# Patient Record
Sex: MALE | Race: BLACK OR AFRICAN AMERICAN | Employment: OTHER | URBAN - NONMETROPOLITAN AREA
[De-identification: names, ages, dates, MRNs, and addresses within clinical notes are randomized per-mention and may not be internally consistent; named-entity substitution may affect disease eponyms.]

---

## 2017-01-26 RX ORDER — SITAGLIPTIN 100 MG/1
TABLET, FILM COATED ORAL
Qty: 90 TABLET | Refills: 3 | Status: SHIPPED | OUTPATIENT
Start: 2017-01-26 | End: 2017-03-22

## 2017-03-22 ENCOUNTER — OFFICE VISIT (OUTPATIENT)
Dept: FAMILY MEDICINE CLINIC | Age: 72
End: 2017-03-22

## 2017-03-22 VITALS
DIASTOLIC BLOOD PRESSURE: 76 MMHG | OXYGEN SATURATION: 98 % | BODY MASS INDEX: 33.29 KG/M2 | WEIGHT: 245.8 LBS | SYSTOLIC BLOOD PRESSURE: 126 MMHG | HEIGHT: 72 IN | HEART RATE: 60 BPM

## 2017-03-22 DIAGNOSIS — N40.0 BENIGN NON-NODULAR PROSTATIC HYPERPLASIA WITHOUT LOWER URINARY TRACT SYMPTOMS: ICD-10-CM

## 2017-03-22 DIAGNOSIS — E11.42 TYPE 2 DIABETES MELLITUS WITH DIABETIC POLYNEUROPATHY, WITHOUT LONG-TERM CURRENT USE OF INSULIN (HCC): ICD-10-CM

## 2017-03-22 DIAGNOSIS — I10 HTN (HYPERTENSION), BENIGN: ICD-10-CM

## 2017-03-22 DIAGNOSIS — N40.1 BENIGN NON-NODULAR PROSTATIC HYPERPLASIA WITH LOWER URINARY TRACT SYMPTOMS: ICD-10-CM

## 2017-03-22 DIAGNOSIS — E78.00 PURE HYPERCHOLESTEROLEMIA: ICD-10-CM

## 2017-03-22 DIAGNOSIS — E11.41 TYPE 2 DIABETES MELLITUS WITH DIABETIC MONONEUROPATHY, WITHOUT LONG-TERM CURRENT USE OF INSULIN (HCC): ICD-10-CM

## 2017-03-22 DIAGNOSIS — F41.9 ANXIETY: ICD-10-CM

## 2017-03-22 DIAGNOSIS — Z23 NEED FOR TDAP VACCINATION: Primary | ICD-10-CM

## 2017-03-22 DIAGNOSIS — Z23 NEED FOR VACCINATION WITH 13-POLYVALENT PNEUMOCOCCAL CONJUGATE VACCINE: ICD-10-CM

## 2017-03-22 PROCEDURE — G8598 ASA/ANTIPLAT THER USED: HCPCS | Performed by: FAMILY MEDICINE

## 2017-03-22 PROCEDURE — 1036F TOBACCO NON-USER: CPT | Performed by: FAMILY MEDICINE

## 2017-03-22 PROCEDURE — 1123F ACP DISCUSS/DSCN MKR DOCD: CPT | Performed by: FAMILY MEDICINE

## 2017-03-22 PROCEDURE — G8427 DOCREV CUR MEDS BY ELIG CLIN: HCPCS | Performed by: FAMILY MEDICINE

## 2017-03-22 PROCEDURE — 90670 PCV13 VACCINE IM: CPT | Performed by: FAMILY MEDICINE

## 2017-03-22 PROCEDURE — G8417 CALC BMI ABV UP PARAM F/U: HCPCS | Performed by: FAMILY MEDICINE

## 2017-03-22 PROCEDURE — G0009 ADMIN PNEUMOCOCCAL VACCINE: HCPCS | Performed by: FAMILY MEDICINE

## 2017-03-22 PROCEDURE — 36415 COLL VENOUS BLD VENIPUNCTURE: CPT | Performed by: FAMILY MEDICINE

## 2017-03-22 PROCEDURE — 3045F PR MOST RECENT HEMOGLOBIN A1C LEVEL 7.0-9.0%: CPT | Performed by: FAMILY MEDICINE

## 2017-03-22 PROCEDURE — 4040F PNEUMOC VAC/ADMIN/RCVD: CPT | Performed by: FAMILY MEDICINE

## 2017-03-22 PROCEDURE — 99214 OFFICE O/P EST MOD 30 MIN: CPT | Performed by: FAMILY MEDICINE

## 2017-03-22 PROCEDURE — 3017F COLORECTAL CA SCREEN DOC REV: CPT | Performed by: FAMILY MEDICINE

## 2017-03-22 PROCEDURE — G8484 FLU IMMUNIZE NO ADMIN: HCPCS | Performed by: FAMILY MEDICINE

## 2017-03-22 RX ORDER — CANAGLIFLOZIN 100 MG/1
TABLET, FILM COATED ORAL
COMMUNITY
Start: 2017-03-13 | End: 2017-07-24 | Stop reason: SDUPTHER

## 2017-03-22 ASSESSMENT — PATIENT HEALTH QUESTIONNAIRE - PHQ9
1. LITTLE INTEREST OR PLEASURE IN DOING THINGS: 0
2. FEELING DOWN, DEPRESSED OR HOPELESS: 0
SUM OF ALL RESPONSES TO PHQ9 QUESTIONS 1 & 2: 0
SUM OF ALL RESPONSES TO PHQ QUESTIONS 1-9: 0

## 2017-03-23 LAB
CHOLESTEROL, TOTAL: 270 MG/DL (ref 0–199)
CREATININE URINE: 68.2 MG/DL (ref 39–259)
ESTIMATED AVERAGE GLUCOSE: 191.5 MG/DL
GLUCOSE BLD-MCNC: 140 MG/DL (ref 70–99)
HBA1C MFR BLD: 8.3 %
HDLC SERPL-MCNC: 56 MG/DL (ref 40–60)
LDL CHOLESTEROL CALCULATED: 189 MG/DL
MICROALBUMIN UR-MCNC: <1.2 MG/DL
MICROALBUMIN/CREAT UR-RTO: NORMAL MG/G (ref 0–30)
TRIGL SERPL-MCNC: 123 MG/DL (ref 0–150)
VLDLC SERPL CALC-MCNC: 25 MG/DL

## 2017-03-27 DIAGNOSIS — E11.41 CONTROLLED TYPE 2 DIABETES MELLITUS WITH DIABETIC MONONEUROPATHY, WITHOUT LONG-TERM CURRENT USE OF INSULIN (HCC): ICD-10-CM

## 2017-03-27 DIAGNOSIS — E11.42 TYPE 2 DIABETES MELLITUS WITH DIABETIC POLYNEUROPATHY, WITHOUT LONG-TERM CURRENT USE OF INSULIN (HCC): ICD-10-CM

## 2017-03-27 DIAGNOSIS — E78.00 PURE HYPERCHOLESTEROLEMIA: ICD-10-CM

## 2017-03-27 RX ORDER — GLIMEPIRIDE 1 MG/1
1 TABLET ORAL
Qty: 30 TABLET | Refills: 11 | Status: SHIPPED | OUTPATIENT
Start: 2017-03-27 | End: 2017-11-16 | Stop reason: SDUPTHER

## 2017-03-27 RX ORDER — ROSUVASTATIN CALCIUM 40 MG/1
TABLET, COATED ORAL
Qty: 30 TABLET | Refills: 11 | Status: SHIPPED | OUTPATIENT
Start: 2017-03-27 | End: 2018-05-24 | Stop reason: SDUPTHER

## 2017-03-27 RX ORDER — AMLODIPINE BESYLATE 10 MG/1
TABLET ORAL
Qty: 30 TABLET | Refills: 11 | Status: SHIPPED | OUTPATIENT
Start: 2017-03-27 | End: 2018-03-30 | Stop reason: SDUPTHER

## 2017-03-28 RX ORDER — AMLODIPINE BESYLATE 10 MG/1
TABLET ORAL
Qty: 30 TABLET | Refills: 11 | Status: SHIPPED | OUTPATIENT
Start: 2017-03-28 | End: 2017-07-24 | Stop reason: SDUPTHER

## 2017-04-08 ENCOUNTER — TELEPHONE (OUTPATIENT)
Dept: FAMILY MEDICINE CLINIC | Age: 72
End: 2017-04-08

## 2017-04-08 DIAGNOSIS — E11.9 DIABETES MELLITUS TYPE II, CONTROLLED (HCC): ICD-10-CM

## 2017-04-10 RX ORDER — METFORMIN HYDROCHLORIDE 500 MG/1
TABLET, EXTENDED RELEASE ORAL
Qty: 60 TABLET | Refills: 11 | Status: SHIPPED | OUTPATIENT
Start: 2017-04-10 | End: 2017-07-24 | Stop reason: SDUPTHER

## 2017-04-20 RX ORDER — CANAGLIFLOZIN 100 MG/1
TABLET, FILM COATED ORAL
Qty: 15 TABLET | Refills: 10 | Status: SHIPPED | OUTPATIENT
Start: 2017-04-20 | End: 2018-01-23

## 2017-05-11 RX ORDER — DIAZEPAM 5 MG/1
TABLET ORAL
Qty: 45 TABLET | Refills: 0 | Status: SHIPPED | OUTPATIENT
Start: 2017-05-11 | End: 2017-07-12 | Stop reason: SDUPTHER

## 2017-05-24 RX ORDER — DIAZEPAM 5 MG/1
TABLET ORAL
Qty: 45 TABLET | Refills: 0 | OUTPATIENT
Start: 2017-05-24

## 2017-07-13 RX ORDER — DIAZEPAM 5 MG/1
TABLET ORAL
Qty: 45 TABLET | Refills: 0 | Status: SHIPPED | OUTPATIENT
Start: 2017-07-13 | End: 2017-09-25 | Stop reason: SDUPTHER

## 2017-07-24 ENCOUNTER — OFFICE VISIT (OUTPATIENT)
Dept: FAMILY MEDICINE CLINIC | Age: 72
End: 2017-07-24

## 2017-07-24 VITALS
OXYGEN SATURATION: 97 % | WEIGHT: 242.2 LBS | BODY MASS INDEX: 32.8 KG/M2 | SYSTOLIC BLOOD PRESSURE: 122 MMHG | HEART RATE: 74 BPM | HEIGHT: 72 IN | DIASTOLIC BLOOD PRESSURE: 80 MMHG

## 2017-07-24 DIAGNOSIS — E11.42 TYPE 2 DIABETES MELLITUS WITH DIABETIC POLYNEUROPATHY, WITHOUT LONG-TERM CURRENT USE OF INSULIN (HCC): ICD-10-CM

## 2017-07-24 DIAGNOSIS — N40.0 BENIGN NON-NODULAR PROSTATIC HYPERPLASIA WITHOUT LOWER URINARY TRACT SYMPTOMS: ICD-10-CM

## 2017-07-24 DIAGNOSIS — F41.9 ANXIETY: ICD-10-CM

## 2017-07-24 DIAGNOSIS — Z13.6 SCREENING FOR AAA (ABDOMINAL AORTIC ANEURYSM): ICD-10-CM

## 2017-07-24 DIAGNOSIS — I10 HTN (HYPERTENSION), BENIGN: ICD-10-CM

## 2017-07-24 DIAGNOSIS — E11.41 CONTROLLED TYPE 2 DIABETES MELLITUS WITH DIABETIC MONONEUROPATHY, WITHOUT LONG-TERM CURRENT USE OF INSULIN (HCC): Primary | ICD-10-CM

## 2017-07-24 DIAGNOSIS — E78.00 PURE HYPERCHOLESTEROLEMIA: ICD-10-CM

## 2017-07-24 LAB — HBA1C MFR BLD: 8.4 %

## 2017-07-24 PROCEDURE — 1036F TOBACCO NON-USER: CPT | Performed by: FAMILY MEDICINE

## 2017-07-24 PROCEDURE — G8427 DOCREV CUR MEDS BY ELIG CLIN: HCPCS | Performed by: FAMILY MEDICINE

## 2017-07-24 PROCEDURE — 99214 OFFICE O/P EST MOD 30 MIN: CPT | Performed by: FAMILY MEDICINE

## 2017-07-24 PROCEDURE — 3017F COLORECTAL CA SCREEN DOC REV: CPT | Performed by: FAMILY MEDICINE

## 2017-07-24 PROCEDURE — 3046F HEMOGLOBIN A1C LEVEL >9.0%: CPT | Performed by: FAMILY MEDICINE

## 2017-07-24 PROCEDURE — 1123F ACP DISCUSS/DSCN MKR DOCD: CPT | Performed by: FAMILY MEDICINE

## 2017-07-24 PROCEDURE — 83036 HEMOGLOBIN GLYCOSYLATED A1C: CPT | Performed by: FAMILY MEDICINE

## 2017-07-24 PROCEDURE — G8598 ASA/ANTIPLAT THER USED: HCPCS | Performed by: FAMILY MEDICINE

## 2017-07-24 PROCEDURE — 4040F PNEUMOC VAC/ADMIN/RCVD: CPT | Performed by: FAMILY MEDICINE

## 2017-07-24 PROCEDURE — G8417 CALC BMI ABV UP PARAM F/U: HCPCS | Performed by: FAMILY MEDICINE

## 2017-07-24 RX ORDER — PREGABALIN 75 MG/1
CAPSULE ORAL
Qty: 60 CAPSULE | Refills: 3 | Status: SHIPPED | OUTPATIENT
Start: 2017-07-24 | End: 2017-10-24

## 2017-07-24 RX ORDER — METFORMIN HYDROCHLORIDE 500 MG/1
1000 TABLET, EXTENDED RELEASE ORAL 2 TIMES DAILY
Qty: 120 TABLET | Refills: 11 | Status: SHIPPED | OUTPATIENT
Start: 2017-07-24 | End: 2018-04-23 | Stop reason: ALTCHOICE

## 2017-07-25 PROBLEM — E11.42 TYPE 2 DIABETES MELLITUS WITH DIABETIC POLYNEUROPATHY, WITHOUT LONG-TERM CURRENT USE OF INSULIN (HCC): Status: ACTIVE | Noted: 2017-07-25

## 2017-07-27 LAB
6-ACETYLMORPHINE: NOT DETECTED
7-AMINOCLONAZEPAM: NOT DETECTED
ALPHA-OH-ALPRAZOLAM: NOT DETECTED
ALPRAZOLAM: NOT DETECTED
AMPHETAMINE: NOT DETECTED
BARBITURATES: NOT DETECTED
BENZOYLECGONINE: NOT DETECTED
BUPRENORPHINE: NOT DETECTED
CARISOPRODOL: NOT DETECTED
CLONAZEPAM: NOT DETECTED
CODEINE: NOT DETECTED
CREATININE URINE: 70.1 MG/DL (ref 20–400)
DIAZEPAM: NOT DETECTED
DRUGS EXPECTED: NORMAL
EER PAIN MGT DRUG PANEL, HIGH RES/EMIT U: NORMAL
ETHYL GLUCURONIDE: NOT DETECTED
FENTANYL: NOT DETECTED
HYDROCODONE: NOT DETECTED
HYDROMORPHONE: NOT DETECTED
LORAZEPAM: NOT DETECTED
MARIJUANA METABOLITE: NOT DETECTED
MDA: NOT DETECTED
MDEA: NOT DETECTED
MDMA URINE: NOT DETECTED
MEPERIDINE: NOT DETECTED
METHADONE: NOT DETECTED
METHAMPHETAMINE: NOT DETECTED
METHYLPHENIDATE: NOT DETECTED
MIDAZOLAM: NOT DETECTED
MORPHINE: NOT DETECTED
NORBUPRENORPHINE, FREE: NOT DETECTED
NORDIAZEPAM: PRESENT
NORFENTANYL: NOT DETECTED
NORHYDROCODONE, URINE: NOT DETECTED
NOROXYCODONE: NOT DETECTED
NOROXYMORPHONE, URINE: NOT DETECTED
OXAZEPAM: PRESENT
OXYCODONE: NOT DETECTED
OXYMORPHONE: NOT DETECTED
PAIN MANAGEMENT DRUG PANEL: NORMAL
PAIN MANAGEMENT DRUG PANEL: NORMAL
PCP: NOT DETECTED
PHENTERMINE: NOT DETECTED
PROPOXYPHENE: NOT DETECTED
TAPENTADOL, URINE: NOT DETECTED
TAPENTADOL-O-SULFATE, URINE: NOT DETECTED
TEMAZEPAM: PRESENT
TRAMADOL: NOT DETECTED
ZOLPIDEM: NOT DETECTED

## 2017-08-23 ENCOUNTER — HOSPITAL ENCOUNTER (OUTPATIENT)
Dept: ULTRASOUND IMAGING | Age: 72
Discharge: HOME | End: 2017-08-23
Attending: FAMILY MEDICINE | Admitting: FAMILY MEDICINE

## 2017-08-23 DIAGNOSIS — Z13.6 ENCOUNTER FOR SCREENING FOR CARDIOVASCULAR DISORDERS: ICD-10-CM

## 2017-08-23 DIAGNOSIS — Z13.6 SCREENING FOR AAA (ABDOMINAL AORTIC ANEURYSM): ICD-10-CM

## 2017-09-25 DIAGNOSIS — F41.9 ANXIETY: ICD-10-CM

## 2017-09-25 RX ORDER — DIAZEPAM 5 MG/1
TABLET ORAL
Qty: 45 TABLET | Refills: 0 | Status: SHIPPED | OUTPATIENT
Start: 2017-09-25 | End: 2018-01-03 | Stop reason: SDUPTHER

## 2017-10-24 ENCOUNTER — OFFICE VISIT (OUTPATIENT)
Dept: FAMILY MEDICINE CLINIC | Age: 72
End: 2017-10-24

## 2017-10-24 VITALS
SYSTOLIC BLOOD PRESSURE: 126 MMHG | BODY MASS INDEX: 32.23 KG/M2 | HEART RATE: 83 BPM | DIASTOLIC BLOOD PRESSURE: 78 MMHG | HEIGHT: 72 IN | OXYGEN SATURATION: 97 % | WEIGHT: 238 LBS

## 2017-10-24 DIAGNOSIS — E11.41 CONTROLLED TYPE 2 DIABETES MELLITUS WITH DIABETIC MONONEUROPATHY, WITHOUT LONG-TERM CURRENT USE OF INSULIN (HCC): Primary | ICD-10-CM

## 2017-10-24 DIAGNOSIS — F41.9 ANXIETY: ICD-10-CM

## 2017-10-24 DIAGNOSIS — N40.0 BENIGN NON-NODULAR PROSTATIC HYPERPLASIA WITHOUT LOWER URINARY TRACT SYMPTOMS: ICD-10-CM

## 2017-10-24 DIAGNOSIS — I10 HTN (HYPERTENSION), BENIGN: ICD-10-CM

## 2017-10-24 DIAGNOSIS — L91.0 KELOID: ICD-10-CM

## 2017-10-24 DIAGNOSIS — I25.10 CAD IN NATIVE ARTERY: ICD-10-CM

## 2017-10-24 DIAGNOSIS — E11.42 TYPE 2 DIABETES MELLITUS WITH DIABETIC POLYNEUROPATHY, WITHOUT LONG-TERM CURRENT USE OF INSULIN (HCC): ICD-10-CM

## 2017-10-24 DIAGNOSIS — K59.00 CONSTIPATION, UNSPECIFIED CONSTIPATION TYPE: ICD-10-CM

## 2017-10-24 DIAGNOSIS — Z23 NEED FOR PROPHYLACTIC VACCINATION AGAINST DIPHTHERIA-TETANUS-PERTUSSIS (DTP): ICD-10-CM

## 2017-10-24 DIAGNOSIS — Z23 NEED FOR INFLUENZA VACCINATION: ICD-10-CM

## 2017-10-24 DIAGNOSIS — E78.00 PURE HYPERCHOLESTEROLEMIA: ICD-10-CM

## 2017-10-24 LAB — HBA1C MFR BLD: 8.4 %

## 2017-10-24 PROCEDURE — 4040F PNEUMOC VAC/ADMIN/RCVD: CPT | Performed by: FAMILY MEDICINE

## 2017-10-24 PROCEDURE — 3045F PR MOST RECENT HEMOGLOBIN A1C LEVEL 7.0-9.0%: CPT | Performed by: FAMILY MEDICINE

## 2017-10-24 PROCEDURE — G8598 ASA/ANTIPLAT THER USED: HCPCS | Performed by: FAMILY MEDICINE

## 2017-10-24 PROCEDURE — G8417 CALC BMI ABV UP PARAM F/U: HCPCS | Performed by: FAMILY MEDICINE

## 2017-10-24 PROCEDURE — 3017F COLORECTAL CA SCREEN DOC REV: CPT | Performed by: FAMILY MEDICINE

## 2017-10-24 PROCEDURE — G8484 FLU IMMUNIZE NO ADMIN: HCPCS | Performed by: FAMILY MEDICINE

## 2017-10-24 PROCEDURE — G8427 DOCREV CUR MEDS BY ELIG CLIN: HCPCS | Performed by: FAMILY MEDICINE

## 2017-10-24 PROCEDURE — G0008 ADMIN INFLUENZA VIRUS VAC: HCPCS | Performed by: FAMILY MEDICINE

## 2017-10-24 PROCEDURE — 83036 HEMOGLOBIN GLYCOSYLATED A1C: CPT | Performed by: FAMILY MEDICINE

## 2017-10-24 PROCEDURE — 90688 IIV4 VACCINE SPLT 0.5 ML IM: CPT | Performed by: FAMILY MEDICINE

## 2017-10-24 PROCEDURE — 1123F ACP DISCUSS/DSCN MKR DOCD: CPT | Performed by: FAMILY MEDICINE

## 2017-10-24 PROCEDURE — 1036F TOBACCO NON-USER: CPT | Performed by: FAMILY MEDICINE

## 2017-10-24 PROCEDURE — 99214 OFFICE O/P EST MOD 30 MIN: CPT | Performed by: FAMILY MEDICINE

## 2017-10-24 NOTE — PROGRESS NOTES
SUBJECTIVE:    10/24/2017    Adan Tripp (: 1945)    81 y.o.    male    Prior to Visit Medications    Medication Sig Taking? Authorizing Provider   Tdap (ADACEL) 5-2-15.5 LF-MCG/0.5 injection Inject 0.5 mLs into the muscle once for 1 dose Yes Sonam Cole MD   diazepam (VALIUM) 5 MG tablet TAKE ONE TABLET BY MOUTH THREE TIMES A DAY AS NEEDED Yes Milena Jacobs CNP   pregabalin (LYRICA) 75 MG capsule 1 nightly for a week, then 1 bid Yes Sonam Cole MD   metFORMIN (GLUCOPHAGE-XR) 500 MG extended release tablet Take 2 tablets by mouth 2 times daily Yes Sonam Cole MD   INVOKANA 100 MG TABS tablet TAKE ONE TABLET BY MOUTH EVERY MORNING BEFORE BREAKFAST Yes Supriya Jiménez NP   amLODIPine (NORVASC) 10 MG tablet TAKE ONE TABLET BY MOUTH DAILY Yes Sonam Cole MD   rosuvastatin (CRESTOR) 40 MG tablet 1 daily Yes Sonam Cole MD   glimepiride (AMARYL) 1 MG tablet Take 1 tablet by mouth every morning (before breakfast) Yes Sonam Cole MD   lisinopril (PRINIVIL;ZESTRIL) 40 MG tablet TAKE ONE TABLET BY MOUTH DAILY Yes Cecily Enriquez NP   finasteride (PROSCAR) 5 MG tablet Take 1 tablet by mouth daily Yes Sonam Cole MD   glucose blood VI test strips (ONE TOUCH ULTRA TEST) strip TEST TWICE A DAY Yes Sonam Cole MD   tamsulosin (FLOMAX) 0.4 MG capsule TAKE TWO CAPSULEs BY MOUTH EVERY DAY 1/2 HOUR AFTER THE SAME MEAL EACH DAY Yes Sonam Cole MD   Omega-3 Fatty Acids (FISH OIL) 1000 MG CAPS Take 1 capsule by mouth 2 times daily Yes Cecily Enriquez NP   Nitroglycerin 400 MCG/SPRAY AERS Place 1 tablet onto the tongue every 5 minutes as needed. Yes Sonam Cole MD   isosorbide mononitrate (IMDUR) 60 MG CR tablet Take 60 mg by mouth continuous prn. Yes Historical Provider, MD   aspirin 325 MG tablet Take 325 mg by mouth daily.  Yes Historical Provider, MD   atenolol (TENORMIN) 25 MG tablet Take 50 mg by mouth daily. Yes Historical Provider, MD   He did not use the Lyrica. Had the AAA screening and it was OK. ALLERGIES:  Allergies   Allergen Reactions    Prednisone Other (See Comments)     Made patient very agitated (medrol dospak)    Codeine Other (See Comments)     constipation       Chief Complaint   Patient presents with    Diarrhea     3 month f/u - constipation alternating with lose stool (had colonoscopy recently but wasn't cleaned out so he has to repeat the study).  Diabetes   Uses the Valium before bed or if he wakes up early he will take one. HPI  Barrett Blair has to have his colonoscopy repeated, he was not cleaned out adequately. Still has constipation alternating with loose stool. Dr. Soheila Castro gave him lactulose but he has not taken because that upsets his stomach. Suggested he may just try a half a dose of the lactulose daily. His sugars been running a little bit better. Increase the metformin XR. He understands that the metformin to may have some effect on his bowels but he was pleased with the improvement in his sugar. Is here for high blood pressure. Watching salt intake. Blood pressure checked at home - no. Numbers good if checked? NA. Denies chest pain. Denies shortness of breath. Taking medications as prescribed. Is here for diabetes. BGs consistently in an acceptable range. No increased thirst or increased urination. No dizziness, shakiness, or cold sweats. Is here for cholesterol. Tolerating medication. Trying to watch low-fat diet. Weight stable. No change in exercise. HX: (> 3 status chronic/inact. Prob) or  Wt Readings from Last 3 Encounters:   10/24/17 238 lb (108 kg)   07/24/17 242 lb 3.2 oz (109.9 kg)   03/22/17 245 lb 12.8 oz (111.5 kg)       Occupation Retired teacher now works security              HPI:  (>4 )  LOCATION:  QUALITY:SEVERITY:  DURATION:  TIMING:  CONTEXT:  MOD. FACTORS:  ASSOC.  S/S:    Pertinent items are noted in HPI.  (+/- 2-9 systems)  ROS  All other systems reviewed and are negative except as noted above  Additional review of systems may be scanned into the media section of this medical record. Any responses requiring further intervention were pursued.     Past Medical History:   Diagnosis Date    Acute MI     Angina pectoris, unstable (HCC)     ASHD (arteriosclerotic heart disease)     Bladder outlet obstruction     BPH (benign prostatic hyperplasia)     Chest pain     Constipation     Diabetes mellitus (HCC)     GERD (gastroesophageal reflux disease)     Hx of CABG     Hypercholesteremia     Hypercholesterolemia     Hypertension     IBS (irritable bowel syndrome)     Sinusitis     Type II or unspecified type diabetes mellitus without mention of complication, not stated as uncontrolled        Family History   Problem Relation Age of Onset    Diabetes Mother     High Blood Pressure Mother     Arthritis Mother        Social History   Substance Use Topics    Smoking status: Former Smoker     Packs/day: 5.00     Years: 10.00     Types: Cigarettes     Quit date: 3/13/1976    Smokeless tobacco: Never Used    Alcohol use No      Comment: 3 beers a month         Past Surgical History:   Procedure Laterality Date    CARDIAC CATHETERIZATION  2002    stent    CARDIAC CATHETERIZATION  2011    stent    COLONOSCOPY  10/24/13    CORONARY ANGIOPLASTY WITH STENT PLACEMENT         Immunization History   Administered Date(s) Administered    Influenza Vaccine, unspecified formulation 10/27/2016    Influenza Virus Vaccine 10/03/2014, 10/06/2015    Pneumococcal 13-valent Conjugate ([id number]) 03/22/2017    Pneumococcal Polysaccharide ([id number]) 02/06/2013, 10/06/2015       Vitals:    10/24/17 1416   BP: 126/78   Site: Left Arm   Position: Sitting   Cuff Size: Large Adult   Pulse: 83   SpO2: 97%   Weight: 238 lb (108 kg)   Height: 6' (1.829 m)       Estimated body mass index is 32.28 kg/m² as calculated from the following:    Height as of this encounter: 6' (1.829 m). Weight as of this encounter: 238 lb (108 kg).   OBJECTIVE:    /78 (Site: Left Arm, Position: Sitting, Cuff Size: Large Adult)   Pulse 83   Ht 6' (1.829 m)   Wt 238 lb (108 kg)   SpO2 97% Comment: RA  BMI 32.28 kg/m²   BP Readings from Last 2 Encounters:   10/24/17 126/78   07/24/17 122/80     Lab Review   Office Visit on 07/24/2017   Component Date Value    Hemoglobin A1C 07/24/2017 8.4     Drugs Expected 07/27/2017 See Interp     Pain Management Drug Pan* 07/27/2017 Consistent     Creatinine, Ur 07/27/2017 70.1     Codeine 07/27/2017 Not Detected     Morphine 07/27/2017 Not Detected     6-Acetylmorphine 07/27/2017 Not Detected     Oxycodone 07/27/2017 Not Detected     Noroxycodone 07/27/2017 Not Detected     Oxymorphone 07/27/2017 Not Detected     NOROXYMORPHONE, URINE 07/27/2017 Not Detected     Hydrocodone 07/27/2017 Not Detected     NORHYDROCODONE, URINE 07/27/2017 Not Detected     Hydromorphone 07/27/2017 Not Detected     Buprenorphine 07/27/2017 Not Detected     Norbuprenorphine 07/27/2017 Not Detected     Fentanyl 07/27/2017 Not Detected     Norfentanyl 07/27/2017 Not Detected     Meperidine 07/27/2017 Not Detected     Tapentadol, Urine 07/27/2017 Not Detected     Tapentadol-O-Sulfate, Ur* 07/27/2017 Not Detected     Methadone 07/27/2017 Not Detected     Propoxyphene 07/27/2017 Not Detected     Tramadol 07/27/2017 Not Detected     Amphetamine 07/27/2017 Not Detected     Methamphetamine 07/27/2017 Not Detected     MDMA URINE 07/27/2017 Not Detected     MDA 07/27/2017 Not Detected     MDEA 07/27/2017 Not Detected     Methylphenidate 07/27/2017 Not Detected     Phentermine 07/27/2017 Not Detected     Benzoylecgonine 07/27/2017 Not Detected     Alprazolam 07/27/2017 Not Detected     Alpha-OH-alprazolam 07/27/2017 Not Detected     Clonazepam 07/27/2017 Not Detected     7-aminoclonazepam 07/27/2017 Not Detected     Diazepam 07/27/2017 Not Detected     NORDIAZEPAM 07/27/2017 Present     OXAZEPAM 07/27/2017 Present     TEMAZEPAM 07/27/2017 Present     Lorazepam 07/27/2017 Not Detected     Midazolam 07/27/2017 Not Detected     Zolpidem 07/27/2017 Not Detected     Barbiturates 07/27/2017 Not Detected     Ethyl Glucuronide 07/27/2017 Not Detected     Marijuana Metabolite 07/27/2017 Not Detected     PCP 07/27/2017 Not Detected     CARISOPRODOL 07/27/2017 Not Detected     Pain Management Drug Pan* 07/27/2017 See Below     [facility] Pain Mgt Drug Panel,* 07/27/2017 See Note        Physical Exam   Constitutional: He appears well-developed and well-nourished. No distress. HENT:   Head: Normocephalic and atraumatic. Right Ear: External ear normal.   Left Ear: External ear normal.   Nose: Nose normal.   Eyes: Conjunctivae and EOM are normal. Pupils are equal, round, and reactive to light. Right eye exhibits no discharge. Left eye exhibits no discharge. No scleral icterus. Cardiovascular: Normal rate, regular rhythm and normal heart sounds. Pulmonary/Chest: Effort normal and breath sounds normal. No respiratory distress. Abdominal: Soft. There is no tenderness. Skin: Skin is warm and dry. No rash noted. He is not diaphoretic. No erythema. No pallor. Psychiatric: He has a normal mood and affect. His behavior is normal. Judgment and thought content normal.   Nursing note and vitals reviewed. ASSESSMENT:    Assessment/Plan:  Anel Alexander was seen today for diarrhea and diabetes.     Diagnoses and all orders for this visit:    Controlled type 2 diabetes mellitus with diabetic mononeuropathy, without long-term current use of insulin (HCC)  -     POCT glycosylated hemoglobin (Hb A1C)    Need for influenza vaccination  -     INFLUENZA, QUADV, 3 YRS AND OLDER, IM, MDV, 0.5ML (FLUZONE QUADV)    Need for prophylactic vaccination against diphtheria-tetanus-pertussis (DTP)  -     Tdap (ADACEL) 5-2-15.5 LF-MCG/0.5 injection; Inject 0.5 mLs into the muscle once for 1 dose    Type 2 diabetes mellitus with diabetic polyneuropathy, without long-term current use of insulin (HCC)    Benign non-nodular prostatic hyperplasia without lower urinary tract symptoms    CAD in native artery    Pure hypercholesterolemia    HTN (hypertension), benign    DM (diabetes mellitus), secondary, uncontrolled, w/neurologic complic (HCC)    Anxiety    Constipation, unspecified constipation type    Keloid        Results for POC orders placed in visit on 10/24/17   POCT glycosylated hemoglobin (Hb A1C)   Result Value Ref Range    Hemoglobin A1C 8.4 %       PLAN:    Last A1c was 8.4, recheck today. Flu shot given. Get Tdap. Reassured about keloid. He continues use diazepam appropriately. Blood pressure and cholesterol acceptable. Await follow-up colonoscopy. Repeat 6 months. His sugar control has not improved any. He had been using the Invokanna when necessary. Ask and begin using 100 mg daily. Recheck A1c 3 months. Controlled Substances Monitoring:     Attestation: The Prescription Monitoring Report for this patient was reviewed today. (Caroline Gallegos MD)  Documentation: No signs of potential drug abuse or diversion identified., Existing medication contract. (Caroline Gallegos MD)      Patient should call the office immediately with new or ongoing signs or symptoms or worsening, or proceed to the emergency room. All entries in chief complaint and history of present illness are reviewed and validated by me. No changes in past medical history, past surgical history, social history, or family history were noted during the patient encounter unless specifically listed above. All updates of past medical history, past surgical history, social history, or family history were reviewed personally by me during the office visit. All problems listed in the assessment are stable unless noted otherwise.   Medication

## 2017-11-16 DIAGNOSIS — E11.42 TYPE 2 DIABETES MELLITUS WITH DIABETIC POLYNEUROPATHY, WITHOUT LONG-TERM CURRENT USE OF INSULIN (HCC): ICD-10-CM

## 2017-11-16 DIAGNOSIS — E11.41 CONTROLLED TYPE 2 DIABETES MELLITUS WITH DIABETIC MONONEUROPATHY, WITHOUT LONG-TERM CURRENT USE OF INSULIN (HCC): ICD-10-CM

## 2017-11-16 RX ORDER — GLIMEPIRIDE 1 MG/1
1 TABLET ORAL
Qty: 30 TABLET | Refills: 11 | Status: SHIPPED | OUTPATIENT
Start: 2017-11-16 | End: 2018-01-23 | Stop reason: DRUGHIGH

## 2017-12-05 DIAGNOSIS — E11.42 TYPE 2 DIABETES MELLITUS WITH DIABETIC POLYNEUROPATHY, WITHOUT LONG-TERM CURRENT USE OF INSULIN (HCC): ICD-10-CM

## 2017-12-05 DIAGNOSIS — E11.41 CONTROLLED TYPE 2 DIABETES MELLITUS WITH DIABETIC MONONEUROPATHY, WITHOUT LONG-TERM CURRENT USE OF INSULIN (HCC): ICD-10-CM

## 2018-01-02 RX ORDER — LISINOPRIL 40 MG/1
TABLET ORAL
Qty: 30 TABLET | Refills: 11 | Status: SHIPPED | OUTPATIENT
Start: 2018-01-02 | End: 2018-11-16 | Stop reason: SDUPTHER

## 2018-01-03 RX ORDER — DIAZEPAM 5 MG/1
TABLET ORAL
Qty: 45 TABLET | Refills: 2 | Status: SHIPPED | OUTPATIENT
Start: 2018-01-03 | End: 2018-02-02

## 2018-01-15 ENCOUNTER — TELEPHONE (OUTPATIENT)
Dept: FAMILY MEDICINE CLINIC | Age: 73
End: 2018-01-15

## 2018-01-15 DIAGNOSIS — E11.42 TYPE 2 DIABETES MELLITUS WITH DIABETIC POLYNEUROPATHY, WITHOUT LONG-TERM CURRENT USE OF INSULIN (HCC): Primary | ICD-10-CM

## 2018-01-15 NOTE — TELEPHONE ENCOUNTER
Attempted to contact patient on 1/15/2018. Result: left message on the patient's voicemail asking patient to return my call. Pre-Visit planning not completed.

## 2018-01-23 ENCOUNTER — OFFICE VISIT (OUTPATIENT)
Dept: FAMILY MEDICINE CLINIC | Age: 73
End: 2018-01-23

## 2018-01-23 VITALS
WEIGHT: 242.6 LBS | HEIGHT: 72 IN | DIASTOLIC BLOOD PRESSURE: 76 MMHG | BODY MASS INDEX: 32.86 KG/M2 | OXYGEN SATURATION: 97 % | HEART RATE: 83 BPM | SYSTOLIC BLOOD PRESSURE: 122 MMHG

## 2018-01-23 DIAGNOSIS — J40 BRONCHITIS: ICD-10-CM

## 2018-01-23 DIAGNOSIS — Z12.5 SCREENING PSA (PROSTATE SPECIFIC ANTIGEN): ICD-10-CM

## 2018-01-23 DIAGNOSIS — E11.41 CONTROLLED TYPE 2 DIABETES MELLITUS WITH DIABETIC MONONEUROPATHY, WITHOUT LONG-TERM CURRENT USE OF INSULIN (HCC): Primary | ICD-10-CM

## 2018-01-23 DIAGNOSIS — E11.42 DIABETIC POLYNEUROPATHY ASSOCIATED WITH TYPE 2 DIABETES MELLITUS (HCC): ICD-10-CM

## 2018-01-23 DIAGNOSIS — E78.00 PURE HYPERCHOLESTEROLEMIA: ICD-10-CM

## 2018-01-23 DIAGNOSIS — I25.10 CAD IN NATIVE ARTERY: ICD-10-CM

## 2018-01-23 DIAGNOSIS — F41.9 ANXIETY: ICD-10-CM

## 2018-01-23 DIAGNOSIS — F51.01 PRIMARY INSOMNIA: ICD-10-CM

## 2018-01-23 DIAGNOSIS — Z11.59 ENCOUNTER FOR HEPATITIS C SCREENING TEST FOR LOW RISK PATIENT: ICD-10-CM

## 2018-01-23 DIAGNOSIS — I10 HTN (HYPERTENSION), BENIGN: ICD-10-CM

## 2018-01-23 DIAGNOSIS — Z11.4 ENCOUNTER FOR SCREENING FOR HIV: ICD-10-CM

## 2018-01-23 LAB — HBA1C MFR BLD: 8.4 %

## 2018-01-23 PROCEDURE — G8484 FLU IMMUNIZE NO ADMIN: HCPCS | Performed by: FAMILY MEDICINE

## 2018-01-23 PROCEDURE — G8427 DOCREV CUR MEDS BY ELIG CLIN: HCPCS | Performed by: FAMILY MEDICINE

## 2018-01-23 PROCEDURE — 1123F ACP DISCUSS/DSCN MKR DOCD: CPT | Performed by: FAMILY MEDICINE

## 2018-01-23 PROCEDURE — G8417 CALC BMI ABV UP PARAM F/U: HCPCS | Performed by: FAMILY MEDICINE

## 2018-01-23 PROCEDURE — 3017F COLORECTAL CA SCREEN DOC REV: CPT | Performed by: FAMILY MEDICINE

## 2018-01-23 PROCEDURE — 99214 OFFICE O/P EST MOD 30 MIN: CPT | Performed by: FAMILY MEDICINE

## 2018-01-23 PROCEDURE — 4040F PNEUMOC VAC/ADMIN/RCVD: CPT | Performed by: FAMILY MEDICINE

## 2018-01-23 PROCEDURE — 3045F PR MOST RECENT HEMOGLOBIN A1C LEVEL 7.0-9.0%: CPT | Performed by: FAMILY MEDICINE

## 2018-01-23 PROCEDURE — G8598 ASA/ANTIPLAT THER USED: HCPCS | Performed by: FAMILY MEDICINE

## 2018-01-23 PROCEDURE — 83036 HEMOGLOBIN GLYCOSYLATED A1C: CPT | Performed by: FAMILY MEDICINE

## 2018-01-23 PROCEDURE — 1036F TOBACCO NON-USER: CPT | Performed by: FAMILY MEDICINE

## 2018-01-23 RX ORDER — GLIMEPIRIDE 2 MG/1
2 TABLET ORAL
Qty: 30 TABLET | Refills: 11 | Status: SHIPPED | OUTPATIENT
Start: 2018-01-23 | End: 2018-07-23 | Stop reason: SDUPTHER

## 2018-01-23 RX ORDER — TRAZODONE HYDROCHLORIDE 100 MG/1
TABLET ORAL
Qty: 30 TABLET | Refills: 0 | Status: SHIPPED | OUTPATIENT
Start: 2018-01-23 | End: 2018-05-05 | Stop reason: SDUPTHER

## 2018-01-23 RX ORDER — BENZONATATE 200 MG/1
200 CAPSULE ORAL 3 TIMES DAILY PRN
Qty: 20 CAPSULE | Refills: 0 | Status: SHIPPED | OUTPATIENT
Start: 2018-01-23 | End: 2018-01-30

## 2018-01-23 NOTE — PATIENT INSTRUCTIONS
To use inhaler before you go to bed to help with cough. Start Glimepiride 2 mg tablets. Patient should call the office immediately with new or ongoing signs or symptoms or worsening, or proceed to the emergency room. If you are on medications which could impair your senses, you are at risk of weakness, falls, dizziness, or drowsiness. You should be careful during activities which could place you at risk of harm, such as climbing, using stairs, operating machinery, or driving vehicles. If you feel you cannot safely do these activities, you should request others to help you, or avoid the activities altogether. If you are drowsy for any other reason, you should use the same precautions as listed above.

## 2018-01-23 NOTE — LETTER
January 23, 2018     Tracee Gardiner  [address] [facility] [facility] [address] [facility] [facility]      Dear Lincoln Liu: Thank you for enrolling in [address] Please follow the instructions below to securely access your online medical record. [employer] allows you to send messages to your doctor, view your test results, renew your prescriptions, schedule appointments, and more. How Do I Sign Up? 1. In your Internet browser, go to [email or web address]  2. Click on the Sign Up Now link in the Sign In box. You will see the New Member Sign Up page. 3. Enter your [employer] Access Code exactly as it appears below. You will not need to use this code after youve completed the sign-up process. If you do not sign up before the expiration date, you must request a new code. [employer] Access Code: [email or web address]  Activation Code Expiration: 3/7/2018  9:57 AM  Enter your Social Security Number ([id number]) and Date of Birth (mm/dd/yyyy) as indicated and click Submit. You will be taken to the next sign-up page. 4. Create a [employer] ID. This will be your [employer] login ID and cannot be changed, so think of one that is secure and easy to remember. 5. Create a [employer] password. You can change your password at any time. 6. Enter your Password Reset Question and Answer. This can be used at a later time if you forget your password. 7. Enter your e-mail address. You will receive e-mail notification when new information is available in [address] 8. Click Sign Up. You can now view your medical record. Additional Information  If you have questions, please contact the physician practice where you receive care. Remember, [employer] is NOT to be used for urgent needs. For medical emergencies, dial 911. For questions regarding your [employer] account call [phone]. If you have a clinical question, please call your doctor's office.

## 2018-01-23 NOTE — PROGRESS NOTES
Chief Complaint   Patient presents with    Diabetes    Anxiety    Hypertension    Hyperlipidemia   Discussed colonoscopy. He states he has one scheduled in the next few days. BS have been bouncing around due to recent bronchitis. Stools are better. He has not been using Lactulose. Eating more fruit and vegetables. Denies chest pain. He has been coughing since Laila. Night time is worse with cough. He used Yuli Cornwall pills which worked pretty good. Discussed new regulations for controlled substances and not going to be prescribing any longer. Discussed alternate therapy for sleep. He goes to be around 9 PM and wakes around 2 AM and feels refreshed and can not go back to sleep. He takes Valium to help him go back to sleep. He has not used Invokana in a while because it makes him go to the restroom a lot. He tried to cut it in half but no improvement. HPI:  Helder Choe is a 81 y.o. (: 1945) here today for  Treatment Adherence:   Medication compliance:  compliant most of the time  Diet compliance:  compliant most of the time  Weight trend: stable  Current exercise: active at work   Barriers: none    Diabetes Mellitus Type 2: Current symptoms/problems include none. Home blood sugar records: patient tests 2 time(s) per day  Any episodes of hypoglycemia? no  Eye exam current (within one year): yes  Tobacco history: He  reports that he quit smoking about 41 years ago. His smoking use included Cigarettes. He has a 50.00 pack-year smoking history. He has never used smokeless tobacco.   Daily Aspirin? Yes    Hypertension:  Home blood pressure monitoring: Yes - usually good. He is adherent to a low sodium diet. Patient denies chest pain, shortness of breath, headache, lightheadedness, blurred vision, peripheral edema, palpitations and dry cough. Antihypertensive medication side effects: no medication side effects noted. Use of agents associated with hypertension: none.      Hyperlipidemia:

## 2018-04-18 ENCOUNTER — TELEPHONE (OUTPATIENT)
Dept: FAMILY MEDICINE CLINIC | Age: 73
End: 2018-04-18

## 2018-04-23 ENCOUNTER — OFFICE VISIT (OUTPATIENT)
Dept: FAMILY MEDICINE CLINIC | Age: 73
End: 2018-04-23

## 2018-04-23 VITALS
DIASTOLIC BLOOD PRESSURE: 72 MMHG | BODY MASS INDEX: 33.59 KG/M2 | HEART RATE: 72 BPM | WEIGHT: 248 LBS | SYSTOLIC BLOOD PRESSURE: 120 MMHG | HEIGHT: 72 IN | OXYGEN SATURATION: 96 %

## 2018-04-23 DIAGNOSIS — F51.01 PRIMARY INSOMNIA: ICD-10-CM

## 2018-04-23 DIAGNOSIS — E78.00 PURE HYPERCHOLESTEROLEMIA: ICD-10-CM

## 2018-04-23 DIAGNOSIS — E11.42 TYPE 2 DIABETES MELLITUS WITH DIABETIC POLYNEUROPATHY, WITHOUT LONG-TERM CURRENT USE OF INSULIN (HCC): ICD-10-CM

## 2018-04-23 DIAGNOSIS — E11.41 CONTROLLED TYPE 2 DIABETES MELLITUS WITH DIABETIC MONONEUROPATHY, WITHOUT LONG-TERM CURRENT USE OF INSULIN (HCC): Primary | ICD-10-CM

## 2018-04-23 LAB — HBA1C MFR BLD: 8.7 %

## 2018-04-23 PROCEDURE — 3017F COLORECTAL CA SCREEN DOC REV: CPT | Performed by: FAMILY MEDICINE

## 2018-04-23 PROCEDURE — 1036F TOBACCO NON-USER: CPT | Performed by: FAMILY MEDICINE

## 2018-04-23 PROCEDURE — G8417 CALC BMI ABV UP PARAM F/U: HCPCS | Performed by: FAMILY MEDICINE

## 2018-04-23 PROCEDURE — 99214 OFFICE O/P EST MOD 30 MIN: CPT | Performed by: FAMILY MEDICINE

## 2018-04-23 PROCEDURE — G8427 DOCREV CUR MEDS BY ELIG CLIN: HCPCS | Performed by: FAMILY MEDICINE

## 2018-04-23 PROCEDURE — G8598 ASA/ANTIPLAT THER USED: HCPCS | Performed by: FAMILY MEDICINE

## 2018-04-23 PROCEDURE — 3045F PR MOST RECENT HEMOGLOBIN A1C LEVEL 7.0-9.0%: CPT | Performed by: FAMILY MEDICINE

## 2018-04-23 PROCEDURE — 4040F PNEUMOC VAC/ADMIN/RCVD: CPT | Performed by: FAMILY MEDICINE

## 2018-04-23 PROCEDURE — 1123F ACP DISCUSS/DSCN MKR DOCD: CPT | Performed by: FAMILY MEDICINE

## 2018-04-23 PROCEDURE — 83036 HEMOGLOBIN GLYCOSYLATED A1C: CPT | Performed by: FAMILY MEDICINE

## 2018-04-23 PROCEDURE — 2022F DILAT RTA XM EVC RTNOPTHY: CPT | Performed by: FAMILY MEDICINE

## 2018-04-23 RX ORDER — DIAZEPAM 5 MG/1
TABLET ORAL
COMMUNITY
Start: 2018-03-24 | End: 2018-07-16 | Stop reason: ALTCHOICE

## 2018-04-23 ASSESSMENT — PATIENT HEALTH QUESTIONNAIRE - PHQ9
SUM OF ALL RESPONSES TO PHQ QUESTIONS 1-9: 0
2. FEELING DOWN, DEPRESSED OR HOPELESS: 0
1. LITTLE INTEREST OR PLEASURE IN DOING THINGS: 0
SUM OF ALL RESPONSES TO PHQ9 QUESTIONS 1 & 2: 0

## 2018-05-05 DIAGNOSIS — F51.01 PRIMARY INSOMNIA: ICD-10-CM

## 2018-05-07 RX ORDER — TRAZODONE HYDROCHLORIDE 100 MG/1
TABLET ORAL
Qty: 30 TABLET | Refills: 11 | Status: SHIPPED | OUTPATIENT
Start: 2018-05-07 | End: 2018-07-16

## 2018-05-08 ENCOUNTER — NURSE ONLY (OUTPATIENT)
Dept: FAMILY MEDICINE CLINIC | Age: 73
End: 2018-05-08

## 2018-05-08 DIAGNOSIS — E78.00 PURE HYPERCHOLESTEROLEMIA: ICD-10-CM

## 2018-05-08 DIAGNOSIS — Z12.5 SCREENING PSA (PROSTATE SPECIFIC ANTIGEN): ICD-10-CM

## 2018-05-08 DIAGNOSIS — I10 HTN (HYPERTENSION), BENIGN: ICD-10-CM

## 2018-05-08 DIAGNOSIS — E11.41 CONTROLLED TYPE 2 DIABETES MELLITUS WITH DIABETIC MONONEUROPATHY, WITHOUT LONG-TERM CURRENT USE OF INSULIN (HCC): ICD-10-CM

## 2018-05-08 DIAGNOSIS — I25.10 CAD IN NATIVE ARTERY: ICD-10-CM

## 2018-05-08 DIAGNOSIS — Z11.59 ENCOUNTER FOR HEPATITIS C SCREENING TEST FOR LOW RISK PATIENT: ICD-10-CM

## 2018-05-08 DIAGNOSIS — Z11.4 ENCOUNTER FOR SCREENING FOR HIV: ICD-10-CM

## 2018-05-08 LAB
A/G RATIO: 2 (ref 1.1–2.2)
ALBUMIN SERPL-MCNC: 4.7 G/DL (ref 3.4–5)
ALP BLD-CCNC: 61 U/L (ref 40–129)
ALT SERPL-CCNC: 18 U/L (ref 10–40)
ANION GAP SERPL CALCULATED.3IONS-SCNC: 16 MMOL/L (ref 3–16)
AST SERPL-CCNC: 21 U/L (ref 15–37)
BILIRUB SERPL-MCNC: 1.2 MG/DL (ref 0–1)
BUN BLDV-MCNC: 17 MG/DL (ref 7–20)
CALCIUM SERPL-MCNC: 9.3 MG/DL (ref 8.3–10.6)
CHLORIDE BLD-SCNC: 103 MMOL/L (ref 99–110)
CHOLESTEROL, TOTAL: 176 MG/DL (ref 0–199)
CO2: 21 MMOL/L (ref 21–32)
CREAT SERPL-MCNC: 1 MG/DL (ref 0.8–1.3)
CREATININE URINE: 176 MG/DL (ref 39–259)
GFR AFRICAN AMERICAN: >60
GFR NON-AFRICAN AMERICAN: >60
GLOBULIN: 2.3 G/DL
GLUCOSE BLD-MCNC: 143 MG/DL (ref 70–99)
HDLC SERPL-MCNC: 57 MG/DL (ref 40–60)
HEPATITIS C ANTIBODY INTERPRETATION: NORMAL
LDL CHOLESTEROL CALCULATED: 99 MG/DL
MICROALBUMIN UR-MCNC: 2.9 MG/DL
MICROALBUMIN/CREAT UR-RTO: 16.5 MG/G (ref 0–30)
POTASSIUM SERPL-SCNC: 4.2 MMOL/L (ref 3.5–5.1)
PROSTATE SPECIFIC ANTIGEN: 3.86 NG/ML (ref 0–4)
SODIUM BLD-SCNC: 140 MMOL/L (ref 136–145)
TOTAL PROTEIN: 7 G/DL (ref 6.4–8.2)
TRIGL SERPL-MCNC: 100 MG/DL (ref 0–150)
VLDLC SERPL CALC-MCNC: 20 MG/DL

## 2018-05-08 PROCEDURE — 36415 COLL VENOUS BLD VENIPUNCTURE: CPT | Performed by: FAMILY MEDICINE

## 2018-05-09 LAB
ESTIMATED AVERAGE GLUCOSE: 180 MG/DL
HBA1C MFR BLD: 7.9 %
HIV AG/AB: NORMAL
HIV ANTIGEN: NORMAL
HIV-1 ANTIBODY: NORMAL
HIV-2 AB: NORMAL

## 2018-05-24 DIAGNOSIS — E78.00 PURE HYPERCHOLESTEROLEMIA: ICD-10-CM

## 2018-05-24 RX ORDER — ROSUVASTATIN CALCIUM 40 MG/1
TABLET, COATED ORAL
Qty: 30 TABLET | Refills: 10 | Status: SHIPPED | OUTPATIENT
Start: 2018-05-24 | End: 2019-02-11 | Stop reason: SDUPTHER

## 2018-07-10 ENCOUNTER — TELEPHONE (OUTPATIENT)
Dept: FAMILY MEDICINE CLINIC | Age: 73
End: 2018-07-10

## 2018-07-10 DIAGNOSIS — E11.42 TYPE 2 DIABETES MELLITUS WITH DIABETIC POLYNEUROPATHY, WITHOUT LONG-TERM CURRENT USE OF INSULIN (HCC): Primary | ICD-10-CM

## 2018-07-16 DIAGNOSIS — F51.01 PRIMARY INSOMNIA: ICD-10-CM

## 2018-07-16 NOTE — TELEPHONE ENCOUNTER
Dr. Reynaga Can:    Notes: This patient has an upcoming appointment with you for Diabetes and Hyperlipidemia. In planning for that visit I have completed the following pre-visit planning:     Pre-Visit Planning Checklist:  Patient contacted: yes  Verified patient by name and date of birth: yes    Health Maintenance items reviewed:    No pre-visit planning health maintenance topics to review at this time    Labs and procedures pended: Non-Fasting Hemoglobin A1C    Labs and procedures discussed with patient: yes  Reminded patient to check with their insurance company about coverage for lab tests and lab location: no    Preliminary Medication Reconciliation: was performed. Reminded patient to arrive early: yes    Please complete the med-reconciliation and sign the appropriate labs as soon as possible.       Maribell Bran, [address]  Pre-Services Specialist

## 2018-07-23 ENCOUNTER — OFFICE VISIT (OUTPATIENT)
Dept: FAMILY MEDICINE CLINIC | Age: 73
End: 2018-07-23

## 2018-07-23 VITALS
HEART RATE: 79 BPM | OXYGEN SATURATION: 96 % | SYSTOLIC BLOOD PRESSURE: 138 MMHG | HEIGHT: 72 IN | BODY MASS INDEX: 32.43 KG/M2 | WEIGHT: 239.4 LBS | DIASTOLIC BLOOD PRESSURE: 80 MMHG

## 2018-07-23 DIAGNOSIS — I25.10 CAD IN NATIVE ARTERY: ICD-10-CM

## 2018-07-23 DIAGNOSIS — I10 HTN (HYPERTENSION), BENIGN: ICD-10-CM

## 2018-07-23 DIAGNOSIS — F41.9 ANXIETY: ICD-10-CM

## 2018-07-23 DIAGNOSIS — E11.42 TYPE 2 DIABETES MELLITUS WITH DIABETIC POLYNEUROPATHY, WITHOUT LONG-TERM CURRENT USE OF INSULIN (HCC): Primary | ICD-10-CM

## 2018-07-23 DIAGNOSIS — Z86.010 HX OF COLONIC POLYPS: ICD-10-CM

## 2018-07-23 DIAGNOSIS — E11.41 CONTROLLED TYPE 2 DIABETES MELLITUS WITH DIABETIC MONONEUROPATHY, WITHOUT LONG-TERM CURRENT USE OF INSULIN (HCC): ICD-10-CM

## 2018-07-23 DIAGNOSIS — E78.00 PURE HYPERCHOLESTEROLEMIA: ICD-10-CM

## 2018-07-23 LAB — HBA1C MFR BLD: 7.9 %

## 2018-07-23 PROCEDURE — 1036F TOBACCO NON-USER: CPT | Performed by: FAMILY MEDICINE

## 2018-07-23 PROCEDURE — G8417 CALC BMI ABV UP PARAM F/U: HCPCS | Performed by: FAMILY MEDICINE

## 2018-07-23 PROCEDURE — 1123F ACP DISCUSS/DSCN MKR DOCD: CPT | Performed by: FAMILY MEDICINE

## 2018-07-23 PROCEDURE — G8598 ASA/ANTIPLAT THER USED: HCPCS | Performed by: FAMILY MEDICINE

## 2018-07-23 PROCEDURE — 3045F PR MOST RECENT HEMOGLOBIN A1C LEVEL 7.0-9.0%: CPT | Performed by: FAMILY MEDICINE

## 2018-07-23 PROCEDURE — 99214 OFFICE O/P EST MOD 30 MIN: CPT | Performed by: FAMILY MEDICINE

## 2018-07-23 PROCEDURE — 1101F PT FALLS ASSESS-DOCD LE1/YR: CPT | Performed by: FAMILY MEDICINE

## 2018-07-23 PROCEDURE — 4040F PNEUMOC VAC/ADMIN/RCVD: CPT | Performed by: FAMILY MEDICINE

## 2018-07-23 PROCEDURE — G8427 DOCREV CUR MEDS BY ELIG CLIN: HCPCS | Performed by: FAMILY MEDICINE

## 2018-07-23 PROCEDURE — 3017F COLORECTAL CA SCREEN DOC REV: CPT | Performed by: FAMILY MEDICINE

## 2018-07-23 PROCEDURE — 2022F DILAT RTA XM EVC RTNOPTHY: CPT | Performed by: FAMILY MEDICINE

## 2018-07-23 PROCEDURE — 83036 HEMOGLOBIN GLYCOSYLATED A1C: CPT | Performed by: FAMILY MEDICINE

## 2018-07-23 RX ORDER — GLIMEPIRIDE 4 MG/1
4 TABLET ORAL
Qty: 30 TABLET | Refills: 5 | Status: SHIPPED | OUTPATIENT
Start: 2018-07-23 | End: 2018-08-13 | Stop reason: SDUPTHER

## 2018-07-23 NOTE — PATIENT INSTRUCTIONS
With the Glimepiride 2 mg tablets that you have at home, take 2 tablets a day to use them up. Then take the new prescription of Glimepiride 4 mg daily. This has been sent to [facility]. Keep Invokana the same. Patient should call the office immediately with new or ongoing signs or symptoms or worsening, or proceed to the emergency room. If you are on medications which could impair your senses, you are at risk of weakness, falls, dizziness, or drowsiness. You should be careful during activities which could place you at risk of harm, such as climbing, using stairs, operating machinery, or driving vehicles. If you feel you cannot safely do these activities, you should request others to help you, or avoid the activities altogether. If you are drowsy for any other reason, you should use the same precautions as listed above.

## 2018-08-13 ENCOUNTER — TELEPHONE (OUTPATIENT)
Dept: FAMILY MEDICINE CLINIC | Age: 73
End: 2018-08-13

## 2018-08-13 DIAGNOSIS — E11.41 CONTROLLED TYPE 2 DIABETES MELLITUS WITH DIABETIC MONONEUROPATHY, WITHOUT LONG-TERM CURRENT USE OF INSULIN (HCC): ICD-10-CM

## 2018-08-13 RX ORDER — GLIMEPIRIDE 4 MG/1
4 TABLET ORAL
Qty: 90 TABLET | Refills: 3 | Status: SHIPPED | OUTPATIENT
Start: 2018-08-13 | End: 2018-10-22 | Stop reason: SDUPTHER

## 2018-08-13 NOTE — TELEPHONE ENCOUNTER
Jeferson called in, patient got his Amaryl 4 mg tablets back in July as once daily which is what was sent, apparently the patient was confused and has been taking 4 mg - 2 tablets with breakfast daily -     Do you want to check his blood work since he's been taking 8 mg each morning?    Jeferson is calling the patient to inform him he was only to take 1 - 4mg tablet at breakfast as previously ordered in July

## 2018-08-13 NOTE — TELEPHONE ENCOUNTER
IV site removed and was intact. No active bleeding noted. Discharge instructions and educational printouts given to pt and discussed thoroughly. Patient verbalized clear understanding of all discussed. Patient d/c'd via w/c with escort service and family with all of patient's belongings. At present no distress noted.   May do a follow-up hemoglobin A1c

## 2018-08-15 ENCOUNTER — NURSE ONLY (OUTPATIENT)
Dept: FAMILY MEDICINE CLINIC | Age: 73
End: 2018-08-15

## 2018-08-15 DIAGNOSIS — E11.41 CONTROLLED TYPE 2 DIABETES MELLITUS WITH DIABETIC MONONEUROPATHY, WITHOUT LONG-TERM CURRENT USE OF INSULIN (HCC): Primary | ICD-10-CM

## 2018-08-15 DIAGNOSIS — E11.41 CONTROLLED TYPE 2 DIABETES MELLITUS WITH DIABETIC MONONEUROPATHY, WITHOUT LONG-TERM CURRENT USE OF INSULIN (HCC): ICD-10-CM

## 2018-08-15 DIAGNOSIS — E78.00 PURE HYPERCHOLESTEROLEMIA: ICD-10-CM

## 2018-08-15 DIAGNOSIS — E11.42 TYPE 2 DIABETES MELLITUS WITH DIABETIC POLYNEUROPATHY, WITHOUT LONG-TERM CURRENT USE OF INSULIN (HCC): ICD-10-CM

## 2018-08-15 LAB
ALBUMIN SERPL-MCNC: 4.5 G/DL (ref 3.4–5)
ALP BLD-CCNC: 60 U/L (ref 40–129)
ALT SERPL-CCNC: 16 U/L (ref 10–40)
AST SERPL-CCNC: 18 U/L (ref 15–37)
BILIRUB SERPL-MCNC: 1 MG/DL (ref 0–1)
BILIRUBIN DIRECT: <0.2 MG/DL (ref 0–0.3)
BILIRUBIN, INDIRECT: NORMAL MG/DL (ref 0–1)
CHOLESTEROL, TOTAL: 155 MG/DL (ref 0–199)
HDLC SERPL-MCNC: 47 MG/DL (ref 40–60)
LDL CHOLESTEROL CALCULATED: 87 MG/DL
TOTAL PROTEIN: 6.6 G/DL (ref 6.4–8.2)
TRIGL SERPL-MCNC: 105 MG/DL (ref 0–150)
VLDLC SERPL CALC-MCNC: 21 MG/DL

## 2018-08-15 PROCEDURE — 36415 COLL VENOUS BLD VENIPUNCTURE: CPT | Performed by: FAMILY MEDICINE

## 2018-08-16 LAB
ESTIMATED AVERAGE GLUCOSE: 188.6 MG/DL
HBA1C MFR BLD: 8.2 %

## 2018-08-18 DIAGNOSIS — E11.42 TYPE 2 DIABETES MELLITUS WITH DIABETIC POLYNEUROPATHY, WITHOUT LONG-TERM CURRENT USE OF INSULIN (HCC): ICD-10-CM

## 2018-08-18 DIAGNOSIS — E11.41 CONTROLLED TYPE 2 DIABETES MELLITUS WITH DIABETIC MONONEUROPATHY, WITHOUT LONG-TERM CURRENT USE OF INSULIN (HCC): ICD-10-CM

## 2018-09-28 RX ORDER — AMLODIPINE BESYLATE 10 MG/1
TABLET ORAL
Qty: 90 TABLET | Refills: 0 | Status: SHIPPED | OUTPATIENT
Start: 2018-09-28 | End: 2018-10-01 | Stop reason: SDUPTHER

## 2018-10-01 RX ORDER — AMLODIPINE BESYLATE 10 MG/1
TABLET ORAL
Qty: 90 TABLET | Refills: 1 | Status: SHIPPED | OUTPATIENT
Start: 2018-10-01 | End: 2019-01-02 | Stop reason: SDUPTHER

## 2018-10-01 RX ORDER — AMLODIPINE BESYLATE 10 MG/1
10 TABLET ORAL DAILY
Qty: 90 TABLET | Refills: 3 | OUTPATIENT
Start: 2018-10-01

## 2018-10-18 ENCOUNTER — NURSE ONLY (OUTPATIENT)
Dept: FAMILY MEDICINE CLINIC | Age: 73
End: 2018-10-18
Payer: MEDICARE

## 2018-10-18 DIAGNOSIS — Z23 NEED FOR INFLUENZA VACCINATION: Primary | ICD-10-CM

## 2018-10-18 PROCEDURE — G0008 ADMIN INFLUENZA VIRUS VAC: HCPCS | Performed by: FAMILY MEDICINE

## 2018-10-18 PROCEDURE — 90688 IIV4 VACCINE SPLT 0.5 ML IM: CPT | Performed by: FAMILY MEDICINE

## 2018-10-22 DIAGNOSIS — E11.41 CONTROLLED TYPE 2 DIABETES MELLITUS WITH DIABETIC MONONEUROPATHY, WITHOUT LONG-TERM CURRENT USE OF INSULIN (HCC): ICD-10-CM

## 2018-10-22 RX ORDER — GLIMEPIRIDE 4 MG/1
4 TABLET ORAL
Qty: 90 TABLET | Refills: 0 | Status: SHIPPED | OUTPATIENT
Start: 2018-10-22 | End: 2019-07-11 | Stop reason: SDUPTHER

## 2018-11-16 ENCOUNTER — TELEPHONE (OUTPATIENT)
Dept: FAMILY MEDICINE CLINIC | Age: 73
End: 2018-11-16

## 2018-11-16 DIAGNOSIS — E11.49 TYPE 2 DIABETES MELLITUS WITH OTHER NEUROLOGIC COMPLICATION, WITHOUT LONG-TERM CURRENT USE OF INSULIN (HCC): Primary | ICD-10-CM

## 2018-11-16 RX ORDER — LISINOPRIL 40 MG/1
TABLET ORAL
Qty: 90 TABLET | Refills: 10 | Status: SHIPPED | OUTPATIENT
Start: 2018-11-16 | End: 2019-11-16 | Stop reason: SDUPTHER

## 2018-11-16 NOTE — TELEPHONE ENCOUNTER
Dr. Jimmy Oliva:    Notes: This patient has an upcoming appointment with you for Diabetes, Hyperlipidemia and Hypertension. In planning for that visit I have completed the following pre-visit planning:     Pre-Visit Planning Checklist:  Patient contacted: yes  Verified patient by name and date of birth: yes    Health Maintenance items reviewed:    No pre-visit planning health maintenance topics to review at this time    Labs and procedures pended: Non-Fasting Hemoglobin A1C    Labs and procedures discussed with patient: yes  Reminded patient to check with their insurance company about coverage for lab tests and lab location: no    Preliminary Medication Reconciliation: was performed. Reminded patient to arrive early: yes    Please complete the med-reconciliation and sign the appropriate labs as soon as possible.       Kelly Guillory MA  Pre-Services Specialist

## 2018-11-30 ENCOUNTER — OFFICE VISIT (OUTPATIENT)
Dept: FAMILY MEDICINE CLINIC | Age: 73
End: 2018-11-30
Payer: MEDICARE

## 2018-11-30 VITALS
HEART RATE: 77 BPM | SYSTOLIC BLOOD PRESSURE: 130 MMHG | HEIGHT: 72 IN | BODY MASS INDEX: 31.61 KG/M2 | OXYGEN SATURATION: 98 % | DIASTOLIC BLOOD PRESSURE: 76 MMHG | WEIGHT: 233.4 LBS

## 2018-11-30 DIAGNOSIS — F51.01 PRIMARY INSOMNIA: ICD-10-CM

## 2018-11-30 DIAGNOSIS — I10 HTN (HYPERTENSION), BENIGN: ICD-10-CM

## 2018-11-30 DIAGNOSIS — E78.00 PURE HYPERCHOLESTEROLEMIA: ICD-10-CM

## 2018-11-30 DIAGNOSIS — I25.10 CAD IN NATIVE ARTERY: ICD-10-CM

## 2018-11-30 DIAGNOSIS — N40.1 BENIGN NON-NODULAR PROSTATIC HYPERPLASIA WITH LOWER URINARY TRACT SYMPTOMS: ICD-10-CM

## 2018-11-30 DIAGNOSIS — K63.5 POLYP OF COLON, UNSPECIFIED PART OF COLON, UNSPECIFIED TYPE: ICD-10-CM

## 2018-11-30 DIAGNOSIS — K57.90 DIVERTICULOSIS OF INTESTINE WITHOUT BLEEDING, UNSPECIFIED INTESTINAL TRACT LOCATION: ICD-10-CM

## 2018-11-30 DIAGNOSIS — E11.42 TYPE 2 DIABETES MELLITUS WITH DIABETIC POLYNEUROPATHY, WITHOUT LONG-TERM CURRENT USE OF INSULIN (HCC): Primary | ICD-10-CM

## 2018-11-30 LAB — HBA1C MFR BLD: 7.9 %

## 2018-11-30 PROCEDURE — 99214 OFFICE O/P EST MOD 30 MIN: CPT | Performed by: FAMILY MEDICINE

## 2018-11-30 PROCEDURE — 1101F PT FALLS ASSESS-DOCD LE1/YR: CPT | Performed by: FAMILY MEDICINE

## 2018-11-30 PROCEDURE — G8598 ASA/ANTIPLAT THER USED: HCPCS | Performed by: FAMILY MEDICINE

## 2018-11-30 PROCEDURE — 3045F PR MOST RECENT HEMOGLOBIN A1C LEVEL 7.0-9.0%: CPT | Performed by: FAMILY MEDICINE

## 2018-11-30 PROCEDURE — 2022F DILAT RTA XM EVC RTNOPTHY: CPT | Performed by: FAMILY MEDICINE

## 2018-11-30 PROCEDURE — 83036 HEMOGLOBIN GLYCOSYLATED A1C: CPT | Performed by: FAMILY MEDICINE

## 2018-11-30 PROCEDURE — G8427 DOCREV CUR MEDS BY ELIG CLIN: HCPCS | Performed by: FAMILY MEDICINE

## 2018-11-30 PROCEDURE — 1123F ACP DISCUSS/DSCN MKR DOCD: CPT | Performed by: FAMILY MEDICINE

## 2018-11-30 PROCEDURE — G8482 FLU IMMUNIZE ORDER/ADMIN: HCPCS | Performed by: FAMILY MEDICINE

## 2018-11-30 PROCEDURE — G8417 CALC BMI ABV UP PARAM F/U: HCPCS | Performed by: FAMILY MEDICINE

## 2018-11-30 PROCEDURE — 4040F PNEUMOC VAC/ADMIN/RCVD: CPT | Performed by: FAMILY MEDICINE

## 2018-11-30 PROCEDURE — 3017F COLORECTAL CA SCREEN DOC REV: CPT | Performed by: FAMILY MEDICINE

## 2018-11-30 PROCEDURE — 1036F TOBACCO NON-USER: CPT | Performed by: FAMILY MEDICINE

## 2018-11-30 RX ORDER — TRAZODONE HYDROCHLORIDE 100 MG/1
TABLET ORAL
COMMUNITY
Start: 2018-11-29 | End: 2019-06-03 | Stop reason: SDUPTHER

## 2018-11-30 NOTE — PROGRESS NOTES
lb 6.4 oz (105.9 kg)   07/23/18 239 lb 6.4 oz (108.6 kg)   04/23/18 248 lb (112.5 kg)       Physical Exam   Constitutional: He appears well-developed and well-nourished. No distress. HENT:   Head: Normocephalic and atraumatic. Right Ear: External ear normal.   Left Ear: External ear normal.   Nose: Nose normal.   Lips symmetric   Eyes: Pupils are equal, round, and reactive to light. Lids are normal. Right eye exhibits no discharge. Left eye exhibits no discharge. No scleral icterus. Neck: No JVD present. No thyromegaly present. Cardiovascular: Normal rate, regular rhythm and normal heart sounds. Pulmonary/Chest: Effort normal and breath sounds normal. No respiratory distress. Abdominal: Soft. There is no hepatosplenomegaly. There is no tenderness. Musculoskeletal: He exhibits edema (Trace swelling right ankle). Skin: Skin is warm and dry. No rash noted. He is not diaphoretic. No erythema. No pallor. Turgor normal   Psychiatric: He has a normal mood and affect. His behavior is normal. Judgment and thought content normal.   Nursing note and vitals reviewed. Results for POC orders placed in visit on 11/30/18   POCT glycosylated hemoglobin (Hb A1C)   Result Value Ref Range    Hemoglobin A1C 7.9 %            ASSESSMENT PLAN      Diagnosis Orders   1. Type 2 diabetes mellitus with diabetic polyneuropathy, without long-term current use of insulin (HCC)  POCT glycosylated hemoglobin (Hb A1C)   2. HTN (hypertension), benign     3. Pure hypercholesterolemia     4. DM (diabetes mellitus), secondary, uncontrolled, w/neurologic complic ([facility] [address]     5. Benign non-nodular prostatic hyperplasia with lower urinary tract symptoms     6. CAD in native artery     7. Polyp of colon, unspecified part of colon, unspecified type     8. Diverticulosis of intestine without bleeding, unspecified intestinal tract location     9.  Primary insomnia  traZODone (DESYREL) 100 MG tablet    hemoglobin A1c has dropped from 8.2 down

## 2019-02-11 ENCOUNTER — OFFICE VISIT (OUTPATIENT)
Dept: FAMILY MEDICINE CLINIC | Age: 74
End: 2019-02-11
Payer: MEDICARE

## 2019-02-11 VITALS
OXYGEN SATURATION: 96 % | DIASTOLIC BLOOD PRESSURE: 72 MMHG | BODY MASS INDEX: 32.1 KG/M2 | HEIGHT: 72 IN | WEIGHT: 237 LBS | TEMPERATURE: 98.3 F | SYSTOLIC BLOOD PRESSURE: 142 MMHG | HEART RATE: 80 BPM

## 2019-02-11 DIAGNOSIS — J06.9 VIRAL URI: ICD-10-CM

## 2019-02-11 DIAGNOSIS — R05.9 COUGH: ICD-10-CM

## 2019-02-11 DIAGNOSIS — R06.2 WHEEZING: ICD-10-CM

## 2019-02-11 DIAGNOSIS — R09.89 CHEST CONGESTION: ICD-10-CM

## 2019-02-11 DIAGNOSIS — E78.00 PURE HYPERCHOLESTEROLEMIA: ICD-10-CM

## 2019-02-11 DIAGNOSIS — R68.89 FLU-LIKE SYMPTOMS: Primary | ICD-10-CM

## 2019-02-11 LAB
INFLUENZA A ANTIBODY: NEGATIVE
INFLUENZA B ANTIBODY: NEGATIVE

## 2019-02-11 PROCEDURE — G8427 DOCREV CUR MEDS BY ELIG CLIN: HCPCS | Performed by: NURSE PRACTITIONER

## 2019-02-11 PROCEDURE — 94640 AIRWAY INHALATION TREATMENT: CPT | Performed by: NURSE PRACTITIONER

## 2019-02-11 PROCEDURE — 1101F PT FALLS ASSESS-DOCD LE1/YR: CPT | Performed by: NURSE PRACTITIONER

## 2019-02-11 PROCEDURE — G8482 FLU IMMUNIZE ORDER/ADMIN: HCPCS | Performed by: NURSE PRACTITIONER

## 2019-02-11 PROCEDURE — 87804 INFLUENZA ASSAY W/OPTIC: CPT | Performed by: NURSE PRACTITIONER

## 2019-02-11 PROCEDURE — 1036F TOBACCO NON-USER: CPT | Performed by: NURSE PRACTITIONER

## 2019-02-11 PROCEDURE — 3017F COLORECTAL CA SCREEN DOC REV: CPT | Performed by: NURSE PRACTITIONER

## 2019-02-11 PROCEDURE — G8417 CALC BMI ABV UP PARAM F/U: HCPCS | Performed by: NURSE PRACTITIONER

## 2019-02-11 PROCEDURE — 99214 OFFICE O/P EST MOD 30 MIN: CPT | Performed by: NURSE PRACTITIONER

## 2019-02-11 PROCEDURE — 1123F ACP DISCUSS/DSCN MKR DOCD: CPT | Performed by: NURSE PRACTITIONER

## 2019-02-11 PROCEDURE — 4040F PNEUMOC VAC/ADMIN/RCVD: CPT | Performed by: NURSE PRACTITIONER

## 2019-02-11 PROCEDURE — G8598 ASA/ANTIPLAT THER USED: HCPCS | Performed by: NURSE PRACTITIONER

## 2019-02-11 RX ORDER — ALBUTEROL SULFATE 90 UG/1
2 AEROSOL, METERED RESPIRATORY (INHALATION) EVERY 6 HOURS PRN
Qty: 1 INHALER | Refills: 0 | Status: SHIPPED | OUTPATIENT
Start: 2019-02-11 | End: 2021-09-10

## 2019-02-11 RX ORDER — ALBUTEROL SULFATE 2.5 MG/3ML
2.5 SOLUTION RESPIRATORY (INHALATION) ONCE
Status: COMPLETED | OUTPATIENT
Start: 2019-02-11 | End: 2019-02-11

## 2019-02-11 RX ORDER — ROSUVASTATIN CALCIUM 40 MG/1
40 TABLET, COATED ORAL EVERY EVENING
Qty: 30 TABLET | Refills: 11 | Status: SHIPPED | OUTPATIENT
Start: 2019-02-11 | End: 2022-02-15 | Stop reason: SDUPTHER

## 2019-02-11 RX ORDER — GUAIFENESIN 600 MG/1
1200 TABLET, EXTENDED RELEASE ORAL 2 TIMES DAILY
Qty: 20 TABLET | Refills: 0 | Status: SHIPPED | OUTPATIENT
Start: 2019-02-11 | End: 2019-02-21

## 2019-02-11 RX ORDER — OSELTAMIVIR PHOSPHATE 75 MG/1
75 CAPSULE ORAL 2 TIMES DAILY
Qty: 10 CAPSULE | Refills: 0 | Status: SHIPPED | OUTPATIENT
Start: 2019-02-11 | End: 2019-02-16

## 2019-02-11 RX ADMIN — Medication 0.5 MG: at 13:53

## 2019-02-11 RX ADMIN — ALBUTEROL SULFATE 2.5 MG: 2.5 SOLUTION RESPIRATORY (INHALATION) at 13:52

## 2019-02-11 ASSESSMENT — PATIENT HEALTH QUESTIONNAIRE - PHQ9
SUM OF ALL RESPONSES TO PHQ QUESTIONS 1-9: 0
2. FEELING DOWN, DEPRESSED OR HOPELESS: 0
1. LITTLE INTEREST OR PLEASURE IN DOING THINGS: 0
SUM OF ALL RESPONSES TO PHQ QUESTIONS 1-9: 0
SUM OF ALL RESPONSES TO PHQ9 QUESTIONS 1 & 2: 0

## 2019-02-11 ASSESSMENT — ENCOUNTER SYMPTOMS
SORE THROAT: 0
VOICE CHANGE: 0
SWOLLEN GLANDS: 0
COUGH: 1
CONSTIPATION: 0
ABDOMINAL PAIN: 0
APNEA: 0
CHEST TIGHTNESS: 0
DIARRHEA: 1
BACK PAIN: 0
TROUBLE SWALLOWING: 0
SINUS PRESSURE: 0
RHINORRHEA: 1
WHEEZING: 0
SHORTNESS OF BREATH: 0
FACIAL SWELLING: 0
ALLERGIC/IMMUNOLOGIC NEGATIVE: 1
SINUS PAIN: 0
NAUSEA: 0
VOMITING: 0
STRIDOR: 0
CHOKING: 0

## 2019-03-03 ENCOUNTER — APPOINTMENT (OUTPATIENT)
Dept: CT IMAGING | Age: 74
End: 2019-03-03
Payer: MEDICARE

## 2019-03-03 ENCOUNTER — HOSPITAL ENCOUNTER (EMERGENCY)
Age: 74
Discharge: HOME | End: 2019-03-03
Attending: EMERGENCY MEDICINE
Payer: MEDICARE

## 2019-03-03 VITALS
DIASTOLIC BLOOD PRESSURE: 96 MMHG | TEMPERATURE: 97.9 F | WEIGHT: 237 LBS | RESPIRATION RATE: 16 BRPM | HEIGHT: 74 IN | OXYGEN SATURATION: 98 % | SYSTOLIC BLOOD PRESSURE: 162 MMHG | BODY MASS INDEX: 30.42 KG/M2 | HEART RATE: 72 BPM

## 2019-03-03 DIAGNOSIS — G44.201 ACUTE INTRACTABLE TENSION-TYPE HEADACHE: Primary | ICD-10-CM

## 2019-03-03 LAB
RAPID INFLUENZA  B AGN: NEGATIVE
RAPID INFLUENZA A AGN: NEGATIVE

## 2019-03-03 PROCEDURE — 96372 THER/PROPH/DIAG INJ SC/IM: CPT

## 2019-03-03 PROCEDURE — 70450 CT HEAD/BRAIN W/O DYE: CPT

## 2019-03-03 PROCEDURE — 6360000002 HC RX W HCPCS: Performed by: EMERGENCY MEDICINE

## 2019-03-03 PROCEDURE — 87804 INFLUENZA ASSAY W/OPTIC: CPT

## 2019-03-03 PROCEDURE — 99284 EMERGENCY DEPT VISIT MOD MDM: CPT

## 2019-03-03 RX ORDER — KETOROLAC TROMETHAMINE 30 MG/ML
30 INJECTION, SOLUTION INTRAMUSCULAR; INTRAVENOUS ONCE
Status: COMPLETED | OUTPATIENT
Start: 2019-03-03 | End: 2019-03-03

## 2019-03-03 RX ADMIN — KETOROLAC TROMETHAMINE 30 MG: 30 INJECTION, SOLUTION INTRAMUSCULAR at 05:34

## 2019-03-03 ASSESSMENT — PAIN SCALES - GENERAL: PAINLEVEL_OUTOF10: 3

## 2019-03-03 ASSESSMENT — PAIN DESCRIPTION - LOCATION: LOCATION: HEAD

## 2019-03-20 DIAGNOSIS — E11.8 CONTROLLED TYPE 2 DIABETES MELLITUS WITH COMPLICATION, WITHOUT LONG-TERM CURRENT USE OF INSULIN (HCC): Primary | ICD-10-CM

## 2019-03-20 DIAGNOSIS — E78.00 HYPERCHOLESTEROLEMIA: ICD-10-CM

## 2019-03-29 DIAGNOSIS — E11.41 CONTROLLED TYPE 2 DIABETES MELLITUS WITH DIABETIC MONONEUROPATHY, WITHOUT LONG-TERM CURRENT USE OF INSULIN (HCC): ICD-10-CM

## 2019-03-29 DIAGNOSIS — E11.42 TYPE 2 DIABETES MELLITUS WITH DIABETIC POLYNEUROPATHY, WITHOUT LONG-TERM CURRENT USE OF INSULIN (HCC): ICD-10-CM

## 2019-05-01 ENCOUNTER — OFFICE VISIT (OUTPATIENT)
Dept: ORTHOPEDIC SURGERY | Age: 74
End: 2019-05-01
Payer: MEDICARE

## 2019-05-01 VITALS
SYSTOLIC BLOOD PRESSURE: 128 MMHG | HEIGHT: 74 IN | HEART RATE: 72 BPM | DIASTOLIC BLOOD PRESSURE: 78 MMHG | BODY MASS INDEX: 30.36 KG/M2 | WEIGHT: 236.55 LBS

## 2019-05-01 DIAGNOSIS — M48.062 LUMBAR STENOSIS WITH NEUROGENIC CLAUDICATION: Primary | ICD-10-CM

## 2019-05-01 PROCEDURE — 1123F ACP DISCUSS/DSCN MKR DOCD: CPT | Performed by: PHYSICAL MEDICINE & REHABILITATION

## 2019-05-01 PROCEDURE — 4040F PNEUMOC VAC/ADMIN/RCVD: CPT | Performed by: PHYSICAL MEDICINE & REHABILITATION

## 2019-05-01 PROCEDURE — 3017F COLORECTAL CA SCREEN DOC REV: CPT | Performed by: PHYSICAL MEDICINE & REHABILITATION

## 2019-05-01 PROCEDURE — 99212 OFFICE O/P EST SF 10 MIN: CPT | Performed by: PHYSICAL MEDICINE & REHABILITATION

## 2019-05-01 PROCEDURE — 1036F TOBACCO NON-USER: CPT | Performed by: PHYSICAL MEDICINE & REHABILITATION

## 2019-05-01 PROCEDURE — G8427 DOCREV CUR MEDS BY ELIG CLIN: HCPCS | Performed by: PHYSICAL MEDICINE & REHABILITATION

## 2019-05-01 PROCEDURE — G8417 CALC BMI ABV UP PARAM F/U: HCPCS | Performed by: PHYSICAL MEDICINE & REHABILITATION

## 2019-05-01 PROCEDURE — G8598 ASA/ANTIPLAT THER USED: HCPCS | Performed by: PHYSICAL MEDICINE & REHABILITATION

## 2019-05-01 NOTE — PROGRESS NOTES
New Patient: SPINE    CHIEF COMPLAINT:    Chief Complaint   Patient presents with    Back Pain     fu back wanting a letter stating what is back problems are for his employer       HISTORY OF PRESENT ILLNESS:                The patient is a 72 y.o. male with known lumbar stenosis at T11-T12 and L4 5. 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 He has buttock pain with standing more than 4 hours. No progressive leg weakness. He has no issues with any of his requirements at work but is requesting a letter today to state that he is followed in the clinic for a low back condition. Past Medical History:   Diagnosis Date    [facility]     [facility]ina p[facility]is, unstable [facility]     ASHD (arteriosclerotic heart disease)     Bladder outlet obstruction     BPH (benign prostatic hyperplasia)     Chest pain     Constipation     Diabetes mellitus ([facility]     GERD (gastroesophageal reflux disease)     Hx of CABG     Hypercholesteremia     Hypercholesterolemia     Hypertension     IBS (irritable bowel syndrome)     Sinusitis     Type II or unspecified type diabetes mellitus without mention of complication, not stated as uncontrolled           Pain Assessment  Location of Pain: Back  Severity of Pain: 0    The pain assessment was noted & reviewed in the medical record today.      Current/Past Treatment:   · Physical Therapy:   · Chiropractic:     · Injection:   Prior epidurals 2014  Medications:            NSAIDS: Yes            Muscle relaxer:              Steriods:   prior Pred taper            Neuropathic medications:              Opioids:            Other:   · Surgery/Consult:    Work Status/Functionality:     Past Medical History: Medical history form was reviewed today & scanned into the media tab  Past Medical History:   Diagnosis Date    [facility]     Angina pectoris, unstable ([facility]     ASHD (arteriosclerotic heart disease)     Bladder outlet obstruction     BPH (benign prostatic hyperplasia)     Chest pain     Constipation     Diabetes mellitus ([facility]     GERD (gastroesophageal reflux disease)     Hx of CABG     Hypercholesteremia     Hypercholesterolemia     Hypertension     IBS (irritable bowel syndrome)     Sinusitis     Type II or unspecified type diabetes mellitus without mention of complication, not stated as uncontrolled       Past Surgical History:     Past Surgical History:   Procedure Laterality Date    CARDIAC CATHETERIZATION  2002    stent    CARDIAC CATHETERIZATION  2011    stent    COLONOSCOPY  10/24/13    CORONARY ANGIOPLASTY WITH STENT PLACEMENT       Current Medications:     Current Outpatient Medications:     blood glucose test strips (ONE TOUCH ULTRA TEST) strip, Use one strip to test twice a day, Disp: 100 strip, Rfl: 3    rosuvastatin (CRESTOR) 40 MG tablet, Take 1 tablet by mouth every evening, Disp: 30 tablet, Rfl: 11    albuterol sulfate HFA (PROAIR HFA) 108 (90 Base) MCG/ACT inhaler, Inhale 2 puffs into the lungs every 6 hours as needed for Wheezing or Shortness of Breath, Disp: 1 Inhaler, Rfl: 0    empagliflozin (JARDIANCE) 25 MG tablet, Take 1 tablet by mouth daily, Disp: 30 tablet, Rfl: 11    amLODIPine (NORVASC) 10 MG tablet, TAKE ONE TABLET BY MOUTH DAILY, Disp: 90 tablet, Rfl: 1    traZODone (DESYREL) 100 MG tablet, , Disp: , Rfl:     lisinopril (PRINIVIL;ZESTRIL) 40 MG tablet, TAKE ONE TABLET BY MOUTH DAILY, Disp: 90 tablet, Rfl: 10    glimepiride (AMARYL) 4 MG tablet, Take 1 tablet by mouth every morning (before breakfast), Disp: 90 tablet, Rfl: 0    Omega-3 Fatty Acids (FISH OIL) 1000 MG CAPS, Take 1 capsule by mouth 2 times daily, Disp: 90 capsule, Rfl: 3    Nitroglycerin 400 MCG/SPRAY AERS, Place 1 tablet onto the tongue every 5 minutes as needed. , Disp: 1 Bottle, Rfl: 2    isosorbide mononitrate (IMDUR) 60 MG CR tablet, Take 60 mg by mouth continuous prn., Disp: , Rfl:     aspirin 325 MG tablet, Take 325 mg by mouth daily. , Disp: , Rfl:     atenolol (TENORMIN) 25 MG tablet, Take 50 mg by mouth daily. , Disp: , Rfl:   Allergies:  Prednisone; Metformin and related; and Codeine  Social History:    reports that he quit smoking about 43 years ago. His smoking use included cigarettes. He has a 50.00 pack-year smoking history. He has never used smokeless tobacco. He reports that he does not drink alcohol or use drugs. Family History:   Family History   Problem Relation Age of Onset    Diabetes Mother     High Blood Pressure Mother     Arthritis Mother        REVIEW OF SYSTEMS: Full ROS noted & scanned   CONSTITUTIONAL: Denies unexplained weight loss, fevers, chills or fatigue  NEUROLOGICAL: Denies unsteady gait or progressive weakness  MUSCULOSKELETAL: Denies joint swelling or redness  PSYCHOLOGICAL: Denies anxiety, depression   SKIN: Denies skin changes, delayed healing, rash, itching   HEMATOLOGIC: Denies easy bleeding or bruising  ENDOCRINE: Denies excessive thirst, urination, heat/cold  RESPIRATORY: Denies current dyspnea, cough  GI: Denies nausea, vomiting, diarrhea   : Denies bowel or bladder issues       PHYSICAL EXAM:    Vitals: Blood pressure 128/78, pulse 72, height 6' 2.02\" (1.88 m), weight 236 lb 8.9 oz (107.3 kg). GENERAL EXAM:  · General Apparence: Patient is adequately groomed with no evidence of malnutrition. · Orientation: The patient is oriented to time, place and person. · Mood & Affect:The patient's mood and affect are appropriate   · Vascular: Examination reveals no swelling tenderness in upper or lower extremities. Good capillary refill  · Lymphatic: The lymphatic examination bilaterally reveals all areas to be without enlargement or induration  · Sensation: Sensation is intact without deficit  · Coordination/Balance: Good coordination       LUMBAR/SACRAL EXAMINATION:  · Inspection: Local inspection shows no step-off or bruising.   Lumbar alignment is normal.  Sagittal and Coronal balance is neutral. · Palpation:   No evidence of tenderness at the midline. No tenderness bilaterally at the paraspinal or trochanters. There is no step-off or paraspinal spasm. · Range of Motion:  Mild loss of flexion extension  · Strength:   Strength testing is 5/5 in all muscle groups tested. · Special Tests:   Straight leg raise and crossed SLR negative. Leg length and pelvis level.  0 out of 5 Lang's signs. · Skin: There are no rashes, ulcerations or lesions. · Reflexes: Reflexes are symmetrically 2+ at the patellar and ankle tendons. Clonus absent bilaterally at the feet. · Gait & station: Normal gait  · Additional Examinations:   · RIGHT LOWER EXTREMITY: Inspection/examination of the right lower extremity does not show any tenderness, deformity or injury. Range of motion is full. There is no gross instability. There are no rashes, ulcerations or lesions. Strength and tone are normal.  ·   · LEFT LOWER EXTREMITY:  Inspection/examination of the left lower extremity does not show any tenderness, deformity or injury. Range of motion is full. There is no gross instability. There are no rashes, ulcerations or lesions. Strength and tone are normal.    Diagnostic Testin MRI shows Modic changes L5-S1, moderate lumbar stenosis L4 5     thoracic MRI shows T11-T12 HNP    Impression:    Lumbar stenosis        Plan:     Symptoms are stable. Leg written today for him. He can continue activities to tolerance.   He'll contact me for any progressive weakness or increasing pain    WATSON Engle

## 2019-05-01 NOTE — LETTER
May 1, 2019      To Whom It May Concern:      Shauna Kirkpatrick is my patient at [address] [place] [address] and sports medicine. I have followed him for many years for a chronic low back condition which is been stable. Please call for any questions.         Sincerely,         Daved Ganser M.D.

## 2019-07-03 RX ORDER — AMLODIPINE BESYLATE 10 MG/1
10 TABLET ORAL DAILY
Qty: 90 TABLET | Refills: 3 | Status: SHIPPED | OUTPATIENT
Start: 2019-07-03 | End: 2020-07-06

## 2019-08-05 DIAGNOSIS — E11.41 CONTROLLED TYPE 2 DIABETES MELLITUS WITH DIABETIC MONONEUROPATHY, WITHOUT LONG-TERM CURRENT USE OF INSULIN (HCC): ICD-10-CM

## 2019-08-05 RX ORDER — METFORMIN HYDROCHLORIDE 500 MG/1
TABLET, EXTENDED RELEASE ORAL
Qty: 360 TABLET | Refills: 11 | Status: SHIPPED | OUTPATIENT
Start: 2019-08-05 | End: 2021-03-26

## 2019-08-31 DIAGNOSIS — E11.41 CONTROLLED TYPE 2 DIABETES MELLITUS WITH DIABETIC MONONEUROPATHY, WITHOUT LONG-TERM CURRENT USE OF INSULIN (HCC): ICD-10-CM

## 2019-08-31 DIAGNOSIS — E11.42 TYPE 2 DIABETES MELLITUS WITH DIABETIC POLYNEUROPATHY, WITHOUT LONG-TERM CURRENT USE OF INSULIN (HCC): ICD-10-CM

## 2019-09-06 DIAGNOSIS — E11.41 CONTROLLED TYPE 2 DIABETES MELLITUS WITH DIABETIC MONONEUROPATHY, WITHOUT LONG-TERM CURRENT USE OF INSULIN (HCC): ICD-10-CM

## 2019-09-06 DIAGNOSIS — E11.42 TYPE 2 DIABETES MELLITUS WITH DIABETIC POLYNEUROPATHY, WITHOUT LONG-TERM CURRENT USE OF INSULIN (HCC): ICD-10-CM

## 2019-09-06 RX ORDER — GLIMEPIRIDE 4 MG/1
4 TABLET ORAL
Qty: 90 TABLET | Refills: 3 | Status: SHIPPED | OUTPATIENT
Start: 2019-09-06 | End: 2020-09-14

## 2019-09-13 ENCOUNTER — TELEPHONE (OUTPATIENT)
Dept: FAMILY MEDICINE CLINIC | Age: 74
End: 2019-09-13

## 2019-09-13 NOTE — TELEPHONE ENCOUNTER
Pt. Is requesting an appt. With Dr. Palmira Archibald and he is booked up. Pt. States he is having some burning in his legs.

## 2019-11-04 ENCOUNTER — NURSE ONLY (OUTPATIENT)
Dept: FAMILY MEDICINE CLINIC | Age: 74
End: 2019-11-04
Payer: MEDICARE

## 2019-11-04 DIAGNOSIS — Z23 NEED FOR INFLUENZA VACCINATION: Primary | ICD-10-CM

## 2019-11-04 PROCEDURE — G0008 ADMIN INFLUENZA VIRUS VAC: HCPCS | Performed by: FAMILY MEDICINE

## 2019-11-04 PROCEDURE — 90686 IIV4 VACC NO PRSV 0.5 ML IM: CPT | Performed by: FAMILY MEDICINE

## 2019-11-16 DIAGNOSIS — F51.01 PRIMARY INSOMNIA: ICD-10-CM

## 2019-11-18 RX ORDER — TRAZODONE HYDROCHLORIDE 100 MG/1
100 TABLET ORAL NIGHTLY PRN
Qty: 30 TABLET | Refills: 5 | Status: SHIPPED | OUTPATIENT
Start: 2019-11-18 | End: 2020-12-29

## 2019-11-18 RX ORDER — LISINOPRIL 40 MG/1
TABLET ORAL
Qty: 90 TABLET | Refills: 0 | Status: SHIPPED | OUTPATIENT
Start: 2019-11-18 | End: 2020-11-16 | Stop reason: SDUPTHER

## 2020-01-13 ENCOUNTER — OFFICE VISIT (OUTPATIENT)
Dept: FAMILY MEDICINE CLINIC | Age: 75
End: 2020-01-13
Payer: MEDICARE

## 2020-01-13 VITALS
HEART RATE: 71 BPM | BODY MASS INDEX: 30.98 KG/M2 | WEIGHT: 241.4 LBS | SYSTOLIC BLOOD PRESSURE: 136 MMHG | OXYGEN SATURATION: 98 % | DIASTOLIC BLOOD PRESSURE: 70 MMHG

## 2020-01-13 LAB — HBA1C MFR BLD: 7.4 %

## 2020-01-13 PROCEDURE — G8482 FLU IMMUNIZE ORDER/ADMIN: HCPCS | Performed by: FAMILY MEDICINE

## 2020-01-13 PROCEDURE — 3017F COLORECTAL CA SCREEN DOC REV: CPT | Performed by: FAMILY MEDICINE

## 2020-01-13 PROCEDURE — 4040F PNEUMOC VAC/ADMIN/RCVD: CPT | Performed by: FAMILY MEDICINE

## 2020-01-13 PROCEDURE — 2022F DILAT RTA XM EVC RTNOPTHY: CPT | Performed by: FAMILY MEDICINE

## 2020-01-13 PROCEDURE — 1036F TOBACCO NON-USER: CPT | Performed by: FAMILY MEDICINE

## 2020-01-13 PROCEDURE — 83036 HEMOGLOBIN GLYCOSYLATED A1C: CPT | Performed by: FAMILY MEDICINE

## 2020-01-13 PROCEDURE — 99214 OFFICE O/P EST MOD 30 MIN: CPT | Performed by: FAMILY MEDICINE

## 2020-01-13 PROCEDURE — 3046F HEMOGLOBIN A1C LEVEL >9.0%: CPT | Performed by: FAMILY MEDICINE

## 2020-01-13 PROCEDURE — G8427 DOCREV CUR MEDS BY ELIG CLIN: HCPCS | Performed by: FAMILY MEDICINE

## 2020-01-13 PROCEDURE — G8417 CALC BMI ABV UP PARAM F/U: HCPCS | Performed by: FAMILY MEDICINE

## 2020-01-13 PROCEDURE — 1123F ACP DISCUSS/DSCN MKR DOCD: CPT | Performed by: FAMILY MEDICINE

## 2020-01-13 NOTE — PROGRESS NOTES
Chief Complaint   Patient presents with    Hypertension    Hyperlipidemia    Diabetes    Other     patient has multiple medical complaints   He is to see his cardiologist tomorrow. His overall sugar control has improved from 1 year ago. Foot exam was all normal except bilateral heel due to callous. He will update his labs one day fasting. He does mention feeling bad on Saturday, no energy, may have been a virus. 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 He states he will be back in 6 months and will be down 10 pounds and have his sugar control better as well. HPI:  Teresa Carrillo is a 69 y.o. (: 1945) here today for  Treatment Adherence:   Medication compliance:  compliant all of the time  Diet compliance:  compliant most of the time  Weight trend: increasing  Current exercise: no regular exercise  Barriers: lack of motivation    Diabetes Mellitus Type 2: Current symptoms/problems include none. Home blood sugar records: fasting range: 130 average  Any episodes of hypoglycemia? yes - a couple of times no more than 1 times in 4 months  Eye exam current (within one year): no  Tobacco history: He  reports that he quit smoking about 43 years ago. His smoking use included cigarettes. He has a 50.00 pack-year smoking history. He has never used smokeless tobacco.   Daily Aspirin? Yes    Hypertension:  Home blood pressure monitoring: Yes - runs good. He is not adherent to a low sodium diet. Patient denies chest pain and shortness of breath. Antihypertensive medication side effects: no medication side effects noted. Use of agents associated with hypertension: none. Hyperlipidemia:  No new myalgias or GI upset on rosuvastatin (Crestor).        Lab Results   Component Value Date    LABA1C 7.9 2018    LABA1C 8.2 08/15/2018    LABA1C 7.9 2018     Lab Results   Component Value Date    LABMICR 2.90 (H) 2018    CREATININE 1.0 2018     Lab Results   Component Value Date    ALT 16 08/15/2018    AST 18 08/15/2018     Lab Results   Component Value Date    CHOL 155 08/15/2018    TRIG 105 08/15/2018    HDL 47 08/15/2018    LDLCALC 87 08/15/2018          Patient's medications, allergies, past medical, surgical, social and family histories were reviewed and updated asappropriate on 1/13/2020 at 9:44 AM.    ROS:  Review of Systems    All other systems reviewed and are negative except as noted above on 1/13/2020 at 9:44 AM. Additional review of systems may be scanned into the media section ofthis medical record. Any responses requiring further intervention were pursued.     Hemoglobin A1C (%)   Date Value   11/30/2018 7.9     Microscopic Examination (no units)   Date Value   12/14/2014 YES     Microalbumin, Random Urine (mg/dL)   Date Value   05/08/2018 2.90 (H)     LDL Calculated (mg/dL)   Date Value   08/15/2018 87     Past Medical History:   Diagnosis Date    Acute MI ([address]     Angina pectoris, unstable ([address]     ASHD (arteriosclerotic heart disease)     Bladder outlet obstruction     BPH (benign prostatic hyperplasia)     Chest pain     Constipation     Diabetes mellitus (HCC)     GERD (gastroesophageal reflux disease)     Hx of CABG     Hypercholesteremia     Hypercholesterolemia     Hypertension     IBS (irritable bowel syndrome)     Sinusitis     Type II or unspecified type diabetes mellitus without mention of complication, not stated as uncontrolled         Family History   Problem Relation Age of Onset    Diabetes Mother     High Blood Pressure Mother     Arthritis Mother      Social History     Socioeconomic History    Marital status:      Spouse name: Not on file    Number of children: Not on file    Years of education: Not on file    Highest education level: Not on file   Occupational History    Occupation:    Social Needs    Financial resource strain: Not on file    Food insecurity:     Worry: Not rosuvastatin (CRESTOR) 40 MG tablet Take 1 tablet by mouth every evening  YANNICK Beavers CNP   albuterol sulfate HFA (PROAIR HFA) 108 (90 Base) MCG/ACT inhaler Inhale 2 puffs into the lungs every 6 hours as needed for Wheezing or Shortness of Breath  YANNICK Beavers CNP   empagliflozin (JARDIANCE) 25 MG tablet Take 1 tablet by mouth daily  Anitra Antonio MD   Omega-3 Fatty Acids (FISH OIL) 1000 MG CAPS Take 1 capsule by mouth 2 times daily  YANNICK Garza CNP   Nitroglycerin 400 MCG/SPRAY AERS Place 1 tablet onto the tongue every 5 minutes as needed. Anitra Antonio MD   aspirin 325 MG tablet Take 325 mg by mouth daily. Historical Provider, MD     Allergies   Allergen Reactions    Prednisone Other (See Comments)     Made patient very agitated (medrol dospak)    Metformin And Related Other (See Comments)     GI upset    Codeine Other (See Comments)     constipation       OBJECTIVE:  Estimated body mass index is 30.98 kg/m² as calculated from the following:    Height as of 5/1/19: 6' 2.02\" (1.88 m). Weight as of this encounter: 241 lb 6.4 oz (109.5 kg). Vitals:    01/13/20 0930   BP: 136/70   Site: Left Upper Arm   Position: Sitting   Cuff Size: Large Adult   Pulse: 71   SpO2: 98%   Weight: 241 lb 6.4 oz (109.5 kg)     BP Readings from Last 2 Encounters:   01/13/20 136/70   05/01/19 128/78     Wt Readings from Last 3 Encounters:   01/13/20 241 lb 6.4 oz (109.5 kg)   05/01/19 236 lb 8.9 oz (107.3 kg)   03/03/19 237 lb (107.5 kg)       Physical Exam  Vitals signs and nursing note reviewed. Constitutional:       General: He is not in acute distress. Appearance: He is well-developed. He is not diaphoretic. HENT:      Head: Normocephalic and atraumatic. Right Ear: External ear normal.      Left Ear: External ear normal.      Nose: Nose normal.   Eyes:      General: Lids are normal. No scleral icterus. Right eye: No discharge.          Left eye: No discharge. Pupils: Pupils are equal, round, and reactive to light. Neck:      Thyroid: No thyromegaly. Vascular: No JVD. Cardiovascular:      Rate and Rhythm: Normal rate and regular rhythm. Heart sounds: Normal heart sounds. Pulmonary:      Effort: Pulmonary effort is normal. No respiratory distress. Breath sounds: Normal breath sounds. Abdominal:      Palpations: Abdomen is soft. There is no hepatomegaly or splenomegaly. Tenderness: There is no tenderness. Feet:      Right foot:      Protective Sensation: 10 sites tested. 9 sites sensed. Left foot:      Protective Sensation: 10 sites tested. 9 sites sensed. Skin:     General: Skin is warm and dry. Coloration: Skin is not pale. Findings: No erythema or rash. Comments: Turgor normal   Neurological:      Sensory: Sensory deficit (Monofilament testing both heels not felt) present. Psychiatric:         Behavior: Behavior normal.         Thought Content: Thought content normal.         Judgment: Judgment normal.       No results found for this visit on 01/13/20. Hemoglobin A1c 7.4       ASSESSMENT PLAN      Diagnosis Orders   1. Type 2 diabetes mellitus with diabetic polyneuropathy, without long-term current use of insulin ([facility])  Microalbumin / Creatinine Urine Ratio    POCT glycosylated hemoglobin (Hb A1C)   2. HTN (hypertension), benign  COMPREHENSIVE METABOLIC PANEL   3. CAD in native artery     4. Pure hypercholesterolemia  LIPID PANEL   5. Prostate cancer screening  Psa screening   6. Controlled type 2 diabetes mellitus with diabetic mononeuropathy, without long-term current use of insulin ([facility])     Improve diabetic control, A1c down from 7.9 on November 2000 18-7.4 today. Patient states that his goal was to lose 10 more pounds. We will follow-up in 6 months. Blood pressure acceptable. No symptoms of coronary disease. He is no longer taking isosorbide, and using rosuvastatin PRN.   He will see his cardiologist tomorrow and I asked him to mention these faxed to the cardiologist.  Olivia Mccoy will be updated including lipid. PSA to be checked to. Patient should call the office immediately with new or ongoing signs or symptoms or worsening, or proceed to the emergency room. No changes in past medical history, past surgical history, social history, or family history were noted during the patient encounter unless specifically listed above. All updates of past medical history, past surgical history, social history, or family history were reviewed personally by me during the office visit. All problems listed in the assessment are stable unless noted otherwise. Medication profile reviewed personally by me during the visit. Medication side effects and possible impairments from medications were discussed as applicable. This document was prepared by a combination of typing and transcription through a voice recognition software. Scribe attestation: Hong Cai RN, am scribing for and in the presence of Xiomara Rosario MD. Electronically signed by Richy Gonzalez RN on 1/13/2020 at 9:44 AM      Provider attestation:     I, Dr. Javier Hoff, personally performed the services described in this documentation, as scribed by the above signed scribe in my presence, and it is both accurate and complete. I agree with the ROS and Past Histories independently gathered by the clinical support staff and the remaining scribed note accurately describes my personal service to the patient.       1/13/2020    10:08 AM

## 2020-01-13 NOTE — PATIENT INSTRUCTIONS
Please update your eye exam for diabetic changes. Discuss your medications with your cardiologist.          Patient should call the office immediately with new or ongoing signs or symptoms or worsening, or proceed to the emergency room. If you are on medications which could impair your senses, you are at risk of weakness, falls, dizziness, or drowsiness. You should be careful during activities which could place you at risk of harm, such as climbing, using stairs, operating machinery, or driving vehicles. If you feel you cannot safely do these activities, you should request others to help you, or avoid the activities altogether. If you are drowsy for any other reason, you should use the same precautions as listed above. You may receive a survey regarding the care you received during your visit. Your input is valuable to us. We encourage you to complete and return your survey.

## 2020-01-13 NOTE — LETTER
[address] [facility]  [address] [facility] [address] [facility]  Phone: [phone]  Fax: [phone]    Merle Barba MD        January 13, 2020     Patient: Faustino Lopes   YOB: 1945   Date of Visit: 1/13/2020       To Whom it May Concern:    Rosenda Cantor was seen in my clinic on 1/13/2020. He ill on Saturday, please excuse him from work. He may return to work 1/13/20. If you have any questions or concerns, please don't hesitate to call.     Sincerely,         Merle Barba MD

## 2020-01-21 ENCOUNTER — TELEPHONE (OUTPATIENT)
Dept: FAMILY MEDICINE CLINIC | Age: 75
End: 2020-01-21

## 2020-01-21 NOTE — TELEPHONE ENCOUNTER
Patient states he was told to have his eyes checked but has no referral, please advise looking to get scheduled.

## 2020-01-28 ENCOUNTER — HOSPITAL ENCOUNTER (EMERGENCY)
Age: 75
Discharge: HOME | End: 2020-01-28
Attending: EMERGENCY MEDICINE
Payer: MEDICARE

## 2020-01-28 VITALS
HEIGHT: 74 IN | HEART RATE: 80 BPM | OXYGEN SATURATION: 94 % | WEIGHT: 240 LBS | BODY MASS INDEX: 30.8 KG/M2 | DIASTOLIC BLOOD PRESSURE: 73 MMHG | RESPIRATION RATE: 16 BRPM | SYSTOLIC BLOOD PRESSURE: 162 MMHG | TEMPERATURE: 100.5 F

## 2020-01-28 LAB
RAPID INFLUENZA  B AGN: NEGATIVE
RAPID INFLUENZA A AGN: POSITIVE

## 2020-01-28 PROCEDURE — 6370000000 HC RX 637 (ALT 250 FOR IP): Performed by: EMERGENCY MEDICINE

## 2020-01-28 PROCEDURE — 99283 EMERGENCY DEPT VISIT LOW MDM: CPT

## 2020-01-28 PROCEDURE — 87804 INFLUENZA ASSAY W/OPTIC: CPT

## 2020-01-28 RX ORDER — OSELTAMIVIR PHOSPHATE 75 MG/1
75 CAPSULE ORAL ONCE
Status: COMPLETED | OUTPATIENT
Start: 2020-01-28 | End: 2020-01-28

## 2020-01-28 RX ORDER — ACETAMINOPHEN 325 MG/1
650 TABLET ORAL ONCE
Status: COMPLETED | OUTPATIENT
Start: 2020-01-28 | End: 2020-01-28

## 2020-01-28 RX ORDER — DEXTROMETHORPHAN HYDROBROMIDE AND PROMETHAZINE HYDROCHLORIDE 15; 6.25 MG/5ML; MG/5ML
5 SYRUP ORAL 4 TIMES DAILY PRN
Qty: 180 ML | Refills: 0 | Status: SHIPPED | OUTPATIENT
Start: 2020-01-28 | End: 2021-09-13 | Stop reason: SDUPTHER

## 2020-01-28 RX ORDER — BENZONATATE 100 MG/1
200 CAPSULE ORAL 3 TIMES DAILY PRN
Qty: 30 CAPSULE | Refills: 0 | Status: SHIPPED | OUTPATIENT
Start: 2020-01-28 | End: 2020-02-04

## 2020-01-28 RX ORDER — OSELTAMIVIR PHOSPHATE 75 MG/1
75 CAPSULE ORAL 2 TIMES DAILY
Qty: 10 CAPSULE | Refills: 0 | Status: SHIPPED | OUTPATIENT
Start: 2020-01-28 | End: 2020-02-02

## 2020-01-28 RX ADMIN — OSELTAMIVIR PHOSPHATE 75 MG: 30 CAPSULE ORAL at 21:38

## 2020-01-28 RX ADMIN — ACETAMINOPHEN 650 MG: 325 TABLET, FILM COATED ORAL at 21:38

## 2020-01-28 ASSESSMENT — PAIN SCALES - GENERAL: PAINLEVEL_OUTOF10: 5

## 2020-01-29 NOTE — ED PROVIDER NOTES
Tylenol/Motrin use for home, I did not feel imaging were necessary at this time, lung sounds clear, nontoxic-appearing, strict return precautions given, all questions answered, will return if any worsening symptoms or new concerns, see AVS for further discharge information, patient verbalized understanding of plan, felt comfortable going home. Orders Placed This Encounter   Procedures    Rapid influenza A/B antigens     Orders Placed This Encounter   Medications    oseltamivir (TAMIFLU) capsule 75 mg    acetaminophen (TYLENOL) tablet 650 mg    benzonatate (TESSALON PERLES) 100 MG capsule     Sig: Take 2 capsules by mouth 3 times daily as needed for Cough     Dispense:  30 capsule     Refill:  0    promethazine-dextromethorphan (PROMETHAZINE-DM) 6.25-15 MG/5ML syrup     Sig: Take 5 mLs by mouth 4 times daily as needed for Cough (nausea)     Dispense:  180 mL     Refill:  0    oseltamivir (TAMIFLU) 75 MG capsule     Sig: Take 1 capsule by mouth 2 times daily for 5 days     Dispense:  10 capsule     Refill:  0         CLINICAL IMPRESSION  1. Influenza A    2. Cough    3. Nasal congestion    4. HTN (hypertension), benign    5. Hx of CABG        Blood pressure (!) 162/73, pulse 80, temperature 100.5 °F (38.1 °C), temperature source Oral, resp. rate 16, height 6' 2\" (1.88 m), weight 240 lb (108.9 kg), SpO2 94 %. DISPOSITION  Jacobo Rodriguez was discharged to home in stable condition.                    Lashaun Carr, DO  01/29/20 8851

## 2020-02-03 RX ORDER — EMPAGLIFLOZIN 25 MG/1
TABLET, FILM COATED ORAL
Qty: 30 TABLET | Refills: 10 | Status: SHIPPED | OUTPATIENT
Start: 2020-02-03 | End: 2020-02-24

## 2020-02-24 RX ORDER — EMPAGLIFLOZIN 25 MG/1
TABLET, FILM COATED ORAL
Qty: 30 TABLET | Refills: 3 | Status: SHIPPED | OUTPATIENT
Start: 2020-02-24 | End: 2020-07-09 | Stop reason: SDUPTHER

## 2020-05-26 RX ORDER — BLOOD SUGAR DIAGNOSTIC
STRIP MISCELLANEOUS
Qty: 100 STRIP | Refills: 1 | Status: SHIPPED | OUTPATIENT
Start: 2020-05-26 | End: 2020-08-19

## 2020-06-05 ENCOUNTER — TELEPHONE (OUTPATIENT)
Dept: ADMINISTRATIVE | Age: 75
End: 2020-06-05

## 2020-06-05 NOTE — TELEPHONE ENCOUNTER
Submitted PA for Jardiance 25MG tablets, Key: [id number]  Medication has been DENIED. Patients plan does NOT provide prescription coverage. See attached letter. Please notify patient. Thank you.

## 2020-06-08 NOTE — TELEPHONE ENCOUNTER
I spoke with . Latosha Mayorga, he uses [employer] for his prescription drug coverage. He was told by both the insurance company and Warren Iglesias that he has not met his deductible so the copay for his Ricardo Rondon is $500 for a 30 day supply. I asked him to reach out to [facility] and ask what alternatives are covered or more affordable and asked him to call us back with that info. Pt verbalized understanding.

## 2020-07-06 RX ORDER — AMLODIPINE BESYLATE 10 MG/1
TABLET ORAL
Qty: 90 TABLET | Refills: 2 | Status: SHIPPED | OUTPATIENT
Start: 2020-07-06 | End: 2020-07-10

## 2020-07-06 NOTE — TELEPHONE ENCOUNTER
Future appt scheduled 07/13/2020       Last appt 01/13/2020      Last Written 07/03/2019    amLODIPine (NORVASC) 10 MG tablet  #90  3 RF

## 2020-07-10 RX ORDER — AMLODIPINE BESYLATE 10 MG/1
TABLET ORAL
Qty: 90 TABLET | Refills: 2 | Status: SHIPPED | OUTPATIENT
Start: 2020-07-10 | End: 2021-04-13 | Stop reason: SDUPTHER

## 2020-07-10 RX ORDER — EMPAGLIFLOZIN 25 MG/1
TABLET, FILM COATED ORAL
Qty: 30 TABLET | Refills: 3 | Status: SHIPPED | OUTPATIENT
Start: 2020-07-10 | End: 2020-11-16 | Stop reason: SDUPTHER

## 2020-07-13 ENCOUNTER — OFFICE VISIT (OUTPATIENT)
Dept: FAMILY MEDICINE CLINIC | Age: 75
End: 2020-07-13
Payer: MEDICARE

## 2020-07-13 VITALS
DIASTOLIC BLOOD PRESSURE: 90 MMHG | SYSTOLIC BLOOD PRESSURE: 156 MMHG | HEART RATE: 72 BPM | OXYGEN SATURATION: 97 % | TEMPERATURE: 97.2 F | BODY MASS INDEX: 31.46 KG/M2 | WEIGHT: 245 LBS

## 2020-07-13 LAB
A/G RATIO: 2 (ref 1.1–2.2)
ALBUMIN SERPL-MCNC: 4.6 G/DL (ref 3.4–5)
ALP BLD-CCNC: 60 U/L (ref 40–129)
ALT SERPL-CCNC: 20 U/L (ref 10–40)
ANION GAP SERPL CALCULATED.3IONS-SCNC: 14 MMOL/L (ref 3–16)
AST SERPL-CCNC: 19 U/L (ref 15–37)
BILIRUB SERPL-MCNC: 1.1 MG/DL (ref 0–1)
BUN BLDV-MCNC: 15 MG/DL (ref 7–20)
CALCIUM SERPL-MCNC: 9.5 MG/DL (ref 8.3–10.6)
CHLORIDE BLD-SCNC: 105 MMOL/L (ref 99–110)
CHOLESTEROL, TOTAL: 179 MG/DL (ref 0–199)
CO2: 22 MMOL/L (ref 21–32)
CREAT SERPL-MCNC: 1.1 MG/DL (ref 0.8–1.3)
CREATININE URINE: 196.2 MG/DL (ref 39–259)
GFR AFRICAN AMERICAN: >60
GFR NON-AFRICAN AMERICAN: >60
GLOBULIN: 2.3 G/DL
GLUCOSE BLD-MCNC: 130 MG/DL (ref 70–99)
HDLC SERPL-MCNC: 48 MG/DL (ref 40–60)
LDL CHOLESTEROL CALCULATED: 112 MG/DL
MICROALBUMIN UR-MCNC: 1.3 MG/DL
MICROALBUMIN/CREAT UR-RTO: 6.6 MG/G (ref 0–30)
POTASSIUM SERPL-SCNC: 4.1 MMOL/L (ref 3.5–5.1)
SODIUM BLD-SCNC: 141 MMOL/L (ref 136–145)
TOTAL PROTEIN: 6.9 G/DL (ref 6.4–8.2)
TRIGL SERPL-MCNC: 93 MG/DL (ref 0–150)
VITAMIN B-12: 947 PG/ML (ref 211–911)
VLDLC SERPL CALC-MCNC: 19 MG/DL

## 2020-07-13 PROCEDURE — 3051F HG A1C>EQUAL 7.0%<8.0%: CPT | Performed by: FAMILY MEDICINE

## 2020-07-13 PROCEDURE — G8417 CALC BMI ABV UP PARAM F/U: HCPCS | Performed by: FAMILY MEDICINE

## 2020-07-13 PROCEDURE — 2022F DILAT RTA XM EVC RTNOPTHY: CPT | Performed by: FAMILY MEDICINE

## 2020-07-13 PROCEDURE — G8428 CUR MEDS NOT DOCUMENT: HCPCS | Performed by: FAMILY MEDICINE

## 2020-07-13 PROCEDURE — 83036 HEMOGLOBIN GLYCOSYLATED A1C: CPT | Performed by: FAMILY MEDICINE

## 2020-07-13 PROCEDURE — 3017F COLORECTAL CA SCREEN DOC REV: CPT | Performed by: FAMILY MEDICINE

## 2020-07-13 PROCEDURE — 36415 COLL VENOUS BLD VENIPUNCTURE: CPT | Performed by: FAMILY MEDICINE

## 2020-07-13 PROCEDURE — 99214 OFFICE O/P EST MOD 30 MIN: CPT | Performed by: FAMILY MEDICINE

## 2020-07-13 PROCEDURE — 1123F ACP DISCUSS/DSCN MKR DOCD: CPT | Performed by: FAMILY MEDICINE

## 2020-07-13 PROCEDURE — 4040F PNEUMOC VAC/ADMIN/RCVD: CPT | Performed by: FAMILY MEDICINE

## 2020-07-13 PROCEDURE — 1036F TOBACCO NON-USER: CPT | Performed by: FAMILY MEDICINE

## 2020-07-13 NOTE — PROGRESS NOTES
Chief Complaint   Patient presents with    Diabetes    Hyperlipidemia    Hypertension   He has not taken Jardiance since January. He believes he has a drug deductible, he will check this out further. He urinates a lot, he has seen his urologist and cardiologist.  PSA was fine. Metformin XR hurts his stomach, increases his gas and feels uncomfortable. He is currently on glimepiride 4 mg daily with the metformin. He will try to get the Jardiance to avoid switching to injections. He will try to update his eye exam.  Discussed doing 4 month follow up virtually. He states he had influenza A back in January. HPI:  Justice Bates is a 70 y.o. (: 1945) here today for  Treatment Adherence:   Medication compliance:  noncompliant: not taking the Jardiance due to cost and metformin bothers his stomach but still takes it  Diet compliance:  noncompliant: eating worse since shut down  Weight trend: increasing  Current exercise: has started riding his bike again  Barriers: none    Diabetes Mellitus Type 2: Current symptoms/problems include none. Home blood sugar records: fasting range: 150, patient tests 2 time(s) per day  Any episodes of hypoglycemia? yes - but very rare  Eye exam current (within one year): no  Tobacco history: He  reports that he quit smoking about 44 years ago. His smoking use included cigarettes. He has a 50.00 pack-year smoking history. He has never used smokeless tobacco.   Daily Aspirin? Yes    Hypertension:  Home blood pressure monitoring: Yes - 120's/70's . He is not adherent to a low sodium diet. Patient denies chest pain and shortness of breath. Antihypertensive medication side effects: no medication side effects noted. Use of agents associated with hypertension: none. Hyperlipidemia:  No new myalgias or GI upset on rosuvastatin (Crestor).        Lab Results   Component Value Date    LABA1C 7.4 2020    LABA1C 7.9 2018    LABA1C 8.2 08/15/2018     Lab Results   Component Value Date    LABMICR 2.90 (H) 05/08/2018    CREATININE 1.0 05/08/2018     Lab Results   Component Value Date    ALT 16 08/15/2018    AST 18 08/15/2018     Lab Results   Component Value Date    CHOL 155 08/15/2018    TRIG 105 08/15/2018    HDL 47 08/15/2018    LDLCALC 87 08/15/2018          Patient's medications, allergies, past medical, surgical, social and family histories were reviewed and updated asappropriate on 7/13/2020 at 9:45 AM.    ROS:  Review of Systems    All other systems reviewed and are negative except as noted above on 7/13/2020 at 9:45 AM. Additional review of systems may be scanned into the media section ofthis medical record. Any responses requiring further intervention were pursued.     Hemoglobin A1C (%)   Date Value   01/13/2020 7.4     Microscopic Examination (no units)   Date Value   12/14/2014 YES     Microalbumin, Random Urine (mg/dL)   Date Value   05/08/2018 2.90 (H)     LDL Calculated (mg/dL)   Date Value   08/15/2018 87     Past Medical History:   Diagnosis Date    Acute MI ([address]     Angina pectoris, unstable ([address]     ASHD (arteriosclerotic heart disease)     Bladder outlet obstruction     BPH (benign prostatic hyperplasia)     Chest pain     Constipation     Diabetes mellitus (HCC)     GERD (gastroesophageal reflux disease)     Hx of CABG     Hypercholesteremia     Hypercholesterolemia     Hypertension     IBS (irritable bowel syndrome)     Influenza A 01/28/2020    Sinusitis     Type II or unspecified type diabetes mellitus without mention of complication, not stated as uncontrolled         Family History   Problem Relation Age of Onset    Diabetes Mother     High Blood Pressure Mother     Arthritis Mother      Social History     Socioeconomic History    Marital status:      Spouse name: Not on file    Number of children: Not on file    Years of education: Not on file    Highest education level: Not on file   Occupational History MG tablet Take 1 tablet by mouth every morning (before breakfast)  Nora Jo MD   metFORMIN (GLUCOPHAGE-XR) 500 MG extended release tablet TAKE TWO TABLETS BY MOUTH TWICE A DAY  Nora Jo MD   rosuvastatin (CRESTOR) 40 MG tablet Take 1 tablet by mouth every evening  Dia Fabry, APRN - CNP   albuterol sulfate HFA (PROAIR HFA) 108 (90 Base) MCG/ACT inhaler Inhale 2 puffs into the lungs every 6 hours as needed for Wheezing or Shortness of Breath  Dia Fabry, APRN - CNP   Omega-3 Fatty Acids (FISH OIL) 1000 MG CAPS Take 1 capsule by mouth 2 times daily  YANNICK Garza CNP   Nitroglycerin 400 MCG/SPRAY AERS Place 1 tablet onto the tongue every 5 minutes as needed. Nora Jo MD   aspirin 325 MG tablet Take 325 mg by mouth daily. Historical Provider, MD     Allergies   Allergen Reactions    Prednisone Other (See Comments)     Made patient very agitated (medrol dospak)    Metformin And Related Other (See Comments)     GI upset    Codeine Other (See Comments)     constipation       OBJECTIVE:  Estimated body mass index is 30.81 kg/m² as calculated from the following:    Height as of 1/28/20: 6' 2\" (1.88 m). Weight as of 1/28/20: 240 lb (108.9 kg). Vitals:    07/13/20 0947   BP: (!) 154/80   Site: Left Upper Arm   Position: Sitting   Cuff Size: Large Adult   Pulse: 72   Temp: 97.2 °F (36.2 °C)   TempSrc: Temporal   SpO2: 97%   Weight: 245 lb (111.1 kg)     BP Readings from Last 2 Encounters:   01/28/20 (!) 162/73   01/13/20 136/70     Wt Readings from Last 3 Encounters:   01/28/20 240 lb (108.9 kg)   01/13/20 241 lb 6.4 oz (109.5 kg)   05/01/19 236 lb 8.9 oz (107.3 kg)       Physical Exam  Vitals signs and nursing note reviewed. Constitutional:       General: He is not in acute distress. Appearance: He is well-developed. He is not diaphoretic. HENT:      Head: Normocephalic and atraumatic.       Right Ear: External ear normal.      Left Ear: External ear normal.      Nose: Nose normal.   Eyes:      General: Lids are normal. No scleral icterus. Right eye: No discharge. Left eye: No discharge. Pupils: Pupils are equal, round, and reactive to light. Neck:      Thyroid: No thyromegaly. Vascular: No JVD. Cardiovascular:      Rate and Rhythm: Normal rate and regular rhythm. Heart sounds: Normal heart sounds. Pulmonary:      Effort: Pulmonary effort is normal. No respiratory distress. Breath sounds: Normal breath sounds. Abdominal:      Palpations: Abdomen is soft. There is no hepatomegaly or splenomegaly. Tenderness: There is no abdominal tenderness. Skin:     General: Skin is warm and dry. Coloration: Skin is not pale. Findings: No erythema or rash. Comments: Turgor normal   Psychiatric:         Behavior: Behavior normal.         Thought Content: Thought content normal.         Judgment: Judgment normal.              ASSESSMENT PLAN      Diagnosis Orders   1. DM (diabetes mellitus), secondary, uncontrolled, w/neurologic complic ([facility] [address]  POCT glycosylated hemoglobin (Hb A1C)    Hemoglobin A1C    VITAMIN B12   2. Prostate cancer screening     3. Type 2 diabetes mellitus with diabetic polyneuropathy, without long-term current use of insulin (HCC)  Microalbumin / Creatinine Urine Ratio   4. Pure hypercholesterolemia  LIPID PANEL   5. HTN (hypertension), benign  COMPREHENSIVE METABOLIC PANEL   6. Bladder outlet obstruction     7. Constipation, unspecified constipation type     8. CAD in native artery     Patient's fingerstick hemoglobin A1c came back with an error. Metformin causes him GI disturbance even the extended release. He has not been taking Jardiance due to cost.  I asked him if he would be in favor of injections and he would rather not and he is going to explore trying to get the Jardiance. It sounds like he would prefer also to put up with the side effects of metformin. Await labs.

## 2020-07-14 LAB
ESTIMATED AVERAGE GLUCOSE: 194.4 MG/DL
HBA1C MFR BLD: 8.4 %

## 2020-08-03 RX ORDER — TAMSULOSIN HYDROCHLORIDE 0.4 MG/1
CAPSULE ORAL
Qty: 60 CAPSULE | Refills: 10 | Status: ON HOLD | OUTPATIENT
Start: 2020-08-03 | End: 2022-10-12 | Stop reason: ALTCHOICE

## 2020-08-19 RX ORDER — BLOOD SUGAR DIAGNOSTIC
STRIP MISCELLANEOUS
Qty: 100 STRIP | Refills: 3 | Status: SHIPPED | OUTPATIENT
Start: 2020-08-19 | End: 2020-09-11

## 2020-09-11 RX ORDER — BLOOD SUGAR DIAGNOSTIC
STRIP MISCELLANEOUS
Qty: 100 STRIP | Refills: 0 | Status: SHIPPED | OUTPATIENT
Start: 2020-09-11 | End: 2021-03-02

## 2020-09-11 NOTE — TELEPHONE ENCOUNTER
Last OV 7/13/20  Future Appointments   Date Time Provider Michael Marti   11/16/2020  9:40 AM Nora Jo MD [facility]

## 2020-09-14 RX ORDER — GLIMEPIRIDE 4 MG/1
TABLET ORAL
Qty: 90 TABLET | Refills: 2 | Status: SHIPPED | OUTPATIENT
Start: 2020-09-14 | End: 2021-07-26

## 2020-09-14 NOTE — TELEPHONE ENCOUNTER
Last OV 7/13/20  Future Appointments   Date Time Provider Michael Marti   11/16/2020  9:40 AM Fang Blake MD [facility]

## 2020-10-06 ENCOUNTER — TELEPHONE (OUTPATIENT)
Dept: FAMILY MEDICINE CLINIC | Age: 75
End: 2020-10-06

## 2020-10-09 ENCOUNTER — OFFICE VISIT (OUTPATIENT)
Dept: FAMILY MEDICINE CLINIC | Age: 75
End: 2020-10-09
Payer: MEDICARE

## 2020-10-09 VITALS
DIASTOLIC BLOOD PRESSURE: 80 MMHG | HEIGHT: 74 IN | BODY MASS INDEX: 30.72 KG/M2 | TEMPERATURE: 96.9 F | SYSTOLIC BLOOD PRESSURE: 138 MMHG | WEIGHT: 239.4 LBS

## 2020-10-09 PROCEDURE — 90686 IIV4 VACC NO PRSV 0.5 ML IM: CPT | Performed by: FAMILY MEDICINE

## 2020-10-09 PROCEDURE — G8482 FLU IMMUNIZE ORDER/ADMIN: HCPCS | Performed by: FAMILY MEDICINE

## 2020-10-09 PROCEDURE — 4040F PNEUMOC VAC/ADMIN/RCVD: CPT | Performed by: FAMILY MEDICINE

## 2020-10-09 PROCEDURE — G0438 PPPS, INITIAL VISIT: HCPCS | Performed by: FAMILY MEDICINE

## 2020-10-09 PROCEDURE — 3017F COLORECTAL CA SCREEN DOC REV: CPT | Performed by: FAMILY MEDICINE

## 2020-10-09 PROCEDURE — G0008 ADMIN INFLUENZA VIRUS VAC: HCPCS | Performed by: FAMILY MEDICINE

## 2020-10-09 PROCEDURE — 1123F ACP DISCUSS/DSCN MKR DOCD: CPT | Performed by: FAMILY MEDICINE

## 2020-10-09 ASSESSMENT — PATIENT HEALTH QUESTIONNAIRE - PHQ9
1. LITTLE INTEREST OR PLEASURE IN DOING THINGS: 0
SUM OF ALL RESPONSES TO PHQ QUESTIONS 1-9: 2
SUM OF ALL RESPONSES TO PHQ QUESTIONS 1-9: 2
2. FEELING DOWN, DEPRESSED OR HOPELESS: 2
SUM OF ALL RESPONSES TO PHQ9 QUESTIONS 1 & 2: 2

## 2020-10-09 ASSESSMENT — LIFESTYLE VARIABLES: HOW OFTEN DO YOU HAVE A DRINK CONTAINING ALCOHOL: 0

## 2020-10-09 NOTE — PROGRESS NOTES
Medicare Annual Wellness Visit  Name: Rosa Elena Kenney Date: 10/9/2020   MRN: <[id number]> Sex: Male   Age: 70 y.o. Ethnicity: Non-/Non    : 1945 Race: Parris Ellis is here for Medicare AWV    Screenings for behavioral, psychosocial and functional/safety risks, and cognitive dysfunction are all negative except as indicated below. These results, as well as other patient data from the [address] form, are documented in Flowsheets linked to this Encounter. Allergies   Allergen Reactions    Prednisone Other (See Comments)     Made patient very agitated (medrol dospak)    Metformin And Related Other (See Comments)     GI upset    Codeine Other (See Comments)     constipation       Prior to Visit Medications    Medication Sig Taking?  Authorizing Provider   glimepiride (AMARYL) 4 MG tablet TAKE ONE TABLET BY MOUTH EVERY MORNING BEFORE BREAKFAST  Jose J Hung MD   [facility] ULTRA strip USE ONE STRIP TO TEST TWICE A DAY  Jose J Hung MD   tamsulosin (FLOMAX) 0.4 MG capsule TAKE TWO CAPSULES BY MOUTH DAILY ONE HALF HOUR AFTER THE SAME MEAL EACH DAY  Jose J Hung MD   amLODIPine (NORVASC) 10 MG tablet TAKE ONE TABLET BY MOUTH DAILY  Jose J Hung MD   empagliflozin (Familia Nice) 25 MG tablet TAKE ONE TABLET BY MOUTH DAILY  Patient not taking: Reported on 2020  Jose J Hung MD   lisinopril (PRINIVIL;ZESTRIL) 40 MG tablet TAKE ONE TABLET BY MOUTH DAILY  Jose J Hung MD   traZODone (DESYREL) 100 MG tablet Take 1 tablet by mouth nightly as needed for Sleep  Jose J Hung MD   metFORMIN (GLUCOPHAGE-XR) 500 MG extended release tablet TAKE TWO TABLETS BY MOUTH TWICE A DAY  Jose J Hung MD   rosuvastatin (CRESTOR) 40 MG tablet Take 1 tablet by mouth every evening  Toña Hernandez, APRN - CNP   albuterol sulfate HFA (PROAIR HFA) 108 (90 Base) MCG/ACT inhaler Inhale 2 puffs into the lungs every 6 hours as needed for Wheezing or Shortness of Breath  Héctorarlette YANNICK Crawley CNP   Omega-3 Fatty Acids (FISH OIL) 1000 MG CAPS Take 1 capsule by mouth 2 times daily  YANNIKC Garza CNP   Nitroglycerin 400 MCG/SPRAY AERS Place 1 tablet onto the tongue every 5 minutes as needed. John Jain MD   aspirin 325 MG tablet Take 325 mg by mouth daily.   Historical Provider, MD       Past Medical History:   Diagnosis Date    Acute MI ([facility] [address]     Angina pectoris, unstable ([address]     ASHD (arteriosclerotic heart disease)     Bladder outlet obstruction     BPH (benign prostatic hyperplasia)     Chest pain     Constipation     Diabetes mellitus ([facility] [address] [facility]     GERD (gastroesophageal reflux disease)     Hx of CABG     Hypercholesteremia     Hypercholesterolemia     Hypertension     IBS (irritable bowel syndrome)     Influenza A 01/28/2020    Sinusitis     Type II or unspecified type diabetes mellitus without mention of complication, not stated as uncontrolled        Past Surgical History:   Procedure Laterality Date    CARDIAC CATHETERIZATION  2002    stent    CARDIAC CATHETERIZATION  2011    stent    COLONOSCOPY  10/24/13    CORONARY ANGIOPLASTY WITH STENT PLACEMENT         Family History   Problem Relation Age of Onset    Diabetes Mother     High Blood Pressure Mother     Arthritis Mother        CareTeam (Including outside providers/suppliers regularly involved in providing care):   Patient Care Team:  John Jain MD as PCP - Christos Koo MD as PCP - [facility] Empaneled Provider  Rizwana Oliveira MD as Surgeon (Orthopedic Surgery)    Wt Readings from Last 3 Encounters:   10/09/20 239 lb 6.4 oz (108.6 kg)   07/13/20 245 lb (111.1 kg)   01/28/20 240 lb (108.9 kg)     Vitals:    10/09/20 1158   BP: 138/80   Temp: 96.9 °F (36.1 °C)   Weight: 239 lb 6.4 oz (108.6 kg)   Height: 6' 2\" (1.88 m)     Body mass index is 30.74 kg/m². Based upon direct observation of the patient, evaluation of cognition reveals recent and remote memory intact. Patient's complete Health Risk Assessment and screening values have been reviewed and are found in Flowsheets. The following problems were reviewed today and where indicated follow up appointments were made and/or referrals ordered. Positive Risk Factor Screenings with Interventions:     General Health and ACP:  General  In general, how would you say your health is?: Good  In the past 7 days, have you experienced any of the following?  New or Increased Pain, New or Increased Fatigue, Loneliness, Social Isolation, Stress or Anger?: None of These  Do you get the social and emotional support that you need?: Yes  Do you have a Living Will?: Yes  Advance Directives     Power of [facility] Will ACP-Advance Directive ACP-Power of     Not on File Not on File Filed [address] [facility] [place] Risk Interventions:  · No Living Will: ACP documents already completed- patient asked to provide copy to the office    Health Habits/Nutrition:  Health Habits/Nutrition  Do you exercise for at least 20 minutes 2-3 times per week?: Yes  Have you lost any weight without trying in the past 3 months?: No  Do you eat fewer than 2 meals per day?: No  Have you seen a dentist within the past year?: Yes  Body mass index: (!) 30.73  Health Habits/Nutrition Interventions:  · Inadequate physical activity:  educational materials provided to promote increased physical activity    Hearing/Vision:  No exam data present  Hearing/Vision  Do you or your family notice any trouble with your hearing?: No  Do you have difficulty driving, watching TV, or doing any of your daily activities because of your eyesight?: No  Have you had an eye exam within the past year?: (!) No  Hearing/Vision Interventions:  · Vision concerns:  patient encouraged to make appointment with his/her eye specialist    Personalized Preventive Plan Current Health Maintenance Status  Immunization History   Administered Date(s) Administered    Influenza Vaccine, unspecified formulation 10/27/2016    Influenza Virus Vaccine 10/03/2014, 10/06/2015    Influenza, Quadv, IM, (6 mo and older Fluzone, Flulaval, Fluarix and 3 yrs and older Afluria) 10/24/2017, 10/18/2018    Influenza, Quadv, IM, PF (6 mo and older Fluzone, Flulaval, Fluarix, and 3 yrs and older Afluria) 11/04/2019    Pneumococcal Conjugate 13-valent ([id number]) 03/22/2017    Pneumococcal Polysaccharide ([id number]) 02/06/2013, 10/06/2015    Td vaccine (adult) 01/01/2002        Health Maintenance   Topic Date Due    Shingles Vaccine (1 of 2) 06/27/1997    Diabetic retinal exam  12/19/2018    Annual Wellness Visit (AWV)  05/29/2019    Flu vaccine (1) 09/01/2020    DTaP/Tdap/Td vaccine (1 - Tdap) 01/17/2022 (Originally 6/27/1966)    Diabetic foot exam  01/13/2021    A1C test (Diabetic or Prediabetic)  07/13/2021    Diabetic microalbuminuria test  07/13/2021    Lipid screen  07/13/2021    Potassium monitoring  07/13/2021    Creatinine monitoring  07/13/2021    Colon cancer screen colonoscopy  11/21/2021    Pneumococcal 65+ years Vaccine  Completed    AAA screen  Completed    Hepatitis C screen  Completed    Hepatitis A vaccine  Aged Out    Hib vaccine  Aged Out    Meningococcal (ACWY) vaccine  Aged Out     Recommendations for [employer] Due: see orders and patient instructions/AVS.  . Recommended screening schedule for the next 5-10 years is provided to the patient in written form: see Patient Instructions/AVS.    Judy BRADLEY LPN, 74/9/5698, performed the documented evaluation under the direct supervision of the attending physician. This encounter was performed under myDayanara MDs, direct supervision, 10/9/2020.         Vaccine Information Sheet, \"Influenza - Inactivated\"  given to Valdene Cellar, or parent/legal guardian of Gaby Almanzar and verbalized understanding. Patient responses:    Have you ever had a reaction to a flu vaccine? No  Do you have any current illness? No  Have you ever had [place] Syndrome? No  Do you have a serious allergy to any of the follow: Neomycin, Polymyxin, Thimerosal, eggs or egg products? No    Flu vaccine given per order. Please see immunization tab. Risks and benefits explained. Current VIS given.

## 2020-10-09 NOTE — PATIENT INSTRUCTIONS
Personalized Preventive Plan for Andrea Castrejon - 10/9/2020  Medicare offers a range of preventive health benefits. Some of the tests and screenings are paid in full while other may be subject to a deductible, co-insurance, and/or copay. Some of these benefits include a comprehensive review of your medical history including lifestyle, illnesses that may run in your family, and various assessments and screenings as appropriate. After reviewing your medical record and screening and assessments performed today your provider may have ordered immunizations, labs, imaging, and/or referrals for you. A list of these orders (if applicable) as well as your Preventive Care list are included within your After Visit Summary for your review. Other Preventive Recommendations:    · A preventive eye exam performed by an eye specialist is recommended every 1-2 years to screen for glaucoma; cataracts, macular degeneration, and other eye disorders. · A preventive dental visit is recommended every 6 months. · Try to get at least 150 minutes of exercise per week or 10,000 steps per day on a pedometer . · Order or download the FREE \"Exercise & Physical Activity: Your Everyday Guide\" from [employer]. Call 8-825.850.2547 or search [employer] online. · You need 6198-2194 mg of calcium and 3274-4460 IU of vitamin D per day. It is possible to meet your calcium requirement with diet alone, but a vitamin D supplement is usually necessary to meet this goal.  · When exposed to the sun, use a sunscreen that protects against both UVA and UVB radiation with an SPF of 30 or greater. Reapply every 2 to 3 hours or after sweating, drying off with a towel, or swimming. · Always wear a seat belt when traveling in a car. Always wear a helmet when riding a bicycle or motorcycle.     Keep Your Memory [place]       Many factors can affect your ability to remembera hectic lifestyle, aging, stress, chronic disease, and certain medicines. But, there are steps you can take to sharpen your mind and help preserve your memory. Challenge Your Brain   Regularly challenging your mind may help keeps it in top shape. Good mental exercises include:   Crossword puzzlesUse a dictionary if you need it; you will learn more that way. Brainteasers Try some! Crafts, such as wood working and sewing   Hobbies, such as gardening and building model airplanes   SocializingVisit old friends or join groups to meet new ones. Reading   Learning a new language   Taking a class, whether it be art history or kamryn chi   TravelingExperience the food, history, and culture of your destination   Learning to use a computer   Going to museums, the theater, or thought-provoking movies   Changing things in your daily life, such as reversing your pattern in the grocery store or brushing your teeth using your nondominant hand   Use Memory Aids   There is no need to remember every detail on your own. These memory aids can help:   Calendars and day planners   Electronic organizers to store all sorts of helpful informationThese devices can \"beep\" to remind you of appointments. A book of days to record birthdays, anniversaries, and other occasions that occur on the same date every year   Detailed \"to-do\" lists and strategically placed sticky notes   Quick \"study\" sessionsBefore a gathering, review who will be there so their names will be fresh in your mind. Establish routinesFor example, keep your keys, wallet, and umbrella in the same place all the time or take medicine with your 8:00 AM glass of juice   Live a Healthy Life   Many actions that will keep your body strong will do the same for your mind. For example:   Talk to Your Doctor About Herbs and Supplements    Malnutrition and vitamin deficiencies can impair your mental function. For example, vitamin B12 deficiency can cause a range of symptoms, including confusion.  But, what if your nutritional needs are being met? Can herbs and supplements still offer a benefit? Researchers have investigated a range of natural remedies, such as ginkgo , ginseng , and the supplement phosphatidylserine (PS). So far, though, the evidence is inconsistent as to whether these products can improve memory or thinking. If you are interested in taking herbs and supplements, talk to your doctor first because they may interact with other medicines that you are taking. Exercise Regularly    Among the many benefits of regular exercise are increased blood flow to the brain and decreased risk of certain diseases that can interfere with memory function. One study found that even moderate exercise has a beneficial effect. Examples of \"moderate\" exercise include:   Playing 18 holes of golf once a week, without a cart   Playing tennis twice a week   Walking one mile per day   Manage Stress    It can be tough to remember what is important when your mind is cluttered. Make time for relaxation. Choose activities that calm you down, and make it routine. Manage Chronic Conditions    Side effects of high blood pressure , diabetes, and heart disease can interfere with mental function. Many of the lifestyle steps discussed here can help manage these conditions. Strive to eat a healthy diet, exercise regularly, get stress under control, and follow your doctor's advice for your condition. Minimize Medications    Talk to your doctor about the medicines that you take. Some may be unnecessary. Also, healthy lifestyle habits may lower the need for certain drugs. Last Reviewed: April 2010 Florecita Hobson MD   Updated: 4/13/2010   ·        Advance Care Planning: Care Instructions  Your Care Instructions     It can be hard to live with an illness that cannot be cured. But if your health is getting worse, you may want to make decisions about end-of-life care. Planning for the end of your life does not mean that you are giving up.  It is a way to make sure that your wishes are met. Clearly stating your wishes can make it easier for your loved ones. Making plans while you are still able may also ease your mind and make your final days less stressful and more meaningful. Follow-up care is a key part of your treatment and safety. Be sure to make and go to all appointments, and call your doctor if you are having problems. It's also a good idea to know your test results and keep a list of the medicines you take. What can you do to plan for the end of life? You can bring these issues up with your doctor. You do not need to wait until your doctor starts the conversation. You might start with \"I would not be willing to live with . Silvino Liz \" When you complete this sentence it helps your doctor understand your wishes. Talk openly and honestly with your doctor. This is the best way to understand the decisions you will need to make as your health changes. Know that you can always change your mind. Ask your doctor about commonly used life-support measures. These include tube feedings, breathing machines, and fluids given through a vein (IV). Understanding these treatments will help you decide whether you want them. You may choose to have these life-supporting treatments for a limited time. This allows a trial period to see whether they will help you. You may also decide that you want your doctor to take only certain measures to keep you alive. It is important to spell out these conditions so that your doctor and family understand them. Talk to your doctor about how long you are likely to live. He or she may be able to give you an idea of what usually happens with your specific illness. Think about preparing papers that state your wishes. This way there will not be any confusion about what you want. You can change your instructions at any time. Which papers should you prepare?   Advance directives are legal papers that tell doctors how you want to be cared for at the end of your life. You do not need a  to write these papers. Ask your doctor or your state health department for information on how to write your advance directives. They may have the forms for each of these types of papers. Make sure your doctor has a copy of these on file, and give a copy to a family member or close friend. Consider a do-not-resuscitate order (DNR). This order asks that no extra treatments be done if your heart stops or you stop breathing. Extra treatments may include cardiopulmonary resuscitation (CPR), electrical shock to restart your heart, or a machine to breathe for you. If you decide to have a DNR order, ask your doctor to explain and write it. Place the order in your home where everyone can easily see it. Consider a living will. A living will explains your wishes about life support and other treatments at the end of your life if you become unable to speak for yourself. Living nagel tell doctors to use or not use treatments that would keep you alive. You must have one or two witnesses or a notary present when you sign this form. A living will may be called something else in your state. Consider a medical power of . This form allows you to name a person to make decisions about your care if you are not able to. Most people ask a close friend or family member. Talk to this person about the kinds of treatments you want and those that you do not want. Make sure this person understands your wishes. A medical power of  may be called something else in your state. These legal papers are simple to change. Tell your doctor what you want to change, and ask him or her to make a note in your medical file. Give your family updated copies of the papers. Where can you learn more? Go to https://amado.[employer]. org and sign in to your [employer] account. Enter P184 in the [facility] box to learn more about \"Advance Care Planning: Care Instructions. \" If you do not have an account, please click on the \"Sign Up Now\" link. Current as of: December 9, 2019               Content Version: 12.6  © 4752-8672 [employer]. Care instructions adapted under license by [facility] ([facility]). If you have questions about a medical condition or this instruction, always ask your healthcare professional. [address] any warranty or liability for your use of this information. ·        Learning About Job Wiley  What is a living will? A living will, also called a declaration, is a legal form. It tells your family and your doctor your wishes when you can't speak for yourself. It's used by the health professionals who will treat you as you near the end of your life or if you get seriously hurt or ill. If you put your wishes in writing, your loved ones and others will know what kind of care you want. They won't need to guess. This can ease your mind and be helpful to others. And you can change or cancel your living will at any time. A living will is not the same as an estate or property will. An estate will explains what you want to happen with your money and property after you die. How do you use it? A living will is used to describe the kinds of treatment or life support you want as you near the end of your life or if you get seriously hurt or ill. Keep these facts in mind about living nagel. Your living will is used only if you can't speak or make decisions for yourself. Most often, one or more doctors must certify that you can't speak or decide for yourself before your living will takes effect. If you get better and can speak for yourself again, you can accept or refuse any treatment. It doesn't matter what you said in your living will. Some states may limit your right to refuse treatment in certain cases.  For example, you may need to clearly state in your living will that you don't want artificial hydration and nutrition, such as live without the help of machines? Do you want certain Amish practices performed if you become very ill? If you have a choice, where do you want to be cared for? In your home? At a hospital or nursing home? If you have a choice at the end of your life, where would you prefer to die? At home? In a hospital or nursing home? Somewhere else? Would you prefer to be buried or cremated? Do you want your organs to be donated after you die? What should you do with your living will? Make sure that your family members and your health care agent have copies of your living will (also called a declaration). Give your doctor a copy of your living will. Ask him or her to keep it as part of your medical record. If you have more than one doctor, make sure that each one has a copy. Put a copy of your living will where it can be easily found. For example, some people may put a copy on their refrigerator door. If you are using a digital copy, be sure your doctor, family members, and health care agent know how to find and access it. Where can you learn more? Go to https://[employer].[facility]. org and sign in to your [employer] account. Enter W337 in the [address] box to learn more about \"Learning About Living Manisha Ferrera. \"     If you do not have an account, please click on the \"Sign Up Now\" link. Current as of: December 9, 2019               Content Version: 12.6  © 2276-7940 [employer]. Care instructions adapted under license by [facility] ([facility]). If you have questions about a medical condition or this instruction, always ask your healthcare professional. [place] [address] any warranty or liability for your use of this information. ·        Learning About Medical Power of   What is a medical power of ? A medical power of , also called a durable power of  for health care, is one type of the legal forms called advance directives.  It lets you name the person you want to make treatment decisions for you if you can't speak or decide for yourself. The person you choose is called your health care agent. This person is also called a health care proxy or health care surrogate. A medical power of  may be called something else in your state. How do you choose a health care agent? Choose your health care agent carefully. This person may or may not be a family member. Talk to the person before you make your final decision. Make sure he or she is comfortable with this responsibility. It's a good idea to choose someone who:  Is at least 25years old. Knows you well and understands what makes life meaningful for you. Understands your Buddhist and moral values. Will do what you want, not what he or she wants. Will be able to make difficult choices at a stressful time. Will be able to refuse or stop treatment, if that is what you would want, even if you could die. Will be firm and confident with health professionals if needed. Will ask questions to get needed information. Lives near you or agrees to travel to you if needed. Your family may help you make medical decisions while you can still be part of that process. But it's important to choose one person to be your health care agent in case you aren't able to make decisions for yourself. If you don't fill out the legal form and name a health care agent, the decisions your family can make may be limited. A health care agent may be called something else in your state. Who will make decisions for you if you don't have a health care agent? If you don't have a health care agent or a living will, you may not get the care you want. Decisions may be made by family members who disagree about your medical care. Or decisions may be made by a medical professional who doesn't know you well. In some cases, a  makes the decisions.   When you name a health care agent, it is very clear who has the power

## 2020-11-03 LAB
BASOPHILS ABSOLUTE: NORMAL
BASOPHILS RELATIVE PERCENT: NORMAL
EOSINOPHILS ABSOLUTE: NORMAL
EOSINOPHILS RELATIVE PERCENT: NORMAL
HCT VFR BLD CALC: 48 % (ref 41–53)
HEMOGLOBIN: 16.6 G/DL (ref 13.5–17.5)
LYMPHOCYTES ABSOLUTE: NORMAL
LYMPHOCYTES RELATIVE PERCENT: NORMAL
MCH RBC QN AUTO: 31.3 PG
MCHC RBC AUTO-ENTMCNC: 34.6 G/DL
MCV RBC AUTO: 91 FL
MONOCYTES ABSOLUTE: NORMAL
MONOCYTES RELATIVE PERCENT: NORMAL
NEUTROPHILS ABSOLUTE: NORMAL
NEUTROPHILS RELATIVE PERCENT: NORMAL
PDW BLD-RTO: 11.9 %
PLATELET # BLD: 243 K/ΜL
PMV BLD AUTO: NORMAL FL
RBC # BLD: 5.3 10^6/ΜL
WBC # BLD: 4.4 10^3/ML

## 2020-11-16 ENCOUNTER — VIRTUAL VISIT (OUTPATIENT)
Dept: FAMILY MEDICINE CLINIC | Age: 75
End: 2020-11-16
Payer: MEDICARE

## 2020-11-16 ENCOUNTER — TELEPHONE (OUTPATIENT)
Dept: FAMILY MEDICINE CLINIC | Age: 75
End: 2020-11-16

## 2020-11-16 PROCEDURE — 99441 PR PHYS/QHP TELEPHONE EVALUATION 5-10 MIN: CPT | Performed by: FAMILY MEDICINE

## 2020-11-16 RX ORDER — EMPAGLIFLOZIN 25 MG/1
TABLET, FILM COATED ORAL
Qty: 30 TABLET | Refills: 5 | Status: SHIPPED | OUTPATIENT
Start: 2020-11-16 | End: 2022-08-15

## 2020-11-16 RX ORDER — NITROGLYCERIN 0.4 MG/1
0.4 TABLET SUBLINGUAL EVERY 5 MIN PRN
Qty: 25 TABLET | Refills: 3 | Status: ON HOLD | OUTPATIENT
Start: 2020-11-16 | End: 2022-10-22 | Stop reason: HOSPADM

## 2020-11-16 RX ORDER — LISINOPRIL 40 MG/1
TABLET ORAL
Qty: 90 TABLET | Refills: 3 | Status: SHIPPED | OUTPATIENT
Start: 2020-11-16 | End: 2021-12-06 | Stop reason: ALTCHOICE

## 2020-11-16 NOTE — PROGRESS NOTES
Kiley eZe is a 70 y.o. male evaluated via telephone on 11/16/2020. For follow up of patients diabetes, hypertension and hyperlipidemia    Patient continues to take his rosuvastatin daily with no side effects per patient. Patient continues to take the metformin for his diabetes, tolerating the GI side effects, and did start taking the jardiance and he feels that this medication has helped a lot. Patient has not always been taking the amaryl reminded patient that this medication is to be taken on a daily basis. Patients bg has been hanging in the 140s for his weekly average. Patients Bps have been running in the 130's/ 70s but it was elevated today. Has not had any side effects of his Bps medications and denies any chest pain and shortness of breath. Patient has seen both urology and gastroenterology. He was having trouble with constipation but that is better now. ASSESSMENT PLAN      Diagnosis Orders   1. Controlled type 2 diabetes mellitus with diabetic mononeuropathy, without long-term current use of insulin (HCC)  HEMOGLOBIN A1C    empagliflozin (JARDIANCE) 25 MG tablet   2. HTN (hypertension), benign     3. CAD in native artery     4. Pure hypercholesterolemia     5. Benign non-nodular prostatic hyperplasia without lower urinary tract symptoms     Per report his sugars are improved. Check his hemoglobin A1c. He is reminded to use the glimepiride and Metformin more regularly. Blood pressure acceptable per report at home, follow-up reading 130/70. No symptoms of coronary insufficiency. Continue lipid monitoring as needed. He reports urology felt he was doing okay. In office appointment 4 months    Patient should call the office immediately with new or ongoing signs or symptoms or worsening, or proceed to the emergency room.   No changes in past medical history, past surgical history, social history, or family history were noted during the patient encounter unless specifically listed above. All updates of past medical history, past surgical history, social history, or family history were reviewed personally by me during the office visit. All problems listed in the assessment are stable unless noted otherwise. Medication profile reviewed personally by me during the visit. Medication side effects and possible impairments from medications were discussed as applicable. This document was prepared by a combination of typing and transcription through a voice recognition software. Consent:  He and/or health care decision maker is aware that that he may receive a bill for this telephone service, depending on his insurance coverage, and has provided verbal consent to proceed: Yes      Documentation:  I communicated with the patient and/or health care decision maker about see above. Details of this discussion including any medical advice provided: See above      I affirm this is a Patient Initiated Episode with an Established Patient who has not had a related appointment within my department in the past 7 days or scheduled within the next 24 hours. Total Time: minutes: 5-10 minutes    Note: not billable if this call serves to triage the patient into an appointment for the relevant concern      [facility], Dr. Jeffrey Lovett, personally performed the services described in this documentation, as scribed by the above signed scribe in my presence, and it is both accurate and complete. I agree with the ROS and Past Histories independently gathered by the clinical support staff and the remaining scribed note accurately describes my personal service to the patient.       11/16/2020    10:22 AM

## 2020-11-16 NOTE — TELEPHONE ENCOUNTER
[facility] pharmacy called and said that the script for the nitro was sent for nasal spray and it needs to be for tabs.

## 2020-12-29 RX ORDER — TRAZODONE HYDROCHLORIDE 100 MG/1
TABLET ORAL
Qty: 30 TABLET | Refills: 4 | Status: SHIPPED | OUTPATIENT
Start: 2020-12-29 | End: 2021-11-15

## 2021-03-02 DIAGNOSIS — E11.41 CONTROLLED TYPE 2 DIABETES MELLITUS WITH DIABETIC MONONEUROPATHY, WITHOUT LONG-TERM CURRENT USE OF INSULIN (HCC): ICD-10-CM

## 2021-03-02 DIAGNOSIS — E11.42 TYPE 2 DIABETES MELLITUS WITH DIABETIC POLYNEUROPATHY, WITHOUT LONG-TERM CURRENT USE OF INSULIN (HCC): ICD-10-CM

## 2021-03-02 RX ORDER — BLOOD SUGAR DIAGNOSTIC
STRIP MISCELLANEOUS
Qty: 100 STRIP | Refills: 2 | Status: SHIPPED | OUTPATIENT
Start: 2021-03-02 | End: 2021-05-04 | Stop reason: SDUPTHER

## 2021-03-24 ENCOUNTER — OFFICE VISIT (OUTPATIENT)
Dept: FAMILY MEDICINE CLINIC | Age: 76
End: 2021-03-24
Payer: MEDICARE

## 2021-03-24 VITALS
OXYGEN SATURATION: 94 % | DIASTOLIC BLOOD PRESSURE: 80 MMHG | WEIGHT: 244 LBS | SYSTOLIC BLOOD PRESSURE: 140 MMHG | HEIGHT: 74 IN | TEMPERATURE: 97.9 F | HEART RATE: 82 BPM | BODY MASS INDEX: 31.32 KG/M2

## 2021-03-24 DIAGNOSIS — E11.42 TYPE 2 DIABETES MELLITUS WITH DIABETIC POLYNEUROPATHY, WITHOUT LONG-TERM CURRENT USE OF INSULIN (HCC): ICD-10-CM

## 2021-03-24 DIAGNOSIS — E78.00 PURE HYPERCHOLESTEROLEMIA: ICD-10-CM

## 2021-03-24 DIAGNOSIS — I25.10 CAD IN NATIVE ARTERY: ICD-10-CM

## 2021-03-24 DIAGNOSIS — I10 HTN (HYPERTENSION), BENIGN: ICD-10-CM

## 2021-03-24 DIAGNOSIS — E11.41 CONTROLLED TYPE 2 DIABETES MELLITUS WITH DIABETIC MONONEUROPATHY, WITHOUT LONG-TERM CURRENT USE OF INSULIN (HCC): Primary | ICD-10-CM

## 2021-03-24 DIAGNOSIS — R09.89 ABDOMINAL BRUIT: ICD-10-CM

## 2021-03-24 PROCEDURE — 3017F COLORECTAL CA SCREEN DOC REV: CPT | Performed by: FAMILY MEDICINE

## 2021-03-24 PROCEDURE — 99214 OFFICE O/P EST MOD 30 MIN: CPT | Performed by: FAMILY MEDICINE

## 2021-03-24 PROCEDURE — 1123F ACP DISCUSS/DSCN MKR DOCD: CPT | Performed by: FAMILY MEDICINE

## 2021-03-24 PROCEDURE — 2022F DILAT RTA XM EVC RTNOPTHY: CPT | Performed by: FAMILY MEDICINE

## 2021-03-24 PROCEDURE — G8427 DOCREV CUR MEDS BY ELIG CLIN: HCPCS | Performed by: FAMILY MEDICINE

## 2021-03-24 PROCEDURE — 4040F PNEUMOC VAC/ADMIN/RCVD: CPT | Performed by: FAMILY MEDICINE

## 2021-03-24 PROCEDURE — 1036F TOBACCO NON-USER: CPT | Performed by: FAMILY MEDICINE

## 2021-03-24 PROCEDURE — 3052F HG A1C>EQUAL 8.0%<EQUAL 9.0%: CPT | Performed by: FAMILY MEDICINE

## 2021-03-24 PROCEDURE — G8482 FLU IMMUNIZE ORDER/ADMIN: HCPCS | Performed by: FAMILY MEDICINE

## 2021-03-24 PROCEDURE — G8417 CALC BMI ABV UP PARAM F/U: HCPCS | Performed by: FAMILY MEDICINE

## 2021-03-24 PROCEDURE — 36415 COLL VENOUS BLD VENIPUNCTURE: CPT | Performed by: FAMILY MEDICINE

## 2021-03-24 ASSESSMENT — PATIENT HEALTH QUESTIONNAIRE - PHQ9
SUM OF ALL RESPONSES TO PHQ9 QUESTIONS 1 & 2: 0
2. FEELING DOWN, DEPRESSED OR HOPELESS: 0
1. LITTLE INTEREST OR PLEASURE IN DOING THINGS: 0

## 2021-03-24 NOTE — PROGRESS NOTES
Chief Complaint   Patient presents with    Diabetes    Hyperlipidemia    Hypertension    Other     patient has multiple medical complaints       Wt Readings from Last 3 Encounters:   21 244 lb (110.7 kg)   10/09/20 239 lb 6.4 oz (108.6 kg)   20 245 lb (111.1 kg)     BP Readings from Last 3 Encounters:   21 (!) 140/80   10/09/20 138/80   20 (!) 156/90      Lab Results   Component Value Date    LABA1C 8.4 2020    LABA1C 7.4 2020    LABA1C 7.9 2018     He is still getting the constipation and bloating from the Metformin. He has been taking metamucil and it has been helping. His insurance will not cover Jardiance until his deductible is met. HPI:  Eran Vázquez is a 70 y.o. (: 1945) here today for    Treatment Adherence:   Medication compliance:  compliant most of the time some of his medications he does not take daily  Diet compliance:  eats what he wants  Weight trend: stable  Current exercise: walks 1 time(s) per day  Barriers: none    Diabetes Mellitus Type 2: Current symptoms/problems include none. Home blood sugar records: fasting range: 115-160 checks 2 times a day  Any episodes of hypoglycemia? no  Eye exam current (within one year): no  Tobacco history: He  reports that he quit smoking about 45 years ago. His smoking use included cigarettes. He has a 50.00 pack-year smoking history. He has never used smokeless tobacco.   Daily Aspirin? Yes    Hypertension:  Home blood pressure monitoring: Yes - 130/80's. He is not adherent to a low sodium diet. Patient denies chest pain and shortness of breath. Antihypertensive medication side effects: no medication side effects noted. Use of agents associated with hypertension: none. Hyperlipidemia:  No new myalgias or GI upset on rosuvastatin (Crestor).        Lab Results   Component Value Date    LABA1C 8.4 2020    LABA1C 7.4 2020    LABA1C 7.9 2018     Lab Results   Component Value Date    LABMICR 1.30 07/13/2020    CREATININE 1.1 07/13/2020     Lab Results   Component Value Date    ALT 20 07/13/2020    AST 19 07/13/2020     Lab Results   Component Value Date    CHOL 179 07/13/2020    TRIG 93 07/13/2020    HDL 48 07/13/2020    LDLCALC 112 (H) 07/13/2020          [] Patient has completed an advance directive  [] Patient has NOT completed an advanced directive  [] Patient has a documented healthcare surrogate  [] Patient does NOT have a documented healthcare surrogate  [] Discussed the importance of establishing and updating an advanced directive. Patient has questions at this time and those were answered. [] Discussed the importance of establishing and updating an advanced directive. Patient does NOT have questions at this time. Discussed with: [] Patient            [] Family             [] Other caregiver    Patient's medications, allergies, past medical, surgical, social and family histories were reviewed and updated asappropriate on 3/24/2021 at 10:17 AM.    ROS:  Review of Systems    All other systems reviewed and are negative except as noted above on 3/24/2021 at 10:17 AM. Additional review of systems may be scanned into the media section ofthis medical record. Any responses requiring further intervention were pursued.     Past Medical History:   Diagnosis Date    Acute MI ([address]     Angina pectoris, unstable ([address]     ASHD (arteriosclerotic heart disease)     Bladder outlet obstruction     BPH (benign prostatic hyperplasia)     Chest pain     Constipation     Diabetes mellitus ([address]     GERD (gastroesophageal reflux disease)     Hx of CABG     Hypercholesteremia     Hypercholesterolemia     Hypertension     IBS (irritable bowel syndrome)     Influenza A 01/28/2020    Sinusitis     Type II or unspecified type diabetes mellitus without mention of complication, not stated as uncontrolled      Family History   Problem Relation Age of Onset    Diabetes Mother    [facility] Blood Pressure Mother     Arthritis Mother      Social History     Socioeconomic History    Marital status:      Spouse name: Not on file    Number of children: Not on file    Years of education: Not on file    Highest education level: Not on file   Occupational History    Occupation:    Social Needs    Financial resource strain: Not on file    Food insecurity     Worry: Not on file     Inability: Not on file   [employer] needs     Medical: Not on file     Non-medical: Not on file   Tobacco Use    Smoking status: Former Smoker     Packs/day: 5.00     Years: 10.00     Pack years: 50.00     Types: Cigarettes     Quit date: 3/13/1976     Years since quittin.0    Smokeless tobacco: Never Used   Substance and Sexual Activity    Alcohol use: No     Comment: 3 beers a month    Drug use: No    Sexual activity: Yes     Partners: Female   Lifestyle    Physical activity     Days per week: Not on file     Minutes per session: Not on file    Stress: Not on file   Relationships    Social connections     Talks on phone: Not on file     Gets together: Not on file     Attends [employer] service: Not on file     Active member of club or organization: Not on file     Attends meetings of clubs or organizations: Not on file     Relationship status: Not on file    Intimate partner violence     Fear of current or ex partner: Not on file     Emotionally abused: Not on file     Physically abused: Not on file     Forced sexual activity: Not on file   Other Topics Concern    Not on file   Social History Narrative    Not on file     Prior to Visit Medications    Medication Sig Taking?  Authorizing Provider   blood glucose test strips (ONETOUCH ULTRA) strip TEST TWO TIMES A DAY Yes Mar Jalloh MD   traZODone (DESYREL) 100 MG tablet TAKE ONE TABLET BY MOUTH ONCE NIGHTLY AS NEEDED FOR SLEEP Yes YANNICK Sawyer - CNP   lisinopril (PRINIVIL;ZESTRIL) 40 MG tablet TAKE ONE TABLET BY MOUTH DAILY Yes Esteban Ulloa MD   nitroGLYCERIN (NITROSTAT) 0.4 MG SL tablet Place 1 tablet under the tongue every 5 minutes as needed for Chest pain up to max of 3 total doses. If no relief after 1 dose, call 911. Yes Esteban Ulloa MD   glimepiride (AMARYL) 4 MG tablet TAKE ONE TABLET BY MOUTH EVERY MORNING BEFORE BREAKFAST Yes Esteban Ulloa MD   tamsulosin (FLOMAX) 0.4 MG capsule TAKE TWO CAPSULES BY MOUTH DAILY ONE HALF HOUR AFTER THE SAME MEAL [address] Yes Esteban Ulloa MD   amLODIPine (NORVASC) 10 MG tablet TAKE ONE TABLET BY MOUTH DAILY Yes Esteban Ulloa MD   metFORMIN (GLUCOPHAGE-XR) 500 MG extended release tablet TAKE TWO TABLETS BY MOUTH TWICE A DAY Yes Esteban Ulloa MD   rosuvastatin (CRESTOR) 40 MG tablet Take 1 tablet by mouth every evening Yes YANNICK Sheldon CNP   albuterol sulfate HFA (PROAIR HFA) 108 (90 Base) MCG/ACT inhaler Inhale 2 puffs into the lungs every 6 hours as needed for Wheezing or Shortness of Breath Yes YANNICK Sheldon CNP   Omega-3 Fatty Acids (FISH OIL) 1000 MG CAPS Take 1 capsule by mouth 2 times daily Yes YANNICK Garza CNP   aspirin 325 MG tablet Take 325 mg by mouth daily. Yes Historical Provider, MD   empagliflozin (JARDIANCE) 25 MG tablet TAKE ONE TABLET BY MOUTH DAILY  Patient not taking: Reported on 3/24/2021  Esteban Ulloa MD     Allergies   Allergen Reactions    Prednisone Other (See Comments)     Made patient very agitated (medrol dospak)    Metformin And Related Other (See Comments)     GI upset    Codeine Other (See Comments)     constipation       OBJECTIVE:  Estimated body mass index is 31.33 kg/m² as calculated from the following:    Height as of this encounter: 6' 2\" (1.88 m). Weight as of this encounter: 244 lb (110.7 kg).   Vitals:    03/24/21 0942 03/24/21 1012   BP: (!) 140/80 (!) 140/80   Site: Left Upper Arm    Position: Sitting Cuff Size: Medium Adult    Pulse: 82    Temp: 97.9 °F (36.6 °C)    TempSrc: Temporal    SpO2: 94%    Weight: 244 lb (110.7 kg)    Height: 6' 2\" (1.88 m)        Physical Exam  Vitals signs and nursing note reviewed. Constitutional:       General: He is not in acute distress. Appearance: He is well-developed. He is not diaphoretic. HENT:      Head: Normocephalic and atraumatic. Right Ear: External ear normal.      Left Ear: External ear normal.      Nose: Nose normal.   Eyes:      General: Lids are normal. No scleral icterus. Right eye: No discharge. Left eye: No discharge. Pupils: Pupils are equal, round, and reactive to light. Neck:      Thyroid: No thyromegaly. Vascular: No JVD. Cardiovascular:      Rate and Rhythm: Normal rate and regular rhythm. Heart sounds: Normal heart sounds. Pulmonary:      Effort: Pulmonary effort is normal. No respiratory distress. Breath sounds: Normal breath sounds. Abdominal:      General: There is abdominal bruit. Palpations: Abdomen is soft. There is no hepatomegaly or splenomegaly. Tenderness: There is no abdominal tenderness. Skin:     General: Skin is warm and dry. Coloration: Skin is not pale. Findings: No erythema or rash. Comments: Turgor normal   Psychiatric:         Behavior: Behavior normal.         Thought Content: Thought content normal.         Judgment: Judgment normal.              ASSESSMENT PLAN      Diagnosis Orders   1. Controlled type 2 diabetes mellitus with diabetic mononeuropathy, without long-term current use of insulin (HCC)   DIABETES FOOT EXAM    HEMOGLOBIN A1C    VITAMIN B12 & FOLATE    ANTON - Rox Thomas MD, Ophthalmology, [facility]   2. Abdominal bruit  US SCREENING FOR AAA   3. Type 2 diabetes mellitus with diabetic polyneuropathy, without long-term current use of insulin ([address]     4. HTN (hypertension), benign     5. CAD in native artery     6.  Pure hypercholesterolemia     No symptoms of elevated sugar. He had went off the [employer] because of cost.  I asked him what we wanted to do if the A1c control was not better and he said he would just have to go back on the medication. He needs a referral for diabetic eye exam which was given. I did note a new abdominal bruit and checking a screening abdominal ultrasound. He did have a greater than 100 cigarettes smoking history in his life. Blood pressure acceptable at upper limits. No symptoms of coronary insufficiency. Continue lipid monitoring as needed. Follow-up 6 months    Patient should call the office immediately with new or ongoing signs or symptoms or worsening, or proceed to the emergency room. No changes in past medical history, past surgical history, social history, or family history were noted during the patient encounter unless specifically listed above. All updates of past medical history, past surgical history, social history, or family history were reviewed personally by me during the office visit. All problems listed in the assessment are stable unless noted otherwise. Medication profile reviewed personally by me during the visit. Medication side effects and possible impairments from medications were discussed as applicable. This document was prepared by a combination of typing and transcription through a voice recognition software. Scribe attestation: Shweta Pina MA, am scribing for and in the presence of Jamel Hernandez MD. Electronically signed by Nathalie Dill MA on 3/24/2021 at 10:17 AM      Provider attestation:     I, Dr. Fab Noyola, personally performed the services described in this documentation, as scribed by the above signed scribe in my presence, and it is both accurate and complete.  I agree with the ROS and Past Histories independently gathered by the clinical support staff and the remaining scribed note accurately describes my personal

## 2021-03-24 NOTE — PATIENT INSTRUCTIONS
Call [phone] to schedule your abdominal ultrasound. Call [facility] in [facility] [phone] to schedule your eye exam.       Patient should call the office immediately with new or ongoing signs or symptoms or worsening, or proceed to the emergency room. If you are on medications which could impair your senses, you are at risk of weakness, falls, dizziness, or drowsiness. You should be careful during activities which could place you at risk of harm, such as climbing, using stairs, operating machinery, or driving vehicles. If you feel you cannot safely do these activities, you should request others to help you, or avoid the activities altogether. If you are drowsy for any other reason, you should use the same precautions as listed above.      Web Address for Advance Directive:    [email or web address]

## 2021-03-25 LAB
ESTIMATED AVERAGE GLUCOSE: 205.9 MG/DL
FOLATE: 11.53 NG/ML (ref 4.78–24.2)
HBA1C MFR BLD: 8.8 %
VITAMIN B-12: 651 PG/ML (ref 211–911)

## 2021-03-26 DIAGNOSIS — E11.41 CONTROLLED TYPE 2 DIABETES MELLITUS WITH DIABETIC MONONEUROPATHY, WITHOUT LONG-TERM CURRENT USE OF INSULIN (HCC): ICD-10-CM

## 2021-03-26 RX ORDER — METFORMIN HYDROCHLORIDE 500 MG/1
TABLET, EXTENDED RELEASE ORAL
Qty: 360 TABLET | Refills: 10 | Status: SHIPPED | OUTPATIENT
Start: 2021-03-26 | End: 2021-05-04 | Stop reason: SDUPTHER

## 2021-03-29 ENCOUNTER — HOSPITAL ENCOUNTER (OUTPATIENT)
Dept: ULTRASOUND IMAGING | Age: 76
Discharge: HOME | End: 2021-03-29
Payer: MEDICARE

## 2021-03-29 DIAGNOSIS — E11.41 CONTROLLED TYPE 2 DIABETES MELLITUS WITH DIABETIC MONONEUROPATHY, WITHOUT LONG-TERM CURRENT USE OF INSULIN (HCC): Primary | ICD-10-CM

## 2021-03-29 DIAGNOSIS — R09.89 ABDOMINAL BRUIT: ICD-10-CM

## 2021-03-29 DIAGNOSIS — R09.89 ABDOMINAL BRUIT: Primary | ICD-10-CM

## 2021-03-29 PROCEDURE — 76706 US ABDL AORTA SCREEN AAA: CPT

## 2021-04-13 RX ORDER — AMLODIPINE BESYLATE 10 MG/1
10 TABLET ORAL DAILY
Qty: 90 TABLET | Refills: 3 | Status: SHIPPED | OUTPATIENT
Start: 2021-04-13 | End: 2022-03-29

## 2021-04-27 DIAGNOSIS — R09.89 ABDOMINAL BRUIT: Primary | ICD-10-CM

## 2021-05-04 DIAGNOSIS — E11.41 CONTROLLED TYPE 2 DIABETES MELLITUS WITH DIABETIC MONONEUROPATHY, WITHOUT LONG-TERM CURRENT USE OF INSULIN (HCC): ICD-10-CM

## 2021-05-04 DIAGNOSIS — E11.42 TYPE 2 DIABETES MELLITUS WITH DIABETIC POLYNEUROPATHY, WITHOUT LONG-TERM CURRENT USE OF INSULIN (HCC): ICD-10-CM

## 2021-05-04 RX ORDER — BLOOD SUGAR DIAGNOSTIC
STRIP MISCELLANEOUS
Qty: 100 STRIP | Refills: 2 | Status: SHIPPED | OUTPATIENT
Start: 2021-05-04 | End: 2022-01-10

## 2021-05-04 RX ORDER — METFORMIN HYDROCHLORIDE 500 MG/1
500 TABLET, EXTENDED RELEASE ORAL 2 TIMES DAILY
Qty: 360 TABLET | Refills: 3 | Status: SHIPPED | OUTPATIENT
Start: 2021-05-04 | End: 2022-04-25

## 2021-05-11 LAB
CATARACTS: POSITIVE
DIABETIC RETINOPATHY: NEGATIVE
GLAUCOMA: NEGATIVE
INTRAOCULAR PRESSURE EYE: NORMAL
VISUAL ACUITY DISTANCE LEFT EYE: NORMAL
VISUAL ACUITY DISTANCE RIGHT EYE: NORMAL

## 2021-07-06 ENCOUNTER — NURSE ONLY (OUTPATIENT)
Dept: FAMILY MEDICINE CLINIC | Age: 76
End: 2021-07-06
Payer: MEDICARE

## 2021-07-06 DIAGNOSIS — E11.41 CONTROLLED TYPE 2 DIABETES MELLITUS WITH DIABETIC MONONEUROPATHY, WITHOUT LONG-TERM CURRENT USE OF INSULIN (HCC): ICD-10-CM

## 2021-07-06 PROCEDURE — 36415 COLL VENOUS BLD VENIPUNCTURE: CPT | Performed by: FAMILY MEDICINE

## 2021-07-07 LAB
ESTIMATED AVERAGE GLUCOSE: 185.8 MG/DL
HBA1C MFR BLD: 8.1 %

## 2021-07-25 DIAGNOSIS — E11.41 CONTROLLED TYPE 2 DIABETES MELLITUS WITH DIABETIC MONONEUROPATHY, WITHOUT LONG-TERM CURRENT USE OF INSULIN (HCC): ICD-10-CM

## 2021-07-26 RX ORDER — GLIMEPIRIDE 4 MG/1
TABLET ORAL
Qty: 90 TABLET | Refills: 3 | Status: SHIPPED | OUTPATIENT
Start: 2021-07-26 | End: 2022-08-03

## 2021-07-26 NOTE — TELEPHONE ENCOUNTER
Last OV 3/24/21  Future Appointments   Date Time Provider Michael Marti   9/20/2021  9:40 AM Shant Catherine MD [facility] [address] [facility]

## 2021-08-04 ENCOUNTER — TELEPHONE (OUTPATIENT)
Dept: FAMILY MEDICINE CLINIC | Age: 76
End: 2021-08-04

## 2021-08-04 NOTE — TELEPHONE ENCOUNTER
Patient called and informed that he does not need appointment to discussed. Discussed with him and informed that the best options is to call his insurance to see what type of glucometer they cover and to call us back with this information and we can then send order for this.

## 2021-08-04 NOTE — TELEPHONE ENCOUNTER
----- Message from Hetal Moreno sent at 8/4/2021 10:19 AM EDT -----  Subject: Appointment Request    Reason for Call: Routine Existing Condition Follow Up (Diabetes)    QUESTIONS  Type of Appointment? Established Patient  Reason for appointment request? Available appointments did not meet   patient need  Additional Information for Provider? Pt is interested in different glucose   meters and wanted to discuss. If he can do a phone call, he'd like that.   ---------------------------------------------------------------------------  --------------  [address]  What is the best way for the office to contact you? Do not leave any   message, patient will call back for answer  Preferred Call Back Phone Number? [phone]  ---------------------------------------------------------------------------  --------------  SCRIPT ANSWERS  Relationship to Patient? Self  (Is the patient requesting to be seen urgently for their symptoms?)? No  Is this follow up request related to routine Diabetes Management? Yes  Are you having any new concerns about your existing condition? No  Have you been diagnosed with, awaiting test results for, or told that you   are suspected of having COVID-19 (Coronavirus)? (If patient has tested   negative or was tested as a requirement for work, school, or travel and   not based on symptoms, answer no)? No  Do you currently have flu-like symptoms including fever or chills, cough,   shortness of breath, difficulty breathing, or new loss of taste or smell? No  Have you had close contact with someone with COVID-19 in the last 14 days? No  (Service Expert  click yes below to proceed with [facility] As Usual   Scheduling)?  Yes

## 2021-09-10 ENCOUNTER — HOSPITAL ENCOUNTER (EMERGENCY)
Age: 76
Discharge: HOME | End: 2021-09-10
Attending: EMERGENCY MEDICINE
Payer: MEDICARE

## 2021-09-10 VITALS
OXYGEN SATURATION: 96 % | HEIGHT: 74 IN | TEMPERATURE: 98.7 F | WEIGHT: 235 LBS | DIASTOLIC BLOOD PRESSURE: 90 MMHG | HEART RATE: 73 BPM | SYSTOLIC BLOOD PRESSURE: 167 MMHG | BODY MASS INDEX: 30.16 KG/M2 | RESPIRATION RATE: 18 BRPM

## 2021-09-11 ENCOUNTER — APPOINTMENT (OUTPATIENT)
Dept: GENERAL RADIOLOGY | Age: 76
End: 2021-09-11
Payer: MEDICARE

## 2021-09-11 ENCOUNTER — HOSPITAL ENCOUNTER (EMERGENCY)
Age: 76
Discharge: HOME | End: 2021-09-11
Attending: EMERGENCY MEDICINE
Payer: MEDICARE

## 2021-09-11 VITALS
DIASTOLIC BLOOD PRESSURE: 85 MMHG | SYSTOLIC BLOOD PRESSURE: 164 MMHG | TEMPERATURE: 98.2 F | OXYGEN SATURATION: 96 % | RESPIRATION RATE: 18 BRPM | BODY MASS INDEX: 30.17 KG/M2 | WEIGHT: 235 LBS | HEART RATE: 79 BPM

## 2021-09-11 DIAGNOSIS — R05.9 COUGH: Primary | ICD-10-CM

## 2021-09-11 PROCEDURE — U0005 INFEC AGEN DETEC AMPLI PROBE: HCPCS

## 2021-09-11 PROCEDURE — 71045 X-RAY EXAM CHEST 1 VIEW: CPT

## 2021-09-11 PROCEDURE — 99283 EMERGENCY DEPT VISIT LOW MDM: CPT

## 2021-09-11 PROCEDURE — U0003 INFECTIOUS AGENT DETECTION BY NUCLEIC ACID (DNA OR RNA); SEVERE ACUTE RESPIRATORY SYNDROME CORONAVIRUS 2 (SARS-COV-2) (CORONAVIRUS DISEASE [COVID-19]), AMPLIFIED PROBE TECHNIQUE, MAKING USE OF HIGH THROUGHPUT TECHNOLOGIES AS DESCRIBED BY CMS-2020-01-R: HCPCS

## 2021-09-11 ASSESSMENT — ENCOUNTER SYMPTOMS
SINUS PAIN: 0
CHEST TIGHTNESS: 0
FACIAL SWELLING: 0
SINUS PRESSURE: 0
COUGH: 1
CHOKING: 0
SHORTNESS OF BREATH: 0
WHEEZING: 0
STRIDOR: 0

## 2021-09-11 NOTE — ED PROVIDER NOTES
[facility] [facility] Name: Michelle Gilmore  MRN: [id number]  [place] 1945  Date of evaluation: 9/11/2021  Provider: Shahla Mayers MD    CHIEF COMPLAINT       Chief Complaint   Patient presents with    Cough     states cough for couple weeks, worseneing recently stills feels fatigued, denies fever     Fatigue         HISTORY OF PRESENT ILLNESS   (Location/Symptom, Timing/Onset, Context/Setting, Quality, Duration, Modifying Factors, Severity)  Note limiting factors. Michelle Gilmore is a 78 y.o. male who presents to the emergency department     Patient presents emergency department history of apparently complaining of a cough he is on lisinopril which he says he has been on for many years he denies any new irritants or allergens. He denies fever chills denies productive cough he says he is not short of breath he does not think that he has COVID-19 but would like to be tested  Patient really denies any other medical problems he is a diabetic on Metformin he also has the history of high blood pressure otherwise no other symptoms no headache no hemoptysis no recent travel no leg swelling    The history is provided by the patient. Nursing Notes were reviewed. REVIEW OF SYSTEMS    (2-9 systems for level 4, 10 or more for level 5)     Review of Systems   Constitutional: Negative for activity change, appetite change, chills, diaphoresis, fatigue and fever. HENT: Negative for congestion, facial swelling, sinus pressure and sinus pain. Eyes: Negative for visual disturbance. Respiratory: Positive for cough. Negative for choking, chest tightness, shortness of breath, wheezing and stridor. Cardiovascular: Negative for chest pain, palpitations and leg swelling. Genitourinary: Negative for difficulty urinating and flank pain. Allergic/Immunologic: Negative for immunocompromised state.    Neurological: Negative for dizziness, weakness, light-headedness and headaches. All other systems reviewed and are negative. Except as noted above the remainder of the review of systems was reviewed and negative. PAST MEDICAL HISTORY     Past Medical History:   Diagnosis Date    [facility] ([facility]     Angina pectoris, unstable ([facility]     ASHD (arteriosclerotic heart disease)     Bladder outlet obstruction     BPH (benign prostatic hyperplasia)     Chest pain     Constipation     Diabetes mellitus ([facility] [facility]     GERD (gastroesophageal reflux disease)     Hx of CABG     Hypercholesteremia     Hypercholesterolemia     Hypertension     IBS (irritable bowel syndrome)     Influenza A 01/28/2020    Sinusitis     Type II or unspecified type diabetes mellitus without mention of complication, not stated as uncontrolled          SURGICAL HISTORY       Past Surgical History:   Procedure Laterality Date    CARDIAC CATHETERIZATION  2002    stent    CARDIAC CATHETERIZATION  2011    stent    COLONOSCOPY  10/24/13    CORONARY ANGIOPLASTY WITH STENT PLACEMENT           CURRENT MEDICATIONS       Previous Medications    AMLODIPINE (NORVASC) 10 MG TABLET    Take 1 tablet by mouth daily    ASPIRIN 325 MG TABLET    Take 325 mg by mouth daily. BLOOD GLUCOSE TEST STRIPS (ONETOUCH ULTRA) STRIP    TEST TWO TIMES A DAY    EMPAGLIFLOZIN (JARDIANCE) 25 MG TABLET    TAKE ONE TABLET BY MOUTH DAILY    GLIMEPIRIDE (AMARYL) 4 MG TABLET    TAKE ONE TABLET BY MOUTH EVERY MORNING BEFORE BREAKFAST    LISINOPRIL (PRINIVIL;ZESTRIL) 40 MG TABLET    TAKE ONE TABLET BY MOUTH DAILY    METFORMIN (GLUCOPHAGE-XR) 500 MG EXTENDED RELEASE TABLET    Take 1 tablet by mouth 2 times daily    NITROGLYCERIN (NITROSTAT) 0.4 MG SL TABLET    Place 1 tablet under the tongue every 5 minutes as needed for Chest pain up to max of 3 total doses. If no relief after 1 dose, call 911.     OMEGA-3 FATTY ACIDS (FISH OIL) 1000 MG CAPS    Take 1 capsule by mouth 2 times daily    ROSUVASTATIN (CRESTOR) 40 MG TABLET    Take 1 tablet by mouth every evening    TAMSULOSIN (FLOMAX) 0.4 MG CAPSULE    TAKE TWO CAPSULES BY MOUTH DAILY ONE HALF HOUR AFTER THE SAME MEAL EACH DAY    TRAZODONE (DESYREL) 100 MG TABLET    TAKE ONE TABLET BY MOUTH ONCE NIGHTLY AS NEEDED FOR SLEEP       ALLERGIES     Prednisone, Metformin and related, and Codeine    FAMILY HISTORY       Family History   Problem Relation Age of Onset    Diabetes Mother     High Blood Pressure Mother     Arthritis Mother           SOCIAL HISTORY       Social History     Socioeconomic History    Marital status:      Spouse name: None    Number of children: None    Years of education: None    Highest education level: None   Occupational History    Occupation:    Tobacco Use    Smoking status: Former Smoker     Packs/day: 5.00     Years: 10.00     Pack years: 50.00     Types: Cigarettes     Quit date: 3/13/1976     Years since quittin.5    Smokeless tobacco: Never Used   Substance and Sexual Activity    Alcohol use: No     Comment: 3 beers a month    Drug use: No    Sexual activity: Yes     Partners: Female   Other Topics Concern    None   Social History Narrative    None     Social Determinants of Health     Financial Resource Strain:     Difficulty of Paying Living Expenses:    Food Insecurity:     Worried About Running Out of Food in the Last Year:     Ran Out of Food in the Last Year:    Transportation Needs:     Lack of Transportation (Medical):      Lack of Transportation (Non-Medical):    Physical Activity:     Days of Exercise per Week:     Minutes of Exercise per Session:    Stress:     Feeling of Stress :    Social Connections:     Frequency of Communication with Friends and Family:     Frequency of Social Gatherings with Friends and Family:     Attends [employer]ze Services:     Active Member of Clubs or Organizations:     Attends Club or Organization Meetings:     Marital Status:    Intimate Partner Violence:     Fear of Current or Ex-Partner:     Emotionally Abused:     Physically Abused:     Sexually Abused:        SCREENINGS               PHYSICAL EXAM    (up to 7 for level 4, 8 or more for level 5)     ED Triage Vitals   BP Temp Temp Source Pulse Resp SpO2 Height Weight   09/11/21 1618 09/11/21 1618 09/11/21 1618 09/11/21 1618 09/11/21 1618 09/11/21 1618 -- 09/11/21 1617   (!) 164/85 98.2 °F (36.8 °C) Oral 79 18 96 %  235 lb (106.6 kg)       Physical Exam  Vitals and nursing note reviewed. Constitutional:       General: He is not in acute distress. Appearance: Normal appearance. He is well-developed. He is not ill-appearing, toxic-appearing or diaphoretic. HENT:      Head: Normocephalic. Right Ear: Ear canal and external ear normal.      Left Ear: Ear canal and external ear normal.      Nose: Nose normal. No congestion or rhinorrhea. Mouth/Throat:      Mouth: Mucous membranes are moist.      Pharynx: No oropharyngeal exudate. Eyes:      Conjunctiva/sclera: Conjunctivae normal.      Pupils: Pupils are equal, round, and reactive to light. Neck:      Thyroid: No thyromegaly. Vascular: No carotid bruit. Cardiovascular:      Rate and Rhythm: Normal rate and regular rhythm. Heart sounds: Normal heart sounds. No murmur heard. No friction rub. No gallop. Pulmonary:      Effort: Pulmonary effort is normal. No respiratory distress. Breath sounds: Normal breath sounds. Abdominal:      General: Bowel sounds are normal. There is no distension. Palpations: Abdomen is soft. Tenderness: There is no abdominal tenderness. Musculoskeletal:         General: Normal range of motion. Cervical back: Normal range of motion and neck supple. No rigidity or tenderness. Lymphadenopathy:      Cervical: No cervical adenopathy. Skin:     General: Skin is warm. Neurological:      Mental Status: He is alert and oriented to person, place, and time.       GCS: GCS eye subscore is 4. GCS verbal subscore is 5. GCS motor subscore is 6. Cranial Nerves: No cranial nerve deficit. Sensory: No sensory deficit. Motor: No abnormal muscle tone. Coordination: Coordination normal.      Deep Tendon Reflexes: Reflexes normal.   Psychiatric:         Behavior: Behavior normal.         DIAGNOSTIC RESULTS     EKG: All EKG's are interpreted by the Emergency Department Physician who either signs or Co-signs this chart in the absence of a cardiologist.        RADIOLOGY:   Non-plain film images such as CT, Ultrasound and MRI are read by the radiologist. Plain radiographic images are visualized and preliminarily interpreted by the emergency physician with the below findings:        Interpretation per the Radiologist below, if available at the time of this note:    XR CHEST PORTABLE   Final Result   No acute process. LABS:  No results found for this visit on 09/11/21. EMERGENCY DEPARTMENT COURSE and DIFFERENTIAL DIAGNOSIS/MDM:     Vitals:    09/11/21 1617 09/11/21 1618   BP:  (!) 164/85   Pulse:  79   Resp:  18   Temp:  98.2 °F (36.8 °C)   TempSrc:  Oral   SpO2:  96%   Weight: 235 lb (106.6 kg) 235 lb (106.6 kg)           MDM      REASSESSMENT          CRITICAL CARE TIME     CONSULTS:  None      PROCEDURES:     Procedures    MEDICATIONS GIVEN THIS VISIT:  Medications - No data to display     FINAL IMPRESSION      1. Cough            DISPOSITION/PLAN   DISPOSITION Decision To Discharge 09/11/2021 04:53:41 PM      PATIENT REFERRED TO:  Karen Ortega MD  [address] [place] [address]. [place] [address] [phone]    Schedule an appointment as soon as possible for a visit in 3 days        DISCHARGE MEDICATIONS:  New Prescriptions    No medications on file       Controlled Substances Monitoring  RX Monitoring 1/23/2018   Attestation The Prescription Monitoring Report for this patient was reviewed today.    Periodic Controlled Substance Monitoring No signs of potential drug abuse or diversion identified. (Please note that portions of this note were completed with a voice recognition program.  Efforts were made to edit the dictations but occasionally words are mis-transcribed.)    Patient was advised to return to the Emergency Department if there was any worsening.     Gail Bull MD (electronically signed)  Attending Emergency Physician         Robert Hutchins MD  09/11/21 [id number] no

## 2021-09-11 NOTE — ED NOTES
--Patient provided with discharge instructions and any prescriptions. --Instructions, dosing, and follow-up appointments reviewed with patient/family. No further questions or needs at this time. --Vital signs and patient stable upon discharge. --Patient ambulatory to [facility].        Marie Locke RN  09/11/21 [id number]

## 2021-09-11 NOTE — ED NOTES
Pt ambulatory with steady gait. Pt denies any lightheaded or dizziness or dyspnea on exertion.       Dewayne Pineda RN  09/11/21 [id number]

## 2021-09-12 LAB — SARS-COV-2: NOT DETECTED

## 2021-09-13 ENCOUNTER — VIRTUAL VISIT (OUTPATIENT)
Dept: FAMILY MEDICINE CLINIC | Age: 76
End: 2021-09-13
Payer: MEDICARE

## 2021-09-13 DIAGNOSIS — R05.9 COUGH: ICD-10-CM

## 2021-09-13 DIAGNOSIS — R09.81 SINUS CONGESTION: ICD-10-CM

## 2021-09-13 DIAGNOSIS — B96.89 ACUTE BACTERIAL SINUSITIS: Primary | ICD-10-CM

## 2021-09-13 DIAGNOSIS — J01.90 ACUTE BACTERIAL SINUSITIS: Primary | ICD-10-CM

## 2021-09-13 PROCEDURE — 4040F PNEUMOC VAC/ADMIN/RCVD: CPT | Performed by: NURSE PRACTITIONER

## 2021-09-13 PROCEDURE — G8427 DOCREV CUR MEDS BY ELIG CLIN: HCPCS | Performed by: NURSE PRACTITIONER

## 2021-09-13 PROCEDURE — 1123F ACP DISCUSS/DSCN MKR DOCD: CPT | Performed by: NURSE PRACTITIONER

## 2021-09-13 PROCEDURE — 99213 OFFICE O/P EST LOW 20 MIN: CPT | Performed by: NURSE PRACTITIONER

## 2021-09-13 PROCEDURE — 3017F COLORECTAL CA SCREEN DOC REV: CPT | Performed by: NURSE PRACTITIONER

## 2021-09-13 RX ORDER — AMOXICILLIN 875 MG/1
875 TABLET, COATED ORAL 2 TIMES DAILY
Qty: 14 TABLET | Refills: 0 | Status: SHIPPED | OUTPATIENT
Start: 2021-09-13 | End: 2021-09-20

## 2021-09-13 RX ORDER — DEXTROMETHORPHAN HYDROBROMIDE AND PROMETHAZINE HYDROCHLORIDE 15; 6.25 MG/5ML; MG/5ML
5 SYRUP ORAL 4 TIMES DAILY PRN
Qty: 180 ML | Refills: 0 | Status: SHIPPED | OUTPATIENT
Start: 2021-09-13 | End: 2021-09-20

## 2021-09-13 ASSESSMENT — ENCOUNTER SYMPTOMS
WHEEZING: 0
CHEST TIGHTNESS: 0
APNEA: 0
CHOKING: 0
FACIAL SWELLING: 0
ABDOMINAL PAIN: 0
VOICE CHANGE: 1
VOMITING: 0
RHINORRHEA: 1
NAUSEA: 0
DIARRHEA: 0
SORE THROAT: 1
TROUBLE SWALLOWING: 0
COUGH: 1
SWOLLEN GLANDS: 0
GASTROINTESTINAL NEGATIVE: 1
SHORTNESS OF BREATH: 0
STRIDOR: 0
SINUS PAIN: 1
SINUS PRESSURE: 1

## 2021-09-13 NOTE — PROGRESS NOTES
2021    TELEHEALTH EVALUATION -- Audio/Visual (During [facility]-11 public health emergency)    HPI:    Alok Valdez (:  1945) has requested an audio/video evaluation for the following concern(s):    URI   This is a new problem. The current episode started 1 to 4 weeks ago (2-3 weeks ago and worsened 5 to 7 days ago). The problem has been gradually worsening. There has been no fever. The fever has been present for less than 1 day. Associated symptoms include congestion (Sinus ), coughing (Productive at times ), headaches (Sinus), rhinorrhea, sinus pain and a sore throat (Slightly scratchy). Pertinent negatives include no abdominal pain, chest pain, diarrhea, dysuria, ear pain, joint pain, joint swelling, nausea, neck pain, plugged ear sensation, rash, sneezing, swollen glands, vomiting or wheezing. Treatments tried: Mucinex, Dayquil. He was tested for COVID and states it was negative. He was tested three days ago. Review of Systems   Constitutional: Positive for appetite change (Lack appetite ) and fatigue. Negative for activity change, chills, diaphoresis, fever and unexpected weight change. HENT: Positive for congestion (Sinus ), postnasal drip, rhinorrhea, sinus pressure, sinus pain, sore throat (Slightly scratchy) and voice change (Hoarse voice - comes and goes ). Negative for dental problem, drooling, ear discharge, ear pain, facial swelling, hearing loss, mouth sores, nosebleeds, sneezing, tinnitus and trouble swallowing. Respiratory: Positive for cough (Productive at times ). Negative for apnea, choking, chest tightness, shortness of breath, wheezing and stridor. Cardiovascular: Negative. Negative for chest pain. Gastrointestinal: Negative. Negative for abdominal pain, diarrhea, nausea and vomiting. Genitourinary: Negative for dysuria. Musculoskeletal: Negative for joint pain and neck pain. Skin: Negative for rash.    Allergic/Immunologic: Positive for environmental allergies. Negative for food allergies and immunocompromised state. Neurological: Positive for headaches (Sinus). Negative for dizziness, tremors, seizures, syncope, facial asymmetry, speech difficulty, weakness, light-headedness and numbness. Prior to Visit Medications    Medication Sig Taking? Authorizing Provider   promethazine-dextromethorphan (PROMETHAZINE-DM) 6.25-15 MG/5ML syrup Take 5 mLs by mouth 4 times daily as needed for Cough (nausea) Yes YANNICK Gonzales CNP   amoxicillin (AMOXIL) 875 MG tablet Take 1 tablet by mouth 2 times daily for 7 days Yes YANNICK Gonzales CNP   glimepiride (AMARYL) 4 MG tablet TAKE ONE TABLET BY MOUTH EVERY MORNING BEFORE BREAKFAST Yes Jhon Berger MD   metFORMIN (GLUCOPHAGE-XR) 500 MG extended release tablet Take 1 tablet by mouth 2 times daily Yes Jhon Berger MD   blood glucose test strips (ONETOUCH ULTRA) strip TEST TWO TIMES A DAY Yes Jhon Berger MD   amLODIPine (NORVASC) 10 MG tablet Take 1 tablet by mouth daily Yes Jhon Berger MD   traZODone (DESYREL) 100 MG tablet TAKE ONE TABLET BY MOUTH ONCE NIGHTLY AS NEEDED FOR SLEEP Yes YANNICK Gonzales CNP   lisinopril (PRINIVIL;ZESTRIL) 40 MG tablet TAKE ONE TABLET BY MOUTH DAILY Yes Jhon Berger MD   nitroGLYCERIN (NITROSTAT) 0.4 MG SL tablet Place 1 tablet under the tongue every 5 minutes as needed for Chest pain up to max of 3 total doses. If no relief after 1 dose, call 911. Yes Jhon Berger MD   tamsulosin (FLOMAX) 0.4 MG capsule TAKE TWO CAPSULES BY MOUTH DAILY ONE HALF HOUR AFTER THE SAME MEAL EACH DAY Yes Jhon Berger MD   rosuvastatin (CRESTOR) 40 MG tablet Take 1 tablet by mouth every evening Yes YANNICK Gonzales CNP   aspirin 325 MG tablet Take 325 mg by mouth daily.  Yes Historical Provider, MD   empagliflozin (JARDIANCE) 25 MG tablet TAKE ONE TABLET BY MOUTH DAILY  Patient not taking: Reported on 2021  Meghna Chapman MD   Omega-3 Fatty Acids (FISH OIL) 1000 MG CAPS Take 1 capsule by mouth 2 times daily  Patient not taking: Reported on 2021  YANNICK Olson CNP       Social History     Tobacco Use    Smoking status: Former Smoker     Packs/day: 5.00     Years: 10.00     Pack years: 50.00     Types: Cigarettes     Quit date: 3/13/1976     Years since quittin.5    Smokeless tobacco: Never Used   Substance Use Topics    Alcohol use: No     Comment: 3 beers a month    Drug use: No        Allergies   Allergen Reactions    Prednisone Other (See Comments)     Made patient very agitated (medrol dospak)    Metformin And Related Other (See Comments)     GI upset    Codeine Other (See Comments)     constipation   ,   Past Medical History:   Diagnosis Date    Acute MI ([address]     Angina pectoris, unstable ([address]     ASHD (arteriosclerotic heart disease)     Bladder outlet obstruction     BPH (benign prostatic hyperplasia)     Chest pain     Constipation     Diabetes mellitus ([address]     GERD (gastroesophageal reflux disease)     Hx of CABG     Hypercholesteremia     Hypercholesterolemia     Hypertension     IBS (irritable bowel syndrome)     Influenza A 2020    Sinusitis     Type II or unspecified type diabetes mellitus without mention of complication, not stated as uncontrolled    ,   Past Surgical History:   Procedure Laterality Date    CARDIAC CATHETERIZATION      stent    CARDIAC CATHETERIZATION      stent    COLONOSCOPY  10/24/13    CORONARY ANGIOPLASTY WITH STENT PLACEMENT     ,   Social History     Tobacco Use    Smoking status: Former Smoker     Packs/day: 5.00     Years: 10.00     Pack years: 50.00     Types: Cigarettes     Quit date: 3/13/1976     Years since quittin.5    Smokeless tobacco: Never Used   Substance Use Topics    Alcohol use: No     Comment: 3 beers a month    Drug use: No   ,   Family History Problem Relation Age of Onset    Diabetes Mother     High Blood Pressure Mother     Arthritis Mother    ,   Immunization History   Administered Date(s) Administered    COVID-19, Moderna, PF, 100mcg/0.5mL 02/16/2021, 03/27/2021    Influenza Vaccine, unspecified formulation 10/27/2016    Influenza Virus Vaccine 10/03/2014, 10/06/2015    Influenza, Quadv, IM, (6 mo and older Fluzone, Flulaval, Fluarix and 3 yrs and older Afluria) 10/24/2017, 10/18/2018    Influenza, Quadv, IM, PF (6 mo and older Fluzone, Flulaval, Fluarix, and 3 yrs and older Afluria) 11/04/2019, 10/09/2020    Pneumococcal Conjugate 13-valent (Icopzch10) 03/22/2017    Pneumococcal Polysaccharide ([id number]) 02/06/2013, 10/06/2015    Pneumococcal Vaccine 08/30/2006    Td vaccine (adult) 01/01/2002   ,   Health Maintenance   Topic Date Due    Shingles Vaccine (1 of 2) Never done    Diabetic microalbuminuria test  07/13/2021    Lipid screen  07/13/2021    Potassium monitoring  07/13/2021    Creatinine monitoring  07/13/2021    Flu vaccine (1) 09/01/2021    Annual Wellness Visit (AWV)  10/10/2021    DTaP/Tdap/Td vaccine (1 - Tdap) 01/17/2022 (Originally 1/2/2002)    Colon cancer screen colonoscopy  11/21/2021    Diabetic foot exam  03/24/2022    A1C test (Diabetic or Prediabetic)  07/06/2022    Diabetic retinal exam  05/11/2023    Pneumococcal 65+ years Vaccine  Completed    COVID-19 Vaccine  Completed    AAA screen  Completed    Hepatitis C screen  Completed    Hepatitis A vaccine  Aged Out    Hib vaccine  Aged Out    Meningococcal (ACWY) vaccine  Aged Out       PHYSICAL EXAMINATION:  [ INSTRUCTIONS:  \"[x]\" Indicates a positive item  \"[]\" Indicates a negative item      Constitutional: [x] Appears well-developed and well-nourished [x] No apparent distress      [] Abnormal-   Mental status  [x] Alert and awake  [x] Oriented to person/place/time [x]Able to follow commands      Eyes:  EOM    [x]  Normal  [] Abnormal-  Sclera [x]  Normal  [] Abnormal -         Discharge [x]  None visible  [] Abnormal -    HENT:   [x] Normocephalic, atraumatic. [] Abnormal   [x] Mouth/Throat: Mucous membranes are moist.     External Ears [x] Normal  [] Abnormal-     Neck: [x] No visualized mass     Pulmonary/Chest: [x] Respiratory effort normal.  [x] No visualized signs of difficulty breathing or respiratory distress        [] Abnormal-      Musculoskeletal:   [x] Normal gait with no signs of ataxia         [x] Normal range of motion of neck        [] Abnormal-       Neurological:        [x] No Facial Asymmetry (Cranial nerve 7 motor function) (limited exam to video visit)          [x] No gaze palsy        [] Abnormal-         Skin:        [x] No significant exanthematous lesions or discoloration noted on facial skin         [] Abnormal-            Psychiatric:       [x] Normal Affect [x] No Hallucinations        [] Abnormal-     ASSESSMENT/PLAN:  Jaqueline Bruner was seen today for cough and sinusitis. Recommend rest, increased fluids, good nutrition. Recommend him taking Mucinex two times a day for the next 7 days. Recommend Flonase daily for the next 2 weeks. Diagnoses and all orders for this visit:    Acute bacterial sinusitis  -     amoxicillin (AMOXIL) 875 MG tablet; Take 1 tablet by mouth 2 times daily for 7 days    Cough  -     promethazine-dextromethorphan (PROMETHAZINE-DM) 6.25-15 MG/5ML syrup; Take 5 mLs by mouth 4 times daily as needed for Cough (nausea)    Sinus congestion    Return if symptoms worsen or fail to improve. Bryan Porter is a 78 y.o. male being evaluated by a Virtual Visit (video visit) encounter to address concerns as mentioned above. A caregiver was present when appropriate. Due to this being a TeleHealth encounter (During [facility] public health emergency), evaluation of the following organ systems was limited: Vitals/Constitutional/EENT/Resp/CV/GI//MS/Neuro/Skin/Heme-Lymph-Imm.   Pursuant to the emergency declaration under the [facility], [address] [place] [address] authority and the [employer] Act, this Virtual Visit was conducted with patient's (and/or legal guardian's) consent, to reduce the patient's risk of exposure to COVID-19 and provide necessary medical care. The patient (and/or legal guardian) has also been advised to contact this office for worsening conditions or problems, and seek emergency medical treatment and/or call 911 if deemed necessary. Patient identification was verified at the start of the visit: Yes    Services were provided through a video synchronous discussion virtually to substitute for in-person clinic visit. Patient and provider were located at their individual homes. --YANNICK Lopez CNP on 9/13/2021 at 1:22 PM    An electronic signature was used to authenticate this note. Patient should call the office immediately with new or ongoing signs or symptoms or worsening, or proceed to the emergency room. All entries in chief complaint and history of present illness are reviewed and validated by me. No changes in past medical history, past surgical history, social history, or family history were noted during the patient encounter unless specifically listed above. All updates of past medical history, past surgical history, social history, or family history were reviewed personally by me during the office visit. All problems listed in the assessment are stable unless noted otherwise. Medication profile reviewed personally by me during the office visit. Medication side effects and possible impairments from medications were discussed as applicable. Every effort has been made to assure accurate transcription by this voice recognition software. However, mistakes in transcription may still occur    You are being started on a new medication.  All medications have the potential for adverse effects. All medications effect each person differently. Please read and review provided information related to medication. If the medication that you have been prescribed has the potential to cause sedation, do not drive or operate car, truck, or heavy machinery until you know how the medication will effect you. If you experience any adverse effects from the medication, please call the office or report to the emergency department. Sinusitis: Care Instructions  Your Care Instructions    Sinusitis is an infection of the lining of the sinus cavities in your head. Sinusitis often follows a cold. It causes pain and pressure in your head and face. In most cases, sinusitis gets better on its own in 1 to 2 weeks. But some mild symptoms may last for several weeks. Sometimes antibiotics are needed. Follow-up care is a key part of your treatment and safety. Be sure to make and go to all appointments, and call your doctor if you are having problems. It's also a good idea to know your test results and keep a list of the medicines you take. How can you care for yourself at home? · Take an over-the-counter pain medicine, such as acetaminophen (Tylenol), ibuprofen (Advil, Motrin), or naproxen (Aleve). Read and follow all instructions on the label. · If the doctor prescribed antibiotics, take them as directed. Do not stop taking them just because you feel better. You need to take the full course of antibiotics. · Be careful when taking over-the-counter cold or flu medicines and Tylenol at the same time. Many of these medicines have acetaminophen, which is Tylenol. Read the labels to make sure that you are not taking more than the recommended dose. Too much acetaminophen (Tylenol) can be harmful. · Breathe warm, moist air from a steamy shower, a hot bath, or a sink filled with hot water. Avoid cold, dry air. Using a humidifier in your home may help. Follow the directions for cleaning the machine.   · Use saline (saltwater) nasal washes to help keep your nasal passages open and wash out mucus and bacteria. You can buy saline nose drops at a grocery store or drugstore. Or you can make your own at home by adding 1 teaspoon of salt and 1 teaspoon of baking soda to 2 cups of distilled water. If you make your own, fill a bulb syringe with the solution, insert the tip into your nostril, and squeeze gently. Leandro Simeonman your nose. · Put a hot, wet towel or a warm gel pack on your face 3 or 4 times a day for 5 to 10 minutes each time. · Try a decongestant nasal spray like oxymetazoline (Afrin). Do not use it for more than 3 days in a row. Using it for more than 3 days can make your congestion worse. When should you call for help? Call your doctor now or seek immediate medical care if:    · You have new or worse swelling or redness in your face or around your eyes.     · You have a new or higher fever.    Watch closely for changes in your health, and be sure to contact your doctor if:    · You have new or worse facial pain.     · The mucus from your nose becomes thicker (like pus) or has new blood in it.     · You are not getting better as expected. Where can you learn more? Go to https://[employer]."[employer]. org and sign in to your [employer] account. Enter C145 in the [facility] box to learn more about \"Sinusitis: Care Instructions. \"     If you do not have an account, please click on the \"Sign Up Now\" link. Current as of: May 12, 2017  Content Version: 11.7  © 4557-1054 [employer]. Care instructions adapted under license by [facility] ([facility]). If you have questions about a medical condition or this instruction, always ask your healthcare professional. [address] any warranty or liability for your use of this information.

## 2021-09-13 NOTE — PATIENT INSTRUCTIONS
Patient Education        Sinusitis: Care Instructions  Your Care Instructions     Sinusitis is an infection of the lining of the sinus cavities in your head. Sinusitis often follows a cold. It causes pain and pressure in your head and face. In most cases, sinusitis gets better on its own in 1 to 2 weeks. But some mild symptoms may last for several weeks. Sometimes antibiotics are needed. Follow-up care is a key part of your treatment and safety. Be sure to make and go to all appointments, and call your doctor if you are having problems. It's also a good idea to know your test results and keep a list of the medicines you take. How can you care for yourself at home? · Take an over-the-counter pain medicine, such as acetaminophen (Tylenol), ibuprofen (Advil, Motrin), or naproxen (Aleve). Read and follow all instructions on the label. · If the doctor prescribed antibiotics, take them as directed. Do not stop taking them just because you feel better. You need to take the full course of antibiotics. · Be careful when taking over-the-counter cold or flu medicines and Tylenol at the same time. Many of these medicines have acetaminophen, which is Tylenol. Read the labels to make sure that you are not taking more than the recommended dose. Too much acetaminophen (Tylenol) can be harmful. · Breathe warm, moist air from a steamy shower, a hot bath, or a sink filled with hot water. Avoid cold, dry air. Using a humidifier in your home may help. Follow the directions for cleaning the machine. · Use saline (saltwater) nasal washes. This can help keep your nasal passages open and wash out mucus and bacteria. You can buy saline nose drops at a grocery store or drugstore. Or you can make your own at home by adding 1 teaspoon of salt and 1 teaspoon of baking soda to 2 cups of distilled water. If you make your own, fill a bulb syringe with the solution, insert the tip into your nostril, and squeeze gently. Karoline Sheba your nose.   · Put a hot, wet towel or a warm gel pack on your face 3 or 4 times a day for 5 to 10 minutes each time. · Try a decongestant nasal spray like oxymetazoline (Afrin). Do not use it for more than 3 days in a row. Using it for more than 3 days can make your congestion worse. When should you call for help? Call your doctor now or seek immediate medical care if:    · You have new or worse swelling or redness in your face or around your eyes.     · You have a new or higher fever. Watch closely for changes in your health, and be sure to contact your doctor if:    · You have new or worse facial pain.     · The mucus from your nose becomes thicker (like pus) or has new blood in it.     · You are not getting better as expected. Where can you learn more? Go to https://[employer].net.[employer]. org and sign in to your [employer] account. Enter L971 in the [employer] box to learn more about \"Sinusitis: Care Instructions. \"     If you do not have an account, please click on the \"Sign Up Now\" link. Current as of: December 2, 2020               Content Version: 12.9  © 0895-5709 [employer]. Care instructions adapted under license by [facility] ([facility]). If you have questions about a medical condition or this instruction, always ask your healthcare professional. [address]  any warranty or liability for your use of this information.

## 2021-09-16 ENCOUNTER — TELEPHONE (OUTPATIENT)
Dept: FAMILY MEDICINE CLINIC | Age: 76
End: 2021-09-16

## 2021-09-16 NOTE — TELEPHONE ENCOUNTER
----- Message from Nila Cullen sent at 9/16/2021 10:24 AM EDT -----  Subject: Appointment Request    Reason for Call: Routine Existing Condition Follow Up (Diabetes)    QUESTIONS  Type of Appointment? Established Patient  Reason for appointment request? Available appointments did not meet   patient need  Additional Information for Provider? htn, hld, dm; screened red   ---------------------------------------------------------------------------  --------------  CALL BACK INFO  What is the best way for the office to contact you? OK to leave message on   voicemail  Preferred Call Back Phone Number? [phone]  ---------------------------------------------------------------------------  --------------  SCRIPT ANSWERS  Relationship to Patient? Self  Have your symptoms changed? No  (Is the patient requesting to be seen urgently for their symptoms?)? No  Is this follow up request related to routine Diabetes Management? Yes  Are you having any new concerns about your existing condition? No  Have you been diagnosed with, awaiting test results for, or told that you   are suspected of having COVID-19 (Coronavirus)? (If patient has tested   negative or was tested as a requirement for work, school, or travel and   not based on symptoms, answer no)? No  Within the past two weeks have you developed any of the following symptoms   (answer no if symptoms have been present longer than 2 weeks or began   more than 2 weeks ago)? Fever or Chills, Cough, Shortness of breath or   difficulty breathing, Loss of taste or smell, Sore throat, Nasal   congestion, Sneezing or runny nose, Fatigue or generalized body aches   (answer no if pain is specific to a body part e.g. back pain), Diarrhea,   Headache?  Yes

## 2021-09-22 PROCEDURE — 99283 EMERGENCY DEPT VISIT LOW MDM: CPT

## 2021-09-23 ENCOUNTER — HOSPITAL ENCOUNTER (EMERGENCY)
Age: 76
Discharge: HOME | End: 2021-09-23
Attending: EMERGENCY MEDICINE
Payer: MEDICARE

## 2021-09-23 ENCOUNTER — APPOINTMENT (OUTPATIENT)
Dept: GENERAL RADIOLOGY | Age: 76
End: 2021-09-23
Payer: MEDICARE

## 2021-09-23 VITALS
RESPIRATION RATE: 23 BRPM | TEMPERATURE: 97 F | DIASTOLIC BLOOD PRESSURE: 76 MMHG | HEIGHT: 74 IN | OXYGEN SATURATION: 96 % | HEART RATE: 69 BPM | BODY MASS INDEX: 30.16 KG/M2 | SYSTOLIC BLOOD PRESSURE: 140 MMHG | WEIGHT: 235 LBS

## 2021-09-23 DIAGNOSIS — R05.9 COUGH: Primary | ICD-10-CM

## 2021-09-23 LAB
A/G RATIO: 1.5 (ref 1.1–2.2)
ALBUMIN SERPL-MCNC: 4.2 G/DL (ref 3.4–5)
ALP BLD-CCNC: 71 U/L (ref 40–129)
ALT SERPL-CCNC: 25 U/L (ref 10–40)
ANION GAP SERPL CALCULATED.3IONS-SCNC: 9 MMOL/L (ref 3–16)
AST SERPL-CCNC: 20 U/L (ref 15–37)
BASOPHILS ABSOLUTE: 0.1 K/UL (ref 0–0.2)
BASOPHILS RELATIVE PERCENT: 1.5 %
BILIRUB SERPL-MCNC: 0.6 MG/DL (ref 0–1)
BILIRUBIN URINE: NEGATIVE
BLOOD, URINE: NEGATIVE
BUN BLDV-MCNC: 15 MG/DL (ref 7–20)
CALCIUM SERPL-MCNC: 9.6 MG/DL (ref 8.3–10.6)
CHLORIDE BLD-SCNC: 103 MMOL/L (ref 99–110)
CLARITY: CLEAR
CO2: 25 MMOL/L (ref 21–32)
COLOR: YELLOW
CREAT SERPL-MCNC: 1 MG/DL (ref 0.8–1.3)
EKG ATRIAL RATE: 77 BPM
EKG DIAGNOSIS: NORMAL
EKG P AXIS: 26 DEGREES
EKG P-R INTERVAL: 156 MS
EKG Q-T INTERVAL: 376 MS
EKG QRS DURATION: 98 MS
EKG QTC CALCULATION (BAZETT): 425 MS
EKG R AXIS: 76 DEGREES
EKG T AXIS: 44 DEGREES
EKG VENTRICULAR RATE: 77 BPM
EOSINOPHILS ABSOLUTE: 0.2 K/UL (ref 0–0.6)
EOSINOPHILS RELATIVE PERCENT: 3.7 %
GFR AFRICAN AMERICAN: >60
GFR NON-AFRICAN AMERICAN: >60
GLOBULIN: 2.8 G/DL
GLUCOSE BLD-MCNC: 154 MG/DL (ref 70–99)
GLUCOSE BLD-MCNC: 96 MG/DL (ref 70–99)
GLUCOSE URINE: NEGATIVE MG/DL
HCT VFR BLD CALC: 43.5 % (ref 40.5–52.5)
HEMOGLOBIN: 14.9 G/DL (ref 13.5–17.5)
KETONES, URINE: NEGATIVE MG/DL
LEUKOCYTE ESTERASE, URINE: NEGATIVE
LYMPHOCYTES ABSOLUTE: 2 K/UL (ref 1–5.1)
LYMPHOCYTES RELATIVE PERCENT: 32.2 %
MCH RBC QN AUTO: 32.1 PG (ref 26–34)
MCHC RBC AUTO-ENTMCNC: 34.2 G/DL (ref 31–36)
MCV RBC AUTO: 93.7 FL (ref 80–100)
MICROSCOPIC EXAMINATION: NORMAL
MONOCYTES ABSOLUTE: 0.7 K/UL (ref 0–1.3)
MONOCYTES RELATIVE PERCENT: 11.2 %
NEUTROPHILS ABSOLUTE: 3.2 K/UL (ref 1.7–7.7)
NEUTROPHILS RELATIVE PERCENT: 51.4 %
NITRITE, URINE: NEGATIVE
PDW BLD-RTO: 13.1 % (ref 12.4–15.4)
PERFORMED ON: ABNORMAL
PH UA: 6 (ref 5–8)
PLATELET # BLD: 264 K/UL (ref 135–450)
PMV BLD AUTO: 7.4 FL (ref 5–10.5)
POTASSIUM SERPL-SCNC: 4.2 MMOL/L (ref 3.5–5.1)
PROTEIN UA: NEGATIVE MG/DL
RBC # BLD: 4.64 M/UL (ref 4.2–5.9)
SARS-COV-2, NAAT: NOT DETECTED
SODIUM BLD-SCNC: 137 MMOL/L (ref 136–145)
SPECIFIC GRAVITY UA: <=1.005 (ref 1–1.03)
TOTAL PROTEIN: 7 G/DL (ref 6.4–8.2)
TROPONIN: <0.01 NG/ML
URINE REFLEX TO CULTURE: NORMAL
URINE TYPE: NORMAL
UROBILINOGEN, URINE: 1 E.U./DL
WBC # BLD: 6.2 K/UL (ref 4–11)

## 2021-09-23 PROCEDURE — 93010 ELECTROCARDIOGRAM REPORT: CPT | Performed by: INTERNAL MEDICINE

## 2021-09-23 PROCEDURE — 87635 SARS-COV-2 COVID-19 AMP PRB: CPT

## 2021-09-23 PROCEDURE — 71046 X-RAY EXAM CHEST 2 VIEWS: CPT

## 2021-09-23 PROCEDURE — 85025 COMPLETE CBC W/AUTO DIFF WBC: CPT

## 2021-09-23 PROCEDURE — 84484 ASSAY OF TROPONIN QUANT: CPT

## 2021-09-23 PROCEDURE — 80053 COMPREHEN METABOLIC PANEL: CPT

## 2021-09-23 PROCEDURE — 93005 ELECTROCARDIOGRAM TRACING: CPT | Performed by: EMERGENCY MEDICINE

## 2021-09-23 PROCEDURE — 81003 URINALYSIS AUTO W/O SCOPE: CPT

## 2021-09-23 RX ORDER — ALBUTEROL SULFATE 90 UG/1
2 AEROSOL, METERED RESPIRATORY (INHALATION) 4 TIMES DAILY PRN
Qty: 18 G | Refills: 0 | Status: SHIPPED | OUTPATIENT
Start: 2021-09-23

## 2021-09-23 RX ORDER — BENZONATATE 100 MG/1
100 CAPSULE ORAL 3 TIMES DAILY PRN
Qty: 7 CAPSULE | Refills: 0 | Status: SHIPPED | OUTPATIENT
Start: 2021-09-23 | End: 2022-08-15

## 2021-09-23 NOTE — ED PROVIDER NOTES
[address] ED      CHIEF COMPLAINT  Cough (cough and fatigue for a couple weeks; Covid -; blood glucose readings around 70 @ home. )       HISTORY OF PRESENT ILLNESS  Georga Cranker is a 78 y.o. male who presents to the emergency department for evaluation of cough. Patient reports having cough for couple weeks. Says he has also been feeling very tired. Says he also developed a headache today. Reports it is a global headache. He had it earlier today. Denies having headache currently. Denies any recent head injuries. Reports the glucose reading today was around 70 which prompted the visit to the emergency department. Reports he is on Metformin and glipizide for his diabetes. No insulin. Says he did have a virtual follow-up appointment with his PCP and was given amoxicillin. Reports he just completed the 7-day prescription of amoxicillin which seemed to help with his symptoms. Says he is COVID-19 vaccinated. Says he is eating. No nausea, vomiting, or diarrhea. Denies having history of PE or DVT. No other complaints, modifying factors or associated symptoms. I have reviewed the following from the nursing documentation.     Past Medical History:   Diagnosis Date    Acute MI ([address]     Angina pectoris, unstable ([address]     ASHD (arteriosclerotic heart disease)     Bladder outlet obstruction     BPH (benign prostatic hyperplasia)     Chest pain     Constipation     Diabetes mellitus ([address]     GERD (gastroesophageal reflux disease)     Hx of CABG     Hypercholesteremia     Hypercholesterolemia     Hypertension     IBS (irritable bowel syndrome)     Influenza A 01/28/2020    Sinusitis     Type II or unspecified type diabetes mellitus without mention of complication, not stated as uncontrolled      Past Surgical History:   Procedure Laterality Date    CARDIAC CATHETERIZATION  2002    stent    CARDIAC CATHETERIZATION  2011    stent    COLONOSCOPY  10/24/13    CORONARY ANGIOPLASTY WITH STENT PLACEMENT       Family History   Problem Relation Age of Onset    Diabetes Mother     High Blood Pressure Mother     Arthritis Mother      Social History     Socioeconomic History    Marital status:      Spouse name: Not on file    Number of children: Not on file    Years of education: Not on file    Highest education level: Not on file   Occupational History    Occupation:    Tobacco Use    Smoking status: Former Smoker     Packs/day: 5.00     Years: 10.00     Pack years: 50.00     Types: Cigarettes     Quit date: 3/13/1976     Years since quittin.5    Smokeless tobacco: Never Used   Substance and Sexual Activity    Alcohol use: No     Comment: 3 beers a month    Drug use: No    Sexual activity: Yes     Partners: Female   Other Topics Concern    Not on file   Social History Narrative    Not on file     Social Determinants of Health     Financial Resource Strain:     Difficulty of Paying Living Expenses:    Food Insecurity:     Worried About Running Out of Food in the Last Year:     Ran Out of Food in the Last Year:    Transportation Needs:     Lack of Transportation (Medical):  Lack of Transportation (Non-Medical):    Physical Activity:     Days of Exercise per Week:     Minutes of Exercise per Session:    Stress:     Feeling of Stress :    Social Connections:     Frequency of Communication with Friends and Family:     Frequency of Social Gatherings with Friends and Family:     Attends Jainism Services:     Active Member of Clubs or Organizations:     Attends Club or Organization Meetings:     Marital Status:    Intimate Partner Violence:     Fear of Current or Ex-Partner:     Emotionally Abused:     Physically Abused:     Sexually Abused:      No current facility-administered medications for this encounter.      Current Outpatient Medications   Medication Sig Dispense Refill    benzonatate (TESSALON) 100 MG capsule Take 1 23   Ht 6' 2\" (1.88 m)   Wt 235 lb (106.6 kg)   SpO2 96%   BMI 30.17 kg/m²    GENERAL APPEARANCE: Awake and alert. Clinically nontoxic appearing. HENT: Normocephalic. Atraumatic. PERRL. EOMI. No facial droop. HEART/CHEST: RRR. LUNGS: Respirations unlabored. Speaking comfortably in full sentences. Clear to auscultation bilaterally. ABDOMEN: Soft, non-distended abdomen. Non tender to palpation. No guarding. No rebound. EXTREMITIES: No gross deformities. Moving all extremities. No significant leg swelling. Negative Homans' sign bilaterally. SKIN: Warm and dry. No acute rashes. NEUROLOGICAL: Alert and oriented. No gross facial drooping. Answering questions appropriately. Moving all extremities. PSYCHIATRIC: Pleasant. Normal mood and affect.     LABS  Results for orders placed or performed during the hospital encounter of 09/23/21   COVID-19, Rapid    Specimen: Nasopharyngeal Swab   Result Value Ref Range    SARS-CoV-2, NAAT Not Detected Not Detected   Comprehensive Metabolic Panel   Result Value Ref Range    Sodium 137 136 - 145 mmol/L    Potassium 4.2 3.5 - 5.1 mmol/L    Chloride 103 99 - 110 mmol/L    CO2 25 21 - 32 mmol/L    Anion Gap 9 3 - 16    Glucose 96 70 - 99 mg/dL    BUN 15 7 - 20 mg/dL    CREATININE 1.0 0.8 - 1.3 mg/dL    GFR Non-African American >60 >60    GFR African American >60 >60    Calcium 9.6 8.3 - 10.6 mg/dL    Total Protein 7.0 6.4 - 8.2 g/dL    Albumin 4.2 3.4 - 5.0 g/dL    Albumin/Globulin Ratio 1.5 1.1 - 2.2    Total Bilirubin 0.6 0.0 - 1.0 mg/dL    Alkaline Phosphatase 71 40 - 129 U/L    ALT 25 10 - 40 U/L    AST 20 15 - 37 U/L    Globulin 2.8 g/dL   CBC Auto Differential   Result Value Ref Range    WBC 6.2 4.0 - 11.0 K/uL    RBC 4.64 4.20 - 5.90 M/uL    Hemoglobin 14.9 13.5 - 17.5 g/dL    Hematocrit 43.5 40.5 - 52.5 %    MCV 93.7 80.0 - 100.0 fL    MCH 32.1 26.0 - 34.0 pg    MCHC 34.2 31.0 - 36.0 g/dL    RDW 13.1 12.4 - 15.4 %    Platelets 404 853 - 442 K/uL    MPV 7.4 5.0 - 10.5 fL    Neutrophils % 51.4 %    Lymphocytes % 32.2 %    Monocytes % 11.2 %    Eosinophils % 3.7 %    Basophils % 1.5 %    Neutrophils Absolute 3.2 1.7 - 7.7 K/uL    Lymphocytes Absolute 2.0 1.0 - 5.1 K/uL    Monocytes Absolute 0.7 0.0 - 1.3 K/uL    Eosinophils Absolute 0.2 0.0 - 0.6 K/uL    Basophils Absolute 0.1 0.0 - 0.2 K/uL   Troponin   Result Value Ref Range    Troponin <0.01 <0.01 ng/mL   Urinalysis Reflex to Culture    Specimen: Urine, clean catch   Result Value Ref Range    Color, UA Yellow Straw/Yellow    Clarity, UA Clear Clear    Glucose, Ur Negative Negative mg/dL    Bilirubin Urine Negative Negative    Ketones, Urine Negative Negative mg/dL    Specific Gravity, UA <=1.005 1.005 - 1.030    Blood, Urine Negative Negative    pH, UA 6.0 5.0 - 8.0    Protein, UA Negative Negative mg/dL    Urobilinogen, Urine 1.0 <2.0 E.U./dL    Nitrite, Urine Negative Negative    Leukocyte Esterase, Urine Negative Negative    Microscopic Examination Not Indicated     Urine Type NotGiven     Urine Reflex to Culture Not Indicated    POCT Glucose   Result Value Ref Range    POC Glucose 154 (H) 70 - 99 mg/dl    Performed on ACCU-CHEK    EKG 12 Lead   Result Value Ref Range    Ventricular Rate 77 BPM    Atrial Rate 77 BPM    P-R Interval 156 ms    QRS Duration 98 ms    Q-T Interval 376 ms    QTc Calculation (Bazett) 425 ms    P Axis 26 degrees    R Axis 76 degrees    T Axis 44 degrees    Diagnosis       Normal sinus rhythmNormal ECGNo previous ECGs availableConfirmed by STARR JOSHI MD ([id number]) on 9/23/2021 7:43:09 AM       I have reviewed all labs for this visit. ECG  The Ekg interpreted by me shows  Normal sinus rhythm with a rate of 77  Axis is normal  QTc is normal  Intervals and Durations are unremarkable.       ST Segments: No acute ischemic changes    RADIOLOGY  XR CHEST (2 VW)    Result Date: 9/23/2021  EXAMINATION: TWO XRAY VIEWS OF THE CHEST 9/23/2021 1:23 am COMPARISON: 09/11/2021 HISTORY: ORDERING SYSTEM PROVIDED

## 2021-11-15 DIAGNOSIS — F51.01 PRIMARY INSOMNIA: ICD-10-CM

## 2021-11-15 RX ORDER — TRAZODONE HYDROCHLORIDE 100 MG/1
TABLET ORAL
Qty: 30 TABLET | Refills: 4 | Status: SHIPPED | OUTPATIENT
Start: 2021-11-15

## 2021-12-06 ENCOUNTER — OFFICE VISIT (OUTPATIENT)
Dept: FAMILY MEDICINE CLINIC | Age: 76
End: 2021-12-06
Payer: MEDICARE

## 2021-12-06 VITALS
WEIGHT: 240 LBS | HEIGHT: 74 IN | HEART RATE: 77 BPM | SYSTOLIC BLOOD PRESSURE: 128 MMHG | DIASTOLIC BLOOD PRESSURE: 65 MMHG | OXYGEN SATURATION: 96 % | BODY MASS INDEX: 30.8 KG/M2

## 2021-12-06 DIAGNOSIS — K59.00 CONSTIPATION, UNSPECIFIED CONSTIPATION TYPE: ICD-10-CM

## 2021-12-06 DIAGNOSIS — E11.41 CONTROLLED TYPE 2 DIABETES MELLITUS WITH DIABETIC MONONEUROPATHY, WITHOUT LONG-TERM CURRENT USE OF INSULIN (HCC): Primary | ICD-10-CM

## 2021-12-06 DIAGNOSIS — I10 HTN (HYPERTENSION), BENIGN: ICD-10-CM

## 2021-12-06 DIAGNOSIS — E78.00 PURE HYPERCHOLESTEROLEMIA: ICD-10-CM

## 2021-12-06 PROCEDURE — 99214 OFFICE O/P EST MOD 30 MIN: CPT | Performed by: FAMILY MEDICINE

## 2021-12-06 PROCEDURE — G8427 DOCREV CUR MEDS BY ELIG CLIN: HCPCS | Performed by: FAMILY MEDICINE

## 2021-12-06 PROCEDURE — 1123F ACP DISCUSS/DSCN MKR DOCD: CPT | Performed by: FAMILY MEDICINE

## 2021-12-06 PROCEDURE — 3052F HG A1C>EQUAL 8.0%<EQUAL 9.0%: CPT | Performed by: FAMILY MEDICINE

## 2021-12-06 PROCEDURE — 4040F PNEUMOC VAC/ADMIN/RCVD: CPT | Performed by: FAMILY MEDICINE

## 2021-12-06 PROCEDURE — 3017F COLORECTAL CA SCREEN DOC REV: CPT | Performed by: FAMILY MEDICINE

## 2021-12-06 PROCEDURE — G8484 FLU IMMUNIZE NO ADMIN: HCPCS | Performed by: FAMILY MEDICINE

## 2021-12-06 PROCEDURE — G8417 CALC BMI ABV UP PARAM F/U: HCPCS | Performed by: FAMILY MEDICINE

## 2021-12-06 PROCEDURE — 2022F DILAT RTA XM EVC RTNOPTHY: CPT | Performed by: FAMILY MEDICINE

## 2021-12-06 PROCEDURE — 1036F TOBACCO NON-USER: CPT | Performed by: FAMILY MEDICINE

## 2021-12-06 RX ORDER — LOSARTAN POTASSIUM 100 MG/1
TABLET ORAL
Status: ON HOLD | COMMUNITY
Start: 2021-11-24 | End: 2022-10-22 | Stop reason: HOSPADM

## 2021-12-06 NOTE — PATIENT INSTRUCTIONS

## 2021-12-06 NOTE — PROGRESS NOTES
Chief Complaint   Patient presents with    Hypertension    Hyperlipidemia    Diabetes    Other     pt has multiple medical conditions. pt does take bp at home. pt was switched from lisinopril to losartan. pt states it is doing well. Internal Administration   First Dose COVID-19, Moderna, Primary or Immunocompromised, PF, 100mcg/0.5mL  2021   Second Dose COVID-19, Moderna, Primary or Immunocompromised, PF, 100mcg/0.5mL   2021       Last COVID Lab SARS-CoV-2 (no units)   Date Value   2021 Not Detected     SARS-CoV-2, NAAT (no units)   Date Value   2021 Not Detected             Wt Readings from Last 3 Encounters:   21 235 lb (106.6 kg)   21 235 lb (106.6 kg)   09/10/21 235 lb (106.6 kg)     BP Readings from Last 3 Encounters:   21 (!) 140/76   21 (!) 164/85   09/10/21 (!) 167/90      Lab Results   Component Value Date    LABA1C 8.1 2021    LABA1C 8.8 2021    LABA1C 8.4 2020       HPI:  Rigo Villarreal is a 78 y.o. (: 1945) here today for    Chronic condition follow up. Patient states he does not monitor his BP at home. He switched from lisinopril to losartan and states he is doing well. His cardiologist changed this medication due to a cough he was having. Discussed the importance of getting the AAA redone due to the top portion of the abdomen not being visualized in the previous scan. He will call to get this scheduled. Patient states he is not taking the Metamucil daily explained that for the medication to work as it should he would need to take it daily. Denies chest pain. Continues to follow up with cardiology as needed. [] Patient has completed an advance directive  [x] Patient has NOT completed an advanced directive  [] Patient has a documented healthcare surrogate  [x] Patient does NOT have a documented healthcare surrogate  [] Discussed the importance of establishing and updating an advanced directive. Patient has questions at this time and those were answered. [x] Discussed the importance of establishing and updating an advanced directive. Patient does NOT have questions at this time. Discussed with: [x] Patient            [] Family             [] Other caregiver    Patient's medications, allergies, past medical, surgical, social and family histories were reviewed and updated asappropriate on 2021 at 1:46 PM.    ROS:  Review of Systems    All other systems reviewed and are negative except as noted above on 2021 at 1:46 PM. Additional review of systems may be scanned into the media section ofthis medical record. Any responses requiring further intervention were pursued.     Past Medical History:   Diagnosis Date    Acute [facility] ([facility]     Angina pectoris, unstable ([facility]     ASHD (arteriosclerotic heart disease)     Bladder outlet obstruction     BPH (benign prostatic hyperplasia)     Chest pain     Constipation     Diabetes mellitus ([facility]     GERD (gastroesophageal reflux disease)     Hx of CABG     Hypercholesteremia     Hypercholesterolemia     Hypertension     IBS (irritable bowel syndrome)     Influenza A 2020    Sinusitis     Type II or unspecified type diabetes mellitus without mention of complication, not stated as uncontrolled      Family History   Problem Relation Age of Onset    Diabetes Mother     High Blood Pressure Mother     Arthritis Mother      Social History     Socioeconomic History    Marital status:      Spouse name: Not on file    Number of children: Not on file    Years of education: Not on file    Highest education level: Not on file   Occupational History    Occupation:    Tobacco Use    Smoking status: Former Smoker     Packs/day: 5.00     Years: 10.00     Pack years: 50.00     Types: Cigarettes     Quit date: 3/13/1976     Years since quittin.7    Smokeless tobacco: Never Used   Substance and Sexual Activity    Alcohol use: No     Comment: 3 beers a month    Drug use: No    Sexual activity: Yes     Partners: Female   Other Topics Concern    Not on file   Social History Narrative    Not on file     Social Determinants of Health     Financial Resource Strain:     Difficulty of Paying Living Expenses: Not on file   Food Insecurity:     Worried About Running Out of Food in the Last Year: Not on file    Irma of Food in the Last Year: Not on file   Transportation Needs:     Lack of Transportation (Medical): Not on file    Lack of Transportation (Non-Medical): Not on file   Physical Activity:     Days of Exercise per Week: Not on file    Minutes of Exercise per Session: Not on file   Stress:     Feeling of Stress : Not on file   Social Connections:     Frequency of Communication with Friends and Family: Not on file    Frequency of Social Gatherings with Friends and Family: Not on file    Attends Gnosticism Services: Not on file    Active Member of [address] [place] [address] or Organizations: Not on file    Attends Club or Organization Meetings: Not on file    Marital Status: Not on file   Intimate Partner Violence:     Fear of Current or Ex-Partner: Not on file    Emotionally Abused: Not on file    Physically Abused: Not on file    Sexually Abused: Not on file   Housing Stability:     Unable to Pay for Housing in the Last Year: Not on file    Number of [place] in the Last Year: Not on file    Unstable Housing in the Last Year: Not on file     Prior to Visit Medications    Medication Sig Taking?  Authorizing Provider   losartan (COZAAR) 100 MG tablet  Yes Historical Provider, MD   traZODone (DESYREL) 100 MG tablet TAKE ONE TABLET BY MOUTH ONCE NIGHTLY AS NEEDED FOR SLEEP Yes Nicole Pena MD   benzonatate (TESSALON) 100 MG capsule Take 1 capsule by mouth 3 times daily as needed for Cough Yes Cassi Martin MD   albuterol sulfate HFA (VENTOLIN HFA) 108 (90 Base) MCG/ACT inhaler Inhale 2 puffs into the lungs 4 times daily as needed for Wheezing Yes Cassi Martin MD   glimepiride (AMARYL) 4 MG tablet TAKE ONE TABLET BY MOUTH EVERY MORNING BEFORE BREAKFAST Yes Nicole Pena MD   metFORMIN (GLUCOPHAGE-XR) 500 MG extended release tablet Take 1 tablet by mouth 2 times daily Yes Nicole Pena MD   blood glucose test strips (ONETOUCH ULTRA) strip TEST TWO TIMES A DAY Yes Nicole Pena MD   amLODIPine (NORVASC) 10 MG tablet Take 1 tablet by mouth daily Yes Nicole Pena MD   empagliflozin (JARDIANCE) 25 MG tablet TAKE ONE TABLET BY MOUTH DAILY Yes Nicole Pena MD   nitroGLYCERIN (NITROSTAT) 0.4 MG SL tablet Place 1 tablet under the tongue every 5 minutes as needed for Chest pain up to max of 3 total doses. If no relief after 1 dose, call 911. Yes Nicole Pena MD   tamsulosin (FLOMAX) 0.4 MG capsule TAKE TWO CAPSULES BY MOUTH DAILY ONE HALF HOUR AFTER THE SAME MEAL EACH DAY Yes Nicole Pena MD   rosuvastatin (CRESTOR) 40 MG tablet Take 1 tablet by mouth every evening Yes YANNICK Asher - CNP   Omega-3 Fatty Acids (FISH OIL) 1000 MG CAPS Take 1 capsule by mouth 2 times daily Yes YANNICK Garza - CNP   aspirin 325 MG tablet Take 325 mg by mouth daily. Yes Historical Provider, MD     Allergies   Allergen Reactions    Prednisone Other (See Comments)     Made patient very agitated (medrol dospak)    Metformin And Related Other (See Comments)     GI upset    Codeine Other (See Comments)     constipation       OBJECTIVE:  Estimated body mass index is 30.17 kg/m² as calculated from the following:    Height as of 9/23/21: 6' 2\" (1.88 m). Weight as of 9/23/21: 235 lb (106.6 kg). There were no vitals filed for this visit. Physical Exam  Vitals and nursing note reviewed. Constitutional:       General: He is not in acute distress. Appearance: He is well-developed. He is not diaphoretic.    HENT:      Head: Normocephalic and atraumatic. Right Ear: External ear normal.      Left Ear: External ear normal.      Nose: Nose normal.   Eyes:      General: Lids are normal. No scleral icterus. Right eye: No discharge. Left eye: No discharge. Pupils: Pupils are equal, round, and reactive to light. Neck:      Thyroid: No thyromegaly. Vascular: No JVD. Cardiovascular:      Rate and Rhythm: Normal rate and regular rhythm. Heart sounds: Normal heart sounds. Pulmonary:      Effort: Pulmonary effort is normal. No respiratory distress. Breath sounds: Normal breath sounds. Abdominal:      Palpations: Abdomen is soft. There is no hepatomegaly or splenomegaly. Tenderness: There is no abdominal tenderness. Musculoskeletal:      Right lower leg: Edema (1+) present. Left lower leg: Edema (1+) present. Skin:     General: Skin is warm and dry. Coloration: Skin is not pale. Findings: No erythema or rash. Comments: Turgor normal   Psychiatric:         Behavior: Behavior normal.         Thought Content: Thought content normal.         Judgment: Judgment normal.              ASSESSMENT PLAN      Diagnosis Orders   1. Controlled type 2 diabetes mellitus with diabetic mononeuropathy, without long-term current use of insulin (HCC)  Microalbumin / Creatinine Urine Ratio    Hemoglobin A1C   2. Pure hypercholesterolemia  Lipid Panel   3. Constipation, unspecified constipation type     4. HTN (hypertension), benign     Blood sugar readings by glycosylated hemoglobin or home fingerstick blood sugars are acceptable and no change in diabetic treatment is necessary continue lipid monitoring as needed. Current blood pressure readings in the office or at home are acceptable and no change in medication is necessary discussed how he needs to use the Metamucil more regularly to keep his bowels moving. Follow-up 6 months. Return for fasting labs.     Patient should call the office immediately with new or ongoing signs or symptoms or worsening, or proceed to the emergency room. No changes in past medical history, past surgical history, social history, or family history were noted during the patient encounter unless specifically listed above. All updates of past medical history, past surgical history, social history, or family history were reviewed personally by me during the office visit. All problems listed in the assessment are stable unless noted otherwise. Medication profile reviewed personally by me during the visit. Medication side effects and possible impairments from medications were discussed as applicable. This document was prepared by a combination of typing and transcription through a voice recognition software. Scribe attestation: IEstela am scribing for and in the presence of Malissa Brown MD. Electronically signed by Marietta Valero on 12/6/2021 at 1:46 PM      Provider attestation:     I, Dr. Narvis Schilder, personally performed the services described in this documentation, as scribed by the above signed scribe in my presence, and it is both accurate and complete. I agree with the ROS and Past Histories independently gathered by the clinical support staff and the remaining scribed note accurately describes my personal service to the patient.       12/6/2021    2:31 PM

## 2021-12-13 ENCOUNTER — NURSE ONLY (OUTPATIENT)
Dept: FAMILY MEDICINE CLINIC | Age: 76
End: 2021-12-13
Payer: MEDICARE

## 2021-12-13 DIAGNOSIS — E11.41 CONTROLLED TYPE 2 DIABETES MELLITUS WITH DIABETIC MONONEUROPATHY, WITHOUT LONG-TERM CURRENT USE OF INSULIN (HCC): ICD-10-CM

## 2021-12-13 DIAGNOSIS — E78.00 PURE HYPERCHOLESTEROLEMIA: ICD-10-CM

## 2021-12-13 LAB
CHOLESTEROL, TOTAL: 145 MG/DL (ref 0–199)
CREATININE URINE: 156.2 MG/DL (ref 39–259)
HDLC SERPL-MCNC: 50 MG/DL (ref 40–60)
LDL CHOLESTEROL CALCULATED: 78 MG/DL
MICROALBUMIN UR-MCNC: 2.7 MG/DL
MICROALBUMIN/CREAT UR-RTO: 17.3 MG/G (ref 0–30)
TRIGL SERPL-MCNC: 83 MG/DL (ref 0–150)
VLDLC SERPL CALC-MCNC: 17 MG/DL

## 2021-12-13 PROCEDURE — 36415 COLL VENOUS BLD VENIPUNCTURE: CPT | Performed by: FAMILY MEDICINE

## 2021-12-14 ENCOUNTER — HOSPITAL ENCOUNTER (OUTPATIENT)
Dept: ULTRASOUND IMAGING | Age: 76
Discharge: HOME | End: 2021-12-14
Payer: MEDICARE

## 2021-12-14 DIAGNOSIS — I25.10 CORONARY ARTERY DISEASE INVOLVING NATIVE CORONARY ARTERY OF NATIVE HEART, UNSPECIFIED WHETHER ANGINA PRESENT: Primary | ICD-10-CM

## 2021-12-14 DIAGNOSIS — R09.89 ABDOMINAL BRUIT: ICD-10-CM

## 2021-12-14 DIAGNOSIS — I10 HTN (HYPERTENSION), BENIGN: ICD-10-CM

## 2021-12-14 DIAGNOSIS — I20.9 ANGINA PECTORIS (HCC): ICD-10-CM

## 2021-12-14 LAB
ESTIMATED AVERAGE GLUCOSE: 177.2 MG/DL
HBA1C MFR BLD: 7.8 %

## 2021-12-14 PROCEDURE — 76706 US ABDL AORTA SCREEN AAA: CPT

## 2022-01-04 ENCOUNTER — HOSPITAL ENCOUNTER (OUTPATIENT)
Age: 77
Discharge: HOME | End: 2022-01-04
Payer: MEDICARE

## 2022-01-04 ENCOUNTER — HOSPITAL ENCOUNTER (OUTPATIENT)
Dept: CT IMAGING | Age: 77
Discharge: HOME | End: 2022-01-04
Payer: MEDICARE

## 2022-01-04 DIAGNOSIS — I20.9 ANGINA PECTORIS (HCC): ICD-10-CM

## 2022-01-04 DIAGNOSIS — I10 HTN (HYPERTENSION), BENIGN: ICD-10-CM

## 2022-01-04 DIAGNOSIS — I25.10 CORONARY ARTERY DISEASE INVOLVING NATIVE CORONARY ARTERY OF NATIVE HEART, UNSPECIFIED WHETHER ANGINA PRESENT: ICD-10-CM

## 2022-01-04 LAB
ANION GAP SERPL CALCULATED.3IONS-SCNC: 11 MMOL/L (ref 3–16)
BUN BLDV-MCNC: 12 MG/DL (ref 7–20)
CALCIUM SERPL-MCNC: 9.3 MG/DL (ref 8.3–10.6)
CHLORIDE BLD-SCNC: 102 MMOL/L (ref 99–110)
CO2: 26 MMOL/L (ref 21–32)
CREAT SERPL-MCNC: 1.1 MG/DL (ref 0.8–1.3)
GFR AFRICAN AMERICAN: >60
GFR NON-AFRICAN AMERICAN: >60
GLUCOSE BLD-MCNC: 122 MG/DL (ref 70–99)
POTASSIUM SERPL-SCNC: 4.1 MMOL/L (ref 3.5–5.1)
SODIUM BLD-SCNC: 139 MMOL/L (ref 136–145)

## 2022-01-04 PROCEDURE — 6360000004 HC RX CONTRAST MEDICATION: Performed by: FAMILY MEDICINE

## 2022-01-04 PROCEDURE — 80048 BASIC METABOLIC PNL TOTAL CA: CPT

## 2022-01-04 PROCEDURE — 74175 CTA ABDOMEN W/CONTRAST: CPT

## 2022-01-04 PROCEDURE — 36415 COLL VENOUS BLD VENIPUNCTURE: CPT

## 2022-01-04 RX ADMIN — IOPAMIDOL 75 ML: 755 INJECTION, SOLUTION INTRAVENOUS at 13:50

## 2022-01-10 DIAGNOSIS — E11.41 CONTROLLED TYPE 2 DIABETES MELLITUS WITH DIABETIC MONONEUROPATHY, WITHOUT LONG-TERM CURRENT USE OF INSULIN (HCC): ICD-10-CM

## 2022-01-10 DIAGNOSIS — E11.42 TYPE 2 DIABETES MELLITUS WITH DIABETIC POLYNEUROPATHY, WITHOUT LONG-TERM CURRENT USE OF INSULIN (HCC): ICD-10-CM

## 2022-01-10 RX ORDER — BLOOD SUGAR DIAGNOSTIC
STRIP MISCELLANEOUS
Qty: 100 STRIP | Refills: 2 | Status: SHIPPED | OUTPATIENT
Start: 2022-01-10 | End: 2022-01-18 | Stop reason: SDUPTHER

## 2022-01-18 DIAGNOSIS — E11.42 TYPE 2 DIABETES MELLITUS WITH DIABETIC POLYNEUROPATHY, WITHOUT LONG-TERM CURRENT USE OF INSULIN (HCC): ICD-10-CM

## 2022-01-18 DIAGNOSIS — E11.41 CONTROLLED TYPE 2 DIABETES MELLITUS WITH DIABETIC MONONEUROPATHY, WITHOUT LONG-TERM CURRENT USE OF INSULIN (HCC): ICD-10-CM

## 2022-01-18 RX ORDER — BLOOD SUGAR DIAGNOSTIC
STRIP MISCELLANEOUS
Qty: 200 STRIP | Refills: 3 | Status: SHIPPED | OUTPATIENT
Start: 2022-01-18

## 2022-02-15 DIAGNOSIS — E78.00 PURE HYPERCHOLESTEROLEMIA: ICD-10-CM

## 2022-02-15 RX ORDER — ROSUVASTATIN CALCIUM 40 MG/1
40 TABLET, COATED ORAL EVERY EVENING
Qty: 30 TABLET | Refills: 11 | Status: SHIPPED | OUTPATIENT
Start: 2022-02-15

## 2022-03-29 RX ORDER — AMLODIPINE BESYLATE 10 MG/1
TABLET ORAL
Qty: 90 TABLET | Refills: 3 | Status: ON HOLD
Start: 2022-03-29 | End: 2022-10-22 | Stop reason: HOSPADM

## 2022-04-25 DIAGNOSIS — E11.41 CONTROLLED TYPE 2 DIABETES MELLITUS WITH DIABETIC MONONEUROPATHY, WITHOUT LONG-TERM CURRENT USE OF INSULIN (HCC): ICD-10-CM

## 2022-04-25 RX ORDER — METFORMIN HYDROCHLORIDE 500 MG/1
TABLET, EXTENDED RELEASE ORAL
Qty: 120 TABLET | Refills: 5 | Status: SHIPPED
Start: 2022-04-25 | End: 2022-08-15 | Stop reason: SINTOL

## 2022-05-18 ENCOUNTER — TELEPHONE (OUTPATIENT)
Dept: FAMILY MEDICINE CLINIC | Age: 77
End: 2022-05-18

## 2022-05-18 NOTE — TELEPHONE ENCOUNTER
Pt states thst he missed work on 5/14 was getting over the covid. States that he feels better ans was wanting to see if he could get an excuse for work.

## 2022-05-18 NOTE — LETTER
[address] [facility]  [address] [place] [address] [address] [place] [address]  Phone: [phone]  Fax: [phone]    Kimberly Carpio MD        May 18, 2022     Patient: Jennifer Pineda   YOB: 1945   Date of Visit: 5/18/2022       To Whom It May Concern: It is my medical opinion that Taty Sanchez be excused from work on 5/14/22. If you have any questions or concerns, please don't hesitate to call.     Sincerely,        Kimberly Carpio MD

## 2022-07-19 ENCOUNTER — TELEPHONE (OUTPATIENT)
Dept: FAMILY MEDICINE CLINIC | Age: 77
End: 2022-07-19

## 2022-07-19 NOTE — TELEPHONE ENCOUNTER
Patient would like to know if there is an alternative to Metformin that does not cause stomach pain and diarrhea? He was supposed to have an in office appt today but has rescheduled to next month.

## 2022-07-19 NOTE — TELEPHONE ENCOUNTER
Pt notified.  He is going to call his insurance to see if it is covered and will call back and let us know

## 2022-08-03 DIAGNOSIS — E11.41 CONTROLLED TYPE 2 DIABETES MELLITUS WITH DIABETIC MONONEUROPATHY, WITHOUT LONG-TERM CURRENT USE OF INSULIN (HCC): ICD-10-CM

## 2022-08-03 RX ORDER — GLIMEPIRIDE 4 MG/1
TABLET ORAL
Qty: 30 TABLET | Refills: 5 | Status: SHIPPED | OUTPATIENT
Start: 2022-08-03 | End: 2022-08-15 | Stop reason: SDUPTHER

## 2022-08-15 ENCOUNTER — OFFICE VISIT (OUTPATIENT)
Dept: FAMILY MEDICINE CLINIC | Age: 77
End: 2022-08-15
Payer: MEDICARE

## 2022-08-15 VITALS
HEART RATE: 79 BPM | SYSTOLIC BLOOD PRESSURE: 136 MMHG | BODY MASS INDEX: 30.56 KG/M2 | WEIGHT: 238 LBS | OXYGEN SATURATION: 97 % | DIASTOLIC BLOOD PRESSURE: 76 MMHG

## 2022-08-15 DIAGNOSIS — I10 HTN (HYPERTENSION), BENIGN: ICD-10-CM

## 2022-08-15 DIAGNOSIS — T50.905A ADVERSE EFFECT OF DRUG, INITIAL ENCOUNTER: ICD-10-CM

## 2022-08-15 DIAGNOSIS — N32.0 BLADDER OUTLET OBSTRUCTION: ICD-10-CM

## 2022-08-15 DIAGNOSIS — I25.10 CAD IN NATIVE ARTERY: ICD-10-CM

## 2022-08-15 DIAGNOSIS — E78.00 PURE HYPERCHOLESTEROLEMIA: ICD-10-CM

## 2022-08-15 DIAGNOSIS — U07.1 COVID-19: ICD-10-CM

## 2022-08-15 DIAGNOSIS — E11.41 CONTROLLED TYPE 2 DIABETES MELLITUS WITH DIABETIC MONONEUROPATHY, WITHOUT LONG-TERM CURRENT USE OF INSULIN (HCC): Primary | ICD-10-CM

## 2022-08-15 PROCEDURE — G8417 CALC BMI ABV UP PARAM F/U: HCPCS | Performed by: FAMILY MEDICINE

## 2022-08-15 PROCEDURE — 1123F ACP DISCUSS/DSCN MKR DOCD: CPT | Performed by: FAMILY MEDICINE

## 2022-08-15 PROCEDURE — G8427 DOCREV CUR MEDS BY ELIG CLIN: HCPCS | Performed by: FAMILY MEDICINE

## 2022-08-15 PROCEDURE — 99214 OFFICE O/P EST MOD 30 MIN: CPT | Performed by: FAMILY MEDICINE

## 2022-08-15 PROCEDURE — 3017F COLORECTAL CA SCREEN DOC REV: CPT | Performed by: FAMILY MEDICINE

## 2022-08-15 PROCEDURE — 2022F DILAT RTA XM EVC RTNOPTHY: CPT | Performed by: FAMILY MEDICINE

## 2022-08-15 PROCEDURE — 3046F HEMOGLOBIN A1C LEVEL >9.0%: CPT | Performed by: FAMILY MEDICINE

## 2022-08-15 PROCEDURE — 1036F TOBACCO NON-USER: CPT | Performed by: FAMILY MEDICINE

## 2022-08-15 RX ORDER — GLIMEPIRIDE 4 MG/1
4 TABLET ORAL 2 TIMES DAILY
Qty: 60 TABLET | Refills: 5 | Status: SHIPPED | OUTPATIENT
Start: 2022-08-15

## 2022-08-15 SDOH — ECONOMIC STABILITY: FOOD INSECURITY: WITHIN THE PAST 12 MONTHS, THE FOOD YOU BOUGHT JUST DIDN'T LAST AND YOU DIDN'T HAVE MONEY TO GET MORE.: NEVER TRUE

## 2022-08-15 SDOH — ECONOMIC STABILITY: FOOD INSECURITY: WITHIN THE PAST 12 MONTHS, YOU WORRIED THAT YOUR FOOD WOULD RUN OUT BEFORE YOU GOT MONEY TO BUY MORE.: NEVER TRUE

## 2022-08-15 ASSESSMENT — PATIENT HEALTH QUESTIONNAIRE - PHQ9
2. FEELING DOWN, DEPRESSED OR HOPELESS: 0
SUM OF ALL RESPONSES TO PHQ9 QUESTIONS 1 & 2: 0
SUM OF ALL RESPONSES TO PHQ QUESTIONS 1-9: 0
SUM OF ALL RESPONSES TO PHQ QUESTIONS 1-9: 0
1. LITTLE INTEREST OR PLEASURE IN DOING THINGS: 0
SUM OF ALL RESPONSES TO PHQ QUESTIONS 1-9: 0
SUM OF ALL RESPONSES TO PHQ QUESTIONS 1-9: 0

## 2022-08-15 ASSESSMENT — SOCIAL DETERMINANTS OF HEALTH (SDOH): HOW HARD IS IT FOR YOU TO PAY FOR THE VERY BASICS LIKE FOOD, HOUSING, MEDICAL CARE, AND HEATING?: NOT HARD AT ALL

## 2022-08-15 NOTE — PROGRESS NOTES
Chief Complaint   Patient presents with    Diabetes    Hypertension        Internal Administration   First Dose COVID-19, MODERNA MAURISIO border, Primary or Immunocompromised, (age 12y+), IM, 100 mcg/0.5mL  2021   Second Dose COVID-19, MODERNA MAURISIO border, Primary or Immunocompromised, (age 12y+), IM, 100 mcg/0.5mL   2021       Last COVID Lab SARS-CoV-2 (no units)   Date Value   2021 Not Detected     SARS-CoV-2, NAAT (no units)   Date Value   2021 Not Detected      Diarrhea with metformin intolerable even the extended release. Semaglutide was not even covered by insurance. Juanita People was too expensive. I told him we had very little other options available for oral agents that I considered effective, due to his heart disease would not consider Actos. He is not wanting to do an injection. We will double the glimepiride to 4 mg twice daily but I did warn him about low blood sugars. Check an A1c when he returns in 4 months as well as update current labs. Wt Readings from Last 3 Encounters:   08/15/22 238 lb (108 kg)   21 240 lb (108.9 kg)   21 235 lb (106.6 kg)     BP Readings from Last 3 Encounters:   08/15/22 136/76   21 128/65   21 (!) 140/76      Lab Results   Component Value Date    LABA1C 7.8 2021    LABA1C 8.1 2021    LABA1C 8.8 2021       HPI:  Annetta Eric is a 78 y.o. (: 1945) here today for  See below    [] Patient has completed an advance directive  [] Patient has NOT completed an advanced directive  [] Patient has a documented healthcare surrogate  [] Patient does NOT have a documented healthcare surrogate  [] Discussed the importance of establishing and updating an advanced directive. Patient has questions at this time and those were answered. [] Discussed the importance of establishing and updating an advanced directive. Patient does NOT have questions at this time.     Discussed with: [] Patient            [] Family [] Other caregiver    Patient's medications, allergies, past medical, surgical, social and family histories were reviewed and updated asappropriate on 8/15/2022 at 6:07 PM.    ROS:  Review of Systems    All other systems reviewed and are negative except as noted above on 8/15/2022 at 6:07 PM. Additional review of systems may be scanned into the media section ofthis medical record. Any responses requiring further intervention were pursued.     Past Medical History:   Diagnosis Date    Acute MI ([facility] [address]     Angina pectoris, unstable (HCC)     ASHD (arteriosclerotic heart disease)     Bladder outlet obstruction     BPH (benign prostatic hyperplasia)     Chest pain     Constipation     Diabetes mellitus (HCC)     GERD (gastroesophageal reflux disease)     Hx of CABG     Hypercholesteremia     Hypercholesterolemia     Hypertension     IBS (irritable bowel syndrome)     Influenza A 2020    Sinusitis     Type II or unspecified type diabetes mellitus without mention of complication, not stated as uncontrolled      Family History   Problem Relation Age of Onset    Diabetes Mother     High Blood Pressure Mother     Arthritis Mother      Social History     Socioeconomic History    Marital status:      Spouse name: Not on file    Number of children: Not on file    Years of education: Not on file    Highest education level: Not on file   Occupational History    Occupation:    Tobacco Use    Smoking status: Former     Packs/day: 5.00     Years: 10.00     Pack years: 50.00     Types: Cigarettes     Quit date: 3/13/1976     Years since quittin.4    Smokeless tobacco: Never   Substance and Sexual Activity    Alcohol use: No     Comment: 3 beers a month    Drug use: No    Sexual activity: Yes     Partners: Female   Other Topics Concern    Not on file   Social History Narrative    Not on file     Social Determinants of Health     Financial Resource Strain: Low Risk     Difficulty of Paying Living Expenses: Not hard at all   Food Insecurity: No Food Insecurity    Worried About Running Out of Food in the Last Year: Never true    Ran Out of Food in the Last Year: Never true   Transportation Needs: Not on file   Physical Activity: Not on file   Stress: Not on file   Social Connections: Not on file   Intimate Partner Violence: Not on file   Housing Stability: Not on file     Prior to Visit Medications    Medication Sig Taking? Authorizing Provider   glimepiride (AMARYL) 4 MG tablet TAKE ONE TABLET BY MOUTH EVERY MORNING BEFORE BREAKFAST Yes Mau Sarkar MD   metFORMIN (GLUCOPHAGE-XR) 500 MG extended release tablet TAKE TWO TABLETS BY MOUTH TWICE A DAY Yes Mau Sarkar MD   amLODIPine (NORVASC) 10 MG tablet TAKE ONE TABLET BY MOUTH DAILY Yes Mau Sarkar MD   rosuvastatin (CRESTOR) 40 MG tablet Take 1 tablet by mouth every evening Yes Mau Sarkar MD   blood glucose test strips (ONETOUCH ULTRA) strip USE TO TEST TWO TIMES A DAY Yes Mau Sarkar MD   losartan (COZAAR) 100 MG tablet  Yes Historical Provider, MD   traZODone (DESYREL) 100 MG tablet TAKE ONE TABLET BY MOUTH ONCE NIGHTLY AS NEEDED FOR SLEEP Yes Mau Sarkar MD   albuterol sulfate HFA (VENTOLIN HFA) 108 (90 Base) MCG/ACT inhaler Inhale 2 puffs into the lungs 4 times daily as needed for Wheezing Yes Glen Monterroso MD   nitroGLYCERIN (NITROSTAT) 0.4 MG SL tablet Place 1 tablet under the tongue every 5 minutes as needed for Chest pain up to max of 3 total doses. If no relief after 1 dose, call 911. Yes Mau Sarkar MD   tamsulosin (FLOMAX) 0.4 MG capsule TAKE TWO CAPSULES BY MOUTH DAILY ONE HALF HOUR AFTER THE SAME MEAL EACH DAY Yes Mau Sarkar MD   Omega-3 Fatty Acids (FISH OIL) 1000 MG CAPS Take 1 capsule by mouth 2 times daily Yes YANNICK Garza - CNP   aspirin 325 MG tablet Take 325 mg by mouth daily.  Yes Historical Provider, MD Allergies   Allergen Reactions    Prednisone Other (See Comments)     Made patient very agitated (medrol dospak)    Metformin And Related Other (See Comments)     GI upset    Codeine Other (See Comments)     constipation       OBJECTIVE:  Estimated body mass index is 30.56 kg/m² as calculated from the following:    Height as of 12/6/21: 6' 2\" (1.88 m). Weight as of this encounter: 238 lb (108 kg). Vitals:    08/15/22 1422   BP: 136/76   Site: Left Upper Arm   Position: Sitting   Cuff Size: Medium Adult   Pulse: 79   SpO2: 97%   Weight: 238 lb (108 kg)       Physical Exam  Vitals and nursing note reviewed. Constitutional:       General: He is not in acute distress. Appearance: He is well-developed. He is not diaphoretic. HENT:      Head: Normocephalic and atraumatic. Right Ear: External ear normal.      Left Ear: External ear normal.      Nose: Nose normal.   Eyes:      General: Lids are normal. No scleral icterus. Right eye: No discharge. Left eye: No discharge. Pupils: Pupils are equal, round, and reactive to light. Neck:      Thyroid: No thyromegaly. Vascular: No JVD. Cardiovascular:      Rate and Rhythm: Normal rate and regular rhythm. Heart sounds: Normal heart sounds. Pulmonary:      Effort: Pulmonary effort is normal. No respiratory distress. Breath sounds: Normal breath sounds. Abdominal:      Palpations: Abdomen is soft. There is no hepatomegaly or splenomegaly. Tenderness: There is no abdominal tenderness. Skin:     General: Skin is warm and dry. Coloration: Skin is not pale. Findings: No erythema or rash. Comments: Turgor normal   Psychiatric:         Behavior: Behavior normal.         Thought Content: Thought content normal.         Judgment: Judgment normal.            ASSESSMENT PLAN      Diagnosis Orders   1.  Controlled type 2 diabetes mellitus with diabetic mononeuropathy, without long-term current use of insulin ([facility] [address]        2. COVID-19        3. CAD in native artery        4. HTN (hypertension), benign        5. DM (diabetes mellitus), secondary, uncontrolled, w/neurologic complic ([facility] [address]        6. Bladder outlet obstruction        7. Pure hypercholesterolemia        8. Adverse effect of drug, initial encounter        See discussion above. Recently had COVID-19 no lingering symptoms except some fatigue. There is no clinical evidence of coronary insufficiency or heart failure that requires any change in the treatment regimen and no changes in medications for cardiac conditions as listed in the medication list are necessary. Current blood pressure readings in the office or at home are acceptable and current antihypertensive medications as listed in the medication list require no change. Urinating okay. Lipids will be monitored based upon levels requiring treatment and other cardiac risks. Medications for hyperlipidemia and hypertriglyceridemia as listed on the medication list will be changed as necessary to reach control parameters. Follow-up 4 months hemoglobin A1c at that time    Patient should call the office immediately with new or ongoing signs or symptoms or worsening, or proceed to the emergency room. No changes in past medical history, past surgical history, social history, or family history were noted during the patient encounter unless specifically listed above. All updates of past medical history, past surgical history, social history, or family history were reviewed personally by me during the office visit. All problems listed in the assessment are stable unless noted otherwise. Medication profile reviewed personally by me during the visit. Medication side effects and possible impairments from medications were discussed as applicable. This document was prepared by a combination of typing and transcription through a voice recognition software.                 Scribe attestation: Tom Hunt Estephania Galdamez MD, am scribing for and in the presence of Lamont Shaw MD. Electronically signed by Lamont Shaw MD on 8/15/2022 at 6:07 PM      Provider attestation:     I, Dr. Crystal Zapata, personally performed the services described in this documentation, as scribed by the above signed scribe in my presence, and it is both accurate and complete. I agree with the ROS and Past Histories independently gathered by the clinical support staff and the remaining scribed note accurately describes my personal service to the patient.       8/15/2022    6:11 PM

## 2022-08-29 ENCOUNTER — NURSE ONLY (OUTPATIENT)
Dept: FAMILY MEDICINE CLINIC | Age: 77
End: 2022-08-29
Payer: MEDICARE

## 2022-08-29 DIAGNOSIS — Z13.29 SCREENING FOR THYROID DISORDER: ICD-10-CM

## 2022-08-29 DIAGNOSIS — E78.00 PURE HYPERCHOLESTEROLEMIA: ICD-10-CM

## 2022-08-29 DIAGNOSIS — I10 HTN (HYPERTENSION), BENIGN: Primary | ICD-10-CM

## 2022-08-29 DIAGNOSIS — E11.41 CONTROLLED TYPE 2 DIABETES MELLITUS WITH DIABETIC MONONEUROPATHY, WITHOUT LONG-TERM CURRENT USE OF INSULIN (HCC): ICD-10-CM

## 2022-08-29 DIAGNOSIS — Z12.5 SCREENING FOR PROSTATE CANCER: ICD-10-CM

## 2022-08-29 LAB
ANION GAP SERPL CALCULATED.3IONS-SCNC: 13 MMOL/L (ref 3–16)
BASOPHILS ABSOLUTE: 0.1 K/UL (ref 0–0.2)
BASOPHILS RELATIVE PERCENT: 1 %
BUN BLDV-MCNC: 13 MG/DL (ref 7–20)
CALCIUM SERPL-MCNC: 9.8 MG/DL (ref 8.3–10.6)
CHLORIDE BLD-SCNC: 105 MMOL/L (ref 99–110)
CHOLESTEROL, TOTAL: 172 MG/DL (ref 0–199)
CO2: 23 MMOL/L (ref 21–32)
CREAT SERPL-MCNC: 1 MG/DL (ref 0.8–1.3)
CREATININE URINE: 133.7 MG/DL (ref 39–259)
EOSINOPHILS ABSOLUTE: 0.1 K/UL (ref 0–0.6)
EOSINOPHILS RELATIVE PERCENT: 2.9 %
GFR AFRICAN AMERICAN: >60
GFR NON-AFRICAN AMERICAN: >60
GLUCOSE BLD-MCNC: 157 MG/DL (ref 70–99)
HCT VFR BLD CALC: 45.9 % (ref 40.5–52.5)
HDLC SERPL-MCNC: 54 MG/DL (ref 40–60)
HEMOGLOBIN: 15.6 G/DL (ref 13.5–17.5)
LDL CHOLESTEROL CALCULATED: 103 MG/DL
LYMPHOCYTES ABSOLUTE: 1.7 K/UL (ref 1–5.1)
LYMPHOCYTES RELATIVE PERCENT: 34.5 %
MCH RBC QN AUTO: 31.6 PG (ref 26–34)
MCHC RBC AUTO-ENTMCNC: 33.9 G/DL (ref 31–36)
MCV RBC AUTO: 93.3 FL (ref 80–100)
MICROALBUMIN UR-MCNC: 1.7 MG/DL
MICROALBUMIN/CREAT UR-RTO: 12.7 MG/G (ref 0–30)
MONOCYTES ABSOLUTE: 0.5 K/UL (ref 0–1.3)
MONOCYTES RELATIVE PERCENT: 9.3 %
NEUTROPHILS ABSOLUTE: 2.6 K/UL (ref 1.7–7.7)
NEUTROPHILS RELATIVE PERCENT: 52.3 %
PDW BLD-RTO: 13.4 % (ref 12.4–15.4)
PLATELET # BLD: 240 K/UL (ref 135–450)
PMV BLD AUTO: 8.1 FL (ref 5–10.5)
POTASSIUM SERPL-SCNC: 4.4 MMOL/L (ref 3.5–5.1)
PROSTATE SPECIFIC ANTIGEN: 4.27 NG/ML (ref 0–4)
RBC # BLD: 4.92 M/UL (ref 4.2–5.9)
SODIUM BLD-SCNC: 141 MMOL/L (ref 136–145)
TRIGL SERPL-MCNC: 77 MG/DL (ref 0–150)
TSH REFLEX: 1.23 UIU/ML (ref 0.27–4.2)
VLDLC SERPL CALC-MCNC: 15 MG/DL
WBC # BLD: 5 K/UL (ref 4–11)

## 2022-08-29 PROCEDURE — 36415 COLL VENOUS BLD VENIPUNCTURE: CPT | Performed by: FAMILY MEDICINE

## 2022-08-30 LAB
ESTIMATED AVERAGE GLUCOSE: 185.8 MG/DL
HBA1C MFR BLD: 8.1 %

## 2022-09-27 ENCOUNTER — HOSPITAL ENCOUNTER (EMERGENCY)
Age: 77
Discharge: HOME | End: 2022-09-28
Attending: STUDENT IN AN ORGANIZED HEALTH CARE EDUCATION/TRAINING PROGRAM
Payer: MEDICARE

## 2022-09-27 DIAGNOSIS — R07.9 CHEST PAIN, UNSPECIFIED TYPE: Primary | ICD-10-CM

## 2022-09-27 PROCEDURE — 99285 EMERGENCY DEPT VISIT HI MDM: CPT | Performed by: STUDENT IN AN ORGANIZED HEALTH CARE EDUCATION/TRAINING PROGRAM

## 2022-09-28 ENCOUNTER — APPOINTMENT (OUTPATIENT)
Dept: GENERAL RADIOLOGY | Age: 77
End: 2022-09-28
Payer: MEDICARE

## 2022-09-28 VITALS
WEIGHT: 235 LBS | HEIGHT: 73 IN | SYSTOLIC BLOOD PRESSURE: 135 MMHG | HEART RATE: 67 BPM | OXYGEN SATURATION: 95 % | DIASTOLIC BLOOD PRESSURE: 79 MMHG | RESPIRATION RATE: 13 BRPM | BODY MASS INDEX: 31.14 KG/M2 | TEMPERATURE: 98.1 F

## 2022-09-28 LAB
A/G RATIO: 1.8 (ref 1.1–2.2)
ALBUMIN SERPL-MCNC: 3.9 G/DL (ref 3.4–5)
ALP BLD-CCNC: 62 U/L (ref 40–129)
ALT SERPL-CCNC: 18 U/L (ref 10–40)
ANION GAP SERPL CALCULATED.3IONS-SCNC: 12 MMOL/L (ref 3–16)
AST SERPL-CCNC: 17 U/L (ref 15–37)
BASOPHILS ABSOLUTE: 0 K/UL (ref 0–0.2)
BASOPHILS RELATIVE PERCENT: 0.9 %
BILIRUB SERPL-MCNC: 0.6 MG/DL (ref 0–1)
BUN BLDV-MCNC: 17 MG/DL (ref 7–20)
CALCIUM SERPL-MCNC: 9.1 MG/DL (ref 8.3–10.6)
CHLORIDE BLD-SCNC: 102 MMOL/L (ref 99–110)
CO2: 24 MMOL/L (ref 21–32)
CREAT SERPL-MCNC: 1.1 MG/DL (ref 0.8–1.3)
EKG ATRIAL RATE: 71 BPM
EKG DIAGNOSIS: NORMAL
EKG P AXIS: 13 DEGREES
EKG P-R INTERVAL: 164 MS
EKG Q-T INTERVAL: 396 MS
EKG QRS DURATION: 102 MS
EKG QTC CALCULATION (BAZETT): 430 MS
EKG R AXIS: 63 DEGREES
EKG T AXIS: 11 DEGREES
EKG VENTRICULAR RATE: 71 BPM
EOSINOPHILS ABSOLUTE: 0.2 K/UL (ref 0–0.6)
EOSINOPHILS RELATIVE PERCENT: 3.1 %
GFR AFRICAN AMERICAN: >60
GFR NON-AFRICAN AMERICAN: >60
GLUCOSE BLD-MCNC: 175 MG/DL (ref 70–99)
HCT VFR BLD CALC: 40.4 % (ref 40.5–52.5)
HEMOGLOBIN: 14 G/DL (ref 13.5–17.5)
LIPASE: 14 U/L (ref 13–60)
LYMPHOCYTES ABSOLUTE: 1.7 K/UL (ref 1–5.1)
LYMPHOCYTES RELATIVE PERCENT: 35.6 %
MCH RBC QN AUTO: 31.6 PG (ref 26–34)
MCHC RBC AUTO-ENTMCNC: 34.6 G/DL (ref 31–36)
MCV RBC AUTO: 91.3 FL (ref 80–100)
MONOCYTES ABSOLUTE: 0.6 K/UL (ref 0–1.3)
MONOCYTES RELATIVE PERCENT: 11.8 %
NEUTROPHILS ABSOLUTE: 2.4 K/UL (ref 1.7–7.7)
NEUTROPHILS RELATIVE PERCENT: 48.6 %
PDW BLD-RTO: 13.1 % (ref 12.4–15.4)
PLATELET # BLD: 227 K/UL (ref 135–450)
PMV BLD AUTO: 7.9 FL (ref 5–10.5)
POTASSIUM REFLEX MAGNESIUM: 3.9 MMOL/L (ref 3.5–5.1)
RBC # BLD: 4.43 M/UL (ref 4.2–5.9)
SODIUM BLD-SCNC: 138 MMOL/L (ref 136–145)
TOTAL PROTEIN: 6.1 G/DL (ref 6.4–8.2)
TROPONIN: <0.01 NG/ML
TROPONIN: <0.01 NG/ML
WBC # BLD: 4.9 K/UL (ref 4–11)

## 2022-09-28 PROCEDURE — 85025 COMPLETE CBC W/AUTO DIFF WBC: CPT

## 2022-09-28 PROCEDURE — 36415 COLL VENOUS BLD VENIPUNCTURE: CPT

## 2022-09-28 PROCEDURE — 80053 COMPREHEN METABOLIC PANEL: CPT

## 2022-09-28 PROCEDURE — 71046 X-RAY EXAM CHEST 2 VIEWS: CPT

## 2022-09-28 PROCEDURE — 84484 ASSAY OF TROPONIN QUANT: CPT

## 2022-09-28 PROCEDURE — 83690 ASSAY OF LIPASE: CPT

## 2022-09-28 PROCEDURE — 93010 ELECTROCARDIOGRAM REPORT: CPT | Performed by: INTERNAL MEDICINE

## 2022-09-28 PROCEDURE — 93005 ELECTROCARDIOGRAM TRACING: CPT | Performed by: STUDENT IN AN ORGANIZED HEALTH CARE EDUCATION/TRAINING PROGRAM

## 2022-09-28 PROCEDURE — 6370000000 HC RX 637 (ALT 250 FOR IP): Performed by: STUDENT IN AN ORGANIZED HEALTH CARE EDUCATION/TRAINING PROGRAM

## 2022-09-28 RX ORDER — SUCRALFATE 1 G/1
1 TABLET ORAL 4 TIMES DAILY
Qty: 60 TABLET | Refills: 0 | Status: ON HOLD | OUTPATIENT
Start: 2022-09-28 | End: 2022-10-12

## 2022-09-28 RX ORDER — OMEPRAZOLE 20 MG/1
40 CAPSULE, DELAYED RELEASE ORAL DAILY
Qty: 60 CAPSULE | Refills: 0 | Status: ON HOLD | OUTPATIENT
Start: 2022-09-28 | End: 2022-10-12

## 2022-09-28 RX ORDER — FAMOTIDINE 10 MG
20 TABLET ORAL 2 TIMES DAILY
Qty: 120 TABLET | Refills: 0 | Status: ON HOLD | OUTPATIENT
Start: 2022-09-28 | End: 2022-10-12

## 2022-09-28 RX ADMIN — LIDOCAINE HYDROCHLORIDE: 20 SOLUTION ORAL; TOPICAL at 00:31

## 2022-09-28 ASSESSMENT — PAIN DESCRIPTION - LOCATION: LOCATION: CHEST

## 2022-09-28 ASSESSMENT — PAIN - FUNCTIONAL ASSESSMENT
PAIN_FUNCTIONAL_ASSESSMENT: 0-10
PAIN_FUNCTIONAL_ASSESSMENT: NONE - DENIES PAIN

## 2022-09-28 ASSESSMENT — PAIN DESCRIPTION - DESCRIPTORS: DESCRIPTORS: PRESSURE

## 2022-09-28 NOTE — ED PROVIDER NOTES
[facility]      CHIEF COMPLAINT  Chest pain       HISTORY OF PRESENT ILLNESS  Nalini Brown is a 78 y.o. male with a past medical history of diabetes, coronary artery disease with previous stent and CABG, hypertension, who presents to the ED complaining of indigestion    Onset: 3 days, Intensity at onset: Mild  Chest pain: Left-sided intermittent, no clear inciting event  Quality: Pressure/burning  Radiation: Denies  Severity: Mild  Shortness of breath: Intermittent  Palliative Factors: Denies  Provocative Factors: Denies  Cough: Denies  Fever: Denies  Patient does report episodes of belching  Patient has taken 325 mg of aspirin today    Patient was seen by his cardiologist today. His cardiologist had low suspicion that his symptoms were related to his heart. His cardiologist recommended that he follow-up with his gastroenterologist.  Patient denies abdominal pain. Patient presents to the emergency department today due to concern due to his cardiac history. Patient denies abdominal pain, vomiting, diarrhea. He does report constipation but reports he has daily bowel movements. Denies history of blood clots or active malignancy. Patient denies unilateral leg swelling, hemoptysis, recent travel or  surgery/immobilization, or OCP or other hormone use. Family history: Denies history of aortic pathology  Family History   Problem Relation Age of Onset    Diabetes Mother     High Blood Pressure Mother     Arthritis Mother        Patient does not smoke. Patient denies cocaine or other drug use. Old records reviewed:   3/4/21 Stress Test:  Interpetation Summary:        o Negative ECG for ischemia with graded exercise test.      o Duke Treadmill Score is 7 which indicates low risk. o Negative stress echo with no indication of inducible ischemia.      o Stress echo findings indicate low risk for future ischemic event . o PVCs with exercise, nonspecific finding.      No other complaints, modifying factors or associated symptoms. I have reviewed the following from the nursing documentation.     Past Medical History:   Diagnosis Date    Acute MI ([facility] [address]     Angina pectoris, unstable ([facility] [address]     ASHD (arteriosclerotic heart disease)     Bladder outlet obstruction     BPH (benign prostatic hyperplasia)     Chest pain     Constipation     Diabetes mellitus ([facility] [address]     GERD (gastroesophageal reflux disease)     Hx of CABG     Hypercholesteremia     Hypercholesterolemia     Hypertension     IBS (irritable bowel syndrome)     Influenza A 2020    Sinusitis     Type II or unspecified type diabetes mellitus without mention of complication, not stated as uncontrolled      Past Surgical History:   Procedure Laterality Date    CARDIAC CATHETERIZATION      stent    CARDIAC CATHETERIZATION      stent    COLONOSCOPY  10/24/13    CORONARY ANGIOPLASTY WITH STENT PLACEMENT       Family History   Problem Relation Age of Onset    Diabetes Mother     High Blood Pressure Mother     Arthritis Mother      Social History     Socioeconomic History    Marital status:      Spouse name: Not on file    Number of children: Not on file    Years of education: Not on file    Highest education level: Not on file   Occupational History    Occupation:    Tobacco Use    Smoking status: Former     Packs/day: 5.00     Years: 10.00     Pack years: 50.00     Types: Cigarettes     Quit date: 3/13/1976     Years since quittin.5    Smokeless tobacco: Never   Substance and Sexual Activity    Alcohol use: No     Comment: 3 beers a month    Drug use: No    Sexual activity: Yes     Partners: Female   Other Topics Concern    Not on file   Social History Narrative    Not on file     Social Determinants of Health     Financial Resource Strain: Low Risk     Difficulty of Paying Living Expenses: Not hard at all   Food Insecurity: No Food Insecurity    Worried About [address] in the Last Year: Never true    Ran Out of Food in the Last Year: Never true   Transportation Needs: Not on file   Physical Activity: Not on file   Stress: Not on file   Social Connections: Not on file   Intimate Partner Violence: Not on file   Housing Stability: Not on file     No current facility-administered medications for this encounter. Current Outpatient Medications   Medication Sig Dispense Refill    famotidine (PEPCID) 10 MG tablet Take 2 tablets by mouth 2 times daily 120 tablet 0    omeprazole (PRILOSEC) 20 MG delayed release capsule Take 2 capsules by mouth Daily 60 capsule 0    sucralfate (CARAFATE) 1 GM tablet Take 1 tablet by mouth 4 times daily for 15 days 60 tablet 0    glimepiride (AMARYL) 4 MG tablet Take 1 tablet by mouth in the morning and at bedtime 60 tablet 5    amLODIPine (NORVASC) 10 MG tablet TAKE ONE TABLET BY MOUTH DAILY 90 tablet 3    rosuvastatin (CRESTOR) 40 MG tablet Take 1 tablet by mouth every evening 30 tablet 11    blood glucose test strips (ONETOUCH ULTRA) strip USE TO TEST TWO TIMES A  strip 3    losartan (COZAAR) 100 MG tablet       traZODone (DESYREL) 100 MG tablet TAKE ONE TABLET BY MOUTH ONCE NIGHTLY AS NEEDED FOR SLEEP 30 tablet 4    albuterol sulfate HFA (VENTOLIN HFA) 108 (90 Base) MCG/ACT inhaler Inhale 2 puffs into the lungs 4 times daily as needed for Wheezing 18 g 0    nitroGLYCERIN (NITROSTAT) 0.4 MG SL tablet Place 1 tablet under the tongue every 5 minutes as needed for Chest pain up to max of 3 total doses. If no relief after 1 dose, call 911. 25 tablet 3    tamsulosin (FLOMAX) 0.4 MG capsule TAKE TWO CAPSULES BY MOUTH DAILY ONE HALF HOUR AFTER THE SAME MEAL EACH DAY 60 capsule 10    Omega-3 Fatty Acids (FISH OIL) 1000 MG CAPS Take 1 capsule by mouth 2 times daily 90 capsule 3    aspirin 325 MG tablet Take 325 mg by mouth daily.        Allergies   Allergen Reactions    Prednisone Other (See Comments)     Made patient very agitated (medrol dospak)    Metformin And Related Other (See Comments)     GI upset    Codeine Other (See Comments)     constipation       REVIEW OF SYSTEMS  All systems reviewed, pertinent positives per HPI otherwise noted to be negative. PHYSICAL EXAM  BP (!) 154/77   Pulse 66   Temp 98.1 °F (36.7 °C) (Oral)   Resp 19   Ht 6' 1\" (1.854 m)   Wt 235 lb (106.6 kg)   SpO2 95%   BMI 31.00 kg/m²    GENERAL APPEARANCE: Awake and alert. Cooperative. no distress. HENT: Normocephalic. Atraumatic. Mucous membranes are moist  NECK: Supple. Full range of motion of the neck without stiffness or pain. EYES: PERRL. EOM's grossly intact. HEART/CHEST: RRR. No murmurs. Chest wall is not tender to palpation. LUNGS: Respirations unlabored. CTAB. Good air exchange. Speaking comfortably in full sentences. ABDOMEN: No tenderness. Soft. Non-distended. No masses. No organomegaly. No guarding or rebound. MUSCULOSKELETAL: No extremity edema. Compartments soft. No deformity. No tenderness in the extremities. All extremities neurovascularly intact. SKIN: Warm and dry. No acute rashes. NEUROLOGICAL: Alert and oriented. No gross facial drooping. Strength 5/5, sensation intact. PSYCHIATRIC: Normal mood and affect. LABS  I have reviewed all labs for this visit.    Results for orders placed or performed during the hospital encounter of 09/27/22   CBC with Auto Differential   Result Value Ref Range    WBC 4.9 4.0 - 11.0 K/uL    RBC 4.43 4.20 - 5.90 M/uL    Hemoglobin 14.0 13.5 - 17.5 g/dL    Hematocrit 40.4 (L) 40.5 - 52.5 %    MCV 91.3 80.0 - 100.0 fL    MCH 31.6 26.0 - 34.0 pg    MCHC 34.6 31.0 - 36.0 g/dL    RDW 13.1 12.4 - 15.4 %    Platelets 365 921 - 736 K/uL    MPV 7.9 5.0 - 10.5 fL    Neutrophils % 48.6 %    Lymphocytes % 35.6 %    Monocytes % 11.8 %    Eosinophils % 3.1 %    Basophils % 0.9 %    Neutrophils Absolute 2.4 1.7 - 7.7 K/uL    Lymphocytes Absolute 1.7 1.0 - 5.1 K/uL    Monocytes Absolute 0.6 0.0 - 1.3 K/uL    Eosinophils Absolute 0.2 0.0 - 0.6 K/uL Basophils Absolute 0.0 0.0 - 0.2 K/uL   CMP w/ Reflex to MG   Result Value Ref Range    Sodium 138 136 - 145 mmol/L    Potassium reflex Magnesium 3.9 3.5 - 5.1 mmol/L    Chloride 102 99 - 110 mmol/L    CO2 24 21 - 32 mmol/L    Anion Gap 12 3 - 16    Glucose 175 (H) 70 - 99 mg/dL    BUN 17 7 - 20 mg/dL    Creatinine 1.1 0.8 - 1.3 mg/dL    GFR Non-African American >60 >60    GFR African American >60 >60    Calcium 9.1 8.3 - 10.6 mg/dL    Total Protein 6.1 (L) 6.4 - 8.2 g/dL    Albumin 3.9 3.4 - 5.0 g/dL    Albumin/Globulin Ratio 1.8 1.1 - 2.2    Total Bilirubin 0.6 0.0 - 1.0 mg/dL    Alkaline Phosphatase 62 40 - 129 U/L    ALT 18 10 - 40 U/L    AST 17 15 - 37 U/L   Troponin   Result Value Ref Range    Troponin <0.01 <0.01 ng/mL   Lipase   Result Value Ref Range    Lipase 14.0 13.0 - 60.0 U/L   Troponin   Result Value Ref Range    Troponin <0.01 <0.01 ng/mL       ECG  The Ekg interpreted by me shows  normal sinus rhythm with a rate of 71  Axis is   Normal  QTc is  within an acceptable range  Intervals and Durations are unremarkable. ST Segments: nonspecific changes  No significant change from prior EKG dated 9/23/21      RADIOLOGY  XR CHEST (2 VW)   Final Result   No acute airspace disease identified. ED COURSE / MDM  Patient seen and evaluated. Old records reviewed and pertinent information included in HPI. Labs and imaging reviewed and results discussed with patient. Overall well appearing patient, in no acute distress, presenting for indigestion, patient was seen by his cardiologist who did not feel that it was high his heart, however patient presents with concern given his cardiac history. Physical exam unremarkable. Differential diagnosis includes but is not limited to: Pneumonia, pneumothorax, pleural effusion, ACS, CHF exacerbation, COPD exacerbation, asthma, pulmonary embolism, arrhythmia, anemia, dyspepsia    EKG, laboratory studies, and imaging obtained.  Workup showed:    ED Course as of 09/28/22 0404   Wed Sep 28, 2022   0016 At this time I have low concern for pulmonary embolism. Patient has not had a previous blood clot. Patient denies other risk factors for pulmonary embolism. Patient does not have any evidence of DVT on exam.  Patient is low risk on PERC and Wells criteria. At this time, considering that risks associated with further work-up for pulmonary embolism outweigh the likelihood of this diagnosis. Low suspicion for aortic pathology. Patient has strong pulses in the bilateral radial and femoral arteries. Pain was not maximal at onset. There is no evidence of mediastinal widening on chest x-ray. Patient does not have any neurologic deficits. The evidence indicates that the patient is very low risk for Aortic Dissection and this is consistent with my clinical intuition. The risk of further workup or hospitalization for aortic dissection is likely higher than the risk of the patient having an aortic dissection.  [ER]   0025 No leukocytosis, anemia, thrombocytopenia [ER]   0038 Lipase within normal limits, low suspicion for pancreatitis [ER]   0038 No electrolyte abnormalities or evidence of kidney dysfunction, hyperglycemia 175 [ER]   0038 Liver function testing unremarkable. Low suspicion for hepatitis or other acute liver pathology [ER]   0038 Troponin within normal limits, EKG without evidence of acute ischemia [ER]   0136 Chest x-ray shows no evidence of pneumonia, pneumothorax, pleural effusion, pulmonary edema [ER]   0155 Patient reports resolution of symptoms after GI cocktail in the emergency department. Discussed options for next step of evaluation, patient preference for delta troponin and discharge for outpatient follow-up [ER]   0340 Delta troponin negative [ER]   0343 Patient continues to feel well. Did again discussed potential admission but patient declines at this time.   He is reassured given reassuring lab results in the emergency department and his cardiologist being reassured at his appointment earlier in the day. Did recommend close follow-up with his PCP, cardiologist, and gastroenterologist and strict return precautions if symptoms change or worsen. [ER]      ED Course User Index  [ER] Vel Lane MD        During the patient's ED course, the patient was given:  Medications   aluminum & magnesium hydroxide-simethicone (MAALOX) 30 mL, lidocaine viscous hcl (XYLOCAINE) 5 mL (GI COCKTAIL) ( Oral Given 9/28/22 0031)        Is this patient to be included in the SEP-1 Core Measure due to severe sepsis or septic shock? No   Exclusion criteria - the patient is NOT to be included for SEP-1 Core Measure due to:  2+ SIRS criteria are not met     EKG showed no evidence of acute ischemia. Troponin was within normal limit. Heart score: 5. This falls under the following category: Score of 4-6, which indicates low/moderate risk for major adverse cardiac event and supports observation with repeated troponins and/or non-invasive testing. Did offer patient admission, however given reassuring work-up in the emergency department and his discussion with his cardiologist, patient does not wish to be admitted at this time. His preference is to follow up on an outpatient basis. Consider this an informed decision. Patient denies any upper abdominal pain. His abdominal exam is benign. Did discuss possible CT scan of the abdomen/pelvis, patient declines    Work-up overall reassuring. I did offer admission for further evaluation but patient declines at this time. Discussed risks and benefits, consider this an informed discharge. Prior to refusing, their nurse and I determined and agreed that Kalie Carr had the capacity to make this decision and understood the consequences of that decision. They appear clinically sober, to be mentating appropriately, free from distracting injury, appear to have intact insight, judgement, and reason.  Specifically, they were able to verbally state back in a coherent manner their current medical condition, the proposed course of treatment, and the risks/benfits/ alternatives of treatment verses leaving. I consider this an informed discharge. After refusal, I made every reasonable opportunity to treat Angel Bautista to the best of my ability. They understand that they may return to seek medical attention here whenever they choose. Given resolution of symptoms with a GI cocktail, patient was provided with prescriptions for Pepcid, omeprazole, and Carafate. Did encourage close follow-up with gastroenterologist, cardiologist, and PCP. Strict return precautions given. Patient discharged in stable condition. I estimate there is LOW risk for ACUTE CORONARY SYNDROME, PULMONARY EMBOLISM, PNEUMOTHORAX, RUPTURED ESOPHAGUS OR THORACIC AORTIC DISSECTION, thus I consider the discharge disposition reasonable. Angel Bautista and I have discussed the diagnosis and risks, and we agree with discharging home with close follow-up. We also discussed returning to the Emergency Department immediately if new or worsening symptoms occur. We have discussed the symptoms which are most concerning that necessitate immediate return. CLINICAL IMPRESSION  1. Chest pain, unspecified type        Blood pressure 135/79, pulse 67, temperature 98.1 °F (36.7 °C), temperature source Oral, resp. rate 13, height 6' 1\" (1.854 m), weight 235 lb (106.6 kg), SpO2 95 %. DISPOSITION  Angel Bautista was discharged to home in stable condition. Patient was given scripts for the following medications. I counseled patient how to take these medications.    Discharge Medication List as of 9/28/2022  3:45 AM        START taking these medications    Details   famotidine (PEPCID) 10 MG tablet Take 2 tablets by mouth 2 times daily, Disp-120 tablet, R-0Normal      omeprazole (PRILOSEC) 20 MG delayed release capsule Take 2 capsules by mouth Daily, Disp-60 capsule, R-0Normal      sucralfate (CARAFATE) 1 GM tablet Take 1 tablet by mouth 4 times daily for 15 days, Disp-60 tablet, R-0Normal             Follow-up with:  Your GI doctor    Schedule an appointment as soon as possible for a visit       Remygiulia Jose, [id number] [facility] [place] [phone]    Call in 1 day      Daryle Staggers, MD  [address] [facility]. [place] [address] [phone]    Call in 1 day      [place] Emergency Department  [address] [place] [address],[address]  [phone]  Go to   As needed, If symptoms worsen    DISCLAIMER: This chart was created using Dragon dictation software. Efforts were made by me to ensure accuracy, however some errors may be present due to limitations of this technology and occasionally words are not transcribed correctly.               Serenity Foley MD  09/28/22 0987

## 2022-10-12 ENCOUNTER — APPOINTMENT (OUTPATIENT)
Dept: GENERAL RADIOLOGY | Age: 77
End: 2022-10-12
Payer: MEDICARE

## 2022-10-12 ENCOUNTER — HOSPITAL ENCOUNTER (INPATIENT)
Age: 77
LOS: 10 days | Discharge: HOME HEALTH | DRG: 234 | End: 2022-10-22
Attending: INTERNAL MEDICINE | Admitting: INTERNAL MEDICINE
Payer: MEDICARE

## 2022-10-12 ENCOUNTER — HOSPITAL ENCOUNTER (EMERGENCY)
Age: 77
Discharge: ANOTHER HOSPITAL | End: 2022-10-12
Attending: EMERGENCY MEDICINE
Payer: MEDICARE

## 2022-10-12 VITALS
HEART RATE: 72 BPM | BODY MASS INDEX: 31.14 KG/M2 | SYSTOLIC BLOOD PRESSURE: 139 MMHG | DIASTOLIC BLOOD PRESSURE: 83 MMHG | HEIGHT: 73 IN | TEMPERATURE: 97.7 F | WEIGHT: 235 LBS | OXYGEN SATURATION: 96 % | RESPIRATION RATE: 16 BRPM

## 2022-10-12 DIAGNOSIS — R07.9 CHEST PAIN, UNSPECIFIED TYPE: Primary | ICD-10-CM

## 2022-10-12 DIAGNOSIS — Z95.1 S/P CABG X 3: Primary | ICD-10-CM

## 2022-10-12 DIAGNOSIS — R10.12 ABDOMINAL PAIN, LEFT UPPER QUADRANT: ICD-10-CM

## 2022-10-12 PROBLEM — I20.0 UNSTABLE ANGINA (HCC): Status: ACTIVE | Noted: 2022-10-12

## 2022-10-12 LAB
A/G RATIO: 1.7 (ref 1.1–2.2)
ALBUMIN SERPL-MCNC: 4.4 G/DL (ref 3.4–5)
ALP BLD-CCNC: 76 U/L (ref 40–129)
ALT SERPL-CCNC: 24 U/L (ref 10–40)
ANION GAP SERPL CALCULATED.3IONS-SCNC: 9 MMOL/L (ref 3–16)
AST SERPL-CCNC: 22 U/L (ref 15–37)
BASOPHILS ABSOLUTE: 0.2 K/UL (ref 0–0.2)
BASOPHILS RELATIVE PERCENT: 3.8 %
BILIRUB SERPL-MCNC: 1 MG/DL (ref 0–1)
BUN BLDV-MCNC: 12 MG/DL (ref 7–20)
CALCIUM SERPL-MCNC: 9.8 MG/DL (ref 8.3–10.6)
CHLORIDE BLD-SCNC: 102 MMOL/L (ref 99–110)
CO2: 27 MMOL/L (ref 21–32)
CREAT SERPL-MCNC: 1.2 MG/DL (ref 0.8–1.3)
EKG ATRIAL RATE: 79 BPM
EKG DIAGNOSIS: NORMAL
EKG P AXIS: 15 DEGREES
EKG P-R INTERVAL: 164 MS
EKG Q-T INTERVAL: 392 MS
EKG QRS DURATION: 98 MS
EKG QTC CALCULATION (BAZETT): 449 MS
EKG R AXIS: 62 DEGREES
EKG T AXIS: 15 DEGREES
EKG VENTRICULAR RATE: 79 BPM
EOSINOPHILS ABSOLUTE: 0.2 K/UL (ref 0–0.6)
EOSINOPHILS RELATIVE PERCENT: 3.6 %
GFR AFRICAN AMERICAN: >60
GFR NON-AFRICAN AMERICAN: 59
GLUCOSE BLD-MCNC: 138 MG/DL (ref 70–99)
GLUCOSE BLD-MCNC: 209 MG/DL (ref 70–99)
GLUCOSE BLD-MCNC: 90 MG/DL (ref 70–99)
GLUCOSE BLD-MCNC: 99 MG/DL (ref 70–99)
HCT VFR BLD CALC: 44.3 % (ref 40.5–52.5)
HEMOGLOBIN: 15.4 G/DL (ref 13.5–17.5)
LIPASE: 16 U/L (ref 13–60)
LYMPHOCYTES ABSOLUTE: 1.5 K/UL (ref 1–5.1)
LYMPHOCYTES RELATIVE PERCENT: 28.7 %
MCH RBC QN AUTO: 31.6 PG (ref 26–34)
MCHC RBC AUTO-ENTMCNC: 34.7 G/DL (ref 31–36)
MCV RBC AUTO: 90.9 FL (ref 80–100)
MONOCYTES ABSOLUTE: 0.6 K/UL (ref 0–1.3)
MONOCYTES RELATIVE PERCENT: 11.9 %
NEUTROPHILS ABSOLUTE: 2.7 K/UL (ref 1.7–7.7)
NEUTROPHILS RELATIVE PERCENT: 52 %
PDW BLD-RTO: 13 % (ref 12.4–15.4)
PERFORMED ON: ABNORMAL
PERFORMED ON: NORMAL
PERFORMED ON: NORMAL
PLATELET # BLD: 238 K/UL (ref 135–450)
PMV BLD AUTO: 7.3 FL (ref 5–10.5)
POTASSIUM REFLEX MAGNESIUM: 3.7 MMOL/L (ref 3.5–5.1)
PRO-BNP: 44 PG/ML (ref 0–449)
RBC # BLD: 4.87 M/UL (ref 4.2–5.9)
SODIUM BLD-SCNC: 138 MMOL/L (ref 136–145)
TOTAL PROTEIN: 7 G/DL (ref 6.4–8.2)
TROPONIN: <0.01 NG/ML
WBC # BLD: 5.3 K/UL (ref 4–11)

## 2022-10-12 PROCEDURE — 6370000000 HC RX 637 (ALT 250 FOR IP): Performed by: EMERGENCY MEDICINE

## 2022-10-12 PROCEDURE — 80053 COMPREHEN METABOLIC PANEL: CPT

## 2022-10-12 PROCEDURE — 99285 EMERGENCY DEPT VISIT HI MDM: CPT

## 2022-10-12 PROCEDURE — G0379 DIRECT REFER HOSPITAL OBSERV: HCPCS

## 2022-10-12 PROCEDURE — 36415 COLL VENOUS BLD VENIPUNCTURE: CPT

## 2022-10-12 PROCEDURE — G0378 HOSPITAL OBSERVATION PER HR: HCPCS

## 2022-10-12 PROCEDURE — 83690 ASSAY OF LIPASE: CPT

## 2022-10-12 PROCEDURE — 85025 COMPLETE CBC W/AUTO DIFF WBC: CPT

## 2022-10-12 PROCEDURE — 6360000002 HC RX W HCPCS: Performed by: INTERNAL MEDICINE

## 2022-10-12 PROCEDURE — 6360000002 HC RX W HCPCS: Performed by: EMERGENCY MEDICINE

## 2022-10-12 PROCEDURE — 2580000003 HC RX 258: Performed by: INTERNAL MEDICINE

## 2022-10-12 PROCEDURE — 99223 1ST HOSP IP/OBS HIGH 75: CPT | Performed by: INTERNAL MEDICINE

## 2022-10-12 PROCEDURE — 83036 HEMOGLOBIN GLYCOSYLATED A1C: CPT

## 2022-10-12 PROCEDURE — 93010 ELECTROCARDIOGRAM REPORT: CPT | Performed by: INTERNAL MEDICINE

## 2022-10-12 PROCEDURE — 84484 ASSAY OF TROPONIN QUANT: CPT

## 2022-10-12 PROCEDURE — 1200000000 HC SEMI PRIVATE

## 2022-10-12 PROCEDURE — 96372 THER/PROPH/DIAG INJ SC/IM: CPT

## 2022-10-12 PROCEDURE — 83880 ASSAY OF NATRIURETIC PEPTIDE: CPT

## 2022-10-12 PROCEDURE — 6370000000 HC RX 637 (ALT 250 FOR IP): Performed by: INTERNAL MEDICINE

## 2022-10-12 PROCEDURE — 71045 X-RAY EXAM CHEST 1 VIEW: CPT

## 2022-10-12 PROCEDURE — 93005 ELECTROCARDIOGRAM TRACING: CPT | Performed by: EMERGENCY MEDICINE

## 2022-10-12 RX ORDER — ENOXAPARIN SODIUM 150 MG/ML
1 INJECTION SUBCUTANEOUS ONCE
Status: COMPLETED | OUTPATIENT
Start: 2022-10-12 | End: 2022-10-12

## 2022-10-12 RX ORDER — SODIUM CHLORIDE 0.9 % (FLUSH) 0.9 %
5-40 SYRINGE (ML) INJECTION PRN
Status: DISCONTINUED | OUTPATIENT
Start: 2022-10-12 | End: 2022-10-18

## 2022-10-12 RX ORDER — INSULIN LISPRO 100 [IU]/ML
0-4 INJECTION, SOLUTION INTRAVENOUS; SUBCUTANEOUS NIGHTLY
Status: DISCONTINUED | OUTPATIENT
Start: 2022-10-12 | End: 2022-10-18

## 2022-10-12 RX ORDER — ROSUVASTATIN CALCIUM 10 MG/1
40 TABLET, COATED ORAL EVERY EVENING
Status: DISCONTINUED | OUTPATIENT
Start: 2022-10-12 | End: 2022-10-18

## 2022-10-12 RX ORDER — AMLODIPINE BESYLATE 5 MG/1
10 TABLET ORAL DAILY
Status: DISCONTINUED | OUTPATIENT
Start: 2022-10-12 | End: 2022-10-18

## 2022-10-12 RX ORDER — INSULIN LISPRO 100 [IU]/ML
0-4 INJECTION, SOLUTION INTRAVENOUS; SUBCUTANEOUS
Status: DISCONTINUED | OUTPATIENT
Start: 2022-10-12 | End: 2022-10-18

## 2022-10-12 RX ORDER — ONDANSETRON 2 MG/ML
4 INJECTION INTRAMUSCULAR; INTRAVENOUS EVERY 6 HOURS PRN
Status: DISCONTINUED | OUTPATIENT
Start: 2022-10-12 | End: 2022-10-18

## 2022-10-12 RX ORDER — SODIUM CHLORIDE 9 MG/ML
INJECTION, SOLUTION INTRAVENOUS PRN
Status: DISCONTINUED | OUTPATIENT
Start: 2022-10-12 | End: 2022-10-18

## 2022-10-12 RX ORDER — DEXTROSE MONOHYDRATE 100 MG/ML
INJECTION, SOLUTION INTRAVENOUS CONTINUOUS PRN
Status: DISCONTINUED | OUTPATIENT
Start: 2022-10-12 | End: 2022-10-18

## 2022-10-12 RX ORDER — ENOXAPARIN SODIUM 100 MG/ML
30 INJECTION SUBCUTANEOUS 2 TIMES DAILY
Status: DISCONTINUED | OUTPATIENT
Start: 2022-10-12 | End: 2022-10-13

## 2022-10-12 RX ORDER — POLYETHYLENE GLYCOL 3350 17 G/17G
17 POWDER, FOR SOLUTION ORAL DAILY PRN
Status: DISCONTINUED | OUTPATIENT
Start: 2022-10-12 | End: 2022-10-18

## 2022-10-12 RX ORDER — ACETAMINOPHEN 325 MG/1
650 TABLET ORAL EVERY 6 HOURS PRN
Status: DISCONTINUED | OUTPATIENT
Start: 2022-10-12 | End: 2022-10-18

## 2022-10-12 RX ORDER — SODIUM CHLORIDE 0.9 % (FLUSH) 0.9 %
5-40 SYRINGE (ML) INJECTION EVERY 12 HOURS SCHEDULED
Status: DISCONTINUED | OUTPATIENT
Start: 2022-10-12 | End: 2022-10-18

## 2022-10-12 RX ORDER — MORPHINE SULFATE 4 MG/ML
4 INJECTION, SOLUTION INTRAMUSCULAR; INTRAVENOUS ONCE
Status: DISCONTINUED | OUTPATIENT
Start: 2022-10-12 | End: 2022-10-12

## 2022-10-12 RX ORDER — LOSARTAN POTASSIUM 100 MG/1
100 TABLET ORAL DAILY
Status: DISCONTINUED | OUTPATIENT
Start: 2022-10-12 | End: 2022-10-18

## 2022-10-12 RX ORDER — TAMSULOSIN HYDROCHLORIDE 0.4 MG/1
0.4 CAPSULE ORAL DAILY
Status: DISCONTINUED | OUTPATIENT
Start: 2022-10-12 | End: 2022-10-12

## 2022-10-12 RX ORDER — ONDANSETRON 4 MG/1
4 TABLET, ORALLY DISINTEGRATING ORAL EVERY 8 HOURS PRN
Status: DISCONTINUED | OUTPATIENT
Start: 2022-10-12 | End: 2022-10-18

## 2022-10-12 RX ORDER — ASPIRIN 325 MG
325 TABLET ORAL DAILY
Status: DISCONTINUED | OUTPATIENT
Start: 2022-10-12 | End: 2022-10-18

## 2022-10-12 RX ORDER — ENOXAPARIN SODIUM 100 MG/ML
40 INJECTION SUBCUTANEOUS DAILY
Status: DISCONTINUED | OUTPATIENT
Start: 2022-10-12 | End: 2022-10-12 | Stop reason: DRUGHIGH

## 2022-10-12 RX ORDER — ASPIRIN 81 MG/1
324 TABLET, CHEWABLE ORAL ONCE
Status: COMPLETED | OUTPATIENT
Start: 2022-10-12 | End: 2022-10-12

## 2022-10-12 RX ORDER — ACETAMINOPHEN 650 MG/1
650 SUPPOSITORY RECTAL EVERY 6 HOURS PRN
Status: DISCONTINUED | OUTPATIENT
Start: 2022-10-12 | End: 2022-10-18

## 2022-10-12 RX ADMIN — ASPIRIN 325 MG: 325 TABLET ORAL at 14:16

## 2022-10-12 RX ADMIN — LOSARTAN POTASSIUM 100 MG: 100 TABLET, FILM COATED ORAL at 14:16

## 2022-10-12 RX ADMIN — NITROGLYCERIN 1 INCH: 20 OINTMENT TOPICAL at 02:54

## 2022-10-12 RX ADMIN — ROSUVASTATIN CALCIUM 40 MG: 10 TABLET, FILM COATED ORAL at 17:48

## 2022-10-12 RX ADMIN — AMLODIPINE BESYLATE 10 MG: 5 TABLET ORAL at 14:16

## 2022-10-12 RX ADMIN — ASPIRIN 324 MG: 81 TABLET, CHEWABLE ORAL at 02:54

## 2022-10-12 RX ADMIN — ENOXAPARIN SODIUM 30 MG: 100 INJECTION SUBCUTANEOUS at 21:03

## 2022-10-12 RX ADMIN — SODIUM CHLORIDE, PRESERVATIVE FREE 10 ML: 5 INJECTION INTRAVENOUS at 21:04

## 2022-10-12 RX ADMIN — ENOXAPARIN SODIUM 105 MG: 150 INJECTION SUBCUTANEOUS at 07:29

## 2022-10-12 ASSESSMENT — HEART SCORE: ECG: 0

## 2022-10-12 ASSESSMENT — PAIN - FUNCTIONAL ASSESSMENT
PAIN_FUNCTIONAL_ASSESSMENT: NONE - DENIES PAIN
PAIN_FUNCTIONAL_ASSESSMENT: NONE - DENIES PAIN

## 2022-10-12 ASSESSMENT — PAIN SCALES - GENERAL
PAINLEVEL_OUTOF10: 0
PAINLEVEL_OUTOF10: 0

## 2022-10-12 NOTE — PROGRESS NOTES
Pharmacist Review and Automatic Dose Adjustment of Prophylactic Enoxaparin    *Review reason for admission/hospital problem list*    The reviewing pharmacist has made an adjustment to the ordered enoxaparin dose or converted to UFH per the approved [facility] protocol and table as identified below. Ivana Watson is a 78 y.o. male. Recent Labs     10/12/22  0251   CREATININE 1.2       Estimated Creatinine Clearance: 68 mL/min (based on SCr of 1.2 mg/dL). Recent Labs     10/12/22  0251   HGB 15.4   HCT 44.3        No results for input(s): INR in the last 72 hours. Height:   Ht Readings from Last 1 Encounters:   10/12/22 6' 1\" (1.854 m)     Weight:  Wt Readings from Last 1 Encounters:   10/12/22 235 lb (106.6 kg)               Plan: Based upon the patient's weight and renal function, the ordered enoxaparin dose of 40 mg daily has been changed/converted to 30 mg BID.       Thank you,  Galindo Urban [id number], [facility]  10/12/2022, 1:30 PM

## 2022-10-12 NOTE — CONSULTS
[facility]  [phone]      Attending Physician: Chuck Iyer MD  Reason for Consultation/Chief Complaint: Chest pain, indigestion    Subjective   History of Present Illness:  Raymundo Libman is a 78 y.o. patient who presented to the hospital with complaints of chest pain has been progressive over the last few weeks, he describes this as a substernal heaviness/indigestion, he says this is similar to prior angina. Typically follows at [facility] with Dr. April Harmno, a recent underwent a stress test which was considered moderate in terms of risk profile, showed mixed ischemia and scar in the inferior wall. Plans were made tentatively for heart catheterization but due to progressive symptoms, patient presented to the emergency room. Troponin level was found be negative. He feels comfortable now, denies any chest pain or shortness of breath. Chronically, he does have diabetes, hypertension hypercholesterolemia. His coronary disease dates back to least 20 years, he says had several catheterizations, the last ones around 2011 or 2012, he had an RCA  has been known for many years. He has had also prior PCI of the circumflex. Past Medical History:   has a past medical history of Acute MI ([address] Angina pectoris, unstable ([address] ASHD (arteriosclerotic heart disease), Bladder outlet obstruction, BPH (benign prostatic hyperplasia), Chest pain, Constipation, Diabetes mellitus ([address] GERD (gastroesophageal reflux disease), Hx of CABG, Hypercholesteremia, Hypercholesterolemia, Hypertension, IBS (irritable bowel syndrome), Influenza A, Sinusitis, and Type II or unspecified type diabetes mellitus without mention of complication, not stated as uncontrolled. Surgical History:   has a past surgical history that includes Coronary angioplasty with stent; Cardiac catheterization (2002); Cardiac catheterization (2011); and Colonoscopy (10/24/13).      Social History:   reports that he quit smoking about 46 years ago. His smoking use included cigarettes. He has a 50.00 pack-year smoking history. He has never used smokeless tobacco. He reports that he does not drink alcohol and does not use drugs. Family History:  family history includes Arthritis in his mother; Diabetes in his mother; High Blood Pressure in his mother. Home Medications:  Were reviewed and are listed in nursing record and/or below  Prior to Admission medications    Medication Sig Start Date End Date Taking? Authorizing Provider   metFORMIN (GLUCOPHAGE) 1000 MG tablet Take 1,000 mg by mouth 2 times daily (with meals)   Yes Historical Provider, MD   glimepiride (AMARYL) 4 MG tablet Take 1 tablet by mouth in the morning and at bedtime  Patient taking differently: Take 4 mg by mouth in the morning and at bedtime Patient only takes in AM 8/15/22   Pete Huynh MD   amLODIPine (NORVASC) 10 MG tablet TAKE ONE TABLET BY MOUTH DAILY 3/29/22   Pete Huynh MD   rosuvastatin (CRESTOR) 40 MG tablet Take 1 tablet by mouth every evening 2/15/22   Pete Huynh MD   blood glucose test strips [facility] ULTRA) strip USE TO TEST TWO TIMES A DAY 1/18/22   Pete Huynh MD   losartan (COZAAR) 100 MG tablet  11/24/21   Historical Provider, MD   traZODone (DESYREL) 100 MG tablet TAKE ONE TABLET BY MOUTH ONCE NIGHTLY AS NEEDED FOR SLEEP  Patient taking differently: Patient takes 50mg nightly 11/15/21   Pete Huynh MD   albuterol sulfate HFA (VENTOLIN HFA) 108 (90 Base) MCG/ACT inhaler Inhale 2 puffs into the lungs 4 times daily as needed for Wheezing 9/23/21   Alan Ramirez MD   nitroGLYCERIN (NITROSTAT) 0.4 MG SL tablet Place 1 tablet under the tongue every 5 minutes as needed for Chest pain up to max of 3 total doses.  If no relief after 1 dose, call 911. 11/16/20   Pete Huynh MD   Omega-3 Fatty Acids (FISH OIL) 1000 MG CAPS Take 1 capsule by mouth 2 times daily  Patient taking differently: Take 1,000 mg by mouth daily 4/28/15   Cecily GARTH EnriquezN - CNP   aspirin 325 MG tablet Take 325 mg by mouth daily. Historical Provider, MD        CURRENT Medications:  amLODIPine (NORVASC) tablet 10 mg, Daily  aspirin tablet 325 mg, Daily  rosuvastatin (CRESTOR) tablet 40 mg, QPM  losartan (COZAAR) tablet 100 mg, Daily  sodium chloride flush 0.9 % injection 5-40 mL, 2 times per day  sodium chloride flush 0.9 % injection 5-40 mL, PRN  0.9 % sodium chloride infusion, PRN  ondansetron (ZOFRAN-ODT) disintegrating tablet 4 mg, Q8H PRN   Or  ondansetron (ZOFRAN) injection 4 mg, Q6H PRN  acetaminophen (TYLENOL) tablet 650 mg, Q6H PRN   Or  acetaminophen (TYLENOL) suppository 650 mg, Q6H PRN  polyethylene glycol (GLYCOLAX) packet 17 g, Daily PRN  glucose chewable tablet 16 g, PRN  dextrose bolus 10% 125 mL, PRN   Or  dextrose bolus 10% 250 mL, PRN  glucagon (rDNA) injection 1 mg, PRN  dextrose 10 % infusion, Continuous PRN  insulin lispro (HUMALOG) injection vial 0-4 Units, TID WC  insulin lispro (HUMALOG) injection vial 0-4 Units, Nightly  enoxaparin Sodium (LOVENOX) injection 30 mg, BID  perflutren lipid microspheres (DEFINITY) injection 1.65 mg, ONCE PRN        Allergies:  Prednisone and Codeine     Review of Systems:   A 14 point review of symptoms completed. Pertinent positives identified in the HPI, all other review of symptoms negative as below.       Objective   PHYSICAL EXAM:    Vitals:    10/12/22 1244   BP: (!) 158/86   Pulse: 76   Resp: 17   Temp: 97.6 °F (36.4 °C)   SpO2: 95%    Weight: 235 lb (106.6 kg)         General Appearance:  Alert, cooperative, no distress, appears stated age   Head:  Normocephalic, without obvious abnormality, atraumatic   Eyes:  PERRL, conjunctiva/corneas clear   Nose: Nares normal, no drainage or sinus tenderness   Throat: Lips, mucosa, and tongue normal   Neck: Supple, symmetrical, trachea midline, no adenopathy, thyroid: not enlarged, symmetric, no tenderness/mass/nodules, no carotid bruit or JVD   Lungs:   Clear to auscultation bilaterally, respirations unlabored   Chest Wall:  No deformity or tenderness   Heart:  Regular rate and rhythm, S1, S2 normal, no murmur, rub or gallop   Abdomen:   Soft, non-tender, bowel sounds active all four quadrants,  no masses, no organomegaly   Extremities: Extremities normal, atraumatic, no cyanosis or edema   Pulses: 2+ and symmetric   Skin: Skin color, texture, turgor normal, no rashes or lesions   Pysch: Normal mood and affect   Neurologic: Normal gross motor and sensory exam.         Labs   CBC:   Lab Results   Component Value Date/Time    WBC 5.3 10/12/2022 02:51 AM    RBC 4.87 10/12/2022 02:51 AM    HGB 15.4 10/12/2022 02:51 AM    HCT 44.3 10/12/2022 02:51 AM    MCV 90.9 10/12/2022 02:51 AM    RDW 13.0 10/12/2022 02:51 AM     10/12/2022 02:51 AM     CMP:  Lab Results   Component Value Date/Time     10/12/2022 02:51 AM    K 3.7 10/12/2022 02:51 AM     10/12/2022 02:51 AM    CO2 27 10/12/2022 02:51 AM    BUN 12 10/12/2022 02:51 AM    CREATININE 1.2 10/12/2022 02:51 AM    GFRAA >60 10/12/2022 02:51 AM    GFRAA >60 02/06/2013 08:53 AM    AGRATIO 1.7 10/12/2022 02:51 AM    LABGLOM 59 10/12/2022 02:51 AM    GLUCOSE 138 10/12/2022 02:51 AM    PROT 7.0 10/12/2022 02:51 AM    PROT 7.2 02/06/2013 08:53 AM    CALCIUM 9.8 10/12/2022 02:51 AM    BILITOT 1.0 10/12/2022 02:51 AM    ALKPHOS 76 10/12/2022 02:51 AM    AST 22 10/12/2022 02:51 AM    ALT 24 10/12/2022 02:51 AM     PT/INR:  No results found for: PTINR  HgBA1c:  Lab Results   Component Value Date    LABA1C 8.1 08/29/2022     Lab Results   Component Value Date    TROPONINI <0.01 10/12/2022         Cardiac Data     Last EKG:     Normal sinus rhythm, nonspecific ST changes    Echo:    Stress Test:    October 2022 at [facility] health: Interpetation Summary:   The LV EF is 67%   Normal global and regional wall motion in all territories.    There is a medium sized, partially reversible defect in the inferior wall   consistent with moderate sandhya-infarct ischemia.        o Negative ECG for ischemia with pharmocologic stress. o Positive nuclear stress imaging suggestive of inducible ischemia in the        inferior wall.      o Nuclear stress image findings indicate intermediate risk for future        ischemic event .      o Normal left ventricular systolic function. Cath:    2011 at [facility]:      Cath 01/2011    CORONARY ANATOMY:      The left main coronary artery has no significant disease. The left anterior descending artery has a 30% stenosis, mid   distal lumen  irregularities. The ramus intermedius branch has a 90% mid vessel stenosis. The circumflex artery gives rise to significant obtuse marginal   branch. There is a patent stent in the distal portion of this AV group   circumflex,  but no evidence of hemodynamic disease. The right coronary artery is known to be occluded per angiogram   performed in  2001. The artery was not re-imaged during this case (also not   imaged during  the 2003 case), stopped looking for the right coronary artery,   felt it was  best to conserve on contrast totals as opposed to locating a   chronically  occluded right coronary artery. Aortic root angiogram was performed. This did not show a right   coronary  artery taking off from the right cusp, however, did not identify   anomalous  takeoff either. The aortic root does appear mildly dilated   towards upper  limits of normal size. Left ventriculogram shows normal wall motion ejection fraction of   50 to 55%. Percutaneous coronary intervention/stent implantation x 1 in the   ramus  intermedius branch. Preintervention stenosis was 90%,   postintervention  stenosis was 0%. Guide catheter was a 6 [place] guide   catheter. Guidewire was Kinetics guidewire.   Predilation was performed with   a 2.0 x 8  Voyager stent replaced with a 2.5 x 12 Vision bare-metal stent. Postdilatation was performed with a 2.75 x 6 Voyager   non-compliant balloon. INTERVENTION MEDICATIONS:    1. Angiomax. 2.  Aspirin. 3.  Effient. COMPLICATIONS:  None. IMPRESSION:    1. Patent stent in the circumflex artery. 2.  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 3.  Severe stenosis of the ramus intermedius branch. 4.  Status post successful balloon angioplasty stent deployment   ramus  intermedius branch. 5.  Normal left ventricular systolic function. Studies:       Cxr    Impression   No acute cardiopulmonary process. I have reviewed labs and imaging/xray/diagnostic testing in this note. Assessment and Plan            Patient Active Problem List   Diagnosis    Angina pectoris, unstable (HCC)    Chest pain    Diabetes mellitus type II, controlled ([address]    Bladder outlet obstruction    Hx of CABG    Constipation    Benign non-nodular prostatic hyperplasia with lower urinary tract symptoms    Anxiety    DM (diabetes mellitus), secondary, uncontrolled, w/neurologic complic    HTN (hypertension), benign    CAD in native artery    Pure hypercholesterolemia    Benign non-nodular prostatic hyperplasia without lower urinary tract symptoms    Type 2 diabetes mellitus with diabetic polyneuropathy, without long-term current use of insulin ([facility])    Keloid    Primary insomnia    COVID-19       Chest pain, unstable angina, recent abnormal stress test, plan on heart catheterization, risk/benefit/return/outcomes discussed the patient, he understands/agrees. He understands that if he does continue to have symptoms related to RCA , this may need to be planned as a staged procedure and as such he may require more than 1 catheterization. We will plan on diagnostic angiography tomorrow with possible PCI although he may require this follow-up procedure next week.   Obtain echocardiogram    CAD/ASHD, continue aspirin    Hypertension, continue amlodipine and losartan    Hypercholesterolemia, continue statin    Diabetes, as per primary service    DISCUSSION OF CARDIAC CATHETERIZATION PROCEDURES: The procedures, indications, risks and alternatives have been discussed with the patient and, as appropriate, with the patient's guardian . Risks discussed included, but are not limited to, bleeding, development of blood clots/emboli, damage to blood vessels, renal failure, malignant cardiac arrhythmias, stroke, heart attack, emergent coronary bypass surgery, death, dye allergy. The patient (and guardian as appropriate) expressed understanding of the aforementioned and wished to proceed. Thank you for allowing us to participate in the care of Marychuy Liu. Please call me with any questions [phone].     Anner Baumgarten, MD, [facility]   Interventional Cardiologist  [place]   [phone] [facility]  [phone] [address] [place] [address]  10/12/2022 2:54 PM

## 2022-10-12 NOTE — ED NOTES
Room at [place] [address] [id number]    Nurse to Nurse [phone]    Domitila Will Transfer at [address]  10/12/22 5233

## 2022-10-12 NOTE — ED NOTES
Pt continues resting in bed at this time. Pt verbalizes intermittent discomfort. Pt declines needs at this time. Call light remains in reach.      Patric Reeder RN  10/12/22 2376

## 2022-10-12 NOTE — H&P
mouth in the morning and at bedtime Patient only takes in AM 8/15/22   Miguel Borden MD   amLODIPine (NORVASC) 10 MG tablet TAKE ONE TABLET BY MOUTH DAILY 3/29/22   Miguel Borden MD   rosuvastatin (CRESTOR) 40 MG tablet Take 1 tablet by mouth every evening 2/15/22   Miguel Borden MD   blood glucose test strips [facility] ULTRA) strip USE TO TEST TWO TIMES A DAY 1/18/22   Miguel Borden MD   losartan (COZAAR) 100 MG tablet  11/24/21   Historical Provider, MD   traZODone (DESYREL) 100 MG tablet TAKE ONE TABLET BY MOUTH ONCE NIGHTLY AS NEEDED FOR SLEEP  Patient taking differently: Patient takes 50mg nightly 11/15/21   Miguel Borden MD   albuterol sulfate HFA (VENTOLIN HFA) 108 (90 Base) MCG/ACT inhaler Inhale 2 puffs into the lungs 4 times daily as needed for Wheezing 9/23/21   Burke Nettles MD   nitroGLYCERIN (NITROSTAT) 0.4 MG SL tablet Place 1 tablet under the tongue every 5 minutes as needed for Chest pain up to max of 3 total doses. If no relief after 1 dose, call 911. 11/16/20   Miguel Borden MD   Omega-3 Fatty Acids (FISH OIL) 1000 MG CAPS Take 1 capsule by mouth 2 times daily  Patient taking differently: Take 1,000 mg by mouth daily 4/28/15   YANNICK Garza - CNP   aspirin 325 MG tablet Take 325 mg by mouth daily. Historical Provider, MD       Allergies:  Prednisone and Codeine    Social History:      The patient currently lives at home    TOBACCO:   reports that he quit smoking about 46 years ago. His smoking use included cigarettes. He has a 50.00 pack-year smoking history. He has never used smokeless tobacco.  ETOH:   reports no history of alcohol use. E-cigarette/Vaping       Questions Responses    E-cigarette/Vaping Use     Start Date     Passive Exposure     Quit Date     Counseling Given     Comments               Family History:      Reviewed and negative in regards to presenting illness/complaint.         Problem Relation Age of Onset    Diabetes Mother     High Blood Pressure Mother     Arthritis Mother        REVIEW OF SYSTEMS COMPLETED:   Pertinent positives as noted in the HPI. All other systems reviewed and negative. PHYSICAL EXAM PERFORMED:    BP (!) 158/86   Pulse 76   Temp 97.6 °F (36.4 °C) (Oral)   Resp 17   Ht 6' 1\" (1.854 m)   Wt 235 lb (106.6 kg)   SpO2 95%   BMI 31.00 kg/m²     General appearance:  No apparent distress, appears stated age and cooperative. HEENT:  Normal cephalic, atraumatic without obvious deformity. Pupils equal, round, and reactive to light. Extra ocular muscles intact. Conjunctivae/corneas clear. Neck: Supple, with full range of motion. No jugular venous distention. Trachea midline. Respiratory:  Normal respiratory effort. Clear to auscultation, bilaterally without Rales/Wheezes/Rhonchi. Cardiovascular:  Regular rate and rhythm with normal S1/S2 without murmurs, rubs or gallops. Abdomen: Soft, non-tender, non-distended with normal bowel sounds. Musculoskeletal:  No clubbing, cyanosis or edema bilaterally. Full range of motion without deformity. Skin: Skin color, texture, turgor normal.  No rashes or lesions. Neurologic:  Neurovascularly intact without any focal sensory/motor deficits. Cranial nerves: II-XII intact, grossly non-focal.  Psychiatric:  Alert and oriented, thought content appropriate, normal insight  Capillary Refill: Brisk,3 seconds, normal  Peripheral Pulses: +2 palpable, equal bilaterally       Labs:     Recent Labs     10/12/22  0251   WBC 5.3   HGB 15.4   HCT 44.3        Recent Labs     10/12/22  0251      K 3.7      CO2 27   BUN 12   CREATININE 1.2   CALCIUM 9.8     Recent Labs     10/12/22  0251   AST 22   ALT 24   BILITOT 1.0   ALKPHOS 76     No results for input(s): INR in the last 72 hours.   Recent Labs     10/12/22  0251 10/12/22  1336   TROPONINI <0.01 <0.01       Urinalysis:      Lab Results   Component Value Date/Time    NITRU Negative 09/23/2021 03:20 AM    WBCUA None seen 12/14/2014 05:00 AM    BACTERIA Rare 12/14/2014 05:00 AM    RBCUA 0-2 12/14/2014 05:00 AM    BLOODU Negative 09/23/2021 03:20 AM    SPECGRAV <=1.005 09/23/2021 03:20 AM    GLUCOSEU Negative 09/23/2021 03:20 AM       Radiology:     CXR: I have reviewed the CXR with the following interpretation: no acute disease  EKG:  I have reviewed the EKG with the following interpretation: NSR    No orders to display       Consults:    IP CONSULT TO CARDIOLOGY    ASSESSMENT:    Active Hospital Problems    Diagnosis Date Noted    Unstable angina ([facility] [I20.0] 10/12/2022     Priority: Medium    Chest pain [R07.9] 01/23/2011         PLAN:  Unstable angina  - cardiology consulted  - EKG, trop negative  - recent stress test with increased risk  - plan for [facility] tomorrow  - echo ordered  - continue ASA, statin    HTN  - well controlled  - continue norvasc, losartan    HLD  - continue statin    DMII  - well controlled  - holding home oral meds  - SSI ordered    Obesity  With Body mass index is 31 kg/m². Complicating assessment and treatment. Placing patient at risk for multiple co-morbidities as well as early death and contributing to the patient's presentation. Counseled on weight loss. DVT Prophylaxis: lovenox  Diet: ADULT DIET; Regular; No Caffeine  Diet NPO  Code Status: Full Code    PT/OT Eval Status: not ordered    Dispo - home in 1-2 days       Supriya Mireles MD    Thank you Ankita Thornton MD for the opportunity to be involved in this patient's care. If you have any questions or concerns please feel free to contact me at [phone].

## 2022-10-12 NOTE — ED PROVIDER NOTES
Emergency Physician Note        Note Open Time: 2:48 AM EDT    Chief Complaint  Chest Pain (Pt states he has \" a lot of gas, indigestion and anxiety\". Has cardiac hx and was supposed to be seen by cardiologist. Dev fisher at home.)       History of Present Illness  Kwame Mcmillan is a 78 y.o. male who presents to the ED for abdominal and chest pain. Patient complains of left-sided abdominal and chest pains of been going off and on for several days. They are not associated with activity nor relieved by rest.  He does have some shortness of breath but denies any diaphoresis, palpitations. He does have some nausea as well. He is concerned that it is possibly indigestion or anxiety but also has a history of coronary disease with stenting in 2003 and 2011 and is currently being scheduled for coronary angiogram in the near future as an outpatient by his cardiologist.  He reports that he took nitroglycerin at home during the middle of the night and it made him feel better which was the reason for presentation. 10 systems reviewed, pertinent positives per HPI otherwise noted to be negative    I have reviewed the following from the nursing documentation:      Prior to Admission medications    Medication Sig Start Date End Date Taking?  Authorizing Provider   famotidine (PEPCID) 10 MG tablet Take 2 tablets by mouth 2 times daily 9/28/22 10/28/22  Darcy Colon MD   omeprazole (PRILOSEC) 20 MG delayed release capsule Take 2 capsules by mouth Daily 9/28/22 10/28/22  Darcy Colon MD   sucralfate (CARAFATE) 1 GM tablet Take 1 tablet by mouth 4 times daily for 15 days 9/28/22 10/13/22  Darcy Colon MD   glimepiride (AMARYL) 4 MG tablet Take 1 tablet by mouth in the morning and at bedtime 8/15/22   Candance Senate, MD   amLODIPine (NORVASC) 10 MG tablet TAKE ONE TABLET BY MOUTH DAILY 3/29/22   Candance Senate, MD   rosuvastatin (CRESTOR) 40 MG tablet Take 1 tablet by mouth every evening 2/15/22 Dania Knowles MD   blood glucose test strips [facility] ULTRA) strip USE TO TEST TWO TIMES A DAY 1/18/22   Dania Knowles MD   losartan (COZAAR) 100 MG tablet  11/24/21   Historical Provider, MD   traZODone (DESYREL) 100 MG tablet TAKE ONE TABLET BY MOUTH ONCE NIGHTLY AS NEEDED FOR SLEEP 11/15/21   Dania Knowles MD   albuterol sulfate HFA (VENTOLIN HFA) 108 (90 Base) MCG/ACT inhaler Inhale 2 puffs into the lungs 4 times daily as needed for Wheezing 9/23/21   Cristian Healy MD   nitroGLYCERIN (NITROSTAT) 0.4 MG SL tablet Place 1 tablet under the tongue every 5 minutes as needed for Chest pain up to max of 3 total doses. If no relief after 1 dose, call 911. 11/16/20   Dania Knowles MD   tamsulosin (FLOMAX) 0.4 MG capsule TAKE TWO CAPSULES BY MOUTH DAILY ONE HALF HOUR AFTER THE SAME MEAL EACH DAY 8/3/20   Dania Knowles MD   Omega-3 Fatty Acids (FISH OIL) 1000 MG CAPS Take 1 capsule by mouth 2 times daily 4/28/15   YANNICK Garza - CNP   aspirin 325 MG tablet Take 325 mg by mouth daily.     Historical Provider, MD       Allergies as of 10/12/2022 - Fully Reviewed 10/12/2022   Allergen Reaction Noted    Prednisone Other (See Comments) 07/09/2014    Metformin and related Other (See Comments) 04/23/2018    Codeine Other (See Comments) 01/23/2011       Past Medical History:   Diagnosis Date    Acute MI ([address]     Angina pectoris, unstable ([address]     ASHD (arteriosclerotic heart disease)     Bladder outlet obstruction     BPH (benign prostatic hyperplasia)     Chest pain     Constipation     Diabetes mellitus ([address]     GERD (gastroesophageal reflux disease)     Hx of CABG     Hypercholesteremia     Hypercholesterolemia     Hypertension     IBS (irritable bowel syndrome)     Influenza A 01/28/2020    Sinusitis     Type II or unspecified type diabetes mellitus without mention of complication, not stated as uncontrolled         Surgical History:   Past Surgical History:   Procedure Laterality Date    CARDIAC CATHETERIZATION  2002    stent    CARDIAC CATHETERIZATION  2011    stent    COLONOSCOPY  10/24/13    CORONARY ANGIOPLASTY WITH STENT PLACEMENT          Family History:    Family History   Problem Relation Age of Onset    Diabetes Mother     High Blood Pressure Mother     Arthritis Mother        Social History     Socioeconomic History    Marital status:      Spouse name: Not on file    Number of children: Not on file    Years of education: Not on file    Highest education level: Not on file   Occupational History    Occupation:    Tobacco Use    Smoking status: Former     Packs/day: 5.00     Years: 10.00     Pack years: 50.00     Types: Cigarettes     Quit date: 3/13/1976     Years since quittin.6    Smokeless tobacco: Never   Substance and Sexual Activity    Alcohol use: No     Comment: 3 beers a month    Drug use: No    Sexual activity: Yes     Partners: Female   Other Topics Concern    Not on file   Social History Narrative    Not on file     Social Determinants of Health     Financial Resource Strain: Low Risk     Difficulty of Paying Living Expenses: Not hard at all   Food Insecurity: No Food Insecurity    Worried About Running Out of Food in the Last Year: Never true    Ran Out of Food in the Last Year: Never true   Transportation Needs: Not on file   Physical Activity: Not on file   Stress: Not on file   Social Connections: Not on file   Intimate Partner Violence: Not on file   Housing Stability: Not on file       Nursing notes reviewed. ED Triage Vitals [10/12/22 0244]   Enc Vitals Group      BP (!) 180/81      Heart Rate 75      Resp 18      Temp       Temp src       SpO2 94 %      Weight 235 lb (106.6 kg)      Height 6' 1\" (1.854 m)      Head Circumference       Peak Flow       Pain Score       Pain Loc       Pain Edu? Excl. in [address]? GENERAL:  Awake, alert. Well developed, well nourished with no apparent distress. HENT:  Normocephalic, Atraumatic, moist mucous membranes. EYES:  Pupils equal round and reactive to light, Conjunctiva normal, extraocular movements normal.  NECK:  No meningeal signs, Supple. CHEST:  Regular rate and rhythm, chest wall non-tender. LUNGS:  Clear to auscultation bilaterally. ABDOMEN:  Soft, non-tender, no rebound, rigidity or guarding, non-distended, normal bowel sounds. No costovertebral angle tenderness to palpation. BACK:  No tenderness. EXTREMITIES:  Normal range of motion, no edema, no bony tenderness, no deformity, distal pulses present. SKIN: Warm, dry and intact. NEUROLOGIC: Normal mental status. Moving all extremities to command. LABS and DIAGNOSTIC RESULTS  EKG  The Ekg interpreted by me shows  normal sinus rhythm with a rate of 79  Axis is   Normal  QTc is  normal  Intervals and Durations are unremarkable. ST Segments: normal  Delta waves, Brugada Syndrome, and Short SC are not present. No significant change from prior EKG dated 9/28/22    RADIOLOGY  X-RAYS:  I have reviewed radiologic plain film image(s). ALL OTHER NON-PLAIN FILM IMAGES SUCH AS CT, ULTRASOUND AND MRI HAVE BEEN READ BY THE RADIOLOGIST. XR CHEST PORTABLE   Final Result   No acute cardiopulmonary process. LABS  Labs Reviewed   COMPREHENSIVE METABOLIC PANEL W/ REFLEX TO MG FOR LOW K - Abnormal; Notable for the following components:       Result Value    Glucose 138 (*)     GFR Non- 59 (*)     All other components within normal limits   CBC WITH AUTO DIFFERENTIAL   LIPASE   TROPONIN   BRAIN NATRIURETIC PEPTIDE       MEDICAL DECISION MAKING  Heart Score for chest pain patients  History:  Moderately Suspicious  ECG: Normal  Patient Age: > 65 years  *Risk factors for Atherosclerotic disease: Coronary Artery Disease  Risk Factors: > 3 Risk factors or history of atherosclerotic disease*  Troponin: < 1X normal limit  Heart Score Total: 5     At the patient's request I contacted the test [facility] regarding possible transfer. They inform me that there are no beds available at that facility at this time. I reviewed the records and see the patient's recent abnormal stress test and believe he definitely needs cardiac evaluation likely with cardiac cath. For this reason we are transferring him to the nearest cath capable facility, [facility].  Patient is pain-free and stable for transfer in my opinion. The total Critical Care time is 35 minutes which excludes separately billable procedures. The critical care was concerning treatment of potential ACS with aspirin, nitroglycerin. This time is exclusive of any time documented by any other providers. I spoke with Dr. Heydi Luke, [facility] hospitalist. We thoroughly discussed the history, physical exam, laboratory and imaging studies, as well as, current course of treatment within the emergency department. Based upon that discussion, we've decided to transfer Marcelo Anne to [facility], for further observation and evaluation of Marcelo Anne current condition. As I have deemed necessary from their history, physical, and studies, I have considered and evaluated Marcelo Anne for the following diagnoses:      CLINICAL IMPRESSION:  1. Chest pain, unspecified type    2. Abdominal pain, left upper quadrant        BP (!) 157/79   Pulse 72   Temp 97.7 °F (36.5 °C) (Oral)   Resp 19   Ht 6' 1\" (1.854 m)   Wt 235 lb (106.6 kg)   SpO2 92%   BMI 31.00 kg/m²         Disposition  Pt is in stable condition upon Transfer to [facility] to [facility]. This chart was generated using the [employer] dictation system. I created this record but it may contain dictation errors.           Margot Pena MD  10/12/22 [id number]

## 2022-10-12 NOTE — ED NOTES
Pt ambulatory too and from restroom at this time. Pt's gait even and steady. Pt back to bed, side rails up x2, call light in reach. Pt provided with warm blankets for comfort.      Omer Gary RN  10/12/22 [id number]

## 2022-10-12 NOTE — CONSULTS
Consult placed    [phone] [facility]  Date:10/12/2022,  Time:1:55 PM        Electronically signed by Tye Braun on 10/12/2022 at 1:55 PM

## 2022-10-12 NOTE — PLAN OF CARE
4 Eyes Skin Assessment     NAME:  Jeannette Rizzo OF BIRTH:  1945  MEDICAL RECORD NUMBER:  [id number]    The patient is being assess for  Admission    I agree that 2 RN's have performed a thorough Head to Toe Skin Assessment on the patient. ALL assessment sites listed below have been assessed. Areas assessed by both nurses:    Head, Face, Ears, Shoulders, Back, Chest, Arms, Elbows, Hands, Sacrum. Buttock, Coccyx, Ischium, and Legs. Feet and Heels        Does the Patient have a Wound?  No noted wound(s)       [place] Prevention initiated:  NA   Wound Care Orders initiated:  NA    Pressure Injury (Stage 3,4, Unstageable, DTI, NWPT, and Complex wounds) if present place referral/consult order under [de-identified] NA    New and Established Ostomies if present place consult order under : NA      Nurse 1 eSignature: Electronically signed by Tony Mckinnon RN on 10/12/22 at 2:25 PM EDT      Nurse 2 eSignature: Electronically signed by Zulma Richardson RN on 10/12/22 at 5:56 PM EDT

## 2022-10-13 ENCOUNTER — APPOINTMENT (OUTPATIENT)
Dept: VASCULAR LAB | Age: 77
DRG: 234 | End: 2022-10-13
Attending: INTERNAL MEDICINE
Payer: MEDICARE

## 2022-10-13 ENCOUNTER — APPOINTMENT (OUTPATIENT)
Dept: CARDIAC CATH/INVASIVE PROCEDURES | Age: 77
DRG: 234 | End: 2022-10-13
Attending: INTERNAL MEDICINE
Payer: MEDICARE

## 2022-10-13 LAB
ANION GAP SERPL CALCULATED.3IONS-SCNC: 8 MMOL/L (ref 3–16)
ANTI-XA UNFRAC HEPARIN: <0.1 IU/ML (ref 0.3–0.7)
APTT: 26.9 SEC (ref 23–34.3)
BILIRUBIN URINE: NEGATIVE
BLOOD, URINE: NEGATIVE
BUN BLDV-MCNC: 13 MG/DL (ref 7–20)
CALCIUM SERPL-MCNC: 9 MG/DL (ref 8.3–10.6)
CHLORIDE BLD-SCNC: 104 MMOL/L (ref 99–110)
CLARITY: CLEAR
CO2: 24 MMOL/L (ref 21–32)
COLOR: YELLOW
CREAT SERPL-MCNC: 1 MG/DL (ref 0.8–1.3)
EKG ATRIAL RATE: 71 BPM
EKG DIAGNOSIS: NORMAL
EKG P AXIS: -9 DEGREES
EKG P-R INTERVAL: 160 MS
EKG Q-T INTERVAL: 392 MS
EKG QRS DURATION: 102 MS
EKG QTC CALCULATION (BAZETT): 425 MS
EKG R AXIS: 59 DEGREES
EKG T AXIS: 10 DEGREES
EKG VENTRICULAR RATE: 71 BPM
ESTIMATED AVERAGE GLUCOSE: 188.6 MG/DL
GFR AFRICAN AMERICAN: >60
GFR NON-AFRICAN AMERICAN: >60
GLUCOSE BLD-MCNC: 139 MG/DL (ref 70–99)
GLUCOSE BLD-MCNC: 158 MG/DL (ref 70–99)
GLUCOSE BLD-MCNC: 201 MG/DL (ref 70–99)
GLUCOSE BLD-MCNC: 203 MG/DL (ref 70–99)
GLUCOSE URINE: >=1000 MG/DL
HBA1C MFR BLD: 8.2 %
HCT VFR BLD CALC: 42.8 % (ref 40.5–52.5)
HCT VFR BLD CALC: 45.9 % (ref 40.5–52.5)
HEMOGLOBIN: 14.5 G/DL (ref 13.5–17.5)
HEMOGLOBIN: 15.2 G/DL (ref 13.5–17.5)
INR BLD: 1 (ref 0.87–1.14)
KETONES, URINE: NEGATIVE MG/DL
LEUKOCYTE ESTERASE, URINE: NEGATIVE
LV EF: 58 %
LV EF: 60 %
LVEF MODALITY: NORMAL
LVEF MODALITY: NORMAL
MCH RBC QN AUTO: 30.9 PG (ref 26–34)
MCH RBC QN AUTO: 31.5 PG (ref 26–34)
MCHC RBC AUTO-ENTMCNC: 33.1 G/DL (ref 31–36)
MCHC RBC AUTO-ENTMCNC: 33.9 G/DL (ref 31–36)
MCV RBC AUTO: 93 FL (ref 80–100)
MCV RBC AUTO: 93.6 FL (ref 80–100)
MICROSCOPIC EXAMINATION: ABNORMAL
NITRITE, URINE: NEGATIVE
PDW BLD-RTO: 13 % (ref 12.4–15.4)
PDW BLD-RTO: 13.1 % (ref 12.4–15.4)
PERFORMED ON: ABNORMAL
PH UA: 7 (ref 5–8)
PLATELET # BLD: 222 K/UL (ref 135–450)
PLATELET # BLD: 245 K/UL (ref 135–450)
PMV BLD AUTO: 7.6 FL (ref 5–10.5)
PMV BLD AUTO: 8.2 FL (ref 5–10.5)
POTASSIUM REFLEX MAGNESIUM: 3.7 MMOL/L (ref 3.5–5.1)
POTASSIUM SERPL-SCNC: 3.7 MMOL/L (ref 3.5–5.1)
PROTEIN UA: NEGATIVE MG/DL
PROTHROMBIN TIME: 13.1 SEC (ref 11.7–14.5)
RBC # BLD: 4.6 M/UL (ref 4.2–5.9)
RBC # BLD: 4.91 M/UL (ref 4.2–5.9)
SODIUM BLD-SCNC: 136 MMOL/L (ref 136–145)
SPECIFIC GRAVITY UA: 1.01 (ref 1–1.03)
URINE REFLEX TO CULTURE: ABNORMAL
URINE TYPE: ABNORMAL
UROBILINOGEN, URINE: 2 E.U./DL
WBC # BLD: 4.7 K/UL (ref 4–11)
WBC # BLD: 5.5 K/UL (ref 4–11)

## 2022-10-13 PROCEDURE — 36415 COLL VENOUS BLD VENIPUNCTURE: CPT

## 2022-10-13 PROCEDURE — 85027 COMPLETE CBC AUTOMATED: CPT

## 2022-10-13 PROCEDURE — 6360000002 HC RX W HCPCS

## 2022-10-13 PROCEDURE — 93458 L HRT ARTERY/VENTRICLE ANGIO: CPT | Performed by: INTERNAL MEDICINE

## 2022-10-13 PROCEDURE — C1887 CATHETER, GUIDING: HCPCS

## 2022-10-13 PROCEDURE — 81003 URINALYSIS AUTO W/O SCOPE: CPT

## 2022-10-13 PROCEDURE — 2709999900 HC NON-CHARGEABLE SUPPLY

## 2022-10-13 PROCEDURE — 93010 ELECTROCARDIOGRAM REPORT: CPT | Performed by: INTERNAL MEDICINE

## 2022-10-13 PROCEDURE — B2131ZZ FLUOROSCOPY OF MULTIPLE CORONARY ARTERY BYPASS GRAFTS USING LOW OSMOLAR CONTRAST: ICD-10-PCS | Performed by: INTERNAL MEDICINE

## 2022-10-13 PROCEDURE — 93880 EXTRACRANIAL BILAT STUDY: CPT

## 2022-10-13 PROCEDURE — 99152 MOD SED SAME PHYS/QHP 5/>YRS: CPT | Performed by: INTERNAL MEDICINE

## 2022-10-13 PROCEDURE — 2580000003 HC RX 258: Performed by: INTERNAL MEDICINE

## 2022-10-13 PROCEDURE — 2500000003 HC RX 250 WO HCPCS

## 2022-10-13 PROCEDURE — C1753 CATH, INTRAVAS ULTRASOUND: HCPCS

## 2022-10-13 PROCEDURE — 85347 COAGULATION TIME ACTIVATED: CPT

## 2022-10-13 PROCEDURE — 6360000002 HC RX W HCPCS: Performed by: INTERNAL MEDICINE

## 2022-10-13 PROCEDURE — 92978 ENDOLUMINL IVUS OCT C 1ST: CPT | Performed by: INTERNAL MEDICINE

## 2022-10-13 PROCEDURE — 93971 EXTREMITY STUDY: CPT

## 2022-10-13 PROCEDURE — 92978 ENDOLUMINL IVUS OCT C 1ST: CPT

## 2022-10-13 PROCEDURE — 6370000000 HC RX 637 (ALT 250 FOR IP): Performed by: INTERNAL MEDICINE

## 2022-10-13 PROCEDURE — 93005 ELECTROCARDIOGRAM TRACING: CPT | Performed by: INTERNAL MEDICINE

## 2022-10-13 PROCEDURE — 4A023N7 MEASUREMENT OF CARDIAC SAMPLING AND PRESSURE, LEFT HEART, PERCUTANEOUS APPROACH: ICD-10-PCS | Performed by: INTERNAL MEDICINE

## 2022-10-13 PROCEDURE — 93306 TTE W/DOPPLER COMPLETE: CPT

## 2022-10-13 PROCEDURE — C1769 GUIDE WIRE: HCPCS

## 2022-10-13 PROCEDURE — 85730 THROMBOPLASTIN TIME PARTIAL: CPT

## 2022-10-13 PROCEDURE — 2060000000 HC ICU INTERMEDIATE R&B

## 2022-10-13 PROCEDURE — 85610 PROTHROMBIN TIME: CPT

## 2022-10-13 PROCEDURE — 80048 BASIC METABOLIC PNL TOTAL CA: CPT

## 2022-10-13 PROCEDURE — 93459 L HRT ART/GRFT ANGIO: CPT

## 2022-10-13 PROCEDURE — B2111ZZ FLUOROSCOPY OF MULTIPLE CORONARY ARTERIES USING LOW OSMOLAR CONTRAST: ICD-10-PCS | Performed by: INTERNAL MEDICINE

## 2022-10-13 PROCEDURE — 6360000004 HC RX CONTRAST MEDICATION

## 2022-10-13 PROCEDURE — B2151ZZ FLUOROSCOPY OF LEFT HEART USING LOW OSMOLAR CONTRAST: ICD-10-PCS | Performed by: INTERNAL MEDICINE

## 2022-10-13 PROCEDURE — 85520 HEPARIN ASSAY: CPT

## 2022-10-13 PROCEDURE — 99223 1ST HOSP IP/OBS HIGH 75: CPT | Performed by: NURSE PRACTITIONER

## 2022-10-13 PROCEDURE — C1894 INTRO/SHEATH, NON-LASER: HCPCS

## 2022-10-13 PROCEDURE — 92979 ENDOLUMINL IVUS OCT C EA: CPT | Performed by: INTERNAL MEDICINE

## 2022-10-13 PROCEDURE — 6360000002 HC RX W HCPCS: Performed by: THORACIC SURGERY (CARDIOTHORACIC VASCULAR SURGERY)

## 2022-10-13 RX ORDER — MIDAZOLAM HYDROCHLORIDE 1 MG/ML
INJECTION INTRAMUSCULAR; INTRAVENOUS
Status: COMPLETED | OUTPATIENT
Start: 2022-10-13 | End: 2022-10-13

## 2022-10-13 RX ORDER — SODIUM CHLORIDE 0.9 % (FLUSH) 0.9 %
5-40 SYRINGE (ML) INJECTION EVERY 12 HOURS SCHEDULED
Status: DISCONTINUED | OUTPATIENT
Start: 2022-10-13 | End: 2022-10-18

## 2022-10-13 RX ORDER — FENTANYL CITRATE 50 UG/ML
INJECTION, SOLUTION INTRAMUSCULAR; INTRAVENOUS
Status: COMPLETED | OUTPATIENT
Start: 2022-10-13 | End: 2022-10-13

## 2022-10-13 RX ORDER — SODIUM CHLORIDE 0.9 % (FLUSH) 0.9 %
5-40 SYRINGE (ML) INJECTION PRN
Status: DISCONTINUED | OUTPATIENT
Start: 2022-10-13 | End: 2022-10-18

## 2022-10-13 RX ORDER — ACETAMINOPHEN 325 MG/1
650 TABLET ORAL EVERY 4 HOURS PRN
Status: DISCONTINUED | OUTPATIENT
Start: 2022-10-13 | End: 2022-10-18

## 2022-10-13 RX ORDER — HEPARIN SODIUM 1000 [USP'U]/ML
INJECTION, SOLUTION INTRAVENOUS; SUBCUTANEOUS
Status: COMPLETED | OUTPATIENT
Start: 2022-10-13 | End: 2022-10-13

## 2022-10-13 RX ORDER — HEPARIN SODIUM 1000 [USP'U]/ML
4000 INJECTION, SOLUTION INTRAVENOUS; SUBCUTANEOUS ONCE
Status: COMPLETED | OUTPATIENT
Start: 2022-10-13 | End: 2022-10-13

## 2022-10-13 RX ORDER — HEPARIN SODIUM 10000 [USP'U]/100ML
1720 INJECTION, SOLUTION INTRAVENOUS CONTINUOUS
Status: DISCONTINUED | OUTPATIENT
Start: 2022-10-13 | End: 2022-10-18

## 2022-10-13 RX ORDER — SODIUM CHLORIDE 9 MG/ML
INJECTION, SOLUTION INTRAVENOUS PRN
Status: DISCONTINUED | OUTPATIENT
Start: 2022-10-13 | End: 2022-10-18

## 2022-10-13 RX ORDER — HEPARIN SODIUM 1000 [USP'U]/ML
4000 INJECTION, SOLUTION INTRAVENOUS; SUBCUTANEOUS PRN
Status: DISCONTINUED | OUTPATIENT
Start: 2022-10-13 | End: 2022-10-18

## 2022-10-13 RX ORDER — HEPARIN SODIUM 1000 [USP'U]/ML
2000 INJECTION, SOLUTION INTRAVENOUS; SUBCUTANEOUS PRN
Status: DISCONTINUED | OUTPATIENT
Start: 2022-10-13 | End: 2022-10-18

## 2022-10-13 RX ADMIN — SODIUM CHLORIDE, PRESERVATIVE FREE 10 ML: 5 INJECTION INTRAVENOUS at 08:02

## 2022-10-13 RX ADMIN — HEPARIN SODIUM 5000 UNITS: 1000 INJECTION, SOLUTION INTRAVENOUS; SUBCUTANEOUS at 11:00

## 2022-10-13 RX ADMIN — FENTANYL CITRATE 25 MCG: 50 INJECTION, SOLUTION INTRAMUSCULAR; INTRAVENOUS at 11:43

## 2022-10-13 RX ADMIN — ROSUVASTATIN CALCIUM 40 MG: 10 TABLET, FILM COATED ORAL at 18:30

## 2022-10-13 RX ADMIN — FENTANYL CITRATE 50 MCG: 50 INJECTION, SOLUTION INTRAMUSCULAR; INTRAVENOUS at 10:58

## 2022-10-13 RX ADMIN — LOSARTAN POTASSIUM 100 MG: 100 TABLET, FILM COATED ORAL at 08:01

## 2022-10-13 RX ADMIN — HEPARIN SODIUM 1000 UNITS/HR: 10000 INJECTION, SOLUTION INTRAVENOUS at 18:29

## 2022-10-13 RX ADMIN — FENTANYL CITRATE 25 MCG: 50 INJECTION, SOLUTION INTRAMUSCULAR; INTRAVENOUS at 11:34

## 2022-10-13 RX ADMIN — AMLODIPINE BESYLATE 10 MG: 5 TABLET ORAL at 08:01

## 2022-10-13 RX ADMIN — ASPIRIN 325 MG: 325 TABLET ORAL at 08:01

## 2022-10-13 RX ADMIN — HEPARIN SODIUM 4000 UNITS: 1000 INJECTION, SOLUTION INTRAVENOUS; SUBCUTANEOUS at 11:09

## 2022-10-13 RX ADMIN — INSULIN LISPRO 1 UNITS: 100 INJECTION, SOLUTION INTRAVENOUS; SUBCUTANEOUS at 18:30

## 2022-10-13 RX ADMIN — HEPARIN SODIUM 4000 UNITS: 1000 INJECTION INTRAVENOUS; SUBCUTANEOUS at 22:25

## 2022-10-13 RX ADMIN — MIDAZOLAM HYDROCHLORIDE 2 MG: 1 INJECTION INTRAMUSCULAR; INTRAVENOUS at 10:58

## 2022-10-13 ASSESSMENT — PAIN SCALES - GENERAL: PAINLEVEL_OUTOF10: 0

## 2022-10-13 NOTE — PLAN OF CARE
Problem: Pain  Goal: Verbalizes/displays adequate comfort level or baseline comfort level  Outcome: Progressing  Flowsheets (Taken 10/13/2022 0137)  Verbalizes/displays adequate comfort level or baseline comfort level:   Encourage patient to monitor pain and request assistance   Assess pain using appropriate pain scale   Administer analgesics based on type and severity of pain and evaluate response   Implement non-pharmacological measures as appropriate and evaluate response     Problem: Chronic Conditions and Co-morbidities  Goal: Ability to maintain appropriate glucose levels will improve  Description: Ability to maintain appropriate glucose levels will improve  Outcome: Progressing   The patient will demonstrate an understanding of s/s and treatment of hypoglycemia, by verbalization,  with the goal of completion at discharge.

## 2022-10-13 NOTE — CONSULTS
Department of Cardiovascular & Thoracic Surgery  History and Physical          DIAGNOSIS:  Severe multivessel CAD     CHIEF COMPLAINT:  Chest pain     History Obtained From:  patient, electronic medical record    HISTORY OF PRESENT ILLNESS:      The patient is a 78 y.o. male, former smoker, with significant past medical history of BPH, GERD, DMII, IBS, HLD, HTN, and CAD s/p PCI who presented to [facility] ED on 10/12 with complaints of chest and abdominal pain intermittent for several days. Chest pain described as a pressure. He states it often feels like heartburn or indigestion. Frequent belching. Symptoms not associated with activity. Symptoms similar to prior episodes of angina. He took nitro at home with relief in his symptoms. Of note, the patient was seen in the ED on 9/27 with similar complaints. His work-up at that time was unremarkable. Potential admission was discussed, however decision was made to discharge the patient home with close follow-up. In the emergency department patient was noted to be hypertensive with BP of 180/81. Troponin and repeat troponin negative. Creatinine 1.2. LFTs and CBC unremarkable. CXR without acute findings. EKG without acute ischemic changes. The patient underwent stress test on 10/10 which was concerning for ischemia. Plans were being made for outpatient cardiac catheterization. Decision was made to admit the patient for further investigation and treatment. [address] [place] [address] was initially contacted for transfer however there were no beds available at that time. Cardiac catheterization today reveals severe multivessel CAD including 30% distal left main stenosis and  of RCA. We have been consulted for consideration of surgical revascularization.     Past Medical History:        Diagnosis Date    Acute MI ([facility]     Angina pectoris, unstable ([facility]     ASHD (arteriosclerotic heart disease)     Bladder outlet obstruction     BPH (benign prostatic hyperplasia) Chest pain     Constipation     Diabetes mellitus ([facility] [address]     GERD (gastroesophageal reflux disease)     Hx of CABG     Hypercholesteremia     Hypercholesterolemia     Hypertension     IBS (irritable bowel syndrome)     Influenza A 01/28/2020    Sinusitis     Type II or unspecified type diabetes mellitus without mention of complication, not stated as uncontrolled        Past Surgical History:        Procedure Laterality Date    CARDIAC CATHETERIZATION  2002    stent    CARDIAC CATHETERIZATION  2011    stent    COLONOSCOPY  10/24/13    CORONARY ANGIOPLASTY WITH STENT PLACEMENT         Medications Prior to Admission:   Medications Prior to Admission: metFORMIN (GLUCOPHAGE) 1000 MG tablet, Take 1,000 mg by mouth 2 times daily (with meals)  [DISCONTINUED] famotidine (PEPCID) 10 MG tablet, Take 2 tablets by mouth 2 times daily  [DISCONTINUED] omeprazole (PRILOSEC) 20 MG delayed release capsule, Take 2 capsules by mouth Daily  [DISCONTINUED] sucralfate (CARAFATE) 1 GM tablet, Take 1 tablet by mouth 4 times daily for 15 days  glimepiride (AMARYL) 4 MG tablet, Take 1 tablet by mouth in the morning and at bedtime (Patient taking differently: Take 4 mg by mouth in the morning and at bedtime Patient only takes in AM)  amLODIPine (NORVASC) 10 MG tablet, TAKE ONE TABLET BY MOUTH DAILY  rosuvastatin (CRESTOR) 40 MG tablet, Take 1 tablet by mouth every evening  blood glucose test strips (ONETOUCH ULTRA) strip, USE TO TEST TWO TIMES A DAY  losartan (COZAAR) 100 MG tablet,   traZODone (DESYREL) 100 MG tablet, TAKE ONE TABLET BY MOUTH ONCE NIGHTLY AS NEEDED FOR SLEEP (Patient taking differently: Patient takes 50mg nightly)  albuterol sulfate HFA (VENTOLIN HFA) 108 (90 Base) MCG/ACT inhaler, Inhale 2 puffs into the lungs 4 times daily as needed for Wheezing  nitroGLYCERIN (NITROSTAT) 0.4 MG SL tablet, Place 1 tablet under the tongue every 5 minutes as needed for Chest pain up to max of 3 total doses.  If no relief after 1 dose, call 911.  Omega-3 Fatty Acids (FISH OIL) 1000 MG CAPS, Take 1 capsule by mouth 2 times daily (Patient taking differently: Take 1,000 mg by mouth daily)  aspirin 325 MG tablet, Take 325 mg by mouth daily. Allergies:  Prednisone and Codeine    Social History:    TOBACCO:   reports that he quit smoking about 46 years ago. His smoking use included cigarettes. He has a 50.00 pack-year smoking history. He has never used smokeless tobacco.  ETOH:   reports no history of alcohol use. CAFFEINE ABUSE:  No- 2 cups of coffee/day   DRUGS:   reports no history of drug use. LIFESTYLE: fairly active. Works at [employer]     MARITAL STATUS:    OCCUPATION:  Works at [employer]      Family History:        Problem Relation Age of Onset    Diabetes Mother     High Blood Pressure Mother     Arthritis Mother        REVIEW OF SYSTEMS:      Constitutional:  No night sweats, headaches, weight loss. Eyes:  No glaucoma, cataracts. ENMT:  No nosebleeds, deviated septum. Cardiac:  No arrhythmias, + chest pain. Vascular:  No claudication, varicosities. GI:  No PUD, +heartburn. :  No kidney stones, frequent UTIs  Musculoskeletal:  No arthritis, gout. Respiratory:  No SOB. No emphysema, asthma. Integumentary:  No dermatitis, itching, rash. Neurological:  No stroke, TIAs, seizures. Psychiatric:  No depression, anxiety. Endocrine: + diabetes. No thyroid issues. Hematologic:  No bleeding, easy bruising. Immunologic:  No known cancer, steroid therapies. PHYSICAL EXAM:    VITALS:  BP (!) 142/78   Pulse 68   Temp 97.6 °F (36.4 °C) (Oral)   Resp 17   Ht 6' 1\" (1.854 m)   Wt 228 lb (103.4 kg)   SpO2 95%   BMI 30.08 kg/m²     Constitutional:   Well developed and nourished male. No acute distress. Obese. Eyes:  lids and lashes normal, pupils equal, round and reactive to light, extra ocular muscles intact, sclera clear, conjunctiva normal    Head/ENT:  partial upper dentures. Normal gums, & palate. Moist mucus membranes. No cyanosis or pallor. Neck:  supple, symmetrical, trachea midline, no lymphadenopathy, no jugular venous distension, no carotid bruits and MASSES:  no masses. Lungs:  no increased work of breathing, good air exchange, no retractions and clear to auscultation. Cardiovascular:  regular rate and rhythm, S1, S2 normal, no murmur, click, rub or gallop. Apical impulse in 5th intercostal space. Pulses:  Right dorsalis pedis 2, Left dorsalis pedis 2, Right posterior tibial 2, Left Posterior tibial 2, Right radial 2, and Left radial 2. Abdomen:  normal bowel sounds, non-tender, unable to evaluate abdominal aorta due to body habitus. No hepatosplenomegaly or masses. Musculoskeletal:  Back is straight and non-tender, full ROM of upper and lower extremities. No kyphosis or scoliosis. Extremities:  Warm, pink, no clubbing, cyanosis, petechiae, ischemia, or deformities. No peripheral edema. Skin: no rashes, no petechiae, no nodules, no jaundice, no purpura, no wounds    Neurological/Psychiatric: oriented, normal mood, grossly non-focal    DATA:  EKG:  10/12/22  Normal sinus rhythm. Nonspecific ST abnormality inferior leads. When compared with ECG of 12-OCT-2022 02:47, (unconfirmed). No significant change was found.  Confirmed by Marita Kiran MD, [address] [place] [address] ([id number]) on 10/12/2022 7:53:31 AM    CBC:   Lab Results   Component Value Date/Time    WBC 5.5 10/13/2022 07:22 AM    RBC 4.60 10/13/2022 07:22 AM    HGB 14.5 10/13/2022 07:22 AM    HCT 42.8 10/13/2022 07:22 AM    MCV 93.0 10/13/2022 07:22 AM    MCH 31.5 10/13/2022 07:22 AM    MCHC 33.9 10/13/2022 07:22 AM    RDW 13.0 10/13/2022 07:22 AM     10/13/2022 07:22 AM    MPV 7.6 10/13/2022 07:22 AM     CMP:    Lab Results   Component Value Date/Time     10/13/2022 07:22 AM    K 3.7 10/13/2022 07:22 AM    K 3.7 10/13/2022 07:22 AM     10/13/2022 07:22 AM    CO2 24 10/13/2022 07:22 AM    BUN 13 10/13/2022 07:22 AM    CREATININE 1.0 10/13/2022 07:22 AM    GFRAA >60 10/13/2022 07:22 AM    GFRAA >60 02/06/2013 08:53 AM    AGRATIO 1.7 10/12/2022 02:51 AM    LABGLOM >60 10/13/2022 07:22 AM    GLUCOSE 158 10/13/2022 07:22 AM    PROT 7.0 10/12/2022 02:51 AM    PROT 7.2 02/06/2013 08:53 AM    LABALBU 4.4 10/12/2022 02:51 AM    CALCIUM 9.0 10/13/2022 07:22 AM    BILITOT 1.0 10/12/2022 02:51 AM    ALKPHOS 76 10/12/2022 02:51 AM    AST 22 10/12/2022 02:51 AM    ALT 24 10/12/2022 02:51 AM     PTT:    Lab Results   Component Value Date/Time    APTT 62.8 01/24/2011 09:26 AM     Last 3 Troponin:    Lab Results   Component Value Date/Time    TROPONINI <0.01 10/12/2022 04:37 PM    TROPONINI <0.01 10/12/2022 01:36 PM    TROPONINI <0.01 10/12/2022 02:51 AM     HgBA1c:    Lab Results   Component Value Date/Time    LABA1C 8.2 10/12/2022 01:36 PM     CXRAY:  10/12/22  FINDINGS:   Cardiac and mediastinal silhouettes appear within normal limits for size. Pulmonary vascularity is normal.  No focal infiltrate or pleural effusion. No pneumothorax. No acute osseous abnormality. Degenerative changes are   seen in the thoracic spine. Impression   No acute cardiopulmonary process. TTE: 10/13/22  FINDINGS- 2D Doppler echocardiography. The left ventricle is normal   in size and systolic function. Estimated ejection fraction is 55-   60%. The Doppler exam is consistent with normal diastolic function. The right ventricle is mildly dilated with preserved systolic   function. The left atrium is mildly dilated. The right atrium is   mildly dilated. The mitral valve is structurally normal without   significant regurgitation. The aortic valve is structurally normal   with normal Doppler exam. The tricuspid valve is incompletely   visualized, Doppler exam is unremarkable. The pulmonic valve is not   well seen. There is no significant pericardial effusion. The aortic   root is normal in size. The vena cava is not dilated. IMPRESSION-          1. Normal left ventricular size and systolic function. 2.    Biatrial enlargement. 3.    Dilated right ventricle with preserved systolic function. CT Chest:  No prior study available in EMR for review. CTA AP: 1/4/22 ([facility])   FINDINGS:   Nonvascular: The visualize solid and hollow viscera demonstrate no acute   abnormality. Vascular: The abdominal aorta is normal in course and caliber. There is   minimal atherosclerotic plaque scattered throughout the abdominal aorta. The   celiac axis, superior mesenteric artery, bilateral renal arteries, and   inferior mesenteric artery are widely patent with no evidence of stenosis. IMPRESSION:   Normal appearance to the abdominal aorta and its branches. Stress echo: 3/4/21 ([facility])    o Negative ECG for ischemia with graded exercise test.      o Duke Treadmill Score is 7 which indicates low risk. o Negative stress echo with no indication of inducible ischemia.      o Stress echo findings indicate low risk for future ischemic event . o PVCs with exercise, nonspecific finding. LHC:  10/13/22   LVGRAM     LVEDP 13   GRADIENT ACROSS AORTIC VALVE None   LV FUNCTION EF 60%   WALL MOTION Normal   MITRAL REGURGITATION Mild      CORONARY ARTERIES     LM Aneurysmal with ulcerated plaque, Prox <10%, mid-distal 30% stenosis. LAD Calcified, proximal-mid 50 to 75% stenosis, mid-distal 80% stenosis. LCX Tortuous, calcified, proximal 50 to 75% stenosis. Mid to distal 80 to 90% stenosis that extends into OM1. There is a mid-distal circumflex stent that has 40% in-stent restenosis         RCA Large vessel, dominant, very high anterior takeoff near the left coronary cusp, it is calcified.   There is 100% proximal-mid  with well-developed right to right collaterals and lesser left to right collaterals      PERCUTANEOUS INTERVENTION DESCRIPTION      Heparin was used for anticoagulation, a 6 [place] VL 3.5 guiding catheter was used to intubate the left main. A choice floppy wire was used to cross the left main into the LAD. IVUS was performed to left main and LAD. In the proximal LAD there is mild disease just at the ostium and in the left main there was distal 30% stenosis with moderate plaque, there is no clear unstable plaque or ulceration noted on IVUS. [id number] Score for Atrial Fibrillation Stroke Risk   Risk   Factors  Component Value   C CHF No 0   H HTN Yes 1   A2 Age >= 76 Yes,  (71 y.o.) 2   D DM Yes 1   S2 Prior Stroke/TIA No 0   V Vascular Disease No 0   A Age 74-69 No,  (71 y.o.) 0   Sc Sex male 0    [id number]  Score  4   Score last updated 10/13/22 27:51 PM EDT    Click here for a link to the UpToDate guideline \"Atrial Fibrillation: Anticoagulation therapy to prevent embolization    Disclaimer: Risk Score calculation is dependent on accuracy of patient problem list and past encounter diagnosis. ASSESSMENT AND PLAN:  STS Cardiac Surgery Risk profile: CABG (pending carotid duplex, spirometry, echo)     Risk of Mortality: 0.986%  Renal Failure: 1.185%  Permanent Stroke: 1.185%  Prolonged Ventilation: 5.898%  DSW Infection: 0.180%  Reoperation: 1.722%  Morbidity or Mortality: 9.102%  Short Length of Stay: 42.806%  Long Length of Stay: 3.890%    Mr. Christal Montano is a 78 y.o. male with history of tobacco use, BPH, DMII, HTN, HLD and CAD with previous stents who was admitted with unstable angina. Recent outpatient stress test abnormal. Cardiac catheterization with multivessel CAD including 30% distal left main stenosis and  of RCA. LVEF of 60%. Patient is currently hemodynamically stable and without chest pain. Case discussed with Dr. Jeremy Herrera. Recommend surgical revascularization. Echo pending for evaluation of structure and function. Will continue with pre operative testing and risk stratification in anticipation of OR early next week. This plan was discussed with the patient.  He has verbalized understanding and is in agreement. Carotid duplex. Vein mapping. Spirometry. UA.      Carley Gardiner, YANNICK - CNP  10/13/2022  12:19 PM

## 2022-10-13 NOTE — PROCEDURES
CARDIAC CATHETERIZATION REPORT     Procedure Date:  10/13/2022  Patient Name: Linda Pena  MRN: [id number] : 1945      INDICATION   Unstable angina    PROCEDURES PERFORMED     Left heart catheterization  LVgram  Coronary angiogam  Coronary cath  Monitoring of moderate conscious sedation    IVUS of LM  IVUS of LAD      PROCEDURE DESCRIPTION   Risks/benefits/alternatives/outcomes were discussed with patient and/or family and informed consent was obtained. Using the [facility] scale, the patient's right radial artery was found to be a level B. Patient was prepped draped in the usual sterile fashion. Local anaesthetic was applied over puncture site. Using a back wall technique, a 6 [place] Terumo sheath was inserted into right radial artery. Verapamil, nitroglycerin, nicardipine were administered through the sheath. Heparin was administered. Diagnostic 4fr pigtail,  catheters were used for diagnostic angiograms. At the conclusion of the procedure, a TR band was placed over the puncture site and hemostasis was obtained. There were no immediate complications. I supervised sedation from 10:57 am to 11:43 am with versed 2mg/fentanyl 100mcg during the procedure. An independent trained observer pushed meds at my direction. We monitored the patient's level of consciousness and vital signs/physiologic status throughout the procedure duration (see times listed previously). 220cc contrast was utilized. <20cc EBL. FINDINGS       LVGRAM    LVEDP 13   GRADIENT ACROSS AORTIC VALVE None   LV FUNCTION EF 60%   WALL MOTION Normal   MITRAL REGURGITATION Mild         CORONARY ARTERIES    LM Aneurysmal with ulcerated plaque, Prox <10%, mid-distal 30% stenosis. LAD Calcified, proximal-mid 50 to 75% stenosis, mid-distal 80% stenosis. LCX Tortuous, calcified, proximal 50 to 75% stenosis. Mid to distal 80 to 90% stenosis that extends into OM1.     There is a mid-distal circumflex stent that has 40% in-stent restenosis       RCA Large vessel, dominant, very high anterior takeoff near the left coronary cusp, it is calcified. There is 100% proximal-mid  with well-developed right to right collaterals and lesser left to right collaterals         PERCUTANEOUS INTERVENTION DESCRIPTION     Heparin was used for anticoagulation, a 6 [place] VL 3.5 guiding catheter was used to intubate the left main. A choice floppy wire was used to cross the left main into the LAD. IVUS was performed to left main and LAD. In the proximal LAD there is mild disease just at the ostium and in the left main there was distal 30% stenosis with moderate plaque, there is no clear unstable plaque or ulceration noted on IVUS.         CONCLUSIONS:     Multivessel CAD/ASHD with left main ulcerated plaque  Will refer to CT surgery for consideration of CABG  Start heparin drip without bolus in 3 hours  Transfer to intermediate care, C4

## 2022-10-13 NOTE — CONSULTS
Pharmacy to Manage Heparin Infusion per [facility]    Dx:  Pt wt = 89.3 kg (adjusted body weight)  Baseline aPTT = ordered. Oral factor Xa-inhibitors may alter and elevate anti-Xa levels used for unfractionated heparin monitoring. As a result, anti-Xa monitoring is not accurate while Xa-inhibitor activity is detectable. Utilize aPTT monitoring when patient received an oral factor Xa-inhibitor (apixaban, betrixaban, edoxaban or rivaroxaban) within 72 hours prior to admission (please document last administration time). The goal is to allow a washout of oral factor Xa-inhibitors by using aPTT for 72 hours, then change to ant-Xa levels for UFH. Heparin (weight-based) Infusion: CAD/STEMI/NSTEMI/UA/AFib)   followed by Heparin infusion at 12 units/kg/hr (recommended max initial rate: 1000 units/hr). Recheck anti-Xa (unless aPTT being used) in 6 hours. Goal anti-Xa 0.3-0.7 IU/mL  Goal aPTT =  seconds. Pharmacy to manage Heparin - contact for questions. Heparin 60 units/kg IV x 1 (max 4,000 units), then 12 units/kg/hr (recommended max initial rate 1,000 units/hr). Adjust infusion rate based off anti-Xa results below. anti-Xa < 0.1    Heparin 60 units/kg bolus  Increase infusion by 4 units/kg/hr  anti-Xa 0.1-0.29 Heparin 30 units/kg bolus Increase infusion by 2 units/kg/hr  anti-Xa 0.3-0.7    No bolus No change   anti-Xa 0.71-0.8   No bolus Decrease infusion by 1 units/kg/hr  anti-Xa 0.81-0.99    No bolus Decrease infusion by 2 units/kg/hr  anti-Xa 1 or more     Hold heparin for 1 hour Decrease infusion by 3 units/kg/hr    Obtain anti-Xa hours after bolus and 6 hours after any dose change until two consecutive therapeutic anti-Xa are achieved- then daily.     ** No initial bolus per Dr. Marie Shelton notes  Start Heparin infusion in 3 hours at [address] PharmD 10/13/2022  12:05 PM      10/13/22  [id number]  Low dose Heparin:  Anti-xa was drawn or ran on blood from 1800, expected to be from

## 2022-10-13 NOTE — PROGRESS NOTES
Brief Pre-Op Note/Sedation Assessment      Kwame Mcmillan  1945  [id number]  10:54 AM    Planned Procedure: Cardiac Catheterization Procedure  Post Procedure Plan: Return to same level of care  Consent: I have discussed with the patient and/or the patient representative the indication, alternatives, and the possible risks and/or complications of the planned procedure and the anesthesia methods. The patient and/or patient representative appear to understand and agree to proceed. DISCUSSION OF CARDIAC CATHETERIZATION PROCEDURES: The procedures, indications, risks and alternatives have been discussed with the patient and, as appropriate, with the patient's guardian . Risks discussed included, but are not limited to, bleeding, development of blood clots/emboli, damage to blood vessels, renal failure, malignant cardiac arrhythmias, stroke, heart attack, emergent coronary bypass surgery, death, dye allergy. The patient (and guardian as appropriate) expressed understanding of the aforementioned and wished to proceed. Chief Complaint:   Chest Pain/Pressure      Indications for Cath Procedure:  Presentation:  ACS > 24 hrs  2. Anginal Classification within 2 weeks:  CCS IV - Inability to perform any activity without angina or angina at rest, i.e., severe limitation  3. Angina Symptoms Assessment:  Typical Chest Pain  4. Heart Failure Class within last 2 weeks:  No symptoms  5. Cardiovascular Instability:  No    Prior Ischemic Workup/Eval:  Pre-Procedural Medications: Yes: Aspirin and Ca Channel Blockers  2. Stress Test Completed? Yes:  Stress or Imaging Studies Performed (within ANY time period):   Type:  Stress Nuclear  Results:  Positive:  Myocardial Perfusion Defects (Nuclear) Extent of Ischemia:  Intermediate    Does Patient need surgery?   Cath Valve Surgery:  No    Pre-Procedure Medical History:  Vital Signs:  BP (!) 142/78   Pulse 68   Temp 97.6 °F (36.4 °C) (Oral)   Resp 17   Ht 6' 1\" (1.854 m)   Wt 228 lb (103.4 kg)   SpO2 95%   BMI 30.08 kg/m²     Allergies:     Allergies   Allergen Reactions    Prednisone Other (See Comments)     Made patient very agitated (medrol marissak)    Codeine Other (See Comments)     constipation     Medications:    Current Facility-Administered Medications   Medication Dose Route Frequency Provider Last Rate Last Admin    amLODIPine (NORVASC) tablet 10 mg  10 mg Oral Daily Dania Murguia MD   10 mg at 10/13/22 0801    aspirin tablet 325 mg  325 mg Oral Daily Dania Murguia MD   325 mg at 10/13/22 0801    rosuvastatin (CRESTOR) tablet 40 mg  40 mg Oral QPM Dania Murguia MD   40 mg at 10/12/22 1748    losartan (COZAAR) tablet 100 mg  100 mg Oral Daily Dania Murguia MD   100 mg at 10/13/22 0801    sodium chloride flush 0.9 % injection 5-40 mL  5-40 mL IntraVENous 2 times per day Dania Murguia MD   10 mL at 10/13/22 0802    sodium chloride flush 0.9 % injection 5-40 mL  5-40 mL IntraVENous PRN Dania Murguia MD        0.9 % sodium chloride infusion   IntraVENous PRN Dania Murguia MD        ondansetron (ZOFRAN-ODT) disintegrating tablet 4 mg  4 mg Oral Q8H PRN Dania Murguia MD        Or    [facility]ron [facility] injection 4 mg  4 mg IntraVENous Q6H PRN Dania Murguia MD        acetaminophen (TYLENOL) tablet 650 mg  650 mg Oral Q6H PRN Dania Murguia MD        Or    acetaminophen (TYLENOL) suppository 650 mg  650 mg Rectal Q6H PRN Dania Murguia MD        polyethylene glycol (GLYCOLAX) packet 17 g  17 g Oral Daily PRN Dania Murguia MD        glucose chewable tablet 16 g  4 tablet Oral PRN Dania Murguia MD        dextrose bolus 10% 125 mL  125 mL IntraVENous PRN Dania Murguia MD        Or    dextrose bolus 10% 250 mL  250 mL IntraVENous PRN Dania Murguia MD        glucagon (rDNA) injection 1 mg  1 mg SubCUTAneous PRN Dania Murguia MD        dextrose 10 % infusion   IntraVENous Continuous PRN Dania Murguia MD        insulin lispro (HUMALOG) injection vial 0-4 Units 0-4 Units SubCUTAneous TID  Chuck Iyer MD        insulin lispro (HUMALOG) injection vial 0-4 Units  0-4 Units SubCUTAneous Nightly Chuck Iyre MD        enoxaparin Sodium (LOVENOX) injection 30 mg  30 mg SubCUTAneous BID Chuck Iyer MD   30 mg at 10/12/22 2103    perflutren lipid microspheres (DEFINITY) injection 1.65 mg  1.5 mL IntraVENous ONCE PRN Reinier Barros MD        sodium chloride flush 0.9 % injection 5-40 mL  5-40 mL IntraVENous 2 times per day Reinier Barros MD   10 mL at 10/13/22 0802    sodium chloride flush 0.9 % injection 5-40 mL  5-40 mL IntraVENous PRN Reinier Barros MD        0.9 % sodium chloride infusion   IntraVENous PRN Reinier Barros MD           Past Medical History:    Past Medical History:   Diagnosis Date    Acute MI ([address]     Angina pectoris, unstable ([address]     ASHD (arteriosclerotic heart disease)     Bladder outlet obstruction     BPH (benign prostatic hyperplasia)     Chest pain     Constipation     Diabetes mellitus ([address]     GERD (gastroesophageal reflux disease)     Hx of CABG     Hypercholesteremia     Hypercholesterolemia     Hypertension     IBS (irritable bowel syndrome)     Influenza A 01/28/2020    Sinusitis     Type II or unspecified type diabetes mellitus without mention of complication, not stated as uncontrolled        Surgical History:    Past Surgical History:   Procedure Laterality Date    CARDIAC CATHETERIZATION  2002    stent    CARDIAC CATHETERIZATION  2011    stent    COLONOSCOPY  10/24/13    CORONARY ANGIOPLASTY WITH STENT PLACEMENT               Pre-Sedation:  Pre-Sedation Documentation and Exam:  I have personally completed a history, physical exam & review of systems for this patient (see notes).     Prior History of Anesthesia Complications:   none    Modified Mallampati:  III (soft palate, base of uvula visible)    ASA Classification:  Class 3 - A patient with severe systemic disease that limits activity but is not incapacitating    Rich Scale: Activity:  2 - Able to move 4 extremities voluntarily on command  Respiration:  2 - Able to breathe deeply and cough freely  Circulation:  2 - BP+/- 20mmHg of normal  Consciousness:  2 - Fully awake  Oxygen Saturation (color):  2 - Able to maintain oxygen saturation >92% on room air    Sedation/Anesthesia Plan:  Guard the patient's safety and welfare. Minimize physical discomfort and pain. Minimize negative psychological responses to treatment by providing sedation and analgesia and maximize the potential amnesia. Patient to meet pre-procedure discharge plan.     Medication Planned:  midazolam intravenously and fentanyl intravenously    Patient is an appropriate candidate for plan of sedation:   yes      Electronically signed by James Storey MD on 10/13/2022 at 10:54 AM

## 2022-10-13 NOTE — PROGRESS NOTES
Hospitalist Progress Note      PCP: Favian Garces MD    Date of Admission: 10/12/2022    Chief Complaint: chest pain    Hospital Course:   78 [facility]. [facility]. Patient developed intermittent left sided chest pain for the past several days. This is associated with nausea and belching. Patient states this is his typical presentation when he is having cardiac problems. He had a recent stress test that was moderate risk. He had planned to have a [facility] as an outpatient but symptoms worsened prior to getting once scheduled. Subjective: tolerated cath well. No new complaints. Medications:  Reviewed    Infusion Medications    sodium chloride      heparin (PORCINE) Infusion      sodium chloride      dextrose      sodium chloride       Scheduled Medications    sodium chloride flush  5-40 mL IntraVENous 2 times per day    amLODIPine  10 mg Oral Daily    aspirin  325 mg Oral Daily    rosuvastatin  40 mg Oral QPM    losartan  100 mg Oral Daily    sodium chloride flush  5-40 mL IntraVENous 2 times per day    insulin lispro  0-4 Units SubCUTAneous TID WC    insulin lispro  0-4 Units SubCUTAneous Nightly    sodium chloride flush  5-40 mL IntraVENous 2 times per day     PRN Meds: sodium chloride flush, sodium chloride, acetaminophen, heparin (porcine), heparin (porcine), sodium chloride flush, sodium chloride, ondansetron **OR** ondansetron, acetaminophen **OR** acetaminophen, polyethylene glycol, glucose, dextrose bolus **OR** dextrose bolus, glucagon (rDNA), dextrose, perflutren lipid microspheres, sodium chloride flush, sodium chloride    No intake or output data in the 24 hours ending 10/13/22 1426    Physical Exam Performed:    BP (!) 142/78   Pulse 68   Temp 97.6 °F (36.4 °C) (Oral)   Resp 17   Ht 6' 1\" (1.854 m)   Wt 228 lb (103.4 kg)   SpO2 95%   BMI 30.08 kg/m²     General appearance:  No apparent distress, appears stated age and cooperative.   HEENT: Normal cephalic, atraumatic without obvious deformity. Pupils equal, round, and reactive to light. Extra ocular muscles intact. Conjunctivae/corneas clear. Neck: Supple, with full range of motion. No jugular venous distention. Trachea midline. Respiratory:  Normal respiratory effort. Clear to auscultation, bilaterally without Rales/Wheezes/Rhonchi. Cardiovascular:  Regular rate and rhythm with normal S1/S2 without murmurs, rubs or gallops. Abdomen: Soft, non-tender, non-distended with normal bowel sounds. Musculoskeletal:  No clubbing, cyanosis or edema bilaterally. Full range of motion without deformity. Skin: Skin color, texture, turgor normal.  No rashes or lesions. Neurologic:  Neurovascularly intact without any focal sensory/motor deficits. Cranial nerves: II-XII intact, grossly non-focal.  Psychiatric:  Alert and oriented, thought content appropriate, normal insight  Capillary Refill: Brisk,3 seconds, normal  Peripheral Pulses: +2 palpable, equal bilaterally       Labs:   Recent Labs     10/12/22  0251 10/13/22  0722   WBC 5.3 5.5   HGB 15.4 14.5   HCT 44.3 42.8    222     Recent Labs     10/12/22  0251 10/13/22  0722    136   K 3.7 3.7  3.7    104   CO2 27 24   BUN 12 13   CREATININE 1.2 1.0   CALCIUM 9.8 9.0     Recent Labs     10/12/22  0251   AST 22   ALT 24   BILITOT 1.0   ALKPHOS 76     No results for input(s): INR in the last 72 hours.   Recent Labs     10/12/22  0251 10/12/22  1336 10/12/22  1637   TROPONINI <0.01 <0.01 <0.01       Urinalysis:      Lab Results   Component Value Date/Time    NITRU Negative 09/23/2021 03:20 AM    WBCUA None seen 12/14/2014 05:00 AM    BACTERIA Rare 12/14/2014 05:00 AM    RBCUA 0-2 12/14/2014 05:00 AM    BLOODU Negative 09/23/2021 03:20 AM    SPECGRAV <=1.005 09/23/2021 03:20 AM    GLUCOSEU Negative 09/23/2021 03:20 AM       Radiology:  VL PRE OP VEIN MAPPING    (Results Pending)   VL DUP CAROTID BILATERAL    (Results Pending) Assessment/Plan:    Active Hospital Problems    Diagnosis     Unstable angina (HCC) [I20.0]      Priority: Medium    Coronary artery disease involving native coronary artery of native heart with angina pectoris ([address] [I25.119]     Chest pain [R07.9]      Unstable angina  - cardiology consulted  - EKG, trop negative  - recent stress test with increased risk  - s/p [facility] with severe multivessel CAD  - echo ordered  - CT surgery consulted  - plan for CABG early next week  - continue ASA, statin. Started on heparin gtt     HTN  - well controlled  - continue norvasc, losartan     HLD  - continue statin     DMII  - well controlled  - holding home oral meds  - SSI ordered     Obesity  With Body mass index is 31 kg/m². Complicating assessment and treatment. Placing patient at risk for multiple co-morbidities as well as early death and contributing to the patient's presentation. Counseled on weight loss. DVT Prophylaxis: heparin gtt  Diet: ADULT DIET;  Regular  Code Status: Full Code  PT/OT Eval Status: not ordered    Dispo - pending surgery    Appropriate for A1 Discharge Unit: No      Juan Shah MD

## 2022-10-13 NOTE — PLAN OF CARE
Problem: Discharge Planning  Goal: Discharge to home or other facility with appropriate resources  Outcome: Progressing     Problem: Pain  Goal: Verbalizes/displays adequate comfort level or baseline comfort level  10/13/2022 1040 by Kishore Huynh RN  Outcome: Progressing  10/13/2022 0137 by Waleska Braxton RN  Outcome: Progressing  Flowsheets (Taken 10/13/2022 0137)  Verbalizes/displays adequate comfort level or baseline comfort level:   Encourage patient to monitor pain and request assistance   Assess pain using appropriate pain scale   Administer analgesics based on type and severity of pain and evaluate response   Implement non-pharmacological measures as appropriate and evaluate response     Problem: Chronic Conditions and Co-morbidities  Goal: Ability to maintain appropriate glucose levels will improve  Description: Ability to maintain appropriate glucose levels will improve  10/13/2022 1040 by Kishore Huynh RN  Outcome: Progressing  10/13/2022 0137 by Waleska Braxton RN  Outcome: Progressing

## 2022-10-14 ENCOUNTER — ANESTHESIA EVENT (OUTPATIENT)
Dept: OPERATING ROOM | Age: 77
DRG: 234 | End: 2022-10-14
Payer: MEDICARE

## 2022-10-14 LAB
ANTI-XA UNFRAC HEPARIN: 0.3 IU/ML (ref 0.3–0.7)
ANTI-XA UNFRAC HEPARIN: 0.3 IU/ML (ref 0.3–0.7)
GLUCOSE BLD-MCNC: 122 MG/DL (ref 70–99)
GLUCOSE BLD-MCNC: 211 MG/DL (ref 70–99)
GLUCOSE BLD-MCNC: 237 MG/DL (ref 70–99)
GLUCOSE BLD-MCNC: 266 MG/DL (ref 70–99)
PERFORMED ON: ABNORMAL
TSH SERPL DL<=0.05 MIU/L-ACNC: 1.63 UIU/ML (ref 0.27–4.2)

## 2022-10-14 PROCEDURE — 94799 UNLISTED PULMONARY SVC/PX: CPT

## 2022-10-14 PROCEDURE — 99232 SBSQ HOSP IP/OBS MODERATE 35: CPT | Performed by: NURSE PRACTITIONER

## 2022-10-14 PROCEDURE — 84443 ASSAY THYROID STIM HORMONE: CPT

## 2022-10-14 PROCEDURE — 36415 COLL VENOUS BLD VENIPUNCTURE: CPT

## 2022-10-14 PROCEDURE — 2580000003 HC RX 258: Performed by: INTERNAL MEDICINE

## 2022-10-14 PROCEDURE — 6370000000 HC RX 637 (ALT 250 FOR IP): Performed by: INTERNAL MEDICINE

## 2022-10-14 PROCEDURE — 2060000000 HC ICU INTERMEDIATE R&B

## 2022-10-14 PROCEDURE — 85520 HEPARIN ASSAY: CPT

## 2022-10-14 RX ORDER — NITROGLYCERIN 0.4 MG/1
0.4 TABLET SUBLINGUAL EVERY 5 MIN PRN
Status: DISCONTINUED | OUTPATIENT
Start: 2022-10-14 | End: 2022-10-18

## 2022-10-14 RX ADMIN — ASPIRIN 325 MG: 325 TABLET ORAL at 09:06

## 2022-10-14 RX ADMIN — SODIUM CHLORIDE, PRESERVATIVE FREE 10 ML: 5 INJECTION INTRAVENOUS at 09:06

## 2022-10-14 RX ADMIN — LOSARTAN POTASSIUM 100 MG: 100 TABLET, FILM COATED ORAL at 09:06

## 2022-10-14 RX ADMIN — SODIUM CHLORIDE, PRESERVATIVE FREE 10 ML: 5 INJECTION INTRAVENOUS at 09:10

## 2022-10-14 RX ADMIN — ROSUVASTATIN CALCIUM 40 MG: 10 TABLET, FILM COATED ORAL at 18:48

## 2022-10-14 RX ADMIN — AMLODIPINE BESYLATE 10 MG: 5 TABLET ORAL at 09:06

## 2022-10-14 RX ADMIN — SODIUM CHLORIDE, PRESERVATIVE FREE 10 ML: 5 INJECTION INTRAVENOUS at 09:09

## 2022-10-14 RX ADMIN — INSULIN LISPRO 1 UNITS: 100 INJECTION, SOLUTION INTRAVENOUS; SUBCUTANEOUS at 18:43

## 2022-10-14 NOTE — CARE COORDINATION
CASE MANAGEMENT INITIAL ASSESSMENT      Reviewed chart and completed assessment with patient:  Family present: None  present  Explained Case Management role/services. to patient    Primary contact information:see below    Health Care Decision Maker :   Primary Decision Maker: Ileana Stratton - [phone]          Can this person be reached and be able to respond quickly, such as within a few minutes or hours? Yes    Admit date/status:inpatient 10/12/2022 Inpatient  Diagnosis:Chest pain   Is this a Readmission?:  No      Insurance:Medicare   Precert required for SNF: No       3 night stay required: No    Living arrangements, Adls, care needs, prior to admission:From home with spouse     Durable Medical Equipment at home:  Walker__Cane__RTS__ BSC__Shower Chair__  02__ HHN__ CPAP__  BiPap__  Hospital Bed__ W/C___ Other_____    Services in the home and/or outpatient, prior to admission:***    Current PCP:***                                Medications:*** Prescription coverage? ***{Responses; yes/no (default no):131334} Will pt require financial assistance with medications*** {Responses; yes/no (default no):[id number]}     Transportation needs: ***     Dialysis Facility (if applicable)   Name:  Address:  Dialysis Schedule:  Phone:  Fax:    PT/OT recs:***    Hospital Exemption Notification (HEN):***    Barriers to discharge:***    Plan/comments:***     ECOC on chart for MD signature

## 2022-10-14 NOTE — PROGRESS NOTES
CVTS Thoracic Progress Note:          CC: Chest pain/indigestion/heartburn    Subj: Feeling well. Denies chest pain, shortness of breath, GI symptoms. Obj:    Blood pressure 139/81, pulse 67, temperature 98 °F (36.7 °C), temperature source Oral, resp. rate 18, height 6' 1\" (1.854 m), weight 221 lb 11.2 oz (100.6 kg), SpO2 93 %. Alert, oriented   S1 S2 normal. SR on monitor   Lungs ctab   Abdomen soft, non-tender. Normoactive bowel sounds    No lower extremity edema     Diagnostics:   Recent Labs     10/12/22  0251 10/13/22  0722 10/13/22  1802   WBC 5.3 5.5 4.7   HGB 15.4 14.5 15.2   HCT 44.3 42.8 45.9    222 245                                                                  Recent Labs     10/12/22  0251 10/13/22  0722    136   K 3.7 3.7  3.7    104   CO2 27 24   BUN 12 13   CREATININE 1.2 1.0   GLUCOSE 138* 158*          No results for input(s): MG in the last 72 hours. Recent Labs     10/12/22  0251 10/12/22  1336 10/12/22  1637   TROPONINI <0.01 <0.01 <0.01     Recent Labs     10/13/22  1802   INR 1.00       CXR: 10/12/22  FINDINGS:   Cardiac and mediastinal silhouettes appear within normal limits for size. Pulmonary vascularity is normal.  No focal infiltrate or pleural effusion. No pneumothorax. No acute osseous abnormality. Degenerative changes are   seen in the thoracic spine. Impression   No acute cardiopulmonary process. Carotid duplex: 10/13/22  Tech Comments   Right   The right internal carotid artery reveals a <50% diameter reducing stenosis. The right vertebral artery demonstrates high resistive antegrade flow. The right subclavian artery is visualized and demonstrates multiphasic flow. Bilateral blood pressure were not obtained due to recent right radial artery   catheterization. Left   The left internal carotid artery reveals a <50% diameter reducing stenosis. The left vertebral artery demonstrates normal antegrade flow.    The left subclavian artery is visualized and demonstrates      Vein mapping: 10/13/22  Measurements pending     TTE: 10/13/22   Summary   Normal left ventricular systolic function with ejection fraction of 55-60%. No regional wall motion abnormalites are seen. Normal left ventricular size with mild-moderate concentric left ventricular   hypertrophy. Grade I diastolic dysfunction with normal filling pressure. Normal RV size and systolic function. Trivial mitral regurgitation. No tricuspid regurgitation to estimate systolic pulmonary artery pressure   (SPAP). Assess/Plan:   Labs and imaging reviewed as above. IM and Cardiology notes reviewed. Severe multivessel CAD  -ASA, statin. No BB secondary to bradycardia  -No symptoms at present  -heparin gtt  -OR on Tuesday, 10/18 for CABG  -Continue with preoperative testing and risk stratification (carotid duplex, echo, UA reviewed.   Vein mapping measurements and spirometry pending)    HTN  -Continue losartan, amlodipine    HLD  -Statin    DM II  -A1c 8.2 on 10/12/2022  -SSI per hospitalist  ________________________________________________________________    YANNICK Randhawa - CNP  10/14/2022  12:58 PM

## 2022-10-14 NOTE — PROGRESS NOTES
posteriorly  Cardiovascular:  RRR, normal S1S2, no m/g/r  Abdomen:  Soft, nontender, +bowel sounds  Extremities:  No BLE edema  right radial site without ooze, bruise or hematoma, dressing C,D,I, 2+ pulse      Medications:    sodium chloride flush  5-40 mL IntraVENous 2 times per day    amLODIPine  10 mg Oral Daily    aspirin  325 mg Oral Daily    rosuvastatin  40 mg Oral QPM    losartan  100 mg Oral Daily    sodium chloride flush  5-40 mL IntraVENous 2 times per day    insulin lispro  0-4 Units SubCUTAneous TID WC    insulin lispro  0-4 Units SubCUTAneous Nightly    sodium chloride flush  5-40 mL IntraVENous 2 times per day      sodium chloride      heparin (PORCINE) Infusion 1,360 Units/hr (10/13/22 2229)    sodium chloride      dextrose      sodium chloride         Lab Data: Lab results independently reviewed and analyzed by myself 10/14/2022    CBC:   Recent Labs     10/12/22  0251 10/13/22  0722 10/13/22  1802   WBC 5.3 5.5 4.7   HGB 15.4 14.5 15.2    222 245     BMP:    Recent Labs     10/12/22  0251 10/13/22  0722    136   K 3.7 3.7  3.7   CO2 27 24   BUN 12 13   CREATININE 1.2 1.0     INR:    Recent Labs     10/13/22  1802   INR 1.00     BNP:    Recent Labs     10/12/22  0251   PROBNP 44     Cardiac Enzymes:   Recent Labs     10/12/22  0251 10/12/22  1336 10/12/22  1637   TROPONINI <0.01 <0.01 <0.01     Lipids:   Lab Results   Component Value Date/Time    TRIG 77 08/29/2022 09:22 AM    TRIG 83 12/13/2021 08:23 AM    HDL 54 08/29/2022 09:22 AM    HDL 50 12/13/2021 08:23 AM    HDL 41 01/24/2011 03:25 AM    LDLCALC 103 08/29/2022 09:22 AM    LDLCALC 78 12/13/2021 08:23 AM       Cardiac Imaging:   ECHO 10/14/2022:    Summary   Normal left ventricular systolic function with ejection fraction of 55-60%. No regional wall motion abnormalites are seen. Normal left ventricular size with mild-moderate concentric left ventricular   hypertrophy.    Grade I diastolic dysfunction with normal filling pressure. Normal RV size and systolic function. Trivial mitral regurgitation. No tricuspid regurgitation to estimate systolic pulmonary artery pressure   (SPAP). CARDIAC CATH 10/14/2022:   FINDINGS   LVGRAM     LVEDP 13   GRADIENT ACROSS AORTIC VALVE None   LV FUNCTION EF 60%   WALL MOTION Normal   MITRAL REGURGITATION Mild     CORONARY ARTERIES     LM Aneurysmal with ulcerated plaque, Prox <10%, mid-distal 30% stenosis. LAD Calcified, proximal-mid 50 to 75% stenosis, mid-distal 80% stenosis. LCX Tortuous, calcified, proximal 50 to 75% stenosis. Mid to distal 80 to 90% stenosis that extends into OM1. There is a mid-distal circumflex stent that has 40% in-stent restenosis         RCA Large vessel, dominant, very high anterior takeoff near the left coronary cusp, it is calcified. There is 100% proximal-mid  with well-developed right to right collaterals and lesser left to right collaterals            PERCUTANEOUS INTERVENTION DESCRIPTION      Heparin was used for anticoagulation, a 6 Danish VL 3.5 guiding catheter was used to intubate the left main. A choice floppy wire was used to cross the left main into the LAD. IVUS was performed to left main and LAD. In the proximal LAD there is mild disease just at the ostium and in the left main there was distal 30% stenosis with moderate plaque, there is no clear unstable plaque or ulceration noted on IVUS. CONCLUSIONS:      Multivessel CAD/ASHD with left main ulcerated plaque  Will refer to CT surgery for consideration of CABG  Start heparin drip without bolus in 3 hours  Transfer to intermediate care, C4          Stress Test October 2022 at [facility]: Interpetation Summary:   The LV EF is 67%   Normal global and regional wall motion in all territories.    There is a medium sized, partially reversible defect in the inferior wall   consistent with moderate sandhya-infarct ischemia.        o Negative ECG for ischemia with pharmocologic stress. o Positive nuclear stress imaging suggestive of inducible ischemia in the        inferior wall.      o Nuclear stress image findings indicate intermediate risk for future        ischemic event .      o Normal left ventricular systolic function. Cath 2011 at [facility]:  Cath 01/2011    CORONARY ANATOMY:      The left main coronary artery has no significant disease. The left anterior descending artery has a 30% stenosis, mid   distal lumen  irregularities. The ramus intermedius branch has a 90% mid vessel stenosis. The circumflex artery gives rise to significant obtuse marginal   branch. There is a patent stent in the distal portion of this AV group   circumflex,  but no evidence of hemodynamic disease. The right coronary artery is known to be occluded per angiogram   performed in  2001. The artery was not re-imaged during this case (also not   imaged during  the 2003 case), stopped looking for the right coronary artery,   felt it was  best to conserve on contrast totals as opposed to locating a   chronically  occluded right coronary artery. Aortic root angiogram was performed. This did not show a right   coronary  artery taking off from the right cusp, however, did not identify   anomalous  takeoff either. The aortic root does appear mildly dilated   towards upper  limits of normal size. Left ventriculogram shows normal wall motion ejection fraction of   50 to 55%. Percutaneous coronary intervention/stent implantation x 1 in the   ramus  intermedius branch. Preintervention stenosis was 90%,   postintervention  stenosis was 0%. Guide catheter was a 6 Naa guide   catheter. Guidewire was Kinetics guidewire. Predilation was performed with   a 2.0 x 8  Voyager stent replaced with a 2.5 x 12 Vision bare-metal stent. Postdilatation was performed with a 2.75 x 6 Voyager   non-compliant balloon. INTERVENTION MEDICATIONS:    1. Angiomax. 2.  Aspirin. 3.  Effient. COMPLICATIONS:  None. IMPRESSION:    1. Patent stent in the circumflex artery. 2.  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 3.  Severe stenosis of the ramus intermedius branch. 4.  Status post successful balloon angioplasty stent deployment   ramus  intermedius branch. 5.  Normal left ventricular systolic function.

## 2022-10-14 NOTE — PROGRESS NOTES
Hospitalist Progress Note      PCP: Joyce Dean MD    Date of Admission: 10/12/2022    Chief Complaint: chest pain    Hospital Course:   78 y.o. male who presented to [address] [place] [address] with chest pain. Patient developed intermittent left sided chest pain for the past several days. This is associated with nausea and belching. Patient states this is his typical presentation when he is having cardiac problems. He had a recent stress test that was moderate risk. He had planned to have a [facility] as an outpatient but symptoms worsened prior to getting once scheduled. Subjective: tolerated cath well. No new complaints.        Medications:  Reviewed    Infusion Medications    sodium chloride      heparin (PORCINE) Infusion 1,360 Units/hr (10/13/22 2223)    sodium chloride      dextrose      sodium chloride       Scheduled Medications    sodium chloride flush  5-40 mL IntraVENous 2 times per day    amLODIPine  10 mg Oral Daily    aspirin  325 mg Oral Daily    rosuvastatin  40 mg Oral QPM    losartan  100 mg Oral Daily    sodium chloride flush  5-40 mL IntraVENous 2 times per day    insulin lispro  0-4 Units SubCUTAneous TID WC    insulin lispro  0-4 Units SubCUTAneous Nightly    sodium chloride flush  5-40 mL IntraVENous 2 times per day     PRN Meds: nitroGLYCERIN, sodium chloride flush, sodium chloride, acetaminophen, heparin (porcine), heparin (porcine), sodium chloride flush, sodium chloride, ondansetron **OR** ondansetron, acetaminophen **OR** acetaminophen, polyethylene glycol, glucose, dextrose bolus **OR** dextrose bolus, glucagon (rDNA), dextrose, perflutren lipid microspheres, sodium chloride flush, sodium chloride    No intake or output data in the 24 hours ending 10/14/22 1606    Physical Exam Performed:    BP (!) 146/78   Pulse 66   Temp 98.5 °F (36.9 °C) (Oral)   Resp 18   Ht 6' 1\" (1.854 m)   Wt 221 lb 11.2 oz (100.6 kg)   SpO2 92%   BMI 29.25 kg/m²     General appearance:  No apparent distress, appears stated age and cooperative. HEENT:  Normal cephalic, atraumatic without obvious deformity. Pupils equal, round, and reactive to light. Extra ocular muscles intact. Conjunctivae/corneas clear. Neck: Supple, with full range of motion. No jugular venous distention. Trachea midline. Respiratory:  Normal respiratory effort. Clear to auscultation, bilaterally without Rales/Wheezes/Rhonchi. Cardiovascular:  Regular rate and rhythm with normal S1/S2 without murmurs, rubs or gallops. Abdomen: Soft, non-tender, non-distended with normal bowel sounds. Musculoskeletal:  No clubbing, cyanosis or edema bilaterally. Full range of motion without deformity. Skin: Skin color, texture, turgor normal.  No rashes or lesions. Neurologic:  Neurovascularly intact without any focal sensory/motor deficits.  Cranial nerves: II-XII intact, grossly non-focal.  Psychiatric:  Alert and oriented, thought content appropriate, normal insight  Capillary Refill: Brisk,3 seconds, normal  Peripheral Pulses: +2 palpable, equal bilaterally       Labs:   Recent Labs     10/12/22  0251 10/13/22  0722 10/13/22  1802   WBC 5.3 5.5 4.7   HGB 15.4 14.5 15.2   HCT 44.3 42.8 45.9    222 245     Recent Labs     10/12/22  0251 10/13/22  0722    136   K 3.7 3.7  3.7    104   CO2 27 24   BUN 12 13   CREATININE 1.2 1.0   CALCIUM 9.8 9.0     Recent Labs     10/12/22  0251   AST 22   ALT 24   BILITOT 1.0   ALKPHOS 76     Recent Labs     10/13/22  1802   INR 1.00     Recent Labs     10/12/22  0251 10/12/22  1336 10/12/22  1637   TROPONINI <0.01 <0.01 <0.01       Urinalysis:      Lab Results   Component Value Date/Time    NITRU Negative 10/13/2022 10:31 PM    WBCUA None seen 12/14/2014 05:00 AM    BACTERIA Rare 12/14/2014 05:00 AM    RBCUA 0-2 12/14/2014 05:00 AM    BLOODU Negative 10/13/2022 10:31 PM    SPECGRAV 1.015 10/13/2022 10:31 PM    GLUCOSEU >=1000 10/13/2022 10:31 PM       Radiology:  VL PRE OP VEIN MAPPING   Final Result      VL DUP CAROTID BILATERAL   Final Result              Assessment/Plan:    Active Hospital Problems    Diagnosis     Unstable angina (HCC) [I20.0]      Priority: Medium    Coronary artery disease involving native coronary artery of native heart with angina pectoris ([facility] [address] [I25.119]     Primary hypertension [I10]     Mixed hyperlipidemia [E78.2]     Chest pain [R07.9]      Unstable angina  - cardiology consulted  - EKG, trop negative  - recent stress test with increased risk  - s/p LHC with severe multivessel CAD  - echo ordered  - CT surgery consulted  - plan for CABG early next week  - continue ASA, statin. Continue heparin gtt     HTN  - well controlled  - continue norvasc, losartan     HLD  - continue statin     DMII  - well controlled  - holding home oral meds  - SSI ordered     Obesity  With Body mass index is 31 kg/m². Complicating assessment and treatment. Placing patient at risk for multiple co-morbidities as well as early death and contributing to the patient's presentation. Counseled on weight loss. DVT Prophylaxis: heparin gtt  Diet: ADULT DIET; Regular; 5 carb choices (75 gm/meal);  Low Fat/Low Chol/High Fiber/CARLOZ  Code Status: Full Code  PT/OT Eval Status: not ordered    Dispo - pending surgery    Appropriate for A1 Discharge Unit: No      Ynes Lopez MD

## 2022-10-15 LAB
ANTI-XA UNFRAC HEPARIN: 0.2 IU/ML (ref 0.3–0.7)
ANTI-XA UNFRAC HEPARIN: 0.22 IU/ML (ref 0.3–0.7)
ANTI-XA UNFRAC HEPARIN: 0.45 IU/ML (ref 0.3–0.7)
GLUCOSE BLD-MCNC: 169 MG/DL (ref 70–99)
GLUCOSE BLD-MCNC: 180 MG/DL (ref 70–99)
GLUCOSE BLD-MCNC: 183 MG/DL (ref 70–99)
GLUCOSE BLD-MCNC: 190 MG/DL (ref 70–99)
HCT VFR BLD CALC: 44.4 % (ref 40.5–52.5)
HEMOGLOBIN: 15.2 G/DL (ref 13.5–17.5)
MCH RBC QN AUTO: 31.8 PG (ref 26–34)
MCHC RBC AUTO-ENTMCNC: 34.2 G/DL (ref 31–36)
MCV RBC AUTO: 92.8 FL (ref 80–100)
PDW BLD-RTO: 12.8 % (ref 12.4–15.4)
PERFORMED ON: ABNORMAL
PLATELET # BLD: 226 K/UL (ref 135–450)
PMV BLD AUTO: 8.1 FL (ref 5–10.5)
RBC # BLD: 4.78 M/UL (ref 4.2–5.9)
WBC # BLD: 4.5 K/UL (ref 4–11)

## 2022-10-15 PROCEDURE — 6360000002 HC RX W HCPCS: Performed by: THORACIC SURGERY (CARDIOTHORACIC VASCULAR SURGERY)

## 2022-10-15 PROCEDURE — 2580000003 HC RX 258: Performed by: INTERNAL MEDICINE

## 2022-10-15 PROCEDURE — 6360000002 HC RX W HCPCS: Performed by: INTERNAL MEDICINE

## 2022-10-15 PROCEDURE — 2060000000 HC ICU INTERMEDIATE R&B

## 2022-10-15 PROCEDURE — 6370000000 HC RX 637 (ALT 250 FOR IP): Performed by: INTERNAL MEDICINE

## 2022-10-15 PROCEDURE — 85027 COMPLETE CBC AUTOMATED: CPT

## 2022-10-15 PROCEDURE — 36415 COLL VENOUS BLD VENIPUNCTURE: CPT

## 2022-10-15 PROCEDURE — 85520 HEPARIN ASSAY: CPT

## 2022-10-15 RX ORDER — HEPARIN SODIUM 1000 [USP'U]/ML
2000 INJECTION, SOLUTION INTRAVENOUS; SUBCUTANEOUS ONCE
Status: COMPLETED | OUTPATIENT
Start: 2022-10-15 | End: 2022-10-15

## 2022-10-15 RX ORDER — PANTOPRAZOLE SODIUM 40 MG/1
40 TABLET, DELAYED RELEASE ORAL
Status: DISCONTINUED | OUTPATIENT
Start: 2022-10-15 | End: 2022-10-18

## 2022-10-15 RX ADMIN — HEPARIN SODIUM 2000 UNITS: 1000 INJECTION INTRAVENOUS; SUBCUTANEOUS at 15:07

## 2022-10-15 RX ADMIN — ASPIRIN 325 MG: 325 TABLET ORAL at 09:15

## 2022-10-15 RX ADMIN — LOSARTAN POTASSIUM 100 MG: 100 TABLET, FILM COATED ORAL at 09:15

## 2022-10-15 RX ADMIN — HEPARIN SODIUM 1720 UNITS/HR: 10000 INJECTION, SOLUTION INTRAVENOUS at 23:41

## 2022-10-15 RX ADMIN — HEPARIN SODIUM 1540 UNITS/HR: 10000 INJECTION, SOLUTION INTRAVENOUS at 11:37

## 2022-10-15 RX ADMIN — SODIUM CHLORIDE, PRESERVATIVE FREE 10 ML: 5 INJECTION INTRAVENOUS at 09:17

## 2022-10-15 RX ADMIN — AMLODIPINE BESYLATE 10 MG: 5 TABLET ORAL at 09:15

## 2022-10-15 RX ADMIN — ROSUVASTATIN CALCIUM 40 MG: 10 TABLET, FILM COATED ORAL at 16:32

## 2022-10-15 RX ADMIN — PANTOPRAZOLE SODIUM 40 MG: 40 TABLET, DELAYED RELEASE ORAL at 16:32

## 2022-10-15 RX ADMIN — HEPARIN SODIUM 2000 UNITS: 1000 INJECTION INTRAVENOUS; SUBCUTANEOUS at 06:37

## 2022-10-15 ASSESSMENT — PAIN SCALES - GENERAL
PAINLEVEL_OUTOF10: 0
PAINLEVEL_OUTOF10: 0

## 2022-10-15 NOTE — PROGRESS NOTES
Hospitalist Progress Note      PCP: Florentino De Guzman MD    Date of Admission: 10/12/2022    Chief Complaint: chest pain    Hospital Course:   78 y.o. male who presented to [facility] with chest pain. Patient developed intermittent left sided chest pain for the past several days. This is associated with nausea and belching. Patient states this is his typical presentation when he is having cardiac problems. He had a recent stress test that was moderate risk. He had planned to have a [facility] as an outpatient but symptoms worsened prior to getting once scheduled. Subjective: tolerated cath well. No new complaints.        Medications:  Reviewed    Infusion Medications    sodium chloride      heparin (PORCINE) Infusion 1,720 Units/hr (10/15/22 1504)    sodium chloride      dextrose      sodium chloride       Scheduled Medications    pantoprazole  40 mg Oral QAM AC    sodium chloride flush  5-40 mL IntraVENous 2 times per day    amLODIPine  10 mg Oral Daily    aspirin  325 mg Oral Daily    rosuvastatin  40 mg Oral QPM    losartan  100 mg Oral Daily    sodium chloride flush  5-40 mL IntraVENous 2 times per day    insulin lispro  0-4 Units SubCUTAneous TID WC    insulin lispro  0-4 Units SubCUTAneous Nightly    sodium chloride flush  5-40 mL IntraVENous 2 times per day     PRN Meds: nitroGLYCERIN, sodium chloride flush, sodium chloride, acetaminophen, heparin (porcine), heparin (porcine), sodium chloride flush, sodium chloride, ondansetron **OR** ondansetron, acetaminophen **OR** acetaminophen, polyethylene glycol, glucose, dextrose bolus **OR** dextrose bolus, glucagon (rDNA), dextrose, perflutren lipid microspheres, sodium chloride flush, sodium chloride      Intake/Output Summary (Last 24 hours) at 10/15/2022 1530  Last data filed at 10/15/2022 0635  Gross per 24 hour   Intake 240 ml   Output --   Net 240 ml       Physical Exam Performed:    BP (!) 162/86   Pulse 66   Temp 98.1 °F (36.7 °C) (Oral) Resp 16   Ht 6' 1\" (1.854 m)   Wt 221 lb 11.2 oz (100.6 kg)   SpO2 96%   BMI 29.25 kg/m²     General appearance:  No apparent distress, appears stated age and cooperative. HEENT:  Normal cephalic, atraumatic without obvious deformity. Pupils equal, round, and reactive to light. Extra ocular muscles intact. Conjunctivae/corneas clear. Neck: Supple, with full range of motion. No jugular venous distention. Trachea midline. Respiratory:  Normal respiratory effort. Clear to auscultation, bilaterally without Rales/Wheezes/Rhonchi. Cardiovascular:  Regular rate and rhythm with normal S1/S2 without murmurs, rubs or gallops. Abdomen: Soft, non-tender, non-distended with normal bowel sounds. Musculoskeletal:  No clubbing, cyanosis or edema bilaterally. Full range of motion without deformity. Skin: Skin color, texture, turgor normal.  No rashes or lesions. Neurologic:  Neurovascularly intact without any focal sensory/motor deficits. Cranial nerves: II-XII intact, grossly non-focal.  Psychiatric:  Alert and oriented, thought content appropriate, normal insight  Capillary Refill: Brisk,3 seconds, normal  Peripheral Pulses: +2 palpable, equal bilaterally       Labs:   Recent Labs     10/13/22  0722 10/13/22  1802 10/15/22  0438   WBC 5.5 4.7 4.5   HGB 14.5 15.2 15.2   HCT 42.8 45.9 44.4    245 226     Recent Labs     10/13/22  0722      K 3.7  3.7      CO2 24   BUN 13   CREATININE 1.0   CALCIUM 9.0     No results for input(s): AST, ALT, BILIDIR, BILITOT, ALKPHOS in the last 72 hours.     Recent Labs     10/13/22  1802   INR 1.00     Recent Labs     10/12/22  1637   TROPONINI <0.01       Urinalysis:      Lab Results   Component Value Date/Time    NITRU Negative 10/13/2022 10:31 PM    WBCUA None seen 12/14/2014 05:00 AM    BACTERIA Rare 12/14/2014 05:00 AM    RBCUA 0-2 12/14/2014 05:00 AM    BLOODU Negative 10/13/2022 10:31 PM    SPECGRAV 1.015 10/13/2022 10:31 PM    GLUCOSEU >=1000 10/13/2022 10:31 PM       Radiology:  VL PRE OP VEIN MAPPING   Final Result      VL DUP CAROTID BILATERAL   Final Result              Assessment/Plan:    Active Hospital Problems    Diagnosis     Unstable angina (HCC) [I20.0]      Priority: Medium    Coronary artery disease involving native coronary artery of native heart with angina pectoris ([address] [I25.119]     Primary hypertension [I10]     Mixed hyperlipidemia [E78.2]     Chest pain [R07.9]      Unstable angina  - cardiology consulted  - EKG, trop negative  - recent stress test with increased risk  - s/p [facility] with severe multivessel CAD  - echo ordered  - CT surgery consulted  - plan for CABG early next week  - continue ASA, statin. Continue heparin gtt     HTN  - well controlled  - continue norvasc, losartan     HLD  - continue statin     DMII  - well controlled  - holding home oral meds  - SSI ordered     Obesity  With Body mass index is 31 kg/m². Complicating assessment and treatment. Placing patient at risk for multiple co-morbidities as well as early death and contributing to the patient's presentation. Counseled on weight loss. DVT Prophylaxis: heparin gtt  Diet: ADULT DIET; Regular; 5 carb choices (75 gm/meal);  Low Fat/Low Chol/High Fiber/CARLOZ  Code Status: Full Code  PT/OT Eval Status: not ordered    Dispo - pending surgery    Appropriate for A1 Discharge Unit: No      Kiel Solorio MD

## 2022-10-16 LAB
ANTI-XA UNFRAC HEPARIN: 0.49 IU/ML (ref 0.3–0.7)
GLUCOSE BLD-MCNC: 160 MG/DL (ref 70–99)
GLUCOSE BLD-MCNC: 181 MG/DL (ref 70–99)
GLUCOSE BLD-MCNC: 202 MG/DL (ref 70–99)
GLUCOSE BLD-MCNC: 265 MG/DL (ref 70–99)
PERFORMED ON: ABNORMAL

## 2022-10-16 PROCEDURE — 6360000002 HC RX W HCPCS: Performed by: INTERNAL MEDICINE

## 2022-10-16 PROCEDURE — 6370000000 HC RX 637 (ALT 250 FOR IP): Performed by: INTERNAL MEDICINE

## 2022-10-16 PROCEDURE — 2580000003 HC RX 258: Performed by: INTERNAL MEDICINE

## 2022-10-16 PROCEDURE — 85520 HEPARIN ASSAY: CPT

## 2022-10-16 PROCEDURE — 2060000000 HC ICU INTERMEDIATE R&B

## 2022-10-16 PROCEDURE — 36415 COLL VENOUS BLD VENIPUNCTURE: CPT

## 2022-10-16 RX ADMIN — ROSUVASTATIN CALCIUM 40 MG: 10 TABLET, FILM COATED ORAL at 17:57

## 2022-10-16 RX ADMIN — AMLODIPINE BESYLATE 10 MG: 5 TABLET ORAL at 09:32

## 2022-10-16 RX ADMIN — SODIUM CHLORIDE, PRESERVATIVE FREE 10 ML: 5 INJECTION INTRAVENOUS at 20:30

## 2022-10-16 RX ADMIN — ASPIRIN 325 MG: 325 TABLET ORAL at 09:32

## 2022-10-16 RX ADMIN — PANTOPRAZOLE SODIUM 40 MG: 40 TABLET, DELAYED RELEASE ORAL at 09:38

## 2022-10-16 RX ADMIN — INSULIN LISPRO 1 UNITS: 100 INJECTION, SOLUTION INTRAVENOUS; SUBCUTANEOUS at 17:57

## 2022-10-16 RX ADMIN — INSULIN LISPRO 2 UNITS: 100 INJECTION, SOLUTION INTRAVENOUS; SUBCUTANEOUS at 12:13

## 2022-10-16 RX ADMIN — LOSARTAN POTASSIUM 100 MG: 100 TABLET, FILM COATED ORAL at 09:32

## 2022-10-16 RX ADMIN — HEPARIN SODIUM 1720 UNITS/HR: 10000 INJECTION, SOLUTION INTRAVENOUS at 14:21

## 2022-10-16 ASSESSMENT — PAIN SCALES - GENERAL
PAINLEVEL_OUTOF10: 0

## 2022-10-16 NOTE — PROGRESS NOTES
Hospitalist Progress Note      PCP: Favian Garces MD    Date of Admission: 10/12/2022    Chief Complaint: chest pain    Hospital Course:   78 y.o. male who presented to [facility] with chest pain. Patient developed intermittent left sided chest pain for the past several days. This is associated with nausea and belching. Patient states this is his typical presentation when he is having cardiac problems. He had a recent stress test that was moderate risk. He had planned to have a [facility] as an outpatient but symptoms worsened prior to getting once scheduled. Subjective: tolerated cath well. No new complaints.        Medications:  Reviewed    Infusion Medications    sodium chloride      heparin (PORCINE) Infusion 1,720 Units/hr (10/15/22 6027)    sodium chloride      dextrose      sodium chloride       Scheduled Medications    pantoprazole  40 mg Oral QAM AC    sodium chloride flush  5-40 mL IntraVENous 2 times per day    amLODIPine  10 mg Oral Daily    aspirin  325 mg Oral Daily    rosuvastatin  40 mg Oral QPM    losartan  100 mg Oral Daily    sodium chloride flush  5-40 mL IntraVENous 2 times per day    insulin lispro  0-4 Units SubCUTAneous TID WC    insulin lispro  0-4 Units SubCUTAneous Nightly    sodium chloride flush  5-40 mL IntraVENous 2 times per day     PRN Meds: nitroGLYCERIN, sodium chloride flush, sodium chloride, acetaminophen, heparin (porcine), heparin (porcine), sodium chloride flush, sodium chloride, ondansetron **OR** ondansetron, acetaminophen **OR** acetaminophen, polyethylene glycol, glucose, dextrose bolus **OR** dextrose bolus, glucagon (rDNA), dextrose, perflutren lipid microspheres, sodium chloride flush, sodium chloride      Intake/Output Summary (Last 24 hours) at 10/16/2022 1404  Last data filed at 10/16/2022 1221  Gross per 24 hour   Intake 720 ml   Output --   Net 720 ml       Physical Exam Performed:    BP (!) 167/82   Pulse 67   Temp 98.5 °F (36.9 °C) (Oral) Resp 16   Ht 6' 1\" (1.854 m)   Wt 221 lb 11.2 oz (100.6 kg)   SpO2 97%   BMI 29.25 kg/m²     General appearance:  No apparent distress, appears stated age and cooperative. HEENT:  Normal cephalic, atraumatic without obvious deformity. Pupils equal, round, and reactive to light. Extra ocular muscles intact. Conjunctivae/corneas clear. Neck: Supple, with full range of motion. No jugular venous distention. Trachea midline. Respiratory:  Normal respiratory effort. Clear to auscultation, bilaterally without Rales/Wheezes/Rhonchi. Cardiovascular:  Regular rate and rhythm with normal S1/S2 without murmurs, rubs or gallops. Abdomen: Soft, non-tender, non-distended with normal bowel sounds. Musculoskeletal:  No clubbing, cyanosis or edema bilaterally. Full range of motion without deformity. Skin: Skin color, texture, turgor normal.  No rashes or lesions. Neurologic:  Neurovascularly intact without any focal sensory/motor deficits. Cranial nerves: II-XII intact, grossly non-focal.  Psychiatric:  Alert and oriented, thought content appropriate, normal insight  Capillary Refill: Brisk,3 seconds, normal  Peripheral Pulses: +2 palpable, equal bilaterally       Labs:   Recent Labs     10/13/22  1802 10/15/22  0438   WBC 4.7 4.5   HGB 15.2 15.2   HCT 45.9 44.4    226     No results for input(s): NA, K, CL, CO2, BUN, CREATININE, CALCIUM, PHOS in the last 72 hours. Invalid input(s): MAGNES    No results for input(s): AST, ALT, BILIDIR, BILITOT, ALKPHOS in the last 72 hours. Recent Labs     10/13/22  1802   INR 1.00     No results for input(s): Othelia Nutley in the last 72 hours.       Urinalysis:      Lab Results   Component Value Date/Time    NITRU Negative 10/13/2022 10:31 PM    WBCUA None seen 12/14/2014 05:00 AM    BACTERIA Rare 12/14/2014 05:00 AM    RBCUA 0-2 12/14/2014 05:00 AM    BLOODU Negative 10/13/2022 10:31 PM    SPECGRAV 1.015 10/13/2022 10:31 PM    GLUCOSEU >=1000 10/13/2022 10:31 PM Radiology:  VL PRE OP VEIN MAPPING   Final Result      VL DUP CAROTID BILATERAL   Final Result              Assessment/Plan:    Active Hospital Problems    Diagnosis     Unstable angina (HCC) [I20.0]      Priority: Medium    Coronary artery disease involving native coronary artery of native heart with angina pectoris ([address] [I25.119]     Primary hypertension [I10]     Mixed hyperlipidemia [E78.2]     Chest pain [R07.9]      Unstable angina  - cardiology consulted  - EKG, trop negative  - recent stress test with increased risk  - s/p LHC with severe multivessel CAD  - echo ordered  - CT surgery consulted  - plan for CABG early next week  - continue ASA, statin. Continue heparin gtt     HTN  - well controlled  - continue norvasc, losartan     HLD  - continue statin     DMII  - well controlled  - holding home oral meds  - SSI ordered     Obesity  With Body mass index is 31 kg/m². Complicating assessment and treatment. Placing patient at risk for multiple co-morbidities as well as early death and contributing to the patient's presentation. Counseled on weight loss. DVT Prophylaxis: heparin gtt  Diet: ADULT DIET; Regular; 5 carb choices (75 gm/meal);  Low Fat/Low Chol/High Fiber/CARLOZ  Code Status: Full Code  PT/OT Eval Status: not ordered    Dispo - pending surgery    Appropriate for A1 Discharge Unit: Cynthia Jade MD

## 2022-10-16 NOTE — PROGRESS NOTES
TTE: 10/13/22   Summary   Normal left ventricular systolic function with ejection fraction of 55-60%. No regional wall motion abnormalites are seen. Normal left ventricular size with mild-moderate concentric left ventricular   hypertrophy. Grade I diastolic dysfunction with normal filling pressure. Normal RV size and systolic function. Trivial mitral regurgitation. No tricuspid regurgitation to estimate systolic pulmonary artery pressure   (SPAP). Assess/Plan:   Labs and imaging reviewed as above. IM and Cardiology notes reviewed. Severe multivessel CAD  -ASA, statin. No BB secondary to bradycardia  -No symptoms at present  -heparin gtt  -OR on Tuesday, 10/18 for CABG  -Continue with preoperative testing and risk stratification (carotid duplex, echo, UA reviewed.   Vein mapping measurements and spirometry pending)  ________________________________________________________________    Olga Connelly MD  10/16/2022  10:53 AM

## 2022-10-17 LAB
ABO/RH: NORMAL
ANTI-XA UNFRAC HEPARIN: 0.6 IU/ML (ref 0.3–0.7)
ANTIBODY SCREEN: NORMAL
GLUCOSE BLD-MCNC: 148 MG/DL (ref 70–99)
GLUCOSE BLD-MCNC: 171 MG/DL (ref 70–99)
GLUCOSE BLD-MCNC: 178 MG/DL (ref 70–99)
GLUCOSE BLD-MCNC: 226 MG/DL (ref 70–99)
HCT VFR BLD CALC: 43.8 % (ref 40.5–52.5)
HEMOGLOBIN: 14.7 G/DL (ref 13.5–17.5)
MCH RBC QN AUTO: 31.3 PG (ref 26–34)
MCHC RBC AUTO-ENTMCNC: 33.5 G/DL (ref 31–36)
MCV RBC AUTO: 93.4 FL (ref 80–100)
PDW BLD-RTO: 12.8 % (ref 12.4–15.4)
PERFORMED ON: ABNORMAL
PLATELET # BLD: 243 K/UL (ref 135–450)
PMV BLD AUTO: 8.2 FL (ref 5–10.5)
RBC # BLD: 4.69 M/UL (ref 4.2–5.9)
WBC # BLD: 5.2 K/UL (ref 4–11)

## 2022-10-17 PROCEDURE — 86900 BLOOD TYPING SEROLOGIC ABO: CPT

## 2022-10-17 PROCEDURE — 86850 RBC ANTIBODY SCREEN: CPT

## 2022-10-17 PROCEDURE — 99232 SBSQ HOSP IP/OBS MODERATE 35: CPT | Performed by: NURSE PRACTITIONER

## 2022-10-17 PROCEDURE — 85027 COMPLETE CBC AUTOMATED: CPT

## 2022-10-17 PROCEDURE — 86901 BLOOD TYPING SEROLOGIC RH(D): CPT

## 2022-10-17 PROCEDURE — 6370000000 HC RX 637 (ALT 250 FOR IP): Performed by: INTERNAL MEDICINE

## 2022-10-17 PROCEDURE — 36415 COLL VENOUS BLD VENIPUNCTURE: CPT

## 2022-10-17 PROCEDURE — 6370000000 HC RX 637 (ALT 250 FOR IP): Performed by: NURSE PRACTITIONER

## 2022-10-17 PROCEDURE — 2060000000 HC ICU INTERMEDIATE R&B

## 2022-10-17 PROCEDURE — 86923 COMPATIBILITY TEST ELECTRIC: CPT

## 2022-10-17 PROCEDURE — 85520 HEPARIN ASSAY: CPT

## 2022-10-17 PROCEDURE — 6360000002 HC RX W HCPCS: Performed by: INTERNAL MEDICINE

## 2022-10-17 RX ORDER — SODIUM CHLORIDE, SODIUM LACTATE, POTASSIUM CHLORIDE, CALCIUM CHLORIDE 600; 310; 30; 20 MG/100ML; MG/100ML; MG/100ML; MG/100ML
INJECTION, SOLUTION INTRAVENOUS CONTINUOUS
Status: DISCONTINUED | OUTPATIENT
Start: 2022-10-18 | End: 2022-10-18

## 2022-10-17 RX ORDER — ACETAMINOPHEN 500 MG
1000 TABLET ORAL ONCE
Status: COMPLETED | OUTPATIENT
Start: 2022-10-18 | End: 2022-10-18

## 2022-10-17 RX ORDER — CHLORHEXIDINE GLUCONATE 0.12 MG/ML
15 RINSE ORAL ONCE
Status: COMPLETED | OUTPATIENT
Start: 2022-10-18 | End: 2022-10-18

## 2022-10-17 RX ORDER — ASPIRIN 81 MG/1
81 TABLET ORAL ONCE
Status: COMPLETED | OUTPATIENT
Start: 2022-10-18 | End: 2022-10-18

## 2022-10-17 RX ORDER — ALPRAZOLAM 0.25 MG/1
0.25 TABLET ORAL NIGHTLY PRN
Status: DISCONTINUED | OUTPATIENT
Start: 2022-10-17 | End: 2022-10-18

## 2022-10-17 RX ORDER — CHLORHEXIDINE GLUCONATE 4 G/100ML
SOLUTION TOPICAL SEE ADMIN INSTRUCTIONS
Status: DISCONTINUED | OUTPATIENT
Start: 2022-10-17 | End: 2022-10-18

## 2022-10-17 RX ORDER — BISACODYL 10 MG
10 SUPPOSITORY, RECTAL RECTAL ONCE
Status: COMPLETED | OUTPATIENT
Start: 2022-10-17 | End: 2022-10-18

## 2022-10-17 RX ORDER — HYDRALAZINE HYDROCHLORIDE 10 MG/1
10 TABLET, FILM COATED ORAL EVERY 8 HOURS PRN
Status: DISCONTINUED | OUTPATIENT
Start: 2022-10-17 | End: 2022-10-18

## 2022-10-17 RX ADMIN — HEPARIN SODIUM 1720 UNITS/HR: 10000 INJECTION, SOLUTION INTRAVENOUS at 22:43

## 2022-10-17 RX ADMIN — HEPARIN SODIUM 1720 UNITS/HR: 10000 INJECTION, SOLUTION INTRAVENOUS at 06:49

## 2022-10-17 RX ADMIN — AMLODIPINE BESYLATE 10 MG: 5 TABLET ORAL at 09:15

## 2022-10-17 RX ADMIN — MUPIROCIN: 20 OINTMENT TOPICAL at 22:43

## 2022-10-17 RX ADMIN — PANTOPRAZOLE SODIUM 40 MG: 40 TABLET, DELAYED RELEASE ORAL at 06:50

## 2022-10-17 RX ADMIN — ASPIRIN 325 MG: 325 TABLET ORAL at 09:15

## 2022-10-17 RX ADMIN — LOSARTAN POTASSIUM 100 MG: 100 TABLET, FILM COATED ORAL at 09:15

## 2022-10-17 RX ADMIN — CHLORHEXIDINE GLUCONATE: 213 SOLUTION TOPICAL at 22:25

## 2022-10-17 ASSESSMENT — PAIN SCALES - GENERAL
PAINLEVEL_OUTOF10: 0
PAINLEVEL_OUTOF10: 0

## 2022-10-17 NOTE — PROGRESS NOTES
[facility]   Daily Progress Note    Admit Date:  10/12/2022  HPI:   Reginia Schwab presented to [facility] with ss chest pain/ heaviness associated with shortness of breath, nausea and belching. Hx of CAD with prior PCI to LCX and known  of RCA, recent abnormal stress test for angina, HYPERTENSION, HLD, DM2 and GERD. Transferred to [facility] for interventional cardiology consult. EKG and troponin negative for ACS. Subjective:  Mr. Gupta Males lying in bed, denies any chest pain or shortness of breath. Objective:   Patient Vitals for the past 24 hrs:   BP Temp Temp src Pulse Resp SpO2   10/17/22 0745 (!) 157/81 98 °F (36.7 °C) Oral 67 18 --   10/17/22 0600 136/84 97.7 °F (36.5 °C) Oral 66 18 96 %   10/16/22 2030 (!) 175/93 97.7 °F (36.5 °C) Oral 67 16 96 %   10/16/22 1641 (!) 144/71 98.3 °F (36.8 °C) Oral 67 16 94 %   10/16/22 1215 (!) 167/82 98.5 °F (36.9 °C) Oral 67 16 97 %         Intake/Output Summary (Last 24 hours) at 10/17/2022 0951  Last data filed at 10/17/2022 0600  Gross per 24 hour   Intake 720 ml   Output --   Net 720 ml     Wt Readings from Last 3 Encounters:   10/14/22 221 lb 11.2 oz (100.6 kg)   10/12/22 235 lb (106.6 kg)   09/28/22 235 lb (106.6 kg)         ASSESSMENT:   UNSTABLE ANGINA: EKG and troponin negative for ACS  CAD: hx PCI to LCX and known  -RCA; + ulcerated plaque LM, severe MV CAD, on IV Heparin, asa, statin; no beta blocker due to hx bradycardia  HYPERTENSION: stable though sub optimal  HLD: , on  high intensity statin  DM2: A1c 8.2 per IM      PLAN:  Continue IV Heparin, asa and statin  CT surgery planned for Tuesday 10/18/22  On losartan 100 mg daily and amlodipine 10 mg daily for hypertension. Continue to monitor.   We will add hydralazine as needed dose as needed for systolic greater than 059  Will follow-up after surgery    YANNICK Ron CNP, 10/17/2022, 9:51 AM  [facility]   [phone]       Telemetry: SR 50-70, isolated PACs and PVCs  NYHA: III    Physical Exam:  General:  Awake, alert, NAD  Skin:  Warm and dry  Neck:  JVP normal  Chest:  Clear to auscultation posteriorly  Cardiovascular:  RRR, normal S1S2, no m/g/r  Abdomen:  Soft, nontender, +bowel sounds  Extremities:  No BLE edema      Medications:    pantoprazole  40 mg Oral QAM AC    sodium chloride flush  5-40 mL IntraVENous 2 times per day    amLODIPine  10 mg Oral Daily    aspirin  325 mg Oral Daily    rosuvastatin  40 mg Oral QPM    losartan  100 mg Oral Daily    sodium chloride flush  5-40 mL IntraVENous 2 times per day    insulin lispro  0-4 Units SubCUTAneous TID WC    insulin lispro  0-4 Units SubCUTAneous Nightly    sodium chloride flush  5-40 mL IntraVENous 2 times per day      sodium chloride      heparin (PORCINE) Infusion 1,720 Units/hr (10/17/22 0649)    sodium chloride      dextrose      sodium chloride         Lab Data: Lab results independently reviewed and analyzed by myself 10/17/2022    CBC:   Recent Labs     10/15/22  0438 10/17/22  0429   WBC 4.5 5.2   HGB 15.2 14.7    243       BMP:    No results for input(s): NA, K, CO2, BUN, CREATININE, CA in the last 72 hours. INR:    No results for input(s): INR in the last 72 hours. BNP:    No results for input(s): PROBNP in the last 72 hours. Cardiac Enzymes:   No results for input(s): TROPONINI in the last 72 hours. Lipids:   Lab Results   Component Value Date/Time    TRIG 77 08/29/2022 09:22 AM    TRIG 83 12/13/2021 08:23 AM    HDL 54 08/29/2022 09:22 AM    HDL 50 12/13/2021 08:23 AM    HDL 41 01/24/2011 03:25 AM    LDLCALC 103 08/29/2022 09:22 AM    LDLCALC 78 12/13/2021 08:23 AM       Cardiac Imaging:   ECHO 10/14/2022:    Summary   Normal left ventricular systolic function with ejection fraction of 55-60%. No regional wall motion abnormalites are seen. Normal left ventricular size with mild-moderate concentric left ventricular   hypertrophy.    Grade I diastolic dysfunction with normal filling pressure. Normal RV size and systolic function. Trivial mitral regurgitation. No tricuspid regurgitation to estimate systolic pulmonary artery pressure   (SPAP). CARDIAC CATH 10/14/2022:   FINDINGS   LVGRAM     LVEDP 13   GRADIENT ACROSS AORTIC VALVE None   LV FUNCTION EF 60%   WALL MOTION Normal   MITRAL REGURGITATION Mild     CORONARY ARTERIES     LM Aneurysmal with ulcerated plaque, Prox <10%, mid-distal 30% stenosis. LAD Calcified, proximal-mid 50 to 75% stenosis, mid-distal 80% stenosis. LCX Tortuous, calcified, proximal 50 to 75% stenosis. Mid to distal 80 to 90% stenosis that extends into OM1. There is a mid-distal circumflex stent that has 40% in-stent restenosis         RCA Large vessel, dominant, very high anterior takeoff near the left coronary cusp, it is calcified. There is 100% proximal-mid  with well-developed right to right collaterals and lesser left to right collaterals            PERCUTANEOUS INTERVENTION DESCRIPTION      Heparin was used for anticoagulation, a 6 Marshallese VL 3.5 guiding catheter was used to intubate the left main. A choice floppy wire was used to cross the left main into the LAD. IVUS was performed to left main and LAD. In the proximal LAD there is mild disease just at the ostium and in the left main there was distal 30% stenosis with moderate plaque, there is no clear unstable plaque or ulceration noted on IVUS. CONCLUSIONS:      Multivessel CAD/ASHD with left main ulcerated plaque  Will refer to CT surgery for consideration of CABG  Start heparin drip without bolus in 3 hours  Transfer to intermediate care, C4          Stress Test October 2022 at [facility] health: Interpetation Summary:   The LV EF is 67%   Normal global and regional wall motion in all territories.    There is a medium sized, partially reversible defect in the inferior wall   consistent with moderate sandhya-infarct ischemia.        o Negative ECG for ischemia with pharmocologic stress. o Positive nuclear stress imaging suggestive of inducible ischemia in the        inferior wall.      o Nuclear stress image findings indicate intermediate risk for future        ischemic event .      o Normal left ventricular systolic function. Cath 2011 at [facility]:  Cath 01/2011    CORONARY ANATOMY:      The left main coronary artery has no significant disease. The left anterior descending artery has a 30% stenosis, mid   distal lumen  irregularities. The ramus intermedius branch has a 90% mid vessel stenosis. The circumflex artery gives rise to significant obtuse marginal   branch. There is a patent stent in the distal portion of this AV group   circumflex,  but no evidence of hemodynamic disease. The right coronary artery is known to be occluded per angiogram   performed in  2001. The artery was not re-imaged during this case (also not   imaged during  the 2003 case), stopped looking for the right coronary artery,   felt it was  best to conserve on contrast totals as opposed to locating a   chronically  occluded right coronary artery. Aortic root angiogram was performed. This did not show a right   coronary  artery taking off from the right cusp, however, did not identify   anomalous  takeoff either. The aortic root does appear mildly dilated   towards upper  limits of normal size. Left ventriculogram shows normal wall motion ejection fraction of   50 to 55%. Percutaneous coronary intervention/stent implantation x 1 in the   ramus  intermedius branch. Preintervention stenosis was 90%,   postintervention  stenosis was 0%. Guide catheter was a 6 Naa guide   catheter. Guidewire was Kinetics guidewire. Predilation was performed with   a 2.0 x 8  Voyager stent replaced with a 2.5 x 12 Vision bare-metal stent. Postdilatation was performed with a 2.75 x 6 Voyager   non-compliant balloon. INTERVENTION MEDICATIONS:    1. Angiomax. 2.  Aspirin. 3.  Effient. COMPLICATIONS:  None. IMPRESSION:    1. Patent stent in the circumflex artery. 2.  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 3.  Severe stenosis of the ramus intermedius branch. 4.  Status post successful balloon angioplasty stent deployment   ramus  intermedius branch. 5.  Normal left ventricular systolic function.

## 2022-10-17 NOTE — PROGRESS NOTES
CVTS Thoracic Progress Note:          CC: Chest pain/indigestion/heartburn    Subj: Feeling well. Denies chest pain, shortness of breath, GI symptoms. Obj:    Blood pressure (!) 157/81, pulse 67, temperature 98 °F (36.7 °C), temperature source Oral, resp. rate 18, height 6' 1\" (1.854 m), weight 221 lb 11.2 oz (100.6 kg), SpO2 96 %. Alert, oriented   S1 S2 normal. SR on monitor   Lungs ctab   Abdomen soft, non-tender. Normoactive bowel sounds    No lower extremity edema     Diagnostics:   Recent Labs     10/15/22  0438 10/17/22  0429   WBC 4.5 5.2   HGB 15.2 14.7   HCT 44.4 43.8    243                                                                  No results for input(s): NA, K, CL, CO2, BUN, CREATININE, GLUCOSE in the last 72 hours. Invalid input(s):  CA,  PHOS         No results for input(s): MG in the last 72 hours. No results for input(s): TROPONINI in the last 72 hours. No results for input(s): INR in the last 72 hours. CXR: 10/12/22  FINDINGS:   Cardiac and mediastinal silhouettes appear within normal limits for size. Pulmonary vascularity is normal.  No focal infiltrate or pleural effusion. No pneumothorax. No acute osseous abnormality. Degenerative changes are   seen in the thoracic spine. Impression   No acute cardiopulmonary process. Carotid duplex: 10/13/22  Tech Comments   Right   The right internal carotid artery reveals a <50% diameter reducing stenosis. The right vertebral artery demonstrates high resistive antegrade flow. The right subclavian artery is visualized and demonstrates multiphasic flow. Bilateral blood pressure were not obtained due to recent right radial artery   catheterization. Left   The left internal carotid artery reveals a <50% diameter reducing stenosis. The left vertebral artery demonstrates normal antegrade flow.    The left subclavian artery is visualized and demonstrates      Vein mapping: 10/13/22  +----------------------------------++--------+-----+----+--------+-----+   ! Superficial - Great Saphenous Vein! !Right   ! ! Left!        !     !   +----------------------------------++--------+-----+----+--------+-----+   ! Location                          ! !Diameter! Depth! !Diameter! Depth!   +----------------------------------++--------+-----+----+--------+-----+   ! Sapheno Femoral Junction          ! !7.19    !     !    !5.25    !     !   +----------------------------------++--------+-----+----+--------+-----+   ! GSV High Thigh                    !!7.27    !     !    !6.09    !     !   +----------------------------------++--------+-----+----+--------+-----+   ! GSV Mid Thigh                     !!2.88    !     !    !2.42    !     !   +----------------------------------++--------+-----+----+--------+-----+   ! GSV Low Thigh                     !!2.33    !     !    !2.06    !     !   +----------------------------------++--------+-----+----+--------+-----+   ! GSV Knee                          !!1.83    !     !    !2.24    !     !   +----------------------------------++--------+-----+----+--------+-----+   ! GSV High Calf                     !!2.65    !     !    !2.69    !     !   +----------------------------------++--------+-----+----+--------+-----+   ! GSV Mid Calf                      !!2.78    !     !    !2.05    !     !   +----------------------------------++--------+-----+----+--------+-----+   ! GSV Low Calf                      !!2.24    !     !    !2.51    !     !   +----------------------------------++--------+-----+----+--------+-----+   ! GSV Ankle                         !!2.69    !     !    !2.74    !     ! +----------------------------------++--------+-----+----+--------+-----+      TTE: 10/13/22   Summary   Normal left ventricular systolic function with ejection fraction of 55-60%. No regional wall motion abnormalites are seen.    Normal left ventricular size with mild-moderate concentric left ventricular   hypertrophy. Grade I diastolic dysfunction with normal filling pressure. Normal RV size and systolic function. Trivial mitral regurgitation. No tricuspid regurgitation to estimate systolic pulmonary artery pressure   (SPAP). Spirometry: 10/14/22  FVC 71% pred  FEV1 79% pred   FEV1/% pred       Assess/Plan:   Labs and imaging reviewed as above. IM and Cardiology notes reviewed. Severe multivessel CAD  -ASA, statin. No BB secondary to bradycardia   -No symptoms at present  -heparin gtt  -OR on tomorrow, Tuesday, 10/18 for CABG.  All questions answered   -Preoperative testing completed and reviewed (carotid duplex, echo, UA, Vein mapping, spirometry)    HTN  -Continue losartan, amlodipine    HLD  -Statin    DM II  -A1c 8.2 on 10/12/2022  -SSI per hospitalist  ________________________________________________________________    YANNICK Cha Ace - CNP  10/17/2022  10:57 AM

## 2022-10-17 NOTE — PROGRESS NOTES
Hospitalist Progress Note      PCP: Alysia Harmon MD    Date of Admission: 10/12/2022    Chief Complaint: chest pain    Hospital Course:   78 y.o. male who presented to [facility] with chest pain. Patient developed intermittent left sided chest pain for the past several days. This is associated with nausea and belching. Patient states this is his typical presentation when he is having cardiac problems. He had a recent stress test that was moderate risk. He had planned to have a [facility] as an outpatient but symptoms worsened prior to getting once scheduled. Subjective:  No new complaints.  Comfortable and ready for CABG planned tomorrow     Medications:  Reviewed    Infusion Medications    sodium chloride      heparin (PORCINE) Infusion 1,720 Units/hr (10/17/22 0649)    sodium chloride      dextrose      sodium chloride       Scheduled Medications    pantoprazole  40 mg Oral QAM AC    sodium chloride flush  5-40 mL IntraVENous 2 times per day    amLODIPine  10 mg Oral Daily    aspirin  325 mg Oral Daily    rosuvastatin  40 mg Oral QPM    losartan  100 mg Oral Daily    sodium chloride flush  5-40 mL IntraVENous 2 times per day    insulin lispro  0-4 Units SubCUTAneous TID WC    insulin lispro  0-4 Units SubCUTAneous Nightly    sodium chloride flush  5-40 mL IntraVENous 2 times per day     PRN Meds: nitroGLYCERIN, sodium chloride flush, sodium chloride, acetaminophen, heparin (porcine), heparin (porcine), sodium chloride flush, sodium chloride, ondansetron **OR** ondansetron, acetaminophen **OR** acetaminophen, polyethylene glycol, glucose, dextrose bolus **OR** dextrose bolus, glucagon (rDNA), dextrose, perflutren lipid microspheres, sodium chloride flush, sodium chloride      Intake/Output Summary (Last 24 hours) at 10/17/2022 0900  Last data filed at 10/17/2022 0600  Gross per 24 hour   Intake 720 ml   Output --   Net 720 ml         Physical Exam Performed:    BP (!) 157/81   Pulse 67   Temp 98 °F (36.7 °C) (Oral)   Resp 18   Ht 6' 1\" (1.854 m)   Wt 221 lb 11.2 oz (100.6 kg)   SpO2 96%   BMI 29.25 kg/m²     General appearance:  No apparent distress, appears stated age and cooperative. HEENT:  Normal cephalic, atraumatic without obvious deformity. Pupils equal, round, and reactive to light. Extra ocular muscles intact. Conjunctivae/corneas clear. Neck: Supple, with full range of motion. No jugular venous distention. Trachea midline. Respiratory:  Normal respiratory effort. Clear to auscultation, bilaterally without Rales/Wheezes/Rhonchi. Cardiovascular:  Regular rate and rhythm with normal S1/S2 without murmurs, rubs or gallops. Abdomen: Soft, non-tender, non-distended with normal bowel sounds. Musculoskeletal:  No clubbing, cyanosis or edema bilaterally. Full range of motion without deformity. Skin: Skin color, texture, turgor normal.  No rashes or lesions. Neurologic:  Neurovascularly intact without any focal sensory/motor deficits. Cranial nerves: II-XII intact, grossly non-focal.  Psychiatric:  Alert and oriented, thought content appropriate, normal insight  Capillary Refill: Brisk,3 seconds, normal  Peripheral Pulses: +2 palpable, equal bilaterally       Labs:   Recent Labs     10/15/22  0438 10/17/22  0429   WBC 4.5 5.2   HGB 15.2 14.7   HCT 44.4 43.8    243       No results for input(s): NA, K, CL, CO2, BUN, CREATININE, CALCIUM, PHOS in the last 72 hours. Invalid input(s): MAGNES    No results for input(s): AST, ALT, BILIDIR, BILITOT, ALKPHOS in the last 72 hours. No results for input(s): INR in the last 72 hours. No results for input(s): [place] Ill in the last 72 hours.       Urinalysis:      Lab Results   Component Value Date/Time    NITRU Negative 10/13/2022 10:31 PM    WBCUA None seen 12/14/2014 05:00 AM    BACTERIA Rare 12/14/2014 05:00 AM    RBCUA 0-2 12/14/2014 05:00 AM    BLOODU Negative 10/13/2022 10:31 PM    SPECGRAV 1.015 10/13/2022 10:31 PM

## 2022-10-17 NOTE — ANESTHESIA PRE PROCEDURE
Department of Anesthesiology  Preprocedure Note       Name:  Jenny Rangel   Age:  78 y.o.  :  1945                                          MRN:  [id number]         Date:  10/17/2022      Surgeon: Hamida Tiwari):  Alcira Johnson MD    Procedure: Procedure(s):  CORONARY ARTERY BYPASS GRAFTING TIMES THREE WITH LEFT ATRIAL APPENDAGE CLIP PLACEMENT    Medications prior to admission:   Prior to Admission medications    Medication Sig Start Date End Date Taking? Authorizing Provider   metFORMIN (GLUCOPHAGE) 1000 MG tablet Take 1,000 mg by mouth 2 times daily (with meals)   Yes Historical Provider, MD   glimepiride (AMARYL) 4 MG tablet Take 1 tablet by mouth in the morning and at bedtime  Patient taking differently: Take 4 mg by mouth in the morning and at bedtime Patient only takes in AM 8/15/22   Rosetta Gregory MD   amLODIPine (NORVASC) 10 MG tablet TAKE ONE TABLET BY MOUTH DAILY 3/29/22   Rosetta Gergory MD   rosuvastatin (CRESTOR) 40 MG tablet Take 1 tablet by mouth every evening 2/15/22   Rosetta Gregory MD   blood glucose test strips [facility] ULTRA) strip USE TO TEST TWO TIMES A DAY 22   Rosetta Gregory MD   losartan (COZAAR) 100 MG tablet  21   Historical Provider, MD   traZODone (DESYREL) 100 MG tablet TAKE ONE TABLET BY MOUTH ONCE NIGHTLY AS NEEDED FOR SLEEP  Patient taking differently: Patient takes 50mg nightly 11/15/21   Rosetta Gregory MD   albuterol sulfate HFA (VENTOLIN HFA) 108 (90 Base) MCG/ACT inhaler Inhale 2 puffs into the lungs 4 times daily as needed for Wheezing 21   Taty Hernandez MD   nitroGLYCERIN (NITROSTAT) 0.4 MG SL tablet Place 1 tablet under the tongue every 5 minutes as needed for Chest pain up to max of 3 total doses.  If no relief after 1 dose, call 911. 20   Rosetta Gregory MD   Omega-3 Fatty Acids (FISH OIL) 1000 MG CAPS Take 1 capsule by mouth 2 times daily  Patient taking differently: Take 1,000 mg by mouth daily 4/28/15   YANNICK Garcia - CNP   aspirin 325 MG tablet Take 325 mg by mouth daily.     Historical Provider, MD       Current medications:    Current Facility-Administered Medications   Medication Dose Route Frequency Provider Last Rate Last Admin    hydrALAZINE (APRESOLINE) tablet 10 mg  10 mg Oral Q8H PRN YANNICK Burns - CNP        pantoprazole (PROTONIX) tablet 40 mg  40 mg Oral QAM AC Sonya Epperson MD   40 mg at 10/17/22 0650    nitroGLYCERIN (NITROSTAT) SL tablet 0.4 mg  0.4 mg SubLINGual Q5 Min PRN YANNICK Burns - CNP        sodium chloride flush 0.9 % injection 5-40 mL  5-40 mL IntraVENous 2 times per day Mariano Iyer MD   10 mL at 10/16/22 2030    sodium chloride flush 0.9 % injection 5-40 mL  5-40 mL IntraVENous PRN Mariano Iyer MD        0.9 % sodium chloride infusion   IntraVENous PRN Mariano Iyer MD        acetaminophen (TYLENOL) tablet 650 mg  650 mg Oral Q4H PRN Mraiano Iyer MD        heparin (porcine) injection 4,000 Units  4,000 Units IntraVENous PRN Mariano Iyer MD        heparin (porcine) injection 2,000 Units  2,000 Units IntraVENous PRN Mariano Iyer MD        heparin 25,000 units in dextrose 5% 250 mL (premix) infusion  1,720 Units/hr IntraVENous Continuous Mariano Iyer MD 17.2 mL/hr at 10/17/22 0649 1,720 Units/hr at 10/17/22 0649    amLODIPine (NORVASC) tablet 10 mg  10 mg Oral Daily Sonya Epperson MD   10 mg at 10/17/22 0915    aspirin tablet 325 mg  325 mg Oral Daily Sonya Epperson MD   325 mg at 10/17/22 0915    rosuvastatin (CRESTOR) tablet 40 mg  40 mg Oral QPM Sonya Epperson MD   40 mg at 10/16/22 1757    losartan (COZAAR) tablet 100 mg  100 mg Oral Daily Sonya Epperson MD   100 mg at 10/17/22 0915    sodium chloride flush 0.9 % injection 5-40 mL  5-40 mL IntraVENous 2 times per day Sonya Epperson MD   10 mL at 10/14/22 0909    sodium chloride flush 0.9 % injection 5-40 mL  5-40 mL IntraVENous PRN Sonya Epperson MD  0.9 % sodium chloride infusion   IntraVENous PRN Jayden Poe MD        ondansetron (ZOFRAN-ODT) disintegrating tablet 4 mg  4 mg Oral Q8H PRN Jayden Poe MD        Or    ondansetron [facility]) injection 4 mg  4 mg IntraVENous Q6H PRN Jayden Poe MD        acetaminophen (TYLENOL) tablet 650 mg  650 mg Oral Q6H PRN Jayden Poe MD        Or    acetaminophen (TYLENOL) suppository 650 mg  650 mg Rectal Q6H PRN Jayden Poe MD        polyethylene glycol [facility]) packet 17 g  17 g Oral Daily PRN Jayden Poe MD        glucose chewable tablet 16 g  4 tablet Oral PRN Jayden Poe MD        dextrose bolus 10% 125 mL  125 mL IntraVENous PRN Jayden Poe MD        Or    dextrose bolus 10% 250 mL  250 mL IntraVENous PRN Jayden Poe MD        glucagon (rDNA) injection 1 mg  1 mg SubCUTAneous PRN Jayden Poe MD        dextrose 10 % infusion   IntraVENous Continuous PRN Jayden Poe MD        insulin lispro (HUMALOG) injection vial 0-4 Units  0-4 Units SubCUTAneous TID WC Jayden Poe MD   1 Units at 10/16/22 1757    insulin lispro (HUMALOG) injection vial 0-4 Units  0-4 Units SubCUTAneous Nightly Jayden Poe MD        perflutren lipid microspheres (DEFINITY) injection 1.65 mg  1.5 mL IntraVENous ONCE PRN Ivanna Karimi MD        sodium chloride flush 0.9 % injection 5-40 mL  5-40 mL IntraVENous 2 times per day Ivanna Karimi MD   10 mL at 10/14/22 0910    sodium chloride flush 0.9 % injection 5-40 mL  5-40 mL IntraVENous PRN Ivanna Karimi MD        0.9 % sodium chloride infusion   IntraVENous PRN Ivanna Karimi MD           Allergies:     Allergies   Allergen Reactions    Prednisone Other (See Comments)     Made patient very agitated (medrol dospak)    Codeine Other (See Comments)     constipation       Problem List:    Patient Active Problem List   Diagnosis Code    Angina pectoris, unstable ([address] I20.0    Chest pain R07.9    Diabetes mellitus type II, controlled ([address] E11.9    Bladder outlet obstruction N32.0    Hx of CABG Z95.1    Mixed hyperlipidemia E78.2    Constipation K59.00    Benign non-nodular prostatic hyperplasia with lower urinary tract symptoms N40.1    Anxiety F41.9    DM (diabetes mellitus), secondary, uncontrolled, w/neurologic complic [id number]    Primary hypertension I10    Coronary artery disease involving native coronary artery of native heart with angina pectoris (HCC) I25.119    Pure hypercholesterolemia E78.00    Benign non-nodular prostatic hyperplasia without lower urinary tract symptoms N40.0    Type 2 diabetes mellitus with diabetic polyneuropathy, without long-term current use of insulin (HCC) E11.42    Keloid L91.0    Primary insomnia F51.01    COVID-19 U07.1    Unstable angina ([facility]) I20.0       Past Medical History:        Diagnosis Date    Acute MI ([address]     Angina pectoris, unstable ([address]     ASHD (arteriosclerotic heart disease)     Bladder outlet obstruction     BPH (benign prostatic hyperplasia)     Chest pain     Constipation     Diabetes mellitus (HCC)     GERD (gastroesophageal reflux disease)     Hypercholesteremia     Hypercholesterolemia     Hypertension     IBS (irritable bowel syndrome)     Influenza A 2020    Sinusitis     Type II or unspecified type diabetes mellitus without mention of complication, not stated as uncontrolled        Past Surgical History:        Procedure Laterality Date    CARDIAC CATHETERIZATION      stent    CARDIAC CATHETERIZATION      stent    COLONOSCOPY  10/24/13    CORONARY ANGIOPLASTY WITH STENT PLACEMENT         Social History:    Social History     Tobacco Use    Smoking status: Former     Packs/day: 5.00     Years: 10.00     Pack years: 50.00     Types: Cigarettes     Quit date: 3/13/1976     Years since quittin.6    Smokeless tobacco: Never   Substance Use Topics    Alcohol use: No     Comment: 3 beers a month                                Counseling given: Not Answered      Vital Signs (Current):   Vitals:    10/16/22 1641 10/16/22 2030 10/17/22 0600 10/17/22 0745   BP: (!) 144/71 (!) 175/93 136/84 (!) 157/81   Pulse: 67 67 66 67   Resp: 16 16 18 18   Temp: 98.3 °F (36.8 °C) 97.7 °F (36.5 °C) 97.7 °F (36.5 °C) 98 °F (36.7 °C)   TempSrc: Oral Oral Oral Oral   SpO2: 94% 96% 96%    Weight:       Height:                                                  BP Readings from Last 3 Encounters:   10/17/22 (!) 157/81   10/12/22 139/83   09/28/22 135/79       NPO Status:                                                                                 BMI:   Wt Readings from Last 3 Encounters:   10/14/22 221 lb 11.2 oz (100.6 kg)   10/12/22 235 lb (106.6 kg)   09/28/22 235 lb (106.6 kg)     Body mass index is 29.25 kg/m².     CBC:   Lab Results   Component Value Date/Time    WBC 5.2 10/17/2022 04:29 AM    RBC 4.69 10/17/2022 04:29 AM    HGB 14.7 10/17/2022 04:29 AM    HCT 43.8 10/17/2022 04:29 AM    MCV 93.4 10/17/2022 04:29 AM    RDW 12.8 10/17/2022 04:29 AM     10/17/2022 04:29 AM       CMP:   Lab Results   Component Value Date/Time     10/13/2022 07:22 AM    K 3.7 10/13/2022 07:22 AM    K 3.7 10/13/2022 07:22 AM     10/13/2022 07:22 AM    CO2 24 10/13/2022 07:22 AM    BUN 13 10/13/2022 07:22 AM    CREATININE 1.0 10/13/2022 07:22 AM    GFRAA >60 10/13/2022 07:22 AM    GFRAA >60 02/06/2013 08:53 AM    AGRATIO 1.7 10/12/2022 02:51 AM    LABGLOM >60 10/13/2022 07:22 AM    GLUCOSE 158 10/13/2022 07:22 AM    PROT 7.0 10/12/2022 02:51 AM    PROT 7.2 02/06/2013 08:53 AM    CALCIUM 9.0 10/13/2022 07:22 AM    BILITOT 1.0 10/12/2022 02:51 AM    ALKPHOS 76 10/12/2022 02:51 AM    AST 22 10/12/2022 02:51 AM    ALT 24 10/12/2022 02:51 AM       POC Tests:   Recent Labs     10/17/22  0736   POCGLU 148*       Coags:   Lab Results   Component Value Date/Time    PROTIME 13.1 10/13/2022 06:02 PM    INR 1.00 10/13/2022 06:02 PM    APTT 26.9 10/13/2022 06:02 PM       HCG (If Applicable): No results found for: PREGTESTUR, PREGSERUM, HCG, HCGQUANT     ABGs: No results found for: PHART, PO2ART, PKC3JDM, SQR4KOF, BEART, U8RIINKX     Type & Screen (If Applicable):  No results found for: LABABO, LABRH    Drug/Infectious Status (If Applicable):  No results found for: HIV, HEPCAB    COVID-19 Screening (If Applicable):   Lab Results   Component Value Date/Time    COVID19 Not Detected 09/23/2021 04:06 AM    COVID19 Not Detected 09/11/2021 04:40 PM           Anesthesia Evaluation    Airway: Mallampati: II  TM distance: >3 FB   Neck ROM: full  Mouth opening: > = 3 FB   Dental:          Pulmonary:                              Cardiovascular:    (+) hypertension: moderate, angina:, past MI:, CAD: obstructive, CABG/stent:, hyperlipidemia                  Neuro/Psych:   (+) neuromuscular disease:,             GI/Hepatic/Renal:   (+) GERD:,           Endo/Other:    (+) DiabetesType II DM, , .                 Abdominal:             Vascular: Other Findings:           Anesthesia Plan      general     ASA 4     (  [address] [place] [address] Close EVALUATION FORM       Name:  Valentino Ferguson                                         Age:  78 y.o. MRN:  [id number]           I discussed intravenous with inhalational endotracheal anesthesia with pulmonary artery catheter, radial ( or other artery if required) catheter, possible transesophageal echocardiography and post-operative ventilation including risks and alternatives with the patient . The patient has no further questions. Gemma Skiff, MD  October 17, 2022  11:25 AM)  Induction: inhalational and intravenous. arterial line, central line, BIS, CVP and YING    Anesthetic plan and risks discussed with patient.                         Gemma Skiff, MD   10/17/2022

## 2022-10-18 ENCOUNTER — APPOINTMENT (OUTPATIENT)
Dept: GENERAL RADIOLOGY | Age: 77
DRG: 234 | End: 2022-10-18
Attending: INTERNAL MEDICINE
Payer: MEDICARE

## 2022-10-18 ENCOUNTER — ANESTHESIA (OUTPATIENT)
Dept: OPERATING ROOM | Age: 77
DRG: 234 | End: 2022-10-18
Payer: MEDICARE

## 2022-10-18 LAB
ACTIVATED CLOTTING TIME: 123 SEC (ref 99–130)
ACTIVATED CLOTTING TIME: 138 SEC (ref 99–130)
ACTIVATED CLOTTING TIME: 392 SEC (ref 99–130)
ACTIVATED CLOTTING TIME: 446 SEC (ref 99–130)
ACTIVATED CLOTTING TIME: 447 SEC (ref 99–130)
ACTIVATED CLOTTING TIME: 451 SEC (ref 99–130)
ACTIVATED CLOTTING TIME: 494 SEC (ref 99–130)
ALBUMIN SERPL-MCNC: 4 G/DL (ref 3.4–5)
ALP BLD-CCNC: 70 U/L (ref 40–129)
ALT SERPL-CCNC: 37 U/L (ref 10–40)
ANION GAP SERPL CALCULATED.3IONS-SCNC: 11 MMOL/L (ref 3–16)
ANION GAP SERPL CALCULATED.3IONS-SCNC: 7 MMOL/L (ref 3–16)
APTT: 30.3 SEC (ref 23–34.3)
AST SERPL-CCNC: 24 U/L (ref 15–37)
BASE EXCESS ARTERIAL: -1 (ref -3–3)
BASE EXCESS ARTERIAL: -2 (ref -3–3)
BASE EXCESS ARTERIAL: -3 (ref -3–3)
BASE EXCESS ARTERIAL: -5 (ref -3–3)
BASE EXCESS ARTERIAL: 0 (ref -3–3)
BASE EXCESS ARTERIAL: 0 (ref -3–3)
BASOPHILS ABSOLUTE: 0 K/UL (ref 0–0.2)
BASOPHILS RELATIVE PERCENT: 0.8 %
BILIRUB SERPL-MCNC: 0.9 MG/DL (ref 0–1)
BILIRUBIN DIRECT: <0.2 MG/DL (ref 0–0.3)
BILIRUBIN, INDIRECT: NORMAL MG/DL (ref 0–1)
BUN BLDV-MCNC: 15 MG/DL (ref 7–20)
BUN BLDV-MCNC: 16 MG/DL (ref 7–20)
CALCIUM IONIZED: 1.05 MMOL/L (ref 1.12–1.32)
CALCIUM IONIZED: 1.06 MMOL/L (ref 1.12–1.32)
CALCIUM IONIZED: 1.07 MMOL/L (ref 1.12–1.32)
CALCIUM IONIZED: 1.19 MMOL/L (ref 1.12–1.32)
CALCIUM IONIZED: 1.3 MMOL/L (ref 1.12–1.32)
CALCIUM IONIZED: 1.4 MMOL/L (ref 1.12–1.32)
CALCIUM SERPL-MCNC: 8.7 MG/DL (ref 8.3–10.6)
CALCIUM SERPL-MCNC: 9.8 MG/DL (ref 8.3–10.6)
CHLORIDE BLD-SCNC: 104 MMOL/L (ref 99–110)
CHLORIDE BLD-SCNC: 97 MMOL/L (ref 99–110)
CHOLESTEROL, TOTAL: 163 MG/DL (ref 0–199)
CO2: 22 MMOL/L (ref 21–32)
CO2: 25 MMOL/L (ref 21–32)
CREAT SERPL-MCNC: 1.2 MG/DL (ref 0.8–1.3)
CREAT SERPL-MCNC: 1.2 MG/DL (ref 0.8–1.3)
EOSINOPHILS ABSOLUTE: 0.1 K/UL (ref 0–0.6)
EOSINOPHILS RELATIVE PERCENT: 2.6 %
ESTIMATED AVERAGE GLUCOSE: 182.9 MG/DL
GFR AFRICAN AMERICAN: >60
GFR SERPL CREATININE-BSD FRML MDRD: >60 ML/MIN/{1.73_M2}
GFR SERPL CREATININE-BSD FRML MDRD: >60 ML/MIN/{1.73_M2}
GLUCOSE BLD-MCNC: 113 MG/DL (ref 70–99)
GLUCOSE BLD-MCNC: 121 MG/DL (ref 70–99)
GLUCOSE BLD-MCNC: 130 MG/DL (ref 70–99)
GLUCOSE BLD-MCNC: 131 MG/DL (ref 70–99)
GLUCOSE BLD-MCNC: 142 MG/DL (ref 70–99)
GLUCOSE BLD-MCNC: 143 MG/DL (ref 70–99)
GLUCOSE BLD-MCNC: 149 MG/DL (ref 70–99)
GLUCOSE BLD-MCNC: 151 MG/DL (ref 70–99)
GLUCOSE BLD-MCNC: 152 MG/DL (ref 70–99)
GLUCOSE BLD-MCNC: 153 MG/DL (ref 70–99)
GLUCOSE BLD-MCNC: 154 MG/DL (ref 70–99)
GLUCOSE BLD-MCNC: 162 MG/DL (ref 70–99)
GLUCOSE BLD-MCNC: 178 MG/DL (ref 70–99)
GLUCOSE BLD-MCNC: 215 MG/DL (ref 70–99)
GLUCOSE BLD-MCNC: 261 MG/DL (ref 70–99)
HBA1C MFR BLD: 8 %
HCO3 ARTERIAL: 19.5 MMOL/L (ref 21–29)
HCO3 ARTERIAL: 22 MMOL/L (ref 21–29)
HCO3 ARTERIAL: 22.6 MMOL/L (ref 21–29)
HCO3 ARTERIAL: 23.6 MMOL/L (ref 21–29)
HCO3 ARTERIAL: 24.2 MMOL/L (ref 21–29)
HCO3 ARTERIAL: 24.3 MMOL/L (ref 21–29)
HCO3 ARTERIAL: 24.8 MMOL/L (ref 21–29)
HCO3 ARTERIAL: 24.8 MMOL/L (ref 21–29)
HCT VFR BLD CALC: 40.2 % (ref 40.5–52.5)
HCT VFR BLD CALC: 44.5 % (ref 40.5–52.5)
HDLC SERPL-MCNC: 49 MG/DL (ref 40–60)
HEMOGLOBIN: 10 GM/DL (ref 13.5–17.5)
HEMOGLOBIN: 10 GM/DL (ref 13.5–17.5)
HEMOGLOBIN: 10.6 GM/DL (ref 13.5–17.5)
HEMOGLOBIN: 10.8 GM/DL (ref 13.5–17.5)
HEMOGLOBIN: 12.6 GM/DL (ref 13.5–17.5)
HEMOGLOBIN: 13.4 GM/DL (ref 13.5–17.5)
HEMOGLOBIN: 13.7 G/DL (ref 13.5–17.5)
HEMOGLOBIN: 15 G/DL (ref 13.5–17.5)
HEMOGLOBIN: 15.3 GM/DL (ref 13.5–17.5)
INR BLD: 1.1 (ref 0.87–1.14)
INR BLD: 1.48 (ref 0.87–1.14)
LACTATE: 1.21 MMOL/L (ref 0.4–2)
LACTATE: 1.34 MMOL/L (ref 0.4–2)
LDL CHOLESTEROL CALCULATED: 95 MG/DL
LYMPHOCYTES ABSOLUTE: 1.5 K/UL (ref 1–5.1)
LYMPHOCYTES RELATIVE PERCENT: 28.4 %
MAGNESIUM: 2.2 MG/DL (ref 1.8–2.4)
MAGNESIUM: 3.5 MG/DL (ref 1.8–2.4)
MCH RBC QN AUTO: 31.4 PG (ref 26–34)
MCH RBC QN AUTO: 31.6 PG (ref 26–34)
MCHC RBC AUTO-ENTMCNC: 33.6 G/DL (ref 31–36)
MCHC RBC AUTO-ENTMCNC: 34 G/DL (ref 31–36)
MCV RBC AUTO: 93 FL (ref 80–100)
MCV RBC AUTO: 93.3 FL (ref 80–100)
MONOCYTES ABSOLUTE: 0.7 K/UL (ref 0–1.3)
MONOCYTES RELATIVE PERCENT: 13 %
NEUTROPHILS ABSOLUTE: 2.9 K/UL (ref 1.7–7.7)
NEUTROPHILS RELATIVE PERCENT: 55.2 %
O2 SAT, ARTERIAL: 100 % (ref 93–100)
O2 SAT, ARTERIAL: 99 % (ref 93–100)
PCO2 ARTERIAL: 29.3 MM HG (ref 35–45)
PCO2 ARTERIAL: 33.4 MM HG (ref 35–45)
PCO2 ARTERIAL: 33.8 MM HG (ref 35–45)
PCO2 ARTERIAL: 35.7 MM HG (ref 35–45)
PCO2 ARTERIAL: 38.5 MM HG (ref 35–45)
PCO2 ARTERIAL: 39.6 MM HG (ref 35–45)
PCO2 ARTERIAL: 41.3 MM HG (ref 35–45)
PCO2 ARTERIAL: 46.6 MM HG (ref 35–45)
PDW BLD-RTO: 12.8 % (ref 12.4–15.4)
PDW BLD-RTO: 13 % (ref 12.4–15.4)
PERFORMED ON: ABNORMAL
PH ARTERIAL: 7.33 (ref 7.35–7.45)
PH ARTERIAL: 7.39 (ref 7.35–7.45)
PH ARTERIAL: 7.39 (ref 7.35–7.45)
PH ARTERIAL: 7.41 (ref 7.35–7.45)
PH ARTERIAL: 7.42 (ref 7.35–7.45)
PH ARTERIAL: 7.43 (ref 7.35–7.45)
PH ARTERIAL: 7.43 (ref 7.35–7.45)
PH ARTERIAL: 7.44 (ref 7.35–7.45)
PLATELET # BLD: 137 K/UL (ref 135–450)
PLATELET # BLD: 235 K/UL (ref 135–450)
PMV BLD AUTO: 7.6 FL (ref 5–10.5)
PMV BLD AUTO: 7.9 FL (ref 5–10.5)
PO2 ARTERIAL: 116.2 MM HG (ref 75–108)
PO2 ARTERIAL: 120.7 MM HG (ref 75–108)
PO2 ARTERIAL: 149.2 MM HG (ref 75–108)
PO2 ARTERIAL: 344.1 MM HG (ref 75–108)
PO2 ARTERIAL: 353 MM HG (ref 75–108)
PO2 ARTERIAL: 376.4 MM HG (ref 75–108)
PO2 ARTERIAL: 392 MM HG (ref 75–108)
PO2 ARTERIAL: 508 MM HG (ref 75–108)
POC HEMATOCRIT: 29 % (ref 40.5–52.5)
POC HEMATOCRIT: 29 % (ref 40.5–52.5)
POC HEMATOCRIT: 31 % (ref 40.5–52.5)
POC HEMATOCRIT: 32 % (ref 40.5–52.5)
POC HEMATOCRIT: 37 % (ref 40.5–52.5)
POC HEMATOCRIT: 39 % (ref 40.5–52.5)
POC HEMATOCRIT: 45 % (ref 40.5–52.5)
POC POTASSIUM: 4 MMOL/L (ref 3.5–5.1)
POC POTASSIUM: 4.1 MMOL/L (ref 3.5–5.1)
POC POTASSIUM: 4.4 MMOL/L (ref 3.5–5.1)
POC POTASSIUM: 4.6 MMOL/L (ref 3.5–5.1)
POC POTASSIUM: 4.7 MMOL/L (ref 3.5–5.1)
POC POTASSIUM: 4.8 MMOL/L (ref 3.5–5.1)
POC SAMPLE TYPE: ABNORMAL
POC SODIUM: 133 MMOL/L (ref 136–145)
POC SODIUM: 134 MMOL/L (ref 136–145)
POC SODIUM: 135 MMOL/L (ref 136–145)
POC SODIUM: 140 MMOL/L (ref 136–145)
POTASSIUM SERPL-SCNC: 3.8 MMOL/L (ref 3.5–5.1)
POTASSIUM SERPL-SCNC: 4 MMOL/L (ref 3.5–5.1)
POTASSIUM SERPL-SCNC: 4.1 MMOL/L (ref 3.5–5.1)
PROTHROMBIN TIME: 14.1 SEC (ref 11.7–14.5)
PROTHROMBIN TIME: 17.8 SEC (ref 11.7–14.5)
RBC # BLD: 4.32 M/UL (ref 4.2–5.9)
RBC # BLD: 4.77 M/UL (ref 4.2–5.9)
SODIUM BLD-SCNC: 133 MMOL/L (ref 136–145)
SODIUM BLD-SCNC: 133 MMOL/L (ref 136–145)
TCO2 ARTERIAL: 20 MMOL/L
TCO2 ARTERIAL: 23 MMOL/L
TCO2 ARTERIAL: 24 MMOL/L
TCO2 ARTERIAL: 25 MMOL/L
TCO2 ARTERIAL: 25 MMOL/L
TCO2 ARTERIAL: 26 MMOL/L
TOTAL PROTEIN: 6.4 G/DL (ref 6.4–8.2)
TRIGL SERPL-MCNC: 93 MG/DL (ref 0–150)
VLDLC SERPL CALC-MCNC: 19 MG/DL
WBC # BLD: 12.6 K/UL (ref 4–11)
WBC # BLD: 5.2 K/UL (ref 4–11)

## 2022-10-18 PROCEDURE — 021209W BYPASS CORONARY ARTERY, THREE ARTERIES FROM AORTA WITH AUTOLOGOUS VENOUS TISSUE, OPEN APPROACH: ICD-10-PCS | Performed by: STUDENT IN AN ORGANIZED HEALTH CARE EDUCATION/TRAINING PROGRAM

## 2022-10-18 PROCEDURE — 6360000002 HC RX W HCPCS: Performed by: STUDENT IN AN ORGANIZED HEALTH CARE EDUCATION/TRAINING PROGRAM

## 2022-10-18 PROCEDURE — 02L70CK OCCLUSION OF LEFT ATRIAL APPENDAGE WITH EXTRALUMINAL DEVICE, OPEN APPROACH: ICD-10-PCS | Performed by: STUDENT IN AN ORGANIZED HEALTH CARE EDUCATION/TRAINING PROGRAM

## 2022-10-18 PROCEDURE — 94002 VENT MGMT INPAT INIT DAY: CPT

## 2022-10-18 PROCEDURE — 3700000001 HC ADD 15 MINUTES (ANESTHESIA): Performed by: STUDENT IN AN ORGANIZED HEALTH CARE EDUCATION/TRAINING PROGRAM

## 2022-10-18 PROCEDURE — 94640 AIRWAY INHALATION TREATMENT: CPT

## 2022-10-18 PROCEDURE — 94761 N-INVAS EAR/PLS OXIMETRY MLT: CPT

## 2022-10-18 PROCEDURE — P9045 ALBUMIN (HUMAN), 5%, 250 ML: HCPCS | Performed by: STUDENT IN AN ORGANIZED HEALTH CARE EDUCATION/TRAINING PROGRAM

## 2022-10-18 PROCEDURE — 2580000003 HC RX 258: Performed by: STUDENT IN AN ORGANIZED HEALTH CARE EDUCATION/TRAINING PROGRAM

## 2022-10-18 PROCEDURE — 80048 BASIC METABOLIC PNL TOTAL CA: CPT

## 2022-10-18 PROCEDURE — 80076 HEPATIC FUNCTION PANEL: CPT

## 2022-10-18 PROCEDURE — 2100000000 HC CCU R&B

## 2022-10-18 PROCEDURE — 83735 ASSAY OF MAGNESIUM: CPT

## 2022-10-18 PROCEDURE — 6370000000 HC RX 637 (ALT 250 FOR IP): Performed by: STUDENT IN AN ORGANIZED HEALTH CARE EDUCATION/TRAINING PROGRAM

## 2022-10-18 PROCEDURE — 85730 THROMBOPLASTIN TIME PARTIAL: CPT

## 2022-10-18 PROCEDURE — 2500000003 HC RX 250 WO HCPCS: Performed by: STUDENT IN AN ORGANIZED HEALTH CARE EDUCATION/TRAINING PROGRAM

## 2022-10-18 PROCEDURE — 33533 CABG ARTERIAL SINGLE: CPT | Performed by: STUDENT IN AN ORGANIZED HEALTH CARE EDUCATION/TRAINING PROGRAM

## 2022-10-18 PROCEDURE — C1713 ANCHOR/SCREW BN/BN,TIS/BN: HCPCS | Performed by: STUDENT IN AN ORGANIZED HEALTH CARE EDUCATION/TRAINING PROGRAM

## 2022-10-18 PROCEDURE — 6370000000 HC RX 637 (ALT 250 FOR IP): Performed by: ANESTHESIOLOGY

## 2022-10-18 PROCEDURE — 85014 HEMATOCRIT: CPT

## 2022-10-18 PROCEDURE — 3700000000 HC ANESTHESIA ATTENDED CARE: Performed by: STUDENT IN AN ORGANIZED HEALTH CARE EDUCATION/TRAINING PROGRAM

## 2022-10-18 PROCEDURE — 33508 ENDOSCOPIC VEIN HARVEST: CPT | Performed by: STUDENT IN AN ORGANIZED HEALTH CARE EDUCATION/TRAINING PROGRAM

## 2022-10-18 PROCEDURE — 7100000010 HC PHASE II RECOVERY - FIRST 15 MIN

## 2022-10-18 PROCEDURE — 2700000000 HC OXYGEN THERAPY PER DAY

## 2022-10-18 PROCEDURE — 6360000002 HC RX W HCPCS: Performed by: ANESTHESIOLOGY

## 2022-10-18 PROCEDURE — 80061 LIPID PANEL: CPT

## 2022-10-18 PROCEDURE — 3600000005 HC SURGERY LEVEL 5 BASE: Performed by: STUDENT IN AN ORGANIZED HEALTH CARE EDUCATION/TRAINING PROGRAM

## 2022-10-18 PROCEDURE — 82330 ASSAY OF CALCIUM: CPT

## 2022-10-18 PROCEDURE — 6360000002 HC RX W HCPCS: Performed by: NURSE PRACTITIONER

## 2022-10-18 PROCEDURE — 6370000000 HC RX 637 (ALT 250 FOR IP): Performed by: NURSE PRACTITIONER

## 2022-10-18 PROCEDURE — 2580000003 HC RX 258: Performed by: NURSE PRACTITIONER

## 2022-10-18 PROCEDURE — 33518 CABG ARTERY-VEIN TWO: CPT | Performed by: STUDENT IN AN ORGANIZED HEALTH CARE EDUCATION/TRAINING PROGRAM

## 2022-10-18 PROCEDURE — 82803 BLOOD GASES ANY COMBINATION: CPT

## 2022-10-18 PROCEDURE — 71045 X-RAY EXAM CHEST 1 VIEW: CPT

## 2022-10-18 PROCEDURE — 85610 PROTHROMBIN TIME: CPT

## 2022-10-18 PROCEDURE — 84295 ASSAY OF SERUM SODIUM: CPT

## 2022-10-18 PROCEDURE — 84132 ASSAY OF SERUM POTASSIUM: CPT

## 2022-10-18 PROCEDURE — A4217 STERILE WATER/SALINE, 500 ML: HCPCS | Performed by: STUDENT IN AN ORGANIZED HEALTH CARE EDUCATION/TRAINING PROGRAM

## 2022-10-18 PROCEDURE — 06BP4ZZ EXCISION OF RIGHT SAPHENOUS VEIN, PERCUTANEOUS ENDOSCOPIC APPROACH: ICD-10-PCS | Performed by: STUDENT IN AN ORGANIZED HEALTH CARE EDUCATION/TRAINING PROGRAM

## 2022-10-18 PROCEDURE — 2709999900 HC NON-CHARGEABLE SUPPLY: Performed by: STUDENT IN AN ORGANIZED HEALTH CARE EDUCATION/TRAINING PROGRAM

## 2022-10-18 PROCEDURE — B240ZZ3 ULTRASONOGRAPHY OF SINGLE CORONARY ARTERY, INTRAVASCULAR: ICD-10-PCS | Performed by: STUDENT IN AN ORGANIZED HEALTH CARE EDUCATION/TRAINING PROGRAM

## 2022-10-18 PROCEDURE — 2580000003 HC RX 258: Performed by: ANESTHESIOLOGY

## 2022-10-18 PROCEDURE — 36415 COLL VENOUS BLD VENIPUNCTURE: CPT

## 2022-10-18 PROCEDURE — 3600000015 HC SURGERY LEVEL 5 ADDTL 15MIN: Performed by: STUDENT IN AN ORGANIZED HEALTH CARE EDUCATION/TRAINING PROGRAM

## 2022-10-18 PROCEDURE — 5A1221Z PERFORMANCE OF CARDIAC OUTPUT, CONTINUOUS: ICD-10-PCS | Performed by: STUDENT IN AN ORGANIZED HEALTH CARE EDUCATION/TRAINING PROGRAM

## 2022-10-18 PROCEDURE — 33268 EXCL LAA OPN OTH PX ANY METH: CPT | Performed by: STUDENT IN AN ORGANIZED HEALTH CARE EDUCATION/TRAINING PROGRAM

## 2022-10-18 PROCEDURE — 85347 COAGULATION TIME ACTIVATED: CPT

## 2022-10-18 PROCEDURE — 85027 COMPLETE CBC AUTOMATED: CPT

## 2022-10-18 PROCEDURE — 83036 HEMOGLOBIN GLYCOSYLATED A1C: CPT

## 2022-10-18 PROCEDURE — 2500000003 HC RX 250 WO HCPCS: Performed by: ANESTHESIOLOGY

## 2022-10-18 PROCEDURE — 83605 ASSAY OF LACTIC ACID: CPT

## 2022-10-18 PROCEDURE — 2720000010 HC SURG SUPPLY STERILE: Performed by: STUDENT IN AN ORGANIZED HEALTH CARE EDUCATION/TRAINING PROGRAM

## 2022-10-18 PROCEDURE — 82947 ASSAY GLUCOSE BLOOD QUANT: CPT

## 2022-10-18 PROCEDURE — 85025 COMPLETE CBC W/AUTO DIFF WBC: CPT

## 2022-10-18 PROCEDURE — 94669 MECHANICAL CHEST WALL OSCILL: CPT

## 2022-10-18 PROCEDURE — C1729 CATH, DRAINAGE: HCPCS | Performed by: STUDENT IN AN ORGANIZED HEALTH CARE EDUCATION/TRAINING PROGRAM

## 2022-10-18 PROCEDURE — 7100000011 HC PHASE II RECOVERY - ADDTL 15 MIN

## 2022-10-18 DEVICE — CLIP MED SUTURE LESS 40 MM SYS 1 HND STRL ATRICLIP FLX V: Type: IMPLANTABLE DEVICE | Site: HEART | Status: FUNCTIONAL

## 2022-10-18 RX ORDER — NOREPINEPHRINE BITARTRATE 1 MG/ML
INJECTION, SOLUTION INTRAVENOUS PRN
Status: DISCONTINUED | OUTPATIENT
Start: 2022-10-18 | End: 2022-10-18 | Stop reason: SDUPTHER

## 2022-10-18 RX ORDER — POTASSIUM CHLORIDE 750 MG/1
10 TABLET, EXTENDED RELEASE ORAL
Status: DISCONTINUED | OUTPATIENT
Start: 2022-10-19 | End: 2022-10-22 | Stop reason: HOSPADM

## 2022-10-18 RX ORDER — MEPERIDINE HYDROCHLORIDE 50 MG/ML
25 INJECTION INTRAMUSCULAR; INTRAVENOUS; SUBCUTANEOUS
Status: DISCONTINUED | OUTPATIENT
Start: 2022-10-18 | End: 2022-10-19

## 2022-10-18 RX ORDER — FAMOTIDINE 20 MG/1
20 TABLET, FILM COATED ORAL 2 TIMES DAILY
Status: DISCONTINUED | OUTPATIENT
Start: 2022-10-18 | End: 2022-10-22 | Stop reason: HOSPADM

## 2022-10-18 RX ORDER — ATORVASTATIN CALCIUM 40 MG/1
40 TABLET, FILM COATED ORAL NIGHTLY
Status: DISCONTINUED | OUTPATIENT
Start: 2022-10-19 | End: 2022-10-20

## 2022-10-18 RX ORDER — KETOROLAC TROMETHAMINE 30 MG/ML
INJECTION, SOLUTION INTRAMUSCULAR; INTRAVENOUS PRN
Status: DISCONTINUED | OUTPATIENT
Start: 2022-10-18 | End: 2022-10-18 | Stop reason: SDUPTHER

## 2022-10-18 RX ORDER — MAGNESIUM SULFATE IN WATER 40 MG/ML
2000 INJECTION, SOLUTION INTRAVENOUS PRN
Status: DISCONTINUED | OUTPATIENT
Start: 2022-10-18 | End: 2022-10-22 | Stop reason: HOSPADM

## 2022-10-18 RX ORDER — LANOLIN ALCOHOL/MO/W.PET/CERES
400 CREAM (GRAM) TOPICAL 2 TIMES DAILY
Status: DISCONTINUED | OUTPATIENT
Start: 2022-10-19 | End: 2022-10-22 | Stop reason: HOSPADM

## 2022-10-18 RX ORDER — SODIUM CHLORIDE 0.9 % (FLUSH) 0.9 %
5-40 SYRINGE (ML) INJECTION PRN
Status: DISCONTINUED | OUTPATIENT
Start: 2022-10-18 | End: 2022-10-22 | Stop reason: HOSPADM

## 2022-10-18 RX ORDER — CALCIUM CHLORIDE 100 MG/ML
INJECTION INTRAVENOUS; INTRAVENTRICULAR PRN
Status: DISCONTINUED | OUTPATIENT
Start: 2022-10-18 | End: 2022-10-18 | Stop reason: SDUPTHER

## 2022-10-18 RX ORDER — POLYETHYLENE GLYCOL 3350 17 G/17G
17 POWDER, FOR SOLUTION ORAL DAILY
Status: DISCONTINUED | OUTPATIENT
Start: 2022-10-19 | End: 2022-10-22 | Stop reason: HOSPADM

## 2022-10-18 RX ORDER — MIDAZOLAM HYDROCHLORIDE 1 MG/ML
INJECTION INTRAMUSCULAR; INTRAVENOUS PRN
Status: DISCONTINUED | OUTPATIENT
Start: 2022-10-18 | End: 2022-10-18 | Stop reason: SDUPTHER

## 2022-10-18 RX ORDER — CHLORHEXIDINE GLUCONATE 0.12 MG/ML
15 RINSE ORAL 2 TIMES DAILY
Status: DISCONTINUED | OUTPATIENT
Start: 2022-10-18 | End: 2022-10-22 | Stop reason: HOSPADM

## 2022-10-18 RX ORDER — DEXAMETHASONE SODIUM PHOSPHATE 4 MG/ML
INJECTION, SOLUTION INTRA-ARTICULAR; INTRALESIONAL; INTRAMUSCULAR; INTRAVENOUS; SOFT TISSUE PRN
Status: DISCONTINUED | OUTPATIENT
Start: 2022-10-18 | End: 2022-10-18 | Stop reason: SDUPTHER

## 2022-10-18 RX ORDER — POTASSIUM CHLORIDE 29.8 MG/ML
20 INJECTION INTRAVENOUS PRN
Status: DISCONTINUED | OUTPATIENT
Start: 2022-10-18 | End: 2022-10-22 | Stop reason: HOSPADM

## 2022-10-18 RX ORDER — FAMOTIDINE 10 MG/ML
20 INJECTION, SOLUTION INTRAVENOUS 2 TIMES DAILY
Status: DISCONTINUED | OUTPATIENT
Start: 2022-10-18 | End: 2022-10-19

## 2022-10-18 RX ORDER — DEXTROSE AND SODIUM CHLORIDE 5; .45 G/100ML; G/100ML
INJECTION, SOLUTION INTRAVENOUS CONTINUOUS
Status: DISCONTINUED | OUTPATIENT
Start: 2022-10-18 | End: 2022-10-19

## 2022-10-18 RX ORDER — MORPHINE SULFATE 2 MG/ML
2 INJECTION, SOLUTION INTRAMUSCULAR; INTRAVENOUS
Status: DISCONTINUED | OUTPATIENT
Start: 2022-10-18 | End: 2022-10-22 | Stop reason: HOSPADM

## 2022-10-18 RX ORDER — SODIUM CHLORIDE 9 MG/ML
INJECTION, SOLUTION INTRAVENOUS PRN
Status: DISCONTINUED | OUTPATIENT
Start: 2022-10-18 | End: 2022-10-22 | Stop reason: HOSPADM

## 2022-10-18 RX ORDER — HYDRALAZINE HYDROCHLORIDE 20 MG/ML
5 INJECTION INTRAMUSCULAR; INTRAVENOUS EVERY 5 MIN PRN
Status: DISCONTINUED | OUTPATIENT
Start: 2022-10-18 | End: 2022-10-22 | Stop reason: HOSPADM

## 2022-10-18 RX ORDER — ACETAMINOPHEN 325 MG/1
650 TABLET ORAL EVERY 6 HOURS
Status: DISCONTINUED | OUTPATIENT
Start: 2022-10-19 | End: 2022-10-22 | Stop reason: HOSPADM

## 2022-10-18 RX ORDER — MIDAZOLAM HYDROCHLORIDE 1 MG/ML
1 INJECTION INTRAMUSCULAR; INTRAVENOUS
Status: DISCONTINUED | OUTPATIENT
Start: 2022-10-18 | End: 2022-10-19

## 2022-10-18 RX ORDER — ALBUTEROL SULFATE 2.5 MG/3ML
2.5 SOLUTION RESPIRATORY (INHALATION)
Status: DISCONTINUED | OUTPATIENT
Start: 2022-10-18 | End: 2022-10-22 | Stop reason: HOSPADM

## 2022-10-18 RX ORDER — OXYCODONE HYDROCHLORIDE 5 MG/1
5 TABLET ORAL EVERY 4 HOURS PRN
Status: DISCONTINUED | OUTPATIENT
Start: 2022-10-18 | End: 2022-10-22 | Stop reason: HOSPADM

## 2022-10-18 RX ORDER — ONDANSETRON 2 MG/ML
INJECTION INTRAMUSCULAR; INTRAVENOUS PRN
Status: DISCONTINUED | OUTPATIENT
Start: 2022-10-18 | End: 2022-10-18 | Stop reason: SDUPTHER

## 2022-10-18 RX ORDER — EPINEPHRINE 1 MG/ML(1)
AMPUL (ML) INJECTION PRN
Status: DISCONTINUED | OUTPATIENT
Start: 2022-10-18 | End: 2022-10-18 | Stop reason: SDUPTHER

## 2022-10-18 RX ORDER — KETAMINE HYDROCHLORIDE 100 MG/ML
INJECTION, SOLUTION INTRAMUSCULAR; INTRAVENOUS PRN
Status: DISCONTINUED | OUTPATIENT
Start: 2022-10-18 | End: 2022-10-18 | Stop reason: SDUPTHER

## 2022-10-18 RX ORDER — ALBUMIN, HUMAN INJ 5% 5 %
25 SOLUTION INTRAVENOUS PRN
Status: DISCONTINUED | OUTPATIENT
Start: 2022-10-18 | End: 2022-10-22 | Stop reason: HOSPADM

## 2022-10-18 RX ORDER — CISATRACURIUM BESYLATE 2 MG/ML
INJECTION, SOLUTION INTRAVENOUS PRN
Status: DISCONTINUED | OUTPATIENT
Start: 2022-10-18 | End: 2022-10-18 | Stop reason: SDUPTHER

## 2022-10-18 RX ORDER — SODIUM CHLORIDE, SODIUM LACTATE, POTASSIUM CHLORIDE, CALCIUM CHLORIDE 600; 310; 30; 20 MG/100ML; MG/100ML; MG/100ML; MG/100ML
INJECTION, SOLUTION INTRAVENOUS CONTINUOUS PRN
Status: DISCONTINUED | OUTPATIENT
Start: 2022-10-18 | End: 2022-10-18 | Stop reason: SDUPTHER

## 2022-10-18 RX ORDER — PROTAMINE SULFATE 10 MG/ML
50 INJECTION, SOLUTION INTRAVENOUS
Status: ACTIVE | OUTPATIENT
Start: 2022-10-18 | End: 2022-10-19

## 2022-10-18 RX ORDER — CLOPIDOGREL BISULFATE 75 MG/1
75 TABLET ORAL DAILY
Status: DISCONTINUED | OUTPATIENT
Start: 2022-10-19 | End: 2022-10-21

## 2022-10-18 RX ORDER — FUROSEMIDE 10 MG/ML
40 INJECTION INTRAMUSCULAR; INTRAVENOUS 2 TIMES DAILY
Status: COMPLETED | OUTPATIENT
Start: 2022-10-19 | End: 2022-10-20

## 2022-10-18 RX ORDER — METOPROLOL TARTRATE 5 MG/5ML
2.5 INJECTION INTRAVENOUS EVERY 10 MIN PRN
Status: DISCONTINUED | OUTPATIENT
Start: 2022-10-18 | End: 2022-10-22 | Stop reason: HOSPADM

## 2022-10-18 RX ORDER — FENTANYL CITRATE 0.05 MG/ML
INJECTION, SOLUTION INTRAMUSCULAR; INTRAVENOUS PRN
Status: DISCONTINUED | OUTPATIENT
Start: 2022-10-18 | End: 2022-10-18 | Stop reason: SDUPTHER

## 2022-10-18 RX ORDER — PROTAMINE SULFATE 10 MG/ML
INJECTION, SOLUTION INTRAVENOUS PRN
Status: DISCONTINUED | OUTPATIENT
Start: 2022-10-18 | End: 2022-10-18 | Stop reason: SDUPTHER

## 2022-10-18 RX ORDER — NEOSTIGMINE METHYLSULFATE 1 MG/ML
INJECTION, SOLUTION INTRAVENOUS PRN
Status: DISCONTINUED | OUTPATIENT
Start: 2022-10-18 | End: 2022-10-18 | Stop reason: SDUPTHER

## 2022-10-18 RX ORDER — SODIUM CHLORIDE 0.9 % (FLUSH) 0.9 %
5-40 SYRINGE (ML) INJECTION EVERY 12 HOURS SCHEDULED
Status: DISCONTINUED | OUTPATIENT
Start: 2022-10-18 | End: 2022-10-22 | Stop reason: HOSPADM

## 2022-10-18 RX ORDER — ACETAMINOPHEN 500 MG
1000 TABLET ORAL ONCE
Status: DISCONTINUED | OUTPATIENT
Start: 2022-10-18 | End: 2022-10-18

## 2022-10-18 RX ORDER — ONDANSETRON 4 MG/1
4 TABLET, ORALLY DISINTEGRATING ORAL EVERY 8 HOURS PRN
Status: DISCONTINUED | OUTPATIENT
Start: 2022-10-18 | End: 2022-10-22 | Stop reason: HOSPADM

## 2022-10-18 RX ORDER — ASPIRIN 81 MG/1
81 TABLET ORAL DAILY
Status: DISCONTINUED | OUTPATIENT
Start: 2022-10-19 | End: 2022-10-21

## 2022-10-18 RX ORDER — ONDANSETRON 2 MG/ML
4 INJECTION INTRAMUSCULAR; INTRAVENOUS EVERY 6 HOURS PRN
Status: DISCONTINUED | OUTPATIENT
Start: 2022-10-18 | End: 2022-10-22 | Stop reason: HOSPADM

## 2022-10-18 RX ORDER — MORPHINE SULFATE 4 MG/ML
4 INJECTION, SOLUTION INTRAMUSCULAR; INTRAVENOUS
Status: DISCONTINUED | OUTPATIENT
Start: 2022-10-18 | End: 2022-10-22 | Stop reason: HOSPADM

## 2022-10-18 RX ORDER — MILRINONE LACTATE 0.2 MG/ML
.0625-.75 INJECTION, SOLUTION INTRAVENOUS CONTINUOUS
Status: DISCONTINUED | OUTPATIENT
Start: 2022-10-18 | End: 2022-10-19

## 2022-10-18 RX ORDER — INSULIN LISPRO 100 [IU]/ML
0-6 INJECTION, SOLUTION INTRAVENOUS; SUBCUTANEOUS NIGHTLY
Status: DISCONTINUED | OUTPATIENT
Start: 2022-10-21 | End: 2022-10-22 | Stop reason: HOSPADM

## 2022-10-18 RX ORDER — HYDRALAZINE HYDROCHLORIDE 20 MG/ML
INJECTION INTRAMUSCULAR; INTRAVENOUS PRN
Status: DISCONTINUED | OUTPATIENT
Start: 2022-10-18 | End: 2022-10-18 | Stop reason: SDUPTHER

## 2022-10-18 RX ORDER — FUROSEMIDE 40 MG/1
40 TABLET ORAL 2 TIMES DAILY
Status: DISCONTINUED | OUTPATIENT
Start: 2022-10-21 | End: 2022-10-22 | Stop reason: HOSPADM

## 2022-10-18 RX ORDER — INSULIN GLARGINE 100 [IU]/ML
0.15 INJECTION, SOLUTION SUBCUTANEOUS NIGHTLY
Status: DISCONTINUED | OUTPATIENT
Start: 2022-10-20 | End: 2022-10-22 | Stop reason: HOSPADM

## 2022-10-18 RX ORDER — DEXTROSE MONOHYDRATE 100 MG/ML
INJECTION, SOLUTION INTRAVENOUS CONTINUOUS PRN
Status: DISCONTINUED | OUTPATIENT
Start: 2022-10-18 | End: 2022-10-22 | Stop reason: HOSPADM

## 2022-10-18 RX ORDER — HEPARIN SODIUM 1000 [USP'U]/ML
INJECTION, SOLUTION INTRAVENOUS; SUBCUTANEOUS PRN
Status: DISCONTINUED | OUTPATIENT
Start: 2022-10-18 | End: 2022-10-18 | Stop reason: SDUPTHER

## 2022-10-18 RX ORDER — GLYCOPYRROLATE 0.2 MG/ML
INJECTION INTRAMUSCULAR; INTRAVENOUS PRN
Status: DISCONTINUED | OUTPATIENT
Start: 2022-10-18 | End: 2022-10-18 | Stop reason: SDUPTHER

## 2022-10-18 RX ORDER — FONDAPARINUX SODIUM 2.5 MG/.5ML
2.5 INJECTION SUBCUTANEOUS DAILY
Status: DISCONTINUED | OUTPATIENT
Start: 2022-10-19 | End: 2022-10-21

## 2022-10-18 RX ORDER — INSULIN LISPRO 100 [IU]/ML
0-12 INJECTION, SOLUTION INTRAVENOUS; SUBCUTANEOUS
Status: DISCONTINUED | OUTPATIENT
Start: 2022-10-21 | End: 2022-10-22 | Stop reason: HOSPADM

## 2022-10-18 RX ADMIN — SODIUM CHLORIDE, SODIUM LACTATE, POTASSIUM CHLORIDE, AND CALCIUM CHLORIDE: .6; .31; .03; .02 INJECTION, SOLUTION INTRAVENOUS at 07:45

## 2022-10-18 RX ADMIN — CEFAZOLIN 2000 MG: 10 INJECTION, POWDER, FOR SOLUTION INTRAVENOUS at 16:17

## 2022-10-18 RX ADMIN — SODIUM CHLORIDE 4.5 UNITS/HR: 9 INJECTION, SOLUTION INTRAVENOUS at 08:25

## 2022-10-18 RX ADMIN — CISATRACURIUM BESYLATE 5 MG: 2 INJECTION INTRAVENOUS at 12:00

## 2022-10-18 RX ADMIN — DEXTROSE AND SODIUM CHLORIDE: 5; 450 INJECTION, SOLUTION INTRAVENOUS at 15:08

## 2022-10-18 RX ADMIN — NOREPINEPHRINE BITARTRATE 16 MCG: 1 INJECTION INTRAVENOUS at 08:05

## 2022-10-18 RX ADMIN — SODIUM CHLORIDE 1 MCG/KG/HR: 9 INJECTION, SOLUTION INTRAVENOUS at 12:05

## 2022-10-18 RX ADMIN — DEXAMETHASONE SODIUM PHOSPHATE 12 MG: 4 INJECTION, SOLUTION INTRAMUSCULAR; INTRAVENOUS at 12:34

## 2022-10-18 RX ADMIN — FENTANYL CITRATE 500 MCG: 50 INJECTION, SOLUTION INTRAMUSCULAR; INTRAVENOUS at 07:47

## 2022-10-18 RX ADMIN — GLYCOPYRROLATE 0.5 MG: 0.2 INJECTION, SOLUTION INTRAMUSCULAR; INTRAVENOUS at 13:05

## 2022-10-18 RX ADMIN — Medication 15 ML: at 20:36

## 2022-10-18 RX ADMIN — ASPIRIN 325 MG: 325 TABLET, COATED ORAL at 18:56

## 2022-10-18 RX ADMIN — HYDRALAZINE HYDROCHLORIDE 2 MG: 20 INJECTION INTRAMUSCULAR; INTRAVENOUS at 12:21

## 2022-10-18 RX ADMIN — KETAMINE HYDROCHLORIDE 100 MG: 100 INJECTION INTRAMUSCULAR; INTRAVENOUS at 07:48

## 2022-10-18 RX ADMIN — AMINOCAPROIC ACID 1 G/HR: 250 INJECTION, SOLUTION INTRAVENOUS at 08:25

## 2022-10-18 RX ADMIN — ALBUTEROL SULFATE 2.5 MG: 2.5 SOLUTION RESPIRATORY (INHALATION) at 16:33

## 2022-10-18 RX ADMIN — CISATRACURIUM BESYLATE 40 MG: 2 INJECTION INTRAVENOUS at 07:50

## 2022-10-18 RX ADMIN — SODIUM CHLORIDE, POTASSIUM CHLORIDE, SODIUM LACTATE AND CALCIUM CHLORIDE 1000 ML: 600; 310; 30; 20 INJECTION, SOLUTION INTRAVENOUS at 05:37

## 2022-10-18 RX ADMIN — Medication 1500 MG: at 20:37

## 2022-10-18 RX ADMIN — HEPARIN SODIUM 10000 UNITS: 1000 INJECTION, SOLUTION INTRAVENOUS; SUBCUTANEOUS at 10:08

## 2022-10-18 RX ADMIN — POTASSIUM CHLORIDE 20 MEQ: 29.8 INJECTION, SOLUTION INTRAVENOUS at 22:11

## 2022-10-18 RX ADMIN — ONDANSETRON 4 MG: 2 INJECTION INTRAMUSCULAR; INTRAVENOUS at 12:34

## 2022-10-18 RX ADMIN — NOREPINEPHRINE BITARTRATE 16 MCG: 1 INJECTION INTRAVENOUS at 07:58

## 2022-10-18 RX ADMIN — CEFAZOLIN 2 G: 10 INJECTION, POWDER, FOR SOLUTION INTRAVENOUS at 08:00

## 2022-10-18 RX ADMIN — POTASSIUM CHLORIDE 20 MEQ: 29.8 INJECTION, SOLUTION INTRAVENOUS at 15:01

## 2022-10-18 RX ADMIN — SODIUM BICARBONATE 50 MEQ: 84 INJECTION, SOLUTION INTRAVENOUS at 13:28

## 2022-10-18 RX ADMIN — EPINEPHRINE 10 MCG: 0.1 INJECTION, SOLUTION ENDOTRACHEAL; INTRACARDIAC; INTRAVENOUS at 08:05

## 2022-10-18 RX ADMIN — METOPROLOL TARTRATE 6.25 MG: 25 TABLET, FILM COATED ORAL at 05:23

## 2022-10-18 RX ADMIN — FAMOTIDINE 20 MG: 20 TABLET, FILM COATED ORAL at 20:36

## 2022-10-18 RX ADMIN — SODIUM CHLORIDE, PRESERVATIVE FREE 10 ML: 5 INJECTION INTRAVENOUS at 20:36

## 2022-10-18 RX ADMIN — BISACODYL 10 MG: 10 SUPPOSITORY RECTAL at 01:44

## 2022-10-18 RX ADMIN — MORPHINE SULFATE 2 MG: 2 INJECTION, SOLUTION INTRAMUSCULAR; INTRAVENOUS at 14:23

## 2022-10-18 RX ADMIN — SODIUM CHLORIDE, SODIUM LACTATE, POTASSIUM CHLORIDE, AND CALCIUM CHLORIDE: .6; .31; .03; .02 INJECTION, SOLUTION INTRAVENOUS at 11:19

## 2022-10-18 RX ADMIN — ASPIRIN 81 MG: 81 TABLET, COATED ORAL at 05:23

## 2022-10-18 RX ADMIN — DEXTROSE MONOHYDRATE 2 MCG/MIN: 50 INJECTION, SOLUTION INTRAVENOUS at 08:15

## 2022-10-18 RX ADMIN — CISATRACURIUM BESYLATE 10 MG: 2 INJECTION INTRAVENOUS at 09:00

## 2022-10-18 RX ADMIN — OXYCODONE 5 MG: 5 TABLET ORAL at 22:22

## 2022-10-18 RX ADMIN — CALCIUM CHLORIDE 0.5 G: 100 INJECTION, SOLUTION INTRAVENOUS at 12:16

## 2022-10-18 RX ADMIN — PROTAMINE SULFATE 550 MG: 10 INJECTION, SOLUTION INTRAVENOUS at 12:08

## 2022-10-18 RX ADMIN — Medication 15 ML: at 05:40

## 2022-10-18 RX ADMIN — Medication 5 MG: at 13:05

## 2022-10-18 RX ADMIN — MORPHINE SULFATE 2 MG: 2 INJECTION, SOLUTION INTRAMUSCULAR; INTRAVENOUS at 16:15

## 2022-10-18 RX ADMIN — KETOROLAC TROMETHAMINE 60 MG: 30 INJECTION, SOLUTION INTRAMUSCULAR; INTRAVENOUS at 12:34

## 2022-10-18 RX ADMIN — ACETAMINOPHEN 1000 MG: 500 TABLET ORAL at 05:22

## 2022-10-18 RX ADMIN — VANCOMYCIN HYDROCHLORIDE 1500 MG: 10 INJECTION, POWDER, LYOPHILIZED, FOR SOLUTION INTRAVENOUS at 08:00

## 2022-10-18 RX ADMIN — ALBUMIN (HUMAN) 25 G: 12.5 INJECTION, SOLUTION INTRAVENOUS at 20:02

## 2022-10-18 RX ADMIN — CISATRACURIUM BESYLATE 10 MG: 2 INJECTION INTRAVENOUS at 10:00

## 2022-10-18 RX ADMIN — CALCIUM CHLORIDE 0.25 G: 100 INJECTION, SOLUTION INTRAVENOUS at 13:00

## 2022-10-18 RX ADMIN — CEFAZOLIN 2 G: 10 INJECTION, POWDER, FOR SOLUTION INTRAVENOUS at 11:59

## 2022-10-18 RX ADMIN — MUPIROCIN: 20 OINTMENT TOPICAL at 20:36

## 2022-10-18 RX ADMIN — FENTANYL CITRATE 500 MCG: 50 INJECTION, SOLUTION INTRAMUSCULAR; INTRAVENOUS at 09:26

## 2022-10-18 RX ADMIN — HEPARIN SODIUM 38000 UNITS: 1000 INJECTION, SOLUTION INTRAVENOUS; SUBCUTANEOUS at 10:00

## 2022-10-18 RX ADMIN — CISATRACURIUM BESYLATE 10 MG: 2 INJECTION INTRAVENOUS at 11:00

## 2022-10-18 RX ADMIN — ALBUTEROL SULFATE 2.5 MG: 2.5 SOLUTION RESPIRATORY (INHALATION) at 20:17

## 2022-10-18 RX ADMIN — MIDAZOLAM HYDROCHLORIDE 4 MG: 2 INJECTION, SOLUTION INTRAMUSCULAR; INTRAVENOUS at 07:48

## 2022-10-18 RX ADMIN — ALBUMIN (HUMAN) 25 G: 12.5 INJECTION, SOLUTION INTRAVENOUS at 14:48

## 2022-10-18 ASSESSMENT — PULMONARY FUNCTION TESTS
PIF_VALUE: 19
PIF_VALUE: 10
PIF_VALUE: 16
PIF_VALUE: 17
PIF_VALUE: 17
PIF_VALUE: 16
PIF_VALUE: 15
PIF_VALUE: 15
PIF_VALUE: 40

## 2022-10-18 ASSESSMENT — PAIN DESCRIPTION - DESCRIPTORS: DESCRIPTORS: DISCOMFORT

## 2022-10-18 ASSESSMENT — PAIN DESCRIPTION - LOCATION: LOCATION: CHEST

## 2022-10-18 ASSESSMENT — PAIN DESCRIPTION - ORIENTATION: ORIENTATION: ANTERIOR

## 2022-10-18 ASSESSMENT — PAIN SCALES - GENERAL: PAINLEVEL_OUTOF10: 5

## 2022-10-18 NOTE — OP NOTE
Operative Note      Patient: Linda Pena  YOB: 1945  MRN: [id number]    Date of Procedure: 10/18/2022    Pre-Op Diagnosis: Coronary artery disease, unspecified vessel or lesion type, unspecified whether angina present, unspecified whether native or transplanted heart [I25.10]    Post-Op Diagnosis: Same       Procedure(s):  CORONARY ARTERY BYPASS GRAFTING X 3, ON PUMP, INTERNAL MAMMARY ARTERY, SAPHENOUS VEIN GRAFT, YING, TEMPORARY PACING WIRES,  WITH LEFT ATRIAL APPENDAGE CLIP PLACEMENT AND BILATERAL PECTORALIS BLOCKS    Surgeon(s):  Hussain Camara MD    Assistant:   Surgical Assistant: Hollice Shelling; Agustin Lennox  First Assistant: Dequan Patton RN    Anesthesia: General    Estimated Blood Loss (mL): 526     Complications: None    Specimens:   * No specimens in log *    Implants:  Implant Name Type Inv. Item Serial No.  Lot No. LRB No. Used Action   CLIP MED SUTURE LESS 40 MM SYS 1 HND STRL ATRICLIP FLX V - [id number]  CLIP MED SUTURE LESS 40 MM SYS 1 HND STRL ATRICLIP FLX V  [employer]-WD [id number] N/A 1 Implanted         Drains:   Chest Tube Right Pleural 1 (Active)       Chest Tube Mediastinal 2 (Active)       Chest Tube Left Pleural 3 (Active)       Urinary Catheter 10/18/22 Tamayo-Temperature (Active)       Findings: Three-vessel bypass: No suitable target on PDA or posterolateral.  EF normal in case no inotropes or pressors    Detailed Description of Procedure:   BYPASS GRAFTS:  1. LIMA to mid LAD  2. SVG to OM1  3. SVG to acute marginal      Crossclamp Time: 77 min  Cardiopulmonary Bypass Time: 91 min    Vein Start time: 834  Vein out time: 941  Vein ready time: 1013    Detailed Description of Procedure:   After informed consent was obtained the patient was brought to the operating room and general anesthesia was induced without difficulty. Appropriate lines monitors were placed. Timeout was done confirming correct patient procedure.     After sterile prep and drape, the right greater saphenous vein was harvested endoscopically from the thigh to knee. Simultaneously, a median sternotomy incision was made. The sternum was divided with a reciprocating saw after holding ventilations. Cautery was used to control bleeding and bone edges. The left mammary artery was harvested in a pedicle fashion. The left pleural space was entered. Systemic heparinization was administered and allowed to circulate for 3 minutes. The mammary was then clipped distally and prepared for bypass grafting. It was then placed in a papaverine soaked Ray-Seema sponge. The sternal retractor was then placed and pericardial well developed. 2 Ethibond double row pursing sutures were placed as well as a 2-0 Ethibond in the right atrial appendage. A 4-0 pledgeted Prolene was placed for antegrade cardioplegia. Ascending aorta was soft and was cannulated in the distal ascending aorta. Dual stage venous cannula was placed. Bypass flow was initiated with good drainage. Patient was allowed to drift in temperature. Flows were then dropped, crossclamp applied and antegrade cardioplegia was administered with rapid cessation of myocardial activity. Topical ice was then applied as well. The right system was surveyed. The PDA and posterolateral were less than a millimeter. There is a nice acute marginal which was shown to backfill on cath. Acute marginal was a 2 mm artery. It was opened sharply and a end-to-side anastomosis was performed with running 7-0 Prolene suture. Was leak tested and found to be satisfactory. The heart was filled and graft was sized. A 4.4 mm punch was then used to make a aortotomy and the proximal anastomosis was constructed using 6-0 Prolene suture. Cardioplegia administered and satisfactory for leak. The OM1 was a 2.5 mm artery. It was opened sharply and a end-to-side anastomosis was performed with running 7-0 Prolene suture. Was leak tested and found to be satisfactory. The heart was filled and graft was sized. A 4.4 mm punch was then used to make a aortotomy and the proximal anastomosis was constructed using 6-0 Prolene suture. Cardioplegia administered and satisfactory for leak. The left atrial appendage was then sized to a 40 mm. The AtriCure 40 mm V clamp was then applied to the base of the appendage and was satisfactory. A slit was made in the pericardium with care to avoid the phrenic nerve. The mammary artery was passed through this area. The mid LAD was chosen. The LAD was diffusely calcified. The mid LAD was then opened sharply and a running 7-0 Prolene suture was used to construct a end to side anastomosis. 6-0 Prolene suture was then used to tack down the mammary fascia on both sides of this. Hotshot was administered in antegrade fashion. Bypass flows were then dropped patient placed in steep Trendelenburg position and the cross-clamp was removed. The patient required 0 defibrillations and a due to bradycardia short run VVI pacing. Did not require any inotropic support. Left pleural space was evacuated and lungs were ventilated. Bypass was weaned without difficulty and protamine was administered. The grafts were dopplered and found to be satisfactory. Patient was decannulated. All cannulation sites were oversewn. A flat 9 drain was placed in the mediastinum, and bilateral 24 [place] Stuart drains placed in the pleural spaces. Pericardium closed with 0 vicryl    The chest was reapproximated with 7 #7 stainless steel wires and platelet rich plasma was applied to the wound. After chest closure, the deep tissues were closed 0 Vicryl, 2-0 Vicryl, 3-0 Vicryl and 4-0 Vicryl. Skin glue was then applied. Was then taken to the ICU in satisfactory critical condition. I was present and scrubbed for the duration of the case and all sponge and needle counts were correct.       Electronically signed by Umesh Degroot MD on 10/18/2022 at 12:45 PM

## 2022-10-18 NOTE — CONSENT
Informed Consent for Blood Component Transfusion Note    I have discussed with the patient the rationale for blood component transfusion; its benefits in treating or preventing fatigue, organ damage, or death; and its risk which includes mild transfusion reactions, rare risk of blood borne infection, or more serious but rare reactions. I have discussed the alternatives to transfusion, including the risk and consequences of not receiving transfusion. The patient had an opportunity to ask questions and had agreed to proceed with transfusion of blood components.     Electronically signed by Suraj Enriquez MD on 10/18/22 at 12:45 PM EDT

## 2022-10-18 NOTE — PROGRESS NOTES
10/18/22 1328   Patient Observation   Heart Rate 50   Resp 12   SpO2 99 %   Vent Information   Ventilator Initiate Yes   Vent Mode AC/VC   $Ventilation $Initial Day   Ventilator Settings   Vt (Set, mL) 750 mL   Resp Rate (Set) 12 bmp   PEEP/CPAP (cmH2O) 7   FiO2  60 %   Vent Patient Data (Readings)   Vt (Measured) 778 mL   Peak Inspiratory Pressure (cmH2O) 19 cmH2O   Rate Measured 12 br/min   Minute Volume (L/min) 9.32 Liters   Mean Airway Pressure (cmH2O) 11 cmH20   I:E Ratio 1:2.40   Flow Sensitivity 3 L/min   Vent Alarm Settings   High Pressure (cmH2O) 40 cmH2O   Low Minute Volume (lpm) 2 L/min   Low Exhaled Vt (ml) 200 mL   RR High (bpm) 40 br/min   Apnea (secs) 20 secs   Additional Respiratoray Assessments   Humidification Source HME   ETT (adult)   Placement Date/Time: 10/18/22 0751   Placement Verified By: Direct visualization;Capnometry  Preoxygenation: Yes  Mask Ventilation: Ventilated by mask with oral airway (2)  Technique: Stylet; Video laryngoscopy  Airway Type: Cuffed  Airway Tube Size: 7...    Secured At 23 cm   Measured From Lips   ETT Placement Right   Secured By Commercial tube ochoa   Site Assessment Dry

## 2022-10-18 NOTE — ANESTHESIA POSTPROCEDURE EVALUATION
Department of Anesthesiology  Postprocedure Note    Patient: Nicolas Bhakta  MRN: [id number]  YOB: 1945  Date of evaluation: 10/18/2022      Procedure Summary     Date: 10/18/22 Room / Location: [address]    Anesthesia Start: 6453 Anesthesia Stop:     Procedure: CORONARY ARTERY BYPASS GRAFTING X 3, ON PUMP, INTERNAL MAMMARY ARTERY, SAPHENOUS VEIN GRAFT, YING, TEMPORARY PACING WIRES,  WITH LEFT ATRIAL APPENDAGE CLIP PLACEMENT AND BILATERAL PECTORALIS BLOCKS Diagnosis:       Coronary artery disease, unspecified vessel or lesion type, unspecified whether angina present, unspecified whether native or transplanted heart      (Coronary artery disease, unspecified vessel or lesion type, unspecified whether angina present, unspecified whether native or transplanted heart [I25.10])    Surgeons: Imelda Wilson MD Responsible Provider: Elaine Hackett MD    Anesthesia Type: general ASA Status: 4          Anesthesia Type: No value filed. Rich Phase I:      Rich Phase II:        Anesthesia Post Evaluation    Patient location during evaluation: PACU  Patient participation: waiting for patient participation  Level of consciousness: sedated and ventilated  Pain scale: N/A.   Airway patency: patent  Nausea & Vomiting: no nausea and no vomiting  Complications: no  Cardiovascular status: hemodynamically stable  Respiratory status: acceptable, intubated and ventilator  Hydration status: stable

## 2022-10-18 NOTE — PROGRESS NOTES
Shift: Day    Procedure: CABG X 3    Admit from OR (time and date): 6870 10/18/22    Transition (time and date):     Surgery, return to OR no     Nursing assessment at handoff  stable    Most recent vitals: /77   Pulse 66   Temp 97.9 °F (36.6 °C) (Oral)   Resp 15   Ht 6' 1\" (1.854 m)   Wt 226 lb 3.1 oz (102.6 kg)   SpO2 99%   BMI 29.84 kg/m²      Increased O2 requirements: no O2 requirements: Oxygen Therapy  SpO2: 99 %  Pulse Oximetry Type:  Intermittent  Pulse Oximeter Device Mode: Intermittent  Pulse Oximeter Device Location: Finger  O2 Device: Nasal cannula  FiO2 : 50 %  O2 Flow Rate (L/min): 4 L/min  Vent Mode: AC/VC     Admission weight Weight: 235 lb (106.6 kg)  Today's weight   Wt Readings from Last 1 Encounters:   10/18/22 226 lb 3.1 oz (102.6 kg)         EF: 67%    Drop in Urinary Output no     Rhythm Changes Normal Sinus Rhythm 60's    Pacing Wires Removed:  [] Yes  [] NO  [] Platelets < 66,290  [] Arrhythmia  [] Bradycardia [] Valve Replacement  [] Pacing for Cardiac Index  []Physician Order    Lines/Drains  LDA Insertion Date Discontinued Date Dressing Changes   Art line 10/18/22     Central Line 10/18/22     Tamayo 10/18/22     Chest Tube 10/18/22     Wires  10/18/22     ETT      TERE Drain      VasCath      Impella        Interventions After Office Hours  Problem(Brief) Date Time Intervention Physician contacted                                               Drip rates at handoff:    dextrose 5 % and 0.45 % NaCl 75 mL/hr at 10/18/22 1508    sodium chloride      norepinephrine      niCARdipine      insulin 2.84 Units/hr (10/18/22 1700)    dextrose      milrinone 0.25 mcg/kg/min (10/18/22 1446)    aminocaproic acid (AMICAR) IV infusion 1 g/hr (10/18/22 0825)       Hospital Course:  POD# 0 10/18/22  -K replaced  -albumin given  - SB out of OR CI 1-2, unable to resolve with V pacing        Lab Data:  CBC:   Recent Labs     10/18/22  0302 10/18/22  0818 10/18/22  1328 10/18/22  1336   WBC 5.2  -- --  12.6*   HGB 15.0   < > 12.6* 13.7   HCT 44.5  --   --  40.2*   MCV 93.3  --   --  93.0     --   --  137    < > = values in this interval not displayed.      BMP:    Recent Labs     10/18/22  0302 10/18/22  1336   * 133*   K 3.8 4.1   CO2 25 22   BUN 15 16   CREATININE 1.2 1.2     LIVR:   Recent Labs     10/18/22  0302   AST 24   ALT 37     PT/INR:   Recent Labs     10/18/22  0302 10/18/22  1330   PROT 6.4  --    INR 1.10 1.48*     APTT:   Recent Labs     10/18/22  1330   APTT 30.3     ABG:   Recent Labs     10/18/22  1404 10/18/22  1453   PHART 7.428 7.438   [id number] 35.7 33.4*   PO2ART 116.2* 149.2*

## 2022-10-18 NOTE — FLOWSHEET NOTE
10/18/22 1408   Vent Settings   Resp Rate (Set) 12 bmp   Rate Measured 12 br/min   Vt (Set, mL) 650 mL   Vt (Measured) 643 mL   PEEP/CPAP (cmH2O) 5   Pressure Support 0 cmH20   Peak Inspiratory Pressure (cmH2O) 16 cmH2O   Vent settings changed, RT notified

## 2022-10-18 NOTE — FLOWSHEET NOTE
10/18/22 1433   Vent Settings   Rate Measured 12 br/min   Vt (Measured) 334 mL   PEEP/CPAP (cmH2O) 5   Pressure Support 5 cmH20   Peak Inspiratory Pressure (cmH2O) 10 cmH2O     Pt placed on SBT, RT notified

## 2022-10-18 NOTE — PLAN OF CARE
Hospitalist will sign off. Thank you for having us participate in his care.  Do not hesitate to call if any questions

## 2022-10-18 NOTE — PROGRESS NOTES
Patient admitted to CVU from [place] [address] with RN and anesthesia attached to monitors and ventilator. Report received from anesthesiologist, Dr. Tatyana Anderson. Portable chest x-ray taken and reviewed and line and tube placement verified by Dr. Tatyana Anderson. Labs drawn, sent to lab and test completed and reviewed on [facility]. Assessment complete. Continue monitoring hemodynamics per protocol.        Elaine Abbott 171

## 2022-10-18 NOTE — PROGRESS NOTES
HR 50's and CI <2.0, Iwona FROST notified.  Advised to call on call MD if CI <2.0 and HR, 50s and sustains for up to 5 min call on call MD.

## 2022-10-18 NOTE — PROGRESS NOTES
Patient awake, alert x 3 and following commands. Patient placed on CPAP per the Initiate RN/RT Driven Ventilator Weaning Assessment and Protocol. ABG drawn on admission from surgery, if the patient became hemodynamically unstable, AND prior to extubation. Respiratory called for weaning parameters. NIF - 28. Patient met extubation parameters or MD order received for extubation. Patient suctioned and extubated to 4 liters nasal cannula. Patient tolerated well. Oxygen saturation 100% on 4 liters nasal cannula.

## 2022-10-19 ENCOUNTER — APPOINTMENT (OUTPATIENT)
Dept: GENERAL RADIOLOGY | Age: 77
DRG: 234 | End: 2022-10-19
Attending: INTERNAL MEDICINE
Payer: MEDICARE

## 2022-10-19 LAB
ANION GAP SERPL CALCULATED.3IONS-SCNC: 8 MMOL/L (ref 3–16)
BUN BLDV-MCNC: 13 MG/DL (ref 7–20)
CALCIUM SERPL-MCNC: 8.3 MG/DL (ref 8.3–10.6)
CHLORIDE BLD-SCNC: 108 MMOL/L (ref 99–110)
CO2: 21 MMOL/L (ref 21–32)
CREAT SERPL-MCNC: 1.1 MG/DL (ref 0.8–1.3)
GFR SERPL CREATININE-BSD FRML MDRD: >60 ML/MIN/{1.73_M2}
GLUCOSE BLD-MCNC: 105 MG/DL (ref 70–99)
GLUCOSE BLD-MCNC: 116 MG/DL (ref 70–99)
GLUCOSE BLD-MCNC: 122 MG/DL (ref 70–99)
GLUCOSE BLD-MCNC: 124 MG/DL (ref 70–99)
GLUCOSE BLD-MCNC: 134 MG/DL (ref 70–99)
GLUCOSE BLD-MCNC: 137 MG/DL (ref 70–99)
GLUCOSE BLD-MCNC: 138 MG/DL (ref 70–99)
GLUCOSE BLD-MCNC: 138 MG/DL (ref 70–99)
GLUCOSE BLD-MCNC: 139 MG/DL (ref 70–99)
GLUCOSE BLD-MCNC: 167 MG/DL (ref 70–99)
GLUCOSE BLD-MCNC: 171 MG/DL (ref 70–99)
GLUCOSE BLD-MCNC: 184 MG/DL (ref 70–99)
GLUCOSE BLD-MCNC: 187 MG/DL (ref 70–99)
GLUCOSE BLD-MCNC: 189 MG/DL (ref 70–99)
GLUCOSE BLD-MCNC: 208 MG/DL (ref 70–99)
GLUCOSE BLD-MCNC: 238 MG/DL (ref 70–99)
GLUCOSE BLD-MCNC: 76 MG/DL (ref 70–99)
HCT VFR BLD CALC: 36.3 % (ref 40.5–52.5)
HEMOGLOBIN: 12.2 G/DL (ref 13.5–17.5)
INR BLD: 1.32 (ref 0.87–1.14)
MAGNESIUM: 2.4 MG/DL (ref 1.8–2.4)
MCH RBC QN AUTO: 31.5 PG (ref 26–34)
MCHC RBC AUTO-ENTMCNC: 33.7 G/DL (ref 31–36)
MCV RBC AUTO: 93.7 FL (ref 80–100)
PDW BLD-RTO: 13 % (ref 12.4–15.4)
PERFORMED ON: ABNORMAL
PERFORMED ON: NORMAL
PLATELET # BLD: 173 K/UL (ref 135–450)
PMV BLD AUTO: 7.9 FL (ref 5–10.5)
POC ACT LR: 239 SEC
POC ACT LR: 294 SEC
POC ACT LR: 300 SEC
POTASSIUM SERPL-SCNC: 3.9 MMOL/L (ref 3.5–5.1)
POTASSIUM SERPL-SCNC: 4 MMOL/L (ref 3.5–5.1)
PROTHROMBIN TIME: 16.3 SEC (ref 11.7–14.5)
RBC # BLD: 3.88 M/UL (ref 4.2–5.9)
SODIUM BLD-SCNC: 137 MMOL/L (ref 136–145)
WBC # BLD: 16.4 K/UL (ref 4–11)

## 2022-10-19 PROCEDURE — 85610 PROTHROMBIN TIME: CPT

## 2022-10-19 PROCEDURE — 6360000002 HC RX W HCPCS: Performed by: STUDENT IN AN ORGANIZED HEALTH CARE EDUCATION/TRAINING PROGRAM

## 2022-10-19 PROCEDURE — 6370000000 HC RX 637 (ALT 250 FOR IP): Performed by: STUDENT IN AN ORGANIZED HEALTH CARE EDUCATION/TRAINING PROGRAM

## 2022-10-19 PROCEDURE — 97167 OT EVAL HIGH COMPLEX 60 MIN: CPT

## 2022-10-19 PROCEDURE — 94669 MECHANICAL CHEST WALL OSCILL: CPT

## 2022-10-19 PROCEDURE — 94640 AIRWAY INHALATION TREATMENT: CPT

## 2022-10-19 PROCEDURE — 97110 THERAPEUTIC EXERCISES: CPT

## 2022-10-19 PROCEDURE — 84132 ASSAY OF SERUM POTASSIUM: CPT

## 2022-10-19 PROCEDURE — 97162 PT EVAL MOD COMPLEX 30 MIN: CPT

## 2022-10-19 PROCEDURE — 51701 INSERT BLADDER CATHETER: CPT

## 2022-10-19 PROCEDURE — 94761 N-INVAS EAR/PLS OXIMETRY MLT: CPT

## 2022-10-19 PROCEDURE — 99233 SBSQ HOSP IP/OBS HIGH 50: CPT | Performed by: NURSE PRACTITIONER

## 2022-10-19 PROCEDURE — 71045 X-RAY EXAM CHEST 1 VIEW: CPT

## 2022-10-19 PROCEDURE — 51798 US URINE CAPACITY MEASURE: CPT

## 2022-10-19 PROCEDURE — 99024 POSTOP FOLLOW-UP VISIT: CPT | Performed by: NURSE PRACTITIONER

## 2022-10-19 PROCEDURE — 83735 ASSAY OF MAGNESIUM: CPT

## 2022-10-19 PROCEDURE — 80048 BASIC METABOLIC PNL TOTAL CA: CPT

## 2022-10-19 PROCEDURE — 97530 THERAPEUTIC ACTIVITIES: CPT

## 2022-10-19 PROCEDURE — 2700000000 HC OXYGEN THERAPY PER DAY

## 2022-10-19 PROCEDURE — 2100000000 HC CCU R&B

## 2022-10-19 PROCEDURE — 2580000003 HC RX 258: Performed by: STUDENT IN AN ORGANIZED HEALTH CARE EDUCATION/TRAINING PROGRAM

## 2022-10-19 PROCEDURE — 85027 COMPLETE CBC AUTOMATED: CPT

## 2022-10-19 RX ORDER — TAMSULOSIN HYDROCHLORIDE 0.4 MG/1
0.4 CAPSULE ORAL DAILY
COMMUNITY
End: 2022-11-28 | Stop reason: SDUPTHER

## 2022-10-19 RX ORDER — METHOCARBAMOL 500 MG/1
750 TABLET, FILM COATED ORAL 4 TIMES DAILY PRN
Status: DISCONTINUED | OUTPATIENT
Start: 2022-10-19 | End: 2022-10-22 | Stop reason: HOSPADM

## 2022-10-19 RX ADMIN — ACETAMINOPHEN 650 MG: 325 TABLET ORAL at 17:55

## 2022-10-19 RX ADMIN — ACETAMINOPHEN 650 MG: 325 TABLET ORAL at 05:14

## 2022-10-19 RX ADMIN — MUPIROCIN: 20 OINTMENT TOPICAL at 20:37

## 2022-10-19 RX ADMIN — POTASSIUM CHLORIDE 10 MEQ: 750 TABLET, EXTENDED RELEASE ORAL at 17:55

## 2022-10-19 RX ADMIN — ALBUTEROL SULFATE 2.5 MG: 2.5 SOLUTION RESPIRATORY (INHALATION) at 15:10

## 2022-10-19 RX ADMIN — FAMOTIDINE 20 MG: 20 TABLET, FILM COATED ORAL at 09:21

## 2022-10-19 RX ADMIN — DEXTROSE AND SODIUM CHLORIDE: 5; 450 INJECTION, SOLUTION INTRAVENOUS at 05:46

## 2022-10-19 RX ADMIN — BISACODYL 5 MG: 5 TABLET, COATED ORAL at 09:19

## 2022-10-19 RX ADMIN — FONDAPARINUX SODIUM 2.5 MG: 2.5 INJECTION, SOLUTION SUBCUTANEOUS at 09:19

## 2022-10-19 RX ADMIN — CEFAZOLIN 2000 MG: 10 INJECTION, POWDER, FOR SOLUTION INTRAVENOUS at 09:27

## 2022-10-19 RX ADMIN — MUPIROCIN: 20 OINTMENT TOPICAL at 09:23

## 2022-10-19 RX ADMIN — OXYCODONE 5 MG: 5 TABLET ORAL at 09:21

## 2022-10-19 RX ADMIN — FAMOTIDINE 20 MG: 20 TABLET, FILM COATED ORAL at 20:30

## 2022-10-19 RX ADMIN — SODIUM CHLORIDE, PRESERVATIVE FREE 10 ML: 5 INJECTION INTRAVENOUS at 09:19

## 2022-10-19 RX ADMIN — Medication 400 MG: at 09:20

## 2022-10-19 RX ADMIN — ALBUTEROL SULFATE 2.5 MG: 2.5 SOLUTION RESPIRATORY (INHALATION) at 11:21

## 2022-10-19 RX ADMIN — CEFAZOLIN 2000 MG: 10 INJECTION, POWDER, FOR SOLUTION INTRAVENOUS at 16:30

## 2022-10-19 RX ADMIN — ASPIRIN 81 MG: 81 TABLET, COATED ORAL at 09:20

## 2022-10-19 RX ADMIN — OXYCODONE 5 MG: 5 TABLET ORAL at 05:14

## 2022-10-19 RX ADMIN — ACETAMINOPHEN 650 MG: 325 TABLET ORAL at 12:36

## 2022-10-19 RX ADMIN — FUROSEMIDE 40 MG: 10 INJECTION, SOLUTION INTRAMUSCULAR; INTRAVENOUS at 15:54

## 2022-10-19 RX ADMIN — SODIUM CHLORIDE 4.34 UNITS/HR: 9 INJECTION, SOLUTION INTRAVENOUS at 05:43

## 2022-10-19 RX ADMIN — Medication 15 ML: at 09:23

## 2022-10-19 RX ADMIN — POTASSIUM CHLORIDE 20 MEQ: 29.8 INJECTION, SOLUTION INTRAVENOUS at 05:44

## 2022-10-19 RX ADMIN — METOPROLOL TARTRATE 12.5 MG: 25 TABLET, FILM COATED ORAL at 20:29

## 2022-10-19 RX ADMIN — CEFAZOLIN 2000 MG: 10 INJECTION, POWDER, FOR SOLUTION INTRAVENOUS at 00:10

## 2022-10-19 RX ADMIN — CLOPIDOGREL BISULFATE 75 MG: 75 TABLET ORAL at 09:20

## 2022-10-19 RX ADMIN — FUROSEMIDE 40 MG: 10 INJECTION, SOLUTION INTRAMUSCULAR; INTRAVENOUS at 09:19

## 2022-10-19 RX ADMIN — Medication 1500 MG: at 20:33

## 2022-10-19 RX ADMIN — MORPHINE SULFATE 2 MG: 2 INJECTION, SOLUTION INTRAMUSCULAR; INTRAVENOUS at 13:38

## 2022-10-19 RX ADMIN — POTASSIUM CHLORIDE 20 MEQ: 29.8 INJECTION, SOLUTION INTRAVENOUS at 02:55

## 2022-10-19 RX ADMIN — POTASSIUM CHLORIDE 10 MEQ: 750 TABLET, EXTENDED RELEASE ORAL at 09:23

## 2022-10-19 RX ADMIN — ALBUTEROL SULFATE 2.5 MG: 2.5 SOLUTION RESPIRATORY (INHALATION) at 20:22

## 2022-10-19 RX ADMIN — SODIUM CHLORIDE, PRESERVATIVE FREE 10 ML: 5 INJECTION INTRAVENOUS at 20:44

## 2022-10-19 RX ADMIN — MILRINONE LACTATE IN DEXTROSE 0.25 MCG/KG/MIN: 200 INJECTION, SOLUTION INTRAVENOUS at 00:15

## 2022-10-19 RX ADMIN — POLYETHYLENE GLYCOL 3350 17 G: 17 POWDER, FOR SOLUTION ORAL at 09:19

## 2022-10-19 RX ADMIN — ATORVASTATIN CALCIUM 40 MG: 40 TABLET, FILM COATED ORAL at 20:30

## 2022-10-19 RX ADMIN — ALBUTEROL SULFATE 2.5 MG: 2.5 SOLUTION RESPIRATORY (INHALATION) at 07:29

## 2022-10-19 RX ADMIN — Medication 1500 MG: at 09:28

## 2022-10-19 RX ADMIN — POTASSIUM CHLORIDE 10 MEQ: 750 TABLET, EXTENDED RELEASE ORAL at 12:36

## 2022-10-19 RX ADMIN — Medication 15 ML: at 20:38

## 2022-10-19 ASSESSMENT — PAIN SCALES - GENERAL
PAINLEVEL_OUTOF10: 1
PAINLEVEL_OUTOF10: 5
PAINLEVEL_OUTOF10: 2

## 2022-10-19 ASSESSMENT — PAIN DESCRIPTION - LOCATION
LOCATION: STERNUM
LOCATION: CHEST
LOCATION: STERNUM

## 2022-10-19 ASSESSMENT — PAIN DESCRIPTION - ORIENTATION
ORIENTATION: ANTERIOR
ORIENTATION: ANTERIOR;LEFT;RIGHT

## 2022-10-19 ASSESSMENT — PAIN DESCRIPTION - DESCRIPTORS
DESCRIPTORS: DISCOMFORT
DESCRIPTORS: SORE

## 2022-10-19 NOTE — CARE COORDINATION
[facility] - Acute Rehab Unit   After review, this patient is felt to be:       []  Appropriate for Acute Inpatient Rehab    []  Appropriate for Acute Inpatient Rehab Pending Insurance Authorization    []  Not appropriate for Acute Inpatient Rehab    [x]  Referral received and ARU reviewing patient; Evaluation ongoing. Current therapy recommendations are for home, will continue to assess and final determination per Dr. Teresa Diaz       Will notify DCP with further updates.  Thank you for the Tati Hunter RN

## 2022-10-19 NOTE — PROGRESS NOTES
Criteria met per clinical pathway to remove  pacing wires & MD order received. Procedure explained to patient. INR is less than 2.0. Pacing wire site within normal limits. Cleansed site with Chloroprep. Ventricular pacing wires removed per policy without difficulty. Dry sterile dressing applied. Vital signs taken every 15 minutes X 2, every 30 minutes X 1, and every hour X 1 recorded in Doc Flowsheets. Patient tolerated procedure well, heart tones easily auscultated, oxygen saturation within normal limits. No signs/symtoms of cardiac tamponade.

## 2022-10-19 NOTE — PROGRESS NOTES
CVTS Cardiothoracic Progress Note:                                CC:  Post op follow up     Surgery: 10/18/22 CORONARY ARTERY BYPASS GRAFTING X 3, ON PUMP, INTERNAL MAMMARY ARTERY, SAPHENOUS VEIN GRAFT, YING, TEMPORARY PACING WIRES,  WITH LEFT ATRIAL APPENDAGE CLIP PLACEMENT AND BILATERAL [facility] course:  10/19 No acute events overnight. Hemodynamically stable on low dose levo. Milrinone weaned off.      Past Medical History:   Diagnosis Date    Acute MI ([address]     Angina pectoris, unstable ([address]     ASHD (arteriosclerotic heart disease)     Bladder outlet obstruction     BPH (benign prostatic hyperplasia)     Chest pain     Constipation     Diabetes mellitus ([address]     GERD (gastroesophageal reflux disease)     Hypercholesteremia     Hypercholesterolemia     Hypertension     IBS (irritable bowel syndrome)     Influenza A 01/28/2020    Sinusitis     Type II or unspecified type diabetes mellitus without mention of complication, not stated as uncontrolled         Past Surgical History:   Procedure Laterality Date    CARDIAC CATHETERIZATION  2002    stent    CARDIAC CATHETERIZATION  2011    stent    COLONOSCOPY  10/24/13    CORONARY ANGIOPLASTY WITH STENT PLACEMENT      CORONARY ARTERY BYPASS GRAFT N/A 10/18/2022    CORONARY ARTERY BYPASS GRAFTING X 3, ON PUMP, INTERNAL MAMMARY ARTERY, SAPHENOUS VEIN GRAFT, YING, TEMPORARY PACING WIRES,  WITH LEFT ATRIAL APPENDAGE CLIP PLACEMENT AND BILATERAL PECTORALIS BLOCKS performed by Alcira Johnson MD at [address] [place] [address] as of 10/12/2022 - Fully Reviewed 10/12/2022   Allergen Reaction Noted    Prednisone Other (See Comments) 07/09/2014    Codeine Other (See Comments) 01/23/2011        Patient Active Problem List   Diagnosis    Angina pectoris, unstable ([address]    Chest pain    Diabetes mellitus type II, controlled ([address]    Bladder outlet obstruction    Hx of CABG    Mixed hyperlipidemia    Constipation    Benign non-nodular prostatic hyperplasia with lower urinary tract symptoms    Anxiety    DM (diabetes mellitus), secondary, uncontrolled, w/neurologic complic    Primary hypertension    Coronary artery disease involving native coronary artery of native heart with angina pectoris ([address]    Pure hypercholesterolemia    Benign non-nodular prostatic hyperplasia without lower urinary tract symptoms    Type 2 diabetes mellitus with diabetic polyneuropathy, without long-term current use of insulin (HCC)    Keloid    Primary insomnia    COVID-19    Unstable angina (HCC)        Vital Signs: BP (!) 105/48   Pulse 65   Temp 99.7 °F (37.6 °C) (Bladder)   Resp 23   Ht 6' 1\" (1.854 m)   Wt 241 lb 4.8 oz (109.5 kg)   SpO2 95%   BMI 31.84 kg/m²  O2 Flow Rate (L/min): 1 L/min     Admission Weight: Weight: 235 lb (106.6 kg)    Weight on 10/18 (102.6 kg) Pre-op   Weight on 10/19 (109.5 kg)    Intake/Output:   Intake/Output Summary (Last 24 hours) at 10/19/2022 1018  Last data filed at 10/19/2022 0700  Gross per 24 hour   Intake 7001.04 ml   Output 1960 ml   Net 5041.04 ml        GTTS: levo @ 2 mcg/min   Flotrac: CVP 7, CO 5.4, CI 2.4,       LABORATORY DATA:     CBC:   Recent Labs     10/18/22  0302 10/18/22  0818 10/18/22  1328 10/18/22  1336 10/19/22  0510   WBC 5.2  --   --  12.6* 16.4*   HGB 15.0   < > 12.6* 13.7 12.2*   HCT 44.5  --   --  40.2* 36.3*   MCV 93.3  --   --  93.0 93.7     --   --  137 173    < > = values in this interval not displayed. BMP:   Recent Labs     10/18/22  0302 10/18/22  1336 10/18/22  1858 10/19/22  0130 10/19/22  0510   * 133*  --   --  137   K 3.8 4.1 4.0 3.9 4.0   CL 97* 104  --   --  108   CO2 25 22  --   --  21   BUN 15 16  --   --  13   CREATININE 1.2 1.2  --   --  1.1     MG:    Recent Labs     10/18/22  0302 10/18/22  1336 10/19/22  0510   MG 2.20 3.50* 2.40      Cardiac Enzymes: No results for input(s): CKTOTAL, CKMB, CKMBINDEX, TROPONINI in the last 72 hours.   PT/INR:   Recent Labs     10/18/22  0302 10/18/22  1330 10/19/22  0510   PROTIME 14.1 17.8* 16.3*   INR 1.10 1.48* 1.32*     APTT:   Recent Labs     10/18/22  1330   APTT 30.3       CXR: 10/19/22  FINDINGS:   Sternotomy wires and left atrial appendage clip are present. Right jugular   venous sheath terminates over the superior vena cava. Mediastinal and   bibasilar chest drains remain present. Endotracheal tube has been removed in   the interval.       The cardiac silhouette is considered stable, within normal limits in size. There is linear/patchy opacity in the left lung base. Right costophrenic   angle is blunted. There is no pneumothorax. Impression   Interval extubation. Supportive tubing is otherwise stable. No pneumothorax. Left basilar opacity, atelectasis favored over pneumonitis. Small right pleural effusion. ________________________________________________________________________      Subjective:   Dietary Intake: needs improvement    no Nausea   Pain Control: adequate  Complaints: expected post operative pain   Bowels: no BM     Objective:   General appearance: resting comfortably in chair, in nad   Lungs: diminished bilateral bases   Heart: S1S2 normal; SR on monitor  Chest: symmetrical expansion with inspiration and expirations; no rocking of sternum noted   Abdomen: soft, non-tender  Bowel sounds: hypoactive   Kidneys: -460-627=6826 ml over 24 hours; Cr 1.1  Wound/Incisions: Midsternal incision CDI; RLE incisions with dressings CDI; Pacing wires intact and secure  Extremities: BLE pulses present; trace swelling noted in BLE  Neurological: alert, oriented and grossly non focal   Chest tubes/Drains: Mediastinal chest tube with 10-15-52=353 ml of serosanguinous drainage over 24 hours; right pleural chest tube with -80= 225 ml of serosanguinous drainage over 24 hours; no airleak noted in either tube     Assessment:   Post-op: 1 days. Condition: In stable condition. Plan:   1. Cardiovascular:   CAD s/p CABG x 3 with NAS clip- ASA, statin, Plavix, BB when BP can tolerate   HLD- statin   Hypotension- low dose levo, wean as able     2. Pulmonary:   Stable AM CXR with expected postoperative changes, atelectasis, and small right pleural effusion   Satisfactory oxygen saturations on 1L NC, wean as able   Expansion measures  Scheduled albuterol   Hx of tobacco use     3. Neurology:   Post op pain control with PRN morphine or oxy. Scheduled tylenol. Will add PRN robaxin     4. Nephrology:   Good 24 hour UOP with Cr of 1.1  Weight up 7 kg from pre op   Diurese as tolerated- IV Lasix     5. Endocrinology:   Glucose controlled on insulin gtt   DMII- A1C 8 on 10/18/22. Will ensure close follow up with PCP upon discharge to ensure good glucose control     6. Hematology:   Acute blood loss anemia- H&H stable. Monitor     7. Microbiology:   Leukocytosis- WBC 16.4. Likely reactive. Monitor     8. Nutrition:   ADAT. Add ONS     9. Labs:   Labs and imaging reviewed as above   K replaced     10. Post-op Drains/Wires: Remove TPWs and wilhelm catheter. Remove mediastinal chest tube. Right IJ CVC (10/18)  Right brachial arterial line (10/18)     11. D/C Goals: CM following. Will await PT/OT recs.      12. Continue post-op care of patient in the ICU    GI prophylaxis: Pepcid  DVT prophylaxis: arixtra   ________________________________________________________________________  YANNICK Delong - CNP  10/19/2022  10:18 AM

## 2022-10-19 NOTE — CARE COORDINATION
Case Management Follow up:      Chart reviewed and case discussed during huddle and rounds. Pt is admitted day # 7. Unit C-2. Diagnosis and currently status as per MD progress: CAD- s/p CABG x3 with NAS clip 10/18. Services following: Ke, CTS    Anticipated Discharge date: TBD pending clinical course. Expected Plan for Discharge: possibly ARU? IP rehab consult placed per PAYTON Knox with CTS. Therapy to see the Pt today. This RN CM spoke with ARU Katelyn Reyna and she is aware and following. Potential Barriers:none    RN CM will continue to follow Pt clinical course for DCP needs.      Sisi Amin, RN, BSN    [facility]  [phone]

## 2022-10-19 NOTE — CONSULTS
Comprehensive Nutrition Assessment    Type and Reason for Visit:  RD Nutrition Re-Screen/LOS, Consult    Nutrition Recommendations/Plan:   Continue regular diet. Encourage PO intakes. Continue Glucerna ONS TID. Monitor acceptance. Monitor nutrition adequacy, pertinent labs, bowel habits, wt changes, and clinical progress. Malnutrition Assessment:  Malnutrition Status: At risk for malnutrition (Comment) (10/19/22 9067)    Context:  Acute Illness     Findings of the 6 clinical characteristics of malnutrition:  Energy Intake:  Mild decrease in energy intake (Comment)  Fluid Accumulation:  Mild Generalized    Nutrition Assessment:    LOS Assessment/Consult: 78 y.o. M admitted with chest pain with PMHx of DM, GERD, HTN, and IBS. S/p CABG x3 10/18. On low-dose levo today. Pt denies wt loss or decrease in appetite PTA. MARINA wt loss d/t insufficient wt hx in EMR. Pt currently on a regular diet with PO intakes between % recorded prior to surgery. Pt reports appetite is improving since surgery, ate all of lunch today. Consulted for post-CABG diet education, not appropriate at this time. Left handout at bedside. RD to provide verbal education reinforcement when appropriate. Glucerna added TID per CT surgery. Will monitor blood sugars and need for carb control diet. Nutrition Related Findings:    +BM 10/16. Hypoactive BS. Generalized trace edema. Labs reviewed. -151 x 24 hrs. HbA1c 8%. +6.4L since admit. Wound Type: Surgical Incision (right leg, sternum 10/18/22 CABG)       Current Nutrition Intake & Therapies:    Average Meal Intake: %  Average Supplements Intake: None Ordered  ADULT DIET; Regular  ADULT ORAL NUTRITION SUPPLEMENT; Breakfast, Lunch, Dinner; Diabetic Oral Supplement    Anthropometric Measures:  Height: 6' 1\" (185.4 cm)  Ideal Body Weight (IBW): 184 lbs (84 kg)       Current Body Weight: 241 lb 4.8 oz (109.5 kg), 131.1 % IBW.  Weight Source: Standing Scale (10/19/22)  Current BMI (kg/m2): 31.8        Weight Adjustment For: No Adjustment                 BMI Categories: Obese Class 1 (BMI 30.0-34. 9)    Estimated Daily Nutrient Needs:  Energy Requirements Based On: Kcal/kg (25-30 kcal/kg)  Weight Used for Energy Requirements: Ideal  Energy (kcal/day): 9139-3465 kcals  Weight Used for Protein Requirements: Ideal (1.2-1.4 g/kg)  Protein (g/day): 101-118 g  Method Used for Fluid Requirements: 1 ml/kcal  Fluid (ml/day): 4071-7758 mL    Nutrition Diagnosis:   Increased nutrient needs related to increase demand for energy/nutrients as evidenced by other (comment) (s/p CABG 10/18)    Nutrition Interventions:   Food and/or Nutrient Delivery: Continue Current Diet, Start Oral Nutrition Supplement  Nutrition Education/Counseling: Education initiated (left handout at bedside; provide verbal education at next visit)  Coordination of Nutrition Care: Continue to monitor while inpatient       Goals:     Goals: PO intake 50% or greater, by next RD assessment       Nutrition Monitoring and Evaluation:   Behavioral-Environmental Outcomes: None Identified  Food/Nutrient Intake Outcomes: Food and Nutrient Intake, Supplement Intake  Physical Signs/Symptoms Outcomes: Biochemical Data, Nutrition Focused Physical Findings, Weight    Discharge Planning:    Continue current diet, Continue Oral Nutrition Supplement     [address]: Office: [phone]; [address]: [address]

## 2022-10-19 NOTE — PROGRESS NOTES
Physical Therapy  Facility/Department: [facility] C2 CARD TELEMETRY  Physical Therapy Initial Assessment    Name: Robert Felix  : 1945  MRN: [id number]  Date of Service: 10/19/2022    Discharge Recommendations:  24 hour supervision or assist   PT Equipment Recommendations  Other: Will CTA - anticipate no DME needs by time of D/C      Patient Diagnosis(es): There were no encounter diagnoses. Past Medical History:  has a past medical history of Acute MI ([facility] Angina pectoris, unstable ([address] ASHD (arteriosclerotic heart disease), Bladder outlet obstruction, BPH (benign prostatic hyperplasia), Chest pain, Constipation, Diabetes mellitus ([facility] [address] GERD (gastroesophageal reflux disease), Hypercholesteremia, Hypercholesterolemia, Hypertension, IBS (irritable bowel syndrome), Influenza A, Sinusitis, and Type II or unspecified type diabetes mellitus without mention of complication, not stated as uncontrolled. Past Surgical History:  has a past surgical history that includes Coronary angioplasty with stent; Cardiac catheterization (); Cardiac catheterization (); Colonoscopy (10/24/13); and Coronary artery bypass graft (N/A, 10/18/2022). Assessment   Body Structures, Functions, Activity Limitations Requiring Skilled Therapeutic Intervention: Decreased functional mobility ; Decreased strength;Decreased endurance;Decreased balance  Assessment: Pt referred for PT evaluation during current hospital stay with dx of CAD, s/p CABG x 3 on 10/18/22. Pt limited by high # of lines today, so unable to assess ambulation more than distance from bed<>chair. However, despite high # of lines, pt able to mobilize well (at min/CGA x 1 level for transfers/gait) and amb ~5 feet from chair to bed without AD. Assist x 2 needed for portions of bed mobility 2* pt's sternal precautions and inability to use UE's to assist.  Anticipate pt progressing well during remainder of hospital LOS to the point where pt can return home. Recommend pt return home with initial 24-hr sup/assist for safety. Will cont. to assess for possible home PT or DME needs, but anticipate pt will likely not need either by time of discharge. Treatment Diagnosis: Decreased strength and (I) with functional mobility s/p CABG x 3 on 10/18/22  Specific Instructions for Next Treatment: Progress ther ex and mobility as tolerated  Therapy Prognosis: Good  Decision Making: Medium Complexity  Requires PT Follow-Up: Yes  Activity Tolerance: Patient tolerated evaluation without incident;Patient tolerated treatment well     Plan   General Plan: 5-7 times per week  Specific Instructions for Next Treatment: Progress ther ex and mobility as tolerated  Current Treatment Recommendations: Strengthening, Balance training, Functional mobility training, Transfer training, Endurance training, Gait training, Stair training, Home exercise program, Safety education & training, Therapeutic activities  Safety Devices  Type of Devices: All fall risk precautions in place, Call light within reach, Gait belt, Left in bed, Nurse notified     Restrictions  Restrictions/Precautions  Restrictions/Precautions: General Precautions, Cardiac  Position Activity Restriction  Sternal Precautions: No Pushing, No Pulling, 5# Lifting Restrictions  Other position/activity restrictions: Ambulate pt every shift, low fall risk per nursing assessment, Rico-Trac, 2 chest tubes, Tamayo, Arterial BP monitoring line, telemetry with ICU monitoring, 2L O2     Subjective   General  Chart Reviewed: Yes  Patient assessed for rehabilitation services?: Yes  Family / Caregiver Present: No  Referring Practitioner: Dr. Josselin Weaver  Referral Date : 10/18/22  Diagnosis: CAD, s/p CABG x 3 on 10/18/22  Follows Commands: Within Functional Limits  General Comment  Comments: Pt sitting up in chair upon entry of therapy staff  Subjective  Subjective: Pt agreeable to work with PT this morning, very pleasant and cooperative.   States he has been sitting up several hours and is ready to return to bed. Pain: Pt c/o surgical pain in mid-chest but did not rate on 0-10 scale. Social/Functional History  Social/Functional History  Lives With: Spouse  Type of Home: House  Home Layout: One level  Home Access: Stairs to enter without rails  Entrance Stairs - Number of Steps: 1 JENIFFER  Bathroom Shower/Tub: Tub/Shower unit, Walk-in shower  Bathroom Toilet: Standard  Bathroom Equipment: Shower chair  Home Equipment: Cane  ADL Assistance: Independent  Homemaking Assistance: Independent (splits tasks with wife)  Homemaking Responsibilities: Yes  Meal Prep Responsibility: Primary  Laundry Responsibility: Primary  Cleaning Responsibility: Primary  Shopping Responsibility: Primary  Ambulation Assistance: Independent (without an AD)  Transfer Assistance: Independent  Active : Yes  Occupation: Part time employment  Type of Occupation:  at [address] [place] [address] St: Impaired  Vision Exceptions: Wears glasses at all times  Hearing  Hearing: Within functional limits      Cognition   Orientation  Overall Orientation Status: Within Normal Limits     Objective   Vitals  Heart Rate: 76  Heart Rate Source: Monitor  BP: (!) 105/48  BP Location: Arterial  Patient Position: Sitting  MAP (Calculated): 67  SpO2: 96 %  O2 Device: None (Room air)    Gross Assessment  AROM: Within functional limits  Strength: Generally decreased, functional  Coordination: Generally decreased, functional  Tone: Normal     Bed Mobility Training  Interventions: Verbal cues; Visual cues  Supine to Sit: Other (comment) (pt up in chair upon entry of therapy staff)  Sit to Supine: Minimum assistance;Assist X2  Scooting: Moderate assistance;Assist X2 (to scoot up in bed)    Balance  Sitting: Intact  Standing: Impaired  Standing - Static: Good  Standing - Dynamic: Fair;Occasional    Transfer Training  Interventions: Safety awareness training;Verbal cues; Visual cues  Sit to Stand: Contact-guard assistance  Stand to Sit: Contact-guard assistance  Bed to Chair: Minimum assistance (without AD, Katie x 1 mainly needed for line management)    Gait Training  Overall Level of Assistance: Minimum assistance (Katie x 1 mainly needed for line management)  Interventions: Safety awareness training;Verbal cues; Visual cues  Base of Support: Widened  Speed/Tory: Pace decreased (< 100 feet/min); Shuffled; Slow  Step Length: Left shortened;Right shortened  Stance: Left increased;Right increased;Time  Gait Abnormalities: Decreased step clearance (pt able to amb short distance from chair>bed with good standing balance, despite high # of lines)  Distance (ft): 5 Feet (from chair>bed (unable to amb further 2* high # of lines))  Assistive Device: Other (comment);Gait belt (no AD)    Exercise Treatment: x 10 BLE: Ankle pumps, glut sets, quad sets, heel slides        AM-PAC Score  AM-PAC Inpatient Mobility Raw Score : 16 (10/19/22 1213)  AM-PAC Inpatient T-Scale Score : 40.78 (10/19/22 1213)  Mobility Inpatient CMS 0-100% Score: 54.16 (10/19/22 1213)  Mobility Inpatient CMS G-Code Modifier : CK (10/19/22 1213)    Goals  Short Term Goals  Time Frame for Short Term Goals: 1 week, 10/26/22 (unless otherwise specified)  Short Term Goal 1: Pt will transfer supine <-> sit with supervision  Short Term Goal 2: Pt will transfer sit <-> stand and bed>chair with modified(I)  Short Term Goal 3: Pt will ambulate x 200 feet using least AD with supervision/modified(I)  Short Term Goal 4: By 10/22/22: Pt will tolerate 12-15 reps BLE exercise for strengthening, balance, and endurance  Short Term Goal 5: Pt will ambulate up/down at least 1 step using no handrails with SBA/supervision  Patient Goals   Patient Goals :  \"To go home\"       Education  Patient Education  Education Given To: Patient  Education Provided: Role of Therapy;Plan of Care;Home Exercise Program;Precautions;Transfer Training;Energy Conservation  Education Method: Demonstration;Verbal  Barriers to Learning: None  Education Outcome: Verbalized understanding;Demonstrated understanding      Therapy Time   Individual Concurrent Group Co-treatment   Time In 0950         Time Out 1031         Minutes 41         Timed Code Treatment Minutes: [facility], [place], [place] #[id number]    If pt is unable to be seen after this session, please let this note serve as discharge summary. Please see case management note for discharge disposition. Thank you.

## 2022-10-19 NOTE — PROGRESS NOTES
Occupational Therapy  Facility/Department: [facility]  Occupational Therapy Initial Assessment/Treatment    Name: Luz Marte  : 1945  MRN: [id number]  Date of Service: 10/19/2022    Discharge Recommendations:  24 hour supervision or assist  OT Equipment Recommendations  Equipment Needed: No  Other: Pt has a shower chair         Patient Diagnosis(es): There were no encounter diagnoses. Past Medical History:  has a past medical history of Acute MI ([facility] [address] Angina pectoris, unstable ([address] ASHD (arteriosclerotic heart disease), Bladder outlet obstruction, BPH (benign prostatic hyperplasia), Chest pain, Constipation, Diabetes mellitus ([facility] [address] GERD (gastroesophageal reflux disease), Hypercholesteremia, Hypercholesterolemia, Hypertension, IBS (irritable bowel syndrome), Influenza A, Sinusitis, and Type II or unspecified type diabetes mellitus without mention of complication, not stated as uncontrolled. Past Surgical History:  has a past surgical history that includes Coronary angioplasty with stent; Cardiac catheterization (); Cardiac catheterization (); Colonoscopy (10/24/13); and Coronary artery bypass graft (N/A, 10/18/2022). Assessment   Performance deficits / Impairments: Decreased functional mobility ; Decreased strength;Decreased endurance;Decreased high-level IADLs;Decreased ADL status; Decreased balance  Assessment: Pt is a 75yr old male now s/p CABG. Pt reports baseline independence with ADLs and IADLs. Pt currently functioning slightly below baseline. Pt tolerated chair to bed with MIN A x2 due to line management. Anticipate pt will progress well with OT goals and be safe to d/c home with initially 24hr. WIll CTA the need for home therapy closer to d/c. Prognosis: Good  Decision Making: High Complexity  REQUIRES OT FOLLOW-UP: Yes  Activity Tolerance  Activity Tolerance: Patient Tolerated treatment well;Patient limited by pain; Patient limited by fatigue  Activity Tolerance Comments: Limited by complexity of lines        Plan   Occupational Therapy Plan  Times Per Week: 4-6x/wk     Restrictions  Restrictions/Precautions  Restrictions/Precautions: General Precautions, Cardiac  Position Activity Restriction  Sternal Precautions: No Pushing, No Pulling, 5# Lifting Restrictions  Other position/activity restrictions: Ambulate pt every shift, low fall risk per nursing assessment, Rico-Trac, 2 chest tubes, Tamayo, Arterial BP monitoring line, telemetry with ICU monitoring, 2L O2    Subjective   General  Chart Reviewed: Progress Notes, Orders, History and Physical, Imaging, Yes  Patient assessed for rehabilitation services?: Yes  Family / Caregiver Present: No  Referring Practitioner: Dolly Ingram MD  Diagnosis: CABG x3  Subjective  Subjective: Pt in chair, pleasant and motivated to work with OT/PT.   General Comment  Comments: 4/10 pain in chest- denies any intervention     Social/Functional History  Social/Functional History  Lives With: Spouse  Type of Home: House  Home Layout: One level  Home Access: Stairs to enter without rails  Entrance Stairs - Number of Steps: 1 JENIFFER  Bathroom Shower/Tub: Tub/Shower unit, Walk-in shower  Bathroom Toilet: Standard  Bathroom Equipment: Shower chair  Home Equipment: Cane  ADL Assistance: Independent  Homemaking Assistance: Independent (splits tasks with wife)  Homemaking Responsibilities: Yes  Meal Prep Responsibility: Primary  Laundry Responsibility: Primary  Cleaning Responsibility: Primary  Shopping Responsibility: Primary  Ambulation Assistance: Independent (without an AD)  Transfer Assistance: Independent  Active : Yes  Occupation: Part time employment  Type of Occupation:  at [employer] center       Objective   Heart Rate: [address]: Monitor  BP: (!) 105/48  BP Location: Arterial  Patient Position: Sitting  MAP (Calculated): 67  Resp: 25  SpO2: 96 %  O2 Device: None (Room air) (pt placed on RA at this time)          Observation/Palpation  Posture: Fair  Safety Devices  Type of Devices: All fall risk precautions in place;Call light within reach;Gait belt;Left in bed;Nurse notified  Bed Mobility Training  Bed Mobility Training: Yes  Interventions: Verbal cues; Visual cues  Supine to Sit: Other (comment) (pt up in chair upon entry of therapy staff)  Sit to Supine: Minimum assistance;Assist X2  Scooting: Moderate assistance;Assist X2 (to scoot up in bed)  Balance  Sitting: Intact  Standing: Impaired  Standing - Static: Good  Standing - Dynamic: Fair;Occasional  Transfer Training  Transfer Training: Yes  Interventions: Safety awareness training;Verbal cues; Visual cues  Sit to Stand: Contact-guard assistance  Stand to Sit: Contact-guard assistance  Bed to Chair: Minimum assistance (without AD, Katie x 1 mainly needed for line management)  Gait Training  Gait Training: Yes  Gait  Overall Level of Assistance: Minimum assistance (Katie x 1 mainly needed for line management)  Interventions: Safety awareness training;Verbal cues; Visual cues  Base of Support: Widened  Speed/Tory: Pace decreased (< 100 feet/min); Shuffled; Slow  Step Length: Left shortened;Right shortened  Stance: Left increased;Right increased;Time  Gait Abnormalities: Decreased step clearance (pt able to amb short distance from chair>bed with good standing balance, despite high # of lines)  Distance (ft): 5 Feet (from chair>bed (unable to amb further 2* high # of lines))  Assistive Device: Other (comment);Gait belt (no AD)     AROM: Within functional limits (per surgical protocol)  Strength:  (Not formally assessed due to surgical protocol)  Coordination: Generally decreased, functional  Tone: Normal  Sensation: Intact  ADL  Feeding: Independent  LE Dressing: Dependent/Total  Toileting: Dependent/Total     Activity Tolerance  Activity Tolerance: Patient tolerated evaluation without incident;Patient tolerated treatment well        Vision  Vision: Impaired  Vision Exceptions: Wears glasses at all times  Hearing  Hearing: Within functional limits  Cognition  Overall Cognitive Status: [facility]  Orientation  Overall Orientation Status: Within Functional Limits                  Education Given To: Patient  Education Provided: Role of Therapy;Plan of Care;ADL Adaptive Strategies;Transfer Training;Energy Conservation; Fall Prevention Strategies  Education Provided Comments: disease specific: sternal precautions, OOB activity  Education Method: Demonstration;Verbal  Barriers to Learning: None  Education Outcome: Continued education needed;Verbalized understanding              AM-PAC Score        AM-PAC Inpatient Daily Activity Raw Score: 16 (10/19/22 1300)  AM-PAC Inpatient ADL T-Scale Score : 35.96 (10/19/22 1300)  ADL Inpatient CMS 0-100% Score: 53.32 (10/19/22 1300)  ADL Inpatient CMS G-Code Modifier : CK (10/19/22 1300)    Tinneti Score       Goals  Short Term Goals  Time Frame for Short Term Goals: By 10/29/22  Short Term Goal 1: Pt will complete toilet transfers with SBA  Short Term Goal 2: Pt will complete LB dressing with min A  Short Term Goal 3: Pt will complete toileting with SBA  Short Term Goal 4: Pt will increase standing tolerance to 4 mins to complete sink level ADLs  Patient Goals   Patient goals : \" to get stronger\"       Therapy Time   Individual Concurrent Group Co-treatment   Time In 0950         Time Out 1031         Minutes 41         Timed Code Treatment Minutes: [place] 107, OTR/L

## 2022-10-19 NOTE — PROGRESS NOTES
Shift: Day    Procedure: CABG X 3    Admit from OR (time and date): 0465 10/18/22    Transition (time and date): 0745    Surgery, return to OR no     Nursing assessment at handoff  stable    Most recent vitals: BP (!) 105/48   Pulse 81   Temp 99.7 °F (37.6 °C) (Bladder)   Resp 26   Ht 6' 1\" (1.854 m)   Wt 241 lb 4.8 oz (109.5 kg)   SpO2 95%   BMI 31.84 kg/m²      Increased O2 requirements: no O2 requirements: Oxygen Therapy  SpO2: 95 %  Pulse Oximetry Type: Continuous  Pulse Oximeter Device Mode: Continuous  Pulse Oximeter Device Location: Finger  O2 Device: None (Room air) (pt placed on RA at this time)  Skin Assessment: Clean, dry, & intact  FiO2 : 50 %  O2 Flow Rate (L/min): 1 L/min  Vent Mode: AC/VC     Admission weight Weight: 235 lb (106.6 kg)  Today's weight   Wt Readings from Last 1 Encounters:   10/19/22 241 lb 4.8 oz (109.5 kg)         EF: 55-60%    Drop in Urinary Output no     Rhythm Changes Sinus Arrhythmia from the 50's-80's PVC's Irregular    Pacing Wires Removed:  [] Yes  [x] NO  [] Platelets < 23,397  [x] Arrhythmia  [] Bradycardia [] Valve Replacement  [] Pacing for Cardiac Index  []Physician Order    Lines/Drains  LDA Insertion Date Discontinued Date Dressing Changes   Art line 10/18/22     Central Line 10/18/22     Tamayo 10/18/22 10/19/22     Chest Tube 10/18/22 Mediastinal 10/19/22    Wires  10/18/22 10/19/22    ETT 10/18/22 10/18/22 1538    TERE Drain      VasCath      Impella        Interventions After Office Hours  Problem(Brief) Date Time Intervention Physician contacted                                               Drip rates at handoff:    sodium chloride 50 mL/hr at 10/18/22 2000    norepinephrine Stopped (10/19/22 1007)    insulin 4.68 Units/hr (10/19/22 1801)    dextrose         Hospital Course:  POD# 0 10/18/22  -K replaced  -albumin given  - SB out of OR CI 1-2, unable to resolve with V pacing     POD DOS Nights  -KCL replacement  -250 albumin for low CVP/CI/BP  -Sinus arrhythmia/irregular with PVC's  -HR goes from the 50's to the 80's  -BP down to 51'A systolic, CI down around 2 with slower rate. -Milrinone at 0.25mcg (leave)  -Levo at 4mcg  -Insulin  -UOP - 695ml/12hour  -CT Pleural - 150ml/12hour  -CT MS - 110ml/12hour   -oxycodone for pain    POD# 1 10/19/22  - wires, mediastinal CT, and Tamayo removed  - Levo, Milrinone, and D5 0.5 NS stopped  - up in chair with meals tolerated well     Lab Data:  CBC:   Recent Labs     10/18/22  1336 10/19/22  0510   WBC 12.6* 16.4*   HGB 13.7 12.2*   HCT 40.2* 36.3*   MCV 93.0 93.7    173       BMP:    Recent Labs     10/18/22  1336 10/18/22  1858 10/19/22  0130 10/19/22  0510   *  --   --  137   K 4.1   < > 3.9 4.0   CO2 22  --   --  21   BUN 16  --   --  13   CREATININE 1.2  --   --  1.1    < > = values in this interval not displayed.        LIVR:   Recent Labs     10/18/22  0302   AST 24   ALT 37       PT/INR:   Recent Labs     10/18/22  0302 10/18/22  1330 10/19/22  0510   PROT 6.4  --   --    INR 1.10 1.48* 1.32*       APTT:   Recent Labs     10/18/22  1330   APTT 30.3       ABG:   Recent Labs     10/18/22  1404 10/18/22  1453   PHART 7.428 7.438   [id number] 35.7 33.4*   PO2ART 116.2* 149.2*

## 2022-10-19 NOTE — PROGRESS NOTES
Shift: 10/18 1900 - 10/19 0700    Procedure: CABG X 3    Admit from OR (time and date): 1322 10/18/22    Transition (time and date):     Surgery, return to OR no     Nursing assessment at handoff  stable    Most recent vitals: BP (!) 99/45   Pulse 66   Temp 99.7 °F (37.6 °C) (Bladder)   Resp 23   Ht 6' 1\" (1.854 m)   Wt 241 lb 4.8 oz (109.5 kg)   SpO2 96%   BMI 31.84 kg/m²      Increased O2 requirements: no O2 requirements: Oxygen Therapy  SpO2: 96 %  Pulse Oximetry Type: Continuous  Pulse Oximeter Device Mode: Continuous  Pulse Oximeter Device Location: Right, Finger  O2 Device: Nasal cannula  Skin Assessment: Clean, dry, & intact  FiO2 : 50 %  O2 Flow Rate (L/min): 1 L/min  Vent Mode: AC/VC     Admission weight Weight: 235 lb (106.6 kg)  Today's weight   Wt Readings from Last 1 Encounters:   10/19/22 241 lb 4.8 oz (109.5 kg)         EF: 55-60%    Drop in Urinary Output no     Rhythm Changes Sinus Arrhythmia from the 50's-80's PVC's Irregular    Pacing Wires Removed:  [] Yes  [x] NO  [] Platelets < 34,295  [x] Arrhythmia  [] Bradycardia [] Valve Replacement  [] Pacing for Cardiac Index  []Physician Order    Lines/Drains  LDA Insertion Date Discontinued Date Dressing Changes   Art line 10/18/22     Central Line 10/18/22     Tamayo 10/18/22     Chest Tube 10/18/22     Wires  10/18/22     ETT 10/18/22 10/18/22 1538    TERE Drain      VasCath      Impella        Interventions After Office Hours  Problem(Brief) Date Time Intervention Physician contacted                                               Drip rates at handoff:    dextrose 5 % and 0.45 % NaCl 75 mL/hr at 10/19/22 0546    sodium chloride 50 mL/hr at 10/18/22 2000    norepinephrine 4 mcg/min (10/18/22 2105)    niCARdipine      insulin 2.7 Units/hr (10/19/22 0636)    dextrose      milrinone 0.25 mcg/kg/min (10/19/22 0015)    aminocaproic acid (AMICAR) IV infusion Stopped (10/18/22 1955)       Hospital Course:  POD# 0 10/18/22  -K replaced  -albumin given  - SB out of OR CI 1-2, unable to resolve with V pacing     POD DOS Nights  -KCL replacement  -250 albumin for low CVP/CI/BP  -Sinus arrhythmia/irregular with PVC's  -HR goes from the 50's to the 80's  -BP down to 15'N systolic, CI down around 2 with slower rate. -Milrinone at 0.25mcg (leave)  -Levo at 4mcg  -Insulin  -UOP - 695ml/12hour  -CT Pleural - 150ml/12hour  -CT MS - 110ml/12hour   -oxycodone for pain     Lab Data:  CBC:   Recent Labs     10/18/22  1336 10/19/22  0510   WBC 12.6* 16.4*   HGB 13.7 12.2*   HCT 40.2* 36.3*   MCV 93.0 93.7    173       BMP:    Recent Labs     10/18/22  1336 10/18/22  1858 10/19/22  0130 10/19/22  0510   *  --   --  137   K 4.1   < > 3.9 4.0   CO2 22  --   --  21   BUN 16  --   --  13   CREATININE 1.2  --   --  1.1    < > = values in this interval not displayed.        LIVR:   Recent Labs     10/18/22  0302   AST 24   ALT 37       PT/INR:   Recent Labs     10/18/22  0302 10/18/22  1330 10/19/22  0510   PROT 6.4  --   --    INR 1.10 1.48* 1.32*       APTT:   Recent Labs     10/18/22  1330   APTT 30.3       ABG:   Recent Labs     10/18/22  1404 10/18/22  1453   PHART 7.428 7.438   [id number] 35.7 33.4*   PO2ART 116.2* 149.2*

## 2022-10-19 NOTE — PROGRESS NOTES
[facility]   Daily Progress Note    Admit Date:  10/12/2022  HPI:   Jeanette Mcgarry presented to [facility] with ss chest pain/ heaviness associated with shortness of breath, nausea and belching. Hx of CAD with prior PCI to LCX and known  of RCA, recent abnormal stress test for angina, HYPERTENSION, HLD, DM2 and GERD. Transferred to [facility] for interventional cardiology consult. EKG and troponin negative for ACS. LHC on 10/14/22 with MV CAD including LM. CABG X3 on 10/18/22    Subjective:  Mr. Will Fox sitting up in chair, getting ready to work with therapy, complains of significant fatigue.     Objective:   Patient Vitals for the past 24 hrs:   BP Temp Temp src Pulse Resp SpO2 Weight   10/19/22 0700 (!) 105/48 99.7 °F (37.6 °C) Bladder 65 23 95 % --   10/19/22 0600 (!) 99/45 99.7 °F (37.6 °C) Bladder 66 23 96 % --   10/19/22 0544 -- -- -- -- 22 -- --   10/19/22 0500 (!) 123/52 99.5 °F (37.5 °C) Bladder 80 25 94 % 241 lb 4.8 oz (109.5 kg)   10/19/22 0400 (!) 118/55 99.4 °F (37.4 °C) Bladder 78 20 99 % --   10/19/22 0300 (!) 112/57 99.4 °F (37.4 °C) Bladder 73 22 97 % --   10/19/22 0200 (!) 99/47 99.4 °F (37.4 °C) Bladder 64 20 96 % --   10/19/22 0100 (!) 110/51 99.2 °F (37.3 °C) Bladder 70 19 96 % --   10/19/22 0000 (!) 111/56 99.2 °F (37.3 °C) Bladder 64 19 96 % --   10/18/22 2300 (!) 102/53 98.8 °F (37.1 °C) Bladder 63 20 96 % --   10/18/22 2252 -- -- -- -- 20 -- --   10/18/22 2200 136/70 98.4 °F (36.9 °C) Bladder 82 22 97 % --   10/18/22 2100 (!) 92/46 98.3 °F (36.8 °C) Bladder 70 14 95 % --   10/18/22 2020 -- -- -- 73 17 97 % --   10/18/22 2000 (!) 89/45 98.2 °F (36.8 °C) Bladder 61 14 96 % --   10/18/22 1900 (!) 121/58 98 °F (36.7 °C) Bladder 79 16 96 % --   10/18/22 1638 -- -- -- 66 15 99 % --   10/18/22 1630 -- -- -- 64 14 97 % --   10/18/22 1615 -- -- -- 51 15 98 % --   10/18/22 1600 -- -- -- 56 15 97 % --   10/18/22 1545 -- -- -- 56 15 98 % --   10/18/22 1530 -- -- -- 52 14 99 % -- 10/18/22 1515 -- -- -- 55 (!) 9 100 % --   10/18/22 1507 -- -- -- 59 14 99 % --   10/18/22 1500 -- -- -- 62 17 99 % --   10/18/22 1452 -- -- -- 54 15 99 % --   10/18/22 1437 -- -- -- 56 13 100 % --   10/18/22 1422 -- -- -- 51 12 98 % --   10/18/22 1408 -- -- -- 52 12 99 % --   10/18/22 1328 -- -- -- 50 12 99 % --         Intake/Output Summary (Last 24 hours) at 10/19/2022 0855  Last data filed at 10/19/2022 0700  Gross per 24 hour   Intake 7001.04 ml   Output 1960 ml   Net 5041.04 ml       Wt Readings from Last 3 Encounters:   10/19/22 241 lb 4.8 oz (109.5 kg)   10/12/22 235 lb (106.6 kg)   09/28/22 235 lb (106.6 kg)         ASSESSMENT:   UNSTABLE ANGINA: EKG and troponin negative for ACS  CAD: hx PCI to LCX and known  -RCA; + ulcerated plaque LM, severe MV CAD, s/p CABG x3, on aspirin, Plavix, Lipitor, metoprolol  HYPERTENSION: Required IV Levophed overnight as well as milrinone as inotropic for low cardiac index-now discontinued  HLD: , on  high intensity statin  DM2: A1c 8.2 per IM      PLAN:  Continue DAPT, statin, beta-blocker  Progressing well per postop CT surgery course  Agree with diuresis Lasix 40 mg IV twice daily  Weaned pressors and inotropes with stable cardiac output and systemic blood pressures  Daily labs, daily weights    YANNICK Claudio - CNP, 10/19/2022, 8:55 AM  [facility]   [phone]       Telemetry: SR 60-70, PACs and PVCs  NYHA: III    Physical Exam:  General:  Awake, alert, NAD  Skin:  Warm and dry  Neck:  JVP unable to assess  Chest:  Clear to auscultation posteriorly  Cardiovascular:  RRR, normal S1S2, + rub, no m/g  Abdomen:  Soft, nontender, +bowel sounds  Extremities:  No BLE edema, Ace wrap to RLE      Medications:    sodium chloride flush  5-40 mL IntraVENous 2 times per day    fondaparinux  2.5 mg SubCUTAneous Daily    aspirin  81 mg Oral Daily    clopidogrel  75 mg Oral Daily    acetaminophen  650 mg Oral Q6H    chlorhexidine  15 mL Mouth/Throat BID furosemide  40 mg IntraVENous BID    [START ON 10/21/2022] furosemide  40 mg Oral BID    magnesium oxide  400 mg Oral BID    mupirocin   Nasal BID    potassium chloride  10 mEq Oral TID     bisacodyl  5 mg Oral Daily    polyethylene glycol  17 g Oral Daily    metoprolol tartrate  12.5 mg Oral BID    atorvastatin  40 mg Oral Nightly    famotidine  20 mg Oral BID    Or    famotidine (PEPCID) injection  20 mg IntraVENous BID    ceFAZolin (ANCEF) IVPB  2,000 mg IntraVENous Q8H    vancomycin (VANCOCIN) IV  1,500 mg IntraVENous Q12H    albuterol  2.5 mg Nebulization Q4H WA    [START ON 10/20/2022] insulin glargine  0.15 Units/kg SubCUTAneous Nightly    [START ON 10/21/2022] insulin lispro  0-12 Units SubCUTAneous TID WC    [START ON 10/21/2022] insulin lispro  0-6 Units SubCUTAneous Nightly      dextrose 5 % and 0.45 % NaCl 75 mL/hr at 10/19/22 0546    sodium chloride 50 mL/hr at 10/18/22 2000    norepinephrine 4 mcg/min (10/18/22 2105)    niCARdipine      insulin 2.7 Units/hr (10/19/22 0636)    dextrose      milrinone 0.25 mcg/kg/min (10/19/22 0015)    aminocaproic acid (AMICAR) IV infusion Stopped (10/18/22 1955)       Lab Data: Lab results independently reviewed and analyzed by myself 10/19/2022    CBC:   Recent Labs     10/18/22  0302 10/18/22  0818 10/18/22  1328 10/18/22  1336 10/19/22  0510   WBC 5.2  --   --  12.6* 16.4*   HGB 15.0   < > 12.6* 13.7 12.2*     --   --  137 173    < > = values in this interval not displayed. BMP:    Recent Labs     10/18/22  0302 10/18/22  1336 10/18/22  1858 10/19/22  0130 10/19/22  0510   * 133*  --   --  137   K 3.8 4.1 4.0 3.9 4.0   CO2 25 22  --   --  21   BUN 15 16  --   --  13   CREATININE 1.2 1.2  --   --  1.1       INR:    Recent Labs     10/18/22  0302 10/18/22  1330 10/19/22  0510   INR 1.10 1.48* 1.32*       BNP:    No results for input(s): PROBNP in the last 72 hours.     Cardiac Enzymes:   No results for input(s): TROPONINI in the last 72 hours. Lipids:   Lab Results   Component Value Date/Time    TRIG 93 10/18/2022 03:02 AM    TRIG 77 08/29/2022 09:22 AM    HDL 49 10/18/2022 03:02 AM    HDL 54 08/29/2022 09:22 AM    HDL 41 01/24/2011 03:25 AM    LDLCALC 95 10/18/2022 03:02 AM    LDLCALC 103 08/29/2022 09:22 AM       Cardiac Imaging:   Procedure(s): 10/18/2022:   CORONARY ARTERY BYPASS GRAFTING X 3, ON PUMP, INTERNAL MAMMARY ARTERY, SAPHENOUS VEIN GRAFT, YING, TEMPORARY PACING WIRES,  WITH LEFT ATRIAL APPENDAGE CLIP PLACEMENT AND BILATERAL PECTORALIS BLOCKS  BYPASS GRAFTS:  1. LIMA to mid LAD  2. SVG to OM1  3. SVG to acute marginal       ECHO 10/14/2022:    Summary   Normal left ventricular systolic function with ejection fraction of 55-60%. No regional wall motion abnormalites are seen. Normal left ventricular size with mild-moderate concentric left ventricular   hypertrophy. Grade I diastolic dysfunction with normal filling pressure. Normal RV size and systolic function. Trivial mitral regurgitation. No tricuspid regurgitation to estimate systolic pulmonary artery pressure   (SPAP). CARDIAC CATH 10/14/2022:   FINDINGS   LVGRAM   LVEDP 13   GRADIENT ACROSS AORTIC VALVE None   LV FUNCTION EF 60%   WALL MOTION Normal   MITRAL REGURGITATION Mild     CORONARY ARTERIES   LM Aneurysmal with ulcerated plaque, Prox <10%, mid-distal 30% stenosis. LAD Calcified, proximal-mid 50 to 75% stenosis, mid-distal 80% stenosis. LCX Tortuous, calcified, proximal 50 to 75% stenosis. Mid to distal 80 to 90% stenosis that extends into OM1. There is a mid-distal circumflex stent that has 40% in-stent restenosis         RCA Large vessel, dominant, very high anterior takeoff near the left coronary cusp, it is calcified.   There is 100% proximal-mid  with well-developed right to right collaterals and lesser left to right collaterals      PERCUTANEOUS INTERVENTION DESCRIPTION      Heparin was used for anticoagulation, a 6 Vietnamese VL 3.5 guiding catheter was used to intubate the left main. A choice floppy wire was used to cross the left main into the LAD. IVUS was performed to left main and LAD. In the proximal LAD there is mild disease just at the ostium and in the left main there was distal 30% stenosis with moderate plaque, there is no clear unstable plaque or ulceration noted on IVUS. CONCLUSIONS:      Multivessel CAD/ASHD with left main ulcerated plaque  Will refer to CT surgery for consideration of CABG  Start heparin drip without bolus in 3 hours  Transfer to intermediate care, C4          Stress Test October 2022 at [facility] health: Interpetation Summary:   The LV EF is 67%   Normal global and regional wall motion in all territories. There is a medium sized, partially reversible defect in the inferior wall   consistent with moderate sandhya-infarct ischemia.        o Negative ECG for ischemia with pharmocologic stress. o Positive nuclear stress imaging suggestive of inducible ischemia in the        inferior wall.      o Nuclear stress image findings indicate intermediate risk for future        ischemic event .      o Normal left ventricular systolic function. Cath 2011 at [facility]:  Cath 01/2011    CORONARY ANATOMY:      The left main coronary artery has no significant disease. The left anterior descending artery has a 30% stenosis, mid   distal lumen  irregularities. The ramus intermedius branch has a 90% mid vessel stenosis. The circumflex artery gives rise to significant obtuse marginal   branch. There is a patent stent in the distal portion of this AV group   circumflex,  but no evidence of hemodynamic disease. The right coronary artery is known to be occluded per angiogram   performed in  2001.   The artery was not re-imaged during this case (also not   imaged during  the 2003 case), stopped looking for the right coronary artery,   felt it was  best to conserve on contrast totals as opposed to locating a chronically  occluded right coronary artery. Aortic root angiogram was performed. This did not show a right   coronary  artery taking off from the right cusp, however, did not identify   anomalous  takeoff either. The aortic root does appear mildly dilated   towards upper  limits of normal size. Left ventriculogram shows normal wall motion ejection fraction of   50 to 55%. Percutaneous coronary intervention/stent implantation x 1 in the   ramus  intermedius branch. Preintervention stenosis was 90%,   postintervention  stenosis was 0%. Guide catheter was a 6 Big Arm guide   catheter. Guidewire was Kinetics guidewire. Predilation was performed with   a 2.0 x 8  Voyager stent replaced with a 2.5 x 12 Vision bare-metal stent. Postdilatation was performed with a 2.75 x 6 Voyager   non-compliant balloon. INTERVENTION MEDICATIONS:    1. Angiomax. 2.  Aspirin. 3.  Effient. COMPLICATIONS:  None. IMPRESSION:    1. Patent stent in the circumflex artery. 2.  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 3.  Severe stenosis of the ramus intermedius branch. 4.  Status post successful balloon angioplasty stent deployment   ramus  intermedius branch. 5.  Normal left ventricular systolic function.

## 2022-10-20 ENCOUNTER — APPOINTMENT (OUTPATIENT)
Dept: GENERAL RADIOLOGY | Age: 77
DRG: 234 | End: 2022-10-20
Attending: INTERNAL MEDICINE
Payer: MEDICARE

## 2022-10-20 LAB
ANION GAP SERPL CALCULATED.3IONS-SCNC: 9 MMOL/L (ref 3–16)
BUN BLDV-MCNC: 14 MG/DL (ref 7–20)
CALCIUM IONIZED: 1.04 MMOL/L (ref 1.12–1.32)
CALCIUM SERPL-MCNC: 8 MG/DL (ref 8.3–10.6)
CHLORIDE BLD-SCNC: 103 MMOL/L (ref 99–110)
CO2: 22 MMOL/L (ref 21–32)
CREAT SERPL-MCNC: 1.1 MG/DL (ref 0.8–1.3)
GFR SERPL CREATININE-BSD FRML MDRD: >60 ML/MIN/{1.73_M2}
GLUCOSE BLD-MCNC: 118 MG/DL (ref 70–99)
GLUCOSE BLD-MCNC: 123 MG/DL (ref 70–99)
GLUCOSE BLD-MCNC: 126 MG/DL (ref 70–99)
GLUCOSE BLD-MCNC: 131 MG/DL (ref 70–99)
GLUCOSE BLD-MCNC: 133 MG/DL (ref 70–99)
GLUCOSE BLD-MCNC: 145 MG/DL (ref 70–99)
GLUCOSE BLD-MCNC: 166 MG/DL (ref 70–99)
GLUCOSE BLD-MCNC: 166 MG/DL (ref 70–99)
GLUCOSE BLD-MCNC: 230 MG/DL (ref 70–99)
GLUCOSE BLD-MCNC: 273 MG/DL (ref 70–99)
GLUCOSE BLD-MCNC: 91 MG/DL (ref 70–99)
GLUCOSE BLD-MCNC: 91 MG/DL (ref 70–99)
GLUCOSE BLD-MCNC: 94 MG/DL (ref 70–99)
GLUCOSE BLD-MCNC: 96 MG/DL (ref 70–99)
HCT VFR BLD CALC: 36.9 % (ref 40.5–52.5)
HEMOGLOBIN: 12.2 G/DL (ref 13.5–17.5)
INR BLD: 1.34 (ref 0.87–1.14)
MAGNESIUM: 2.3 MG/DL (ref 1.8–2.4)
MCH RBC QN AUTO: 30.8 PG (ref 26–34)
MCHC RBC AUTO-ENTMCNC: 33.2 G/DL (ref 31–36)
MCV RBC AUTO: 92.9 FL (ref 80–100)
PDW BLD-RTO: 13.3 % (ref 12.4–15.4)
PERFORMED ON: ABNORMAL
PERFORMED ON: NORMAL
PH VENOUS: 7.44 (ref 7.35–7.45)
PLATELET # BLD: 167 K/UL (ref 135–450)
PMV BLD AUTO: 8.5 FL (ref 5–10.5)
POTASSIUM SERPL-SCNC: 3.9 MMOL/L (ref 3.5–5.1)
PROTHROMBIN TIME: 16.5 SEC (ref 11.7–14.5)
RBC # BLD: 3.97 M/UL (ref 4.2–5.9)
SODIUM BLD-SCNC: 134 MMOL/L (ref 136–145)
WBC # BLD: 14.4 K/UL (ref 4–11)

## 2022-10-20 PROCEDURE — 97116 GAIT TRAINING THERAPY: CPT

## 2022-10-20 PROCEDURE — 94669 MECHANICAL CHEST WALL OSCILL: CPT

## 2022-10-20 PROCEDURE — 97110 THERAPEUTIC EXERCISES: CPT

## 2022-10-20 PROCEDURE — 6370000000 HC RX 637 (ALT 250 FOR IP): Performed by: NURSE PRACTITIONER

## 2022-10-20 PROCEDURE — 2100000000 HC CCU R&B

## 2022-10-20 PROCEDURE — 2580000003 HC RX 258: Performed by: STUDENT IN AN ORGANIZED HEALTH CARE EDUCATION/TRAINING PROGRAM

## 2022-10-20 PROCEDURE — 51798 US URINE CAPACITY MEASURE: CPT

## 2022-10-20 PROCEDURE — 6360000002 HC RX W HCPCS: Performed by: STUDENT IN AN ORGANIZED HEALTH CARE EDUCATION/TRAINING PROGRAM

## 2022-10-20 PROCEDURE — 71045 X-RAY EXAM CHEST 1 VIEW: CPT

## 2022-10-20 PROCEDURE — 82330 ASSAY OF CALCIUM: CPT

## 2022-10-20 PROCEDURE — 99024 POSTOP FOLLOW-UP VISIT: CPT | Performed by: NURSE PRACTITIONER

## 2022-10-20 PROCEDURE — 85027 COMPLETE CBC AUTOMATED: CPT

## 2022-10-20 PROCEDURE — 80048 BASIC METABOLIC PNL TOTAL CA: CPT

## 2022-10-20 PROCEDURE — 94640 AIRWAY INHALATION TREATMENT: CPT

## 2022-10-20 PROCEDURE — 6370000000 HC RX 637 (ALT 250 FOR IP): Performed by: STUDENT IN AN ORGANIZED HEALTH CARE EDUCATION/TRAINING PROGRAM

## 2022-10-20 PROCEDURE — 83735 ASSAY OF MAGNESIUM: CPT

## 2022-10-20 PROCEDURE — 99232 SBSQ HOSP IP/OBS MODERATE 35: CPT | Performed by: NURSE PRACTITIONER

## 2022-10-20 PROCEDURE — 85610 PROTHROMBIN TIME: CPT

## 2022-10-20 PROCEDURE — 97530 THERAPEUTIC ACTIVITIES: CPT

## 2022-10-20 PROCEDURE — 51701 INSERT BLADDER CATHETER: CPT

## 2022-10-20 PROCEDURE — 51702 INSERT TEMP BLADDER CATH: CPT

## 2022-10-20 RX ORDER — ROSUVASTATIN CALCIUM 10 MG/1
40 TABLET, COATED ORAL NIGHTLY
Status: DISCONTINUED | OUTPATIENT
Start: 2022-10-20 | End: 2022-10-22 | Stop reason: HOSPADM

## 2022-10-20 RX ORDER — TAMSULOSIN HYDROCHLORIDE 0.4 MG/1
0.4 CAPSULE ORAL DAILY
Status: DISCONTINUED | OUTPATIENT
Start: 2022-10-20 | End: 2022-10-22 | Stop reason: HOSPADM

## 2022-10-20 RX ADMIN — Medication 15 ML: at 20:34

## 2022-10-20 RX ADMIN — Medication 15 ML: at 08:06

## 2022-10-20 RX ADMIN — ACETAMINOPHEN 650 MG: 325 TABLET ORAL at 00:05

## 2022-10-20 RX ADMIN — INSULIN GLARGINE 15 UNITS: 100 INJECTION, SOLUTION SUBCUTANEOUS at 20:41

## 2022-10-20 RX ADMIN — TAMSULOSIN HYDROCHLORIDE 0.4 MG: 0.4 CAPSULE ORAL at 12:32

## 2022-10-20 RX ADMIN — HYDRALAZINE HYDROCHLORIDE 5 MG: 20 INJECTION INTRAMUSCULAR; INTRAVENOUS at 06:10

## 2022-10-20 RX ADMIN — POLYETHYLENE GLYCOL 3350 17 G: 17 POWDER, FOR SOLUTION ORAL at 07:58

## 2022-10-20 RX ADMIN — FONDAPARINUX SODIUM 2.5 MG: 2.5 INJECTION, SOLUTION SUBCUTANEOUS at 07:58

## 2022-10-20 RX ADMIN — FAMOTIDINE 20 MG: 20 TABLET, FILM COATED ORAL at 20:34

## 2022-10-20 RX ADMIN — ACETAMINOPHEN 650 MG: 325 TABLET ORAL at 06:15

## 2022-10-20 RX ADMIN — ASPIRIN 81 MG: 81 TABLET, COATED ORAL at 07:57

## 2022-10-20 RX ADMIN — FUROSEMIDE 40 MG: 10 INJECTION, SOLUTION INTRAMUSCULAR; INTRAVENOUS at 15:04

## 2022-10-20 RX ADMIN — ALBUTEROL SULFATE 2.5 MG: 2.5 SOLUTION RESPIRATORY (INHALATION) at 20:41

## 2022-10-20 RX ADMIN — FAMOTIDINE 20 MG: 20 TABLET, FILM COATED ORAL at 07:57

## 2022-10-20 RX ADMIN — POTASSIUM CHLORIDE 10 MEQ: 750 TABLET, EXTENDED RELEASE ORAL at 18:00

## 2022-10-20 RX ADMIN — CLOPIDOGREL BISULFATE 75 MG: 75 TABLET ORAL at 07:57

## 2022-10-20 RX ADMIN — POTASSIUM CHLORIDE 10 MEQ: 750 TABLET, EXTENDED RELEASE ORAL at 07:57

## 2022-10-20 RX ADMIN — METOPROLOL TARTRATE 12.5 MG: 25 TABLET, FILM COATED ORAL at 09:15

## 2022-10-20 RX ADMIN — POTASSIUM CHLORIDE 10 MEQ: 750 TABLET, EXTENDED RELEASE ORAL at 12:32

## 2022-10-20 RX ADMIN — ACETAMINOPHEN 650 MG: 325 TABLET ORAL at 12:32

## 2022-10-20 RX ADMIN — METOPROLOL TARTRATE 25 MG: 25 TABLET, FILM COATED ORAL at 20:34

## 2022-10-20 RX ADMIN — SODIUM CHLORIDE 2.5 UNITS/HR: 9 INJECTION, SOLUTION INTRAVENOUS at 02:21

## 2022-10-20 RX ADMIN — HYDRALAZINE HYDROCHLORIDE 5 MG: 20 INJECTION INTRAMUSCULAR; INTRAVENOUS at 06:18

## 2022-10-20 RX ADMIN — SODIUM CHLORIDE, PRESERVATIVE FREE 10 ML: 5 INJECTION INTRAVENOUS at 20:38

## 2022-10-20 RX ADMIN — ALBUTEROL SULFATE 2.5 MG: 2.5 SOLUTION RESPIRATORY (INHALATION) at 08:04

## 2022-10-20 RX ADMIN — FUROSEMIDE 40 MG: 10 INJECTION, SOLUTION INTRAMUSCULAR; INTRAVENOUS at 07:58

## 2022-10-20 RX ADMIN — METOPROLOL TARTRATE 12.5 MG: 25 TABLET, FILM COATED ORAL at 07:57

## 2022-10-20 RX ADMIN — OXYCODONE 5 MG: 5 TABLET ORAL at 20:34

## 2022-10-20 RX ADMIN — Medication 400 MG: at 20:34

## 2022-10-20 RX ADMIN — CEFAZOLIN 2000 MG: 10 INJECTION, POWDER, FOR SOLUTION INTRAVENOUS at 00:03

## 2022-10-20 RX ADMIN — MUPIROCIN: 20 OINTMENT TOPICAL at 20:34

## 2022-10-20 RX ADMIN — BISACODYL 5 MG: 5 TABLET, COATED ORAL at 07:57

## 2022-10-20 RX ADMIN — ALBUTEROL SULFATE 2.5 MG: 2.5 SOLUTION RESPIRATORY (INHALATION) at 11:26

## 2022-10-20 RX ADMIN — ALBUTEROL SULFATE 2.5 MG: 2.5 SOLUTION RESPIRATORY (INHALATION) at 15:28

## 2022-10-20 RX ADMIN — ACETAMINOPHEN 650 MG: 325 TABLET ORAL at 18:00

## 2022-10-20 RX ADMIN — MUPIROCIN: 20 OINTMENT TOPICAL at 08:06

## 2022-10-20 RX ADMIN — POTASSIUM CHLORIDE 20 MEQ: 29.8 INJECTION, SOLUTION INTRAVENOUS at 06:58

## 2022-10-20 RX ADMIN — SODIUM CHLORIDE, PRESERVATIVE FREE 10 ML: 5 INJECTION INTRAVENOUS at 07:58

## 2022-10-20 RX ADMIN — ROSUVASTATIN CALCIUM 40 MG: 10 TABLET, FILM COATED ORAL at 20:34

## 2022-10-20 ASSESSMENT — PAIN - FUNCTIONAL ASSESSMENT
PAIN_FUNCTIONAL_ASSESSMENT: ACTIVITIES ARE NOT PREVENTED
PAIN_FUNCTIONAL_ASSESSMENT: ACTIVITIES ARE NOT PREVENTED

## 2022-10-20 ASSESSMENT — PAIN SCALES - GENERAL
PAINLEVEL_OUTOF10: 0
PAINLEVEL_OUTOF10: 0
PAINLEVEL_OUTOF10: 2
PAINLEVEL_OUTOF10: 8
PAINLEVEL_OUTOF10: 0

## 2022-10-20 NOTE — PROGRESS NOTES
Shift: 10/20/2022 1900 - 10/21/2022 0700.     Procedure: CABG X 3    Admit from OR (time and date): 3095 10/18/22    Transition (time and date): 0745    Surgery, return to OR no     Nursing assessment at handoff  stable    Most recent vitals: BP (!) 143/70   Pulse 97   Temp 99.3 °F (37.4 °C)   Resp 18   Ht 6' 1\" (1.854 m)   Wt 232 lb 9.6 oz (105.5 kg)   SpO2 94%   BMI 30.69 kg/m²      Increased O2 requirements: no O2 requirements: Oxygen Therapy  SpO2: 94 %  Pulse Oximetry Type: Continuous  Pulse Oximeter Device Mode: Continuous  Pulse Oximeter Device Location: Finger  O2 Device: None (Room air)  Skin Assessment: Clean, dry, & intact  FiO2 : 50 %  O2 Flow Rate (L/min): 1 L/min  Vent Mode: AC/VC     Admission weight Weight: 235 lb (106.6 kg)  Today's weight   Wt Readings from Last 1 Encounters:   10/20/22 232 lb 9.6 oz (105.5 kg)         EF: 55-60%    Drop in Urinary Output no     Rhythm Changes Sinus Tach & SR  Pacing Wires Removed:  [x] Yes  [] NO  [] Platelets < 52,964  [] Arrhythmia  [] Bradycardia [] Valve Replacement  [] Pacing for Cardiac Index  []Physician Order    Lines/Drains  LDA Insertion Date Discontinued Date Dressing Changes   Art line 10/18/22     Central Line 10/18/22     Tamayo 10/18/22 10/19/22     Chest Tube 10/18/22 Mediastinal 10/19/22    Wires  10/18/22 10/19/22    ETT 10/18/22 10/18/22 1538    TERE Drain      VasCath      Impella        Interventions After Office Hours  Problem(Brief) Date Time Intervention Physician contacted                                               Drip rates at handoff:    sodium chloride 50 mL/hr at 10/18/22 2000    insulin 1.86 Units/hr (10/20/22 1519)    dextrose         Hospital Course:  POD# 0 10/18/22  -K replaced  -albumin given  - SB out of OR CI 1-2, unable to resolve with V pacing     POD DOS Nights  -KCL replacement  -250 albumin for low CVP/CI/BP  -Sinus arrhythmia/irregular with PVC's  -HR goes from the 50's to the 80's  -BP down to 86'O systolic, CI down around 2 with slower rate. -Milrinone at 0.25mcg (leave)  -Levo at 4mcg  -Insulin  -UOP - 695ml/12hour  -CT Pleural - 150ml/12hour  -CT MS - 110ml/12hour   -oxycodone for pain    POD# 1 10/19/22  - wires, mediastinal CT, and Wilhelm removed  - Levo, Milrinone, and D5 0.5 NS stopped  - up in chair with meals tolerated well    POD#1 NOC  - Elevated BP when attempting to void  - Urinary retention, I&O urinary catheter x2     - Takes flomax at home, would like to resume if possible. - CVP removed  - Up in chair once at [address] on insulin drip and TKO fluid  - Mediastinal CT, serosanguinous fluid 110 mL output this shift. POD#2 Days  Pt having urinary retention, wilhelm placed back in with bloody urine. Flomax restarted. CT output 200ml left in place per Dr. Anthony Bonner. A line, CVP removed  Insulin gtt titrated       Lab Data:  CBC:   Recent Labs     10/19/22  0510 10/20/22  0535   WBC 16.4* 14.4*   HGB 12.2* 12.2*   HCT 36.3* 36.9*   MCV 93.7 92.9    167       BMP:    Recent Labs     10/19/22  0510 10/20/22  0535    134*   K 4.0 3.9   CO2 21 22   BUN 13 14   CREATININE 1.1 1.1       LIVR:   Recent Labs     10/18/22  0302   AST 24   ALT 37       PT/INR:   Recent Labs     10/18/22  0302 10/18/22  1330 10/19/22  0510 10/20/22  0535   PROT 6.4  --   --   --    INR 1.10   < > 1.32* 1.34*    < > = values in this interval not displayed.        APTT:   Recent Labs     10/18/22  1330   APTT 30.3       ABG:   Recent Labs     10/18/22  1404 10/18/22  1453   PHART 7.428 7.438   MKC1AJA 35.7 33.4*   PO2ART 116.2* 149.2*

## 2022-10-20 NOTE — CARE COORDINATION
[facility] - Acute Rehab Unit   After review, this patient is felt to be:       []  Appropriate for Acute Inpatient Rehab    []  Appropriate for Acute Inpatient Rehab Pending Insurance Authorization    [x]  Not appropriate for Acute Inpatient Rehab    []  Referral received and ARU reviewing patient; Evaluation ongoing. Reviewed with Dr. Sarah Alvarez but patient does not meet criteria, ambulating 200 feet and with recs for home from therapy.    Thank you for the Mitchel Lennox, RN

## 2022-10-20 NOTE — PLAN OF CARE
Problem: Discharge Planning  Goal: Discharge to home or other facility with appropriate resources  Outcome: Progressing     Problem: Pain  Goal: Verbalizes/displays adequate comfort level or baseline comfort level  Outcome: Progressing     Problem: Chronic Conditions and Co-morbidities  Goal: Ability to maintain appropriate glucose levels will improve  Description: Ability to maintain appropriate glucose levels will improve  Outcome: Progressing     Problem: ABCDS Injury Assessment  Goal: Absence of physical injury  Outcome: Progressing     Problem: Neurosensory - Adult  Goal: Achieves stable or improved neurological status  Outcome: Progressing  Goal: Achieves maximal functionality and self care  Outcome: Progressing     Problem: Respiratory - Adult  Goal: Achieves optimal ventilation and oxygenation  Outcome: Progressing     Problem: Cardiovascular - Adult  Goal: Maintains optimal cardiac output and hemodynamic stability  Outcome: Progressing  Goal: Absence of cardiac dysrhythmias or at baseline  Outcome: Progressing     Problem: Skin/Tissue Integrity - Adult  Goal: Skin integrity remains intact  Outcome: Progressing  Goal: Incisions, wounds, or drain sites healing without S/S of infection  Outcome: Progressing  Goal: Oral mucous membranes remain intact  Outcome: Progressing     Problem: Musculoskeletal - Adult  Goal: Return mobility to safest level of function  Outcome: Progressing  Goal: Return ADL status to a safe level of function  Outcome: Progressing     Problem: Gastrointestinal - Adult  Goal: Maintains or returns to baseline bowel function  Outcome: Progressing  Goal: Maintains adequate nutritional intake  Outcome: Progressing     Problem: Genitourinary - Adult  Goal: Absence of urinary retention  Outcome: Progressing     Problem: Metabolic/Fluid and Electrolytes - Adult  Goal: Electrolytes maintained within normal limits  Outcome: Progressing  Goal: Hemodynamic stability and optimal renal function maintained  Outcome: Progressing  Goal: Glucose maintained within prescribed range  Outcome: Progressing     Problem: Hematologic - Adult  Goal: Maintains hematologic stability  Outcome: Progressing     Problem: Nutrition Deficit:  Goal: Optimize nutritional status  Outcome: Progressing

## 2022-10-20 NOTE — PROGRESS NOTES
CVTS Cardiothoracic Progress Note:                                CC:  Post op follow up     Surgery: 10/18/22 CORONARY ARTERY BYPASS GRAFTING X 3, ON PUMP, INTERNAL MAMMARY ARTERY, SAPHENOUS VEIN GRAFT, YING, TEMPORARY PACING WIRES,  WITH LEFT ATRIAL APPENDAGE CLIP PLACEMENT AND BILATERAL [facility] course:  10/19 No acute events overnight. Hemodynamically stable on low dose levo. Milrinone weaned off.   10/20 Issues with urinary retention overnight. Otherwise remains hemodynamically stable in SR.      Past Medical History:   Diagnosis Date    Acute MI ([address]     Angina pectoris, unstable ([address]     ASHD (arteriosclerotic heart disease)     Bladder outlet obstruction     BPH (benign prostatic hyperplasia)     Chest pain     Constipation     Diabetes mellitus ([address]     GERD (gastroesophageal reflux disease)     Hypercholesteremia     Hypercholesterolemia     Hypertension     IBS (irritable bowel syndrome)     Influenza A 01/28/2020    Sinusitis     Type II or unspecified type diabetes mellitus without mention of complication, not stated as uncontrolled         Past Surgical History:   Procedure Laterality Date    CARDIAC CATHETERIZATION  2002    stent    CARDIAC CATHETERIZATION  2011    stent    COLONOSCOPY  10/24/13    CORONARY ANGIOPLASTY WITH STENT PLACEMENT      CORONARY ARTERY BYPASS GRAFT N/A 10/18/2022    CORONARY ARTERY BYPASS GRAFTING X 3, ON PUMP, INTERNAL MAMMARY ARTERY, SAPHENOUS VEIN GRAFT, YING, TEMPORARY PACING WIRES,  WITH LEFT ATRIAL APPENDAGE CLIP PLACEMENT AND BILATERAL PECTORALIS BLOCKS performed by Eli Mathis MD at [address] [place] [address] as of 10/12/2022 - Fully Reviewed 10/12/2022   Allergen Reaction Noted    Prednisone Other (See Comments) 07/09/2014    Codeine Other (See Comments) 01/23/2011        Patient Active Problem List   Diagnosis    Angina pectoris, unstable ([address]    Chest pain    Diabetes mellitus type II, controlled ([address]    Bladder outlet obstruction    S/P CABG x 3    Mixed hyperlipidemia    Constipation    Benign non-nodular prostatic hyperplasia with lower urinary tract symptoms    Anxiety    DM (diabetes mellitus), secondary, uncontrolled, w/neurologic complic    Primary hypertension    Coronary artery disease involving native coronary artery of native heart with angina pectoris ([facility]    Pure hypercholesterolemia    Benign non-nodular prostatic hyperplasia without lower urinary tract symptoms    Type 2 diabetes mellitus with diabetic polyneuropathy, without long-term current use of insulin (HCC)    Keloid    Primary insomnia    COVID-19    Unstable angina (HCC)        Vital Signs: BP (!) 144/67   Pulse (!) 102   Temp 97.8 °F (36.6 °C)   Resp 20   Ht 6' 1\" (1.854 m)   Wt 232 lb 9.6 oz (105.5 kg)   SpO2 93%   BMI 30.69 kg/m²  O2 Flow Rate (L/min): 1 L/min     Admission Weight: Weight: 235 lb (106.6 kg)    Weight on 10/18 (102.6 kg) Pre-op   Weight on 10/19 (109.5 kg)  10/20 105.5 kg     Intake/Output:   Intake/Output Summary (Last 24 hours) at 10/20/2022 0857  Last data filed at 10/20/2022 0800  Gross per 24 hour   Intake 495 ml   Output 3015 ml   Net -2520 ml          LABORATORY DATA:     CBC:   Recent Labs     10/18/22  1336 10/19/22  0510 10/20/22  0535   WBC 12.6* 16.4* 14.4*   HGB 13.7 12.2* 12.2*   HCT 40.2* 36.3* 36.9*   MCV 93.0 93.7 92.9    173 167     BMP:   Recent Labs     10/18/22  1336 10/18/22  1858 10/19/22  0130 10/19/22  0510 10/20/22  0535   *  --   --  137 134*   K 4.1   < > 3.9 4.0 3.9     --   --  108 103   CO2 22  --   --  21 22   BUN 16  --   --  13 14   CREATININE 1.2  --   --  1.1 1.1    < > = values in this interval not displayed. MG:    Recent Labs     10/18/22  1336 10/19/22  0510 10/20/22  0535   MG 3.50* 2.40 2.30      Cardiac Enzymes: No results for input(s): CKTOTAL, CKMB, CKMBINDEX, TROPONINI in the last 72 hours.   PT/INR:   Recent Labs     10/18/22  1330 10/19/22  0510 10/20/22  0535   PROTIME 17.8* 16.3* 16.5*   INR 1.48* 1.32* 1.34*     APTT:   Recent Labs     10/18/22  1330   APTT 30.3       CXR: 10/20/22  FINDINGS:   Right IJ Cordis and thoracic drainage tubes are unchanged in position. Bibasilar segmental airspace disease persists, left greater than right. Heart size and vascularity are stable with note made of recent CABG and left   atrial appendage exclusion. There is no pneumothorax. Small bilateral   pleural effusions are present. Impression   No significant interval change.     ________________________________________________________________________      Subjective:   Dietary Intake: slowly improving   no Nausea   Pain Control: adequate  Complaints: expected post operative pain   Bowels: no BM     Objective:   General appearance: resting comfortably in chair, in nad   Lungs: diminished bilateral bases   Heart: S1S2 normal; SR on monitor  Chest: symmetrical expansion with inspiration and expirations; no rocking of sternum noted   Abdomen: soft, non-tender  Bowel sounds: hypoactive   Kidneys: UOP 3781-7398-954=2935 ml over 24 hours; Cr 1.1  Wound/Incisions: Midsternal incision CDI; RLE incisions CDI  Extremities: BLE pulses present; trace swelling noted in BLE  Neurological: alert, oriented and grossly non focal   Chest tubes/Drains: Right pleural chest tube with 0-100-19=218 ml of serosanguinous drainage over 24 hours; no airleak noted    Assessment:   Post-op: 2 days. Condition: In stable condition. Plan:   1. Cardiovascular:   CAD s/p CABG x 3 with NAS clip- ASA, statin, Plavix, BB (increased given HTN, tachycardia)  HLD- statin   Hypotension- resolved     2. Pulmonary:   Stable AM CXR unchanged from prior study with expected postoperative changes, atelectasis, and small right pleural effusion   Satisfactory oxygen saturations on RA  Expansion measures  Scheduled albuterol   Hx of tobacco use     3.  Neurology:   Post op pain control with PRN morphine or oxy, robaxin Scheduled tylenol    4. Nephrology:   Good 24 hour UOP with Cr of 1.1  Weight trending down but still up from pre-op   Continue to diurese as tolerated- IV Lasix     5. Urology:  Urinary retention- hx of BPH. Resume flomax. Place f/c. Consult urology, recs appreciated     6. Endocrinology:   Glucose controlled on insulin gtt   DMII- A1C 8 on 10/18/22. Will ensure close follow up with PCP upon discharge to ensure good glucose control     7. Hematology:   Acute blood loss anemia- H&H stable. Monitor     8. Microbiology:   Leukocytosis- WBC 14.4, and trending down. Afebrile. Likely reactive. Monitor     9. Nutrition:   Continue diet as ordered, ONS     10. Labs:   Labs and imaging reviewed as above   K replaced     11. Post-op Drains/Wires: Remove pleural chest tube   Right IJ CVC (10/18)    12. D/C Goals: CM following. Will await PT/OT recs.      13. Continue post-op care of patient in the ICU    GI prophylaxis: Pepcid  DVT prophylaxis: arixtra   ________________________________________________________________________  YANNICK Garcia - CNP  10/20/2022  8:57 AM

## 2022-10-20 NOTE — PROGRESS NOTES
Patient was straight cath 2X overnight and was complaining of pain when he was trying to urinate this morning. He became hypertensive systolic 612'[id number] '[id number]. CT surgery team notified.  Tamayo placed and flomax reordered

## 2022-10-20 NOTE — CARE COORDINATION
LOS 8. Care managed by CV surg. S/P CABG 10/18. Plan ARU at [place]- referred yesterday, eval in progress. No precert will be needed. Wellington Ballard RN     1120 Per CV NP- di dwell in PT- may be appropriate for [place] [id number]. Spoke to pt at bedside- no agency preference- referred to [facility].   Wellington Ballard RN

## 2022-10-20 NOTE — CONSULTS
Urology Consult Note      Reason for Consultation: retention    Chief Complaint: \"I haven't been able to pee\"  HPI:  Mendez Fraser is a 78 y.o. male with known BPH who follows with Dr. Mario Figueroa. He is POD2 CABG. Since surgery he has had to be straight cath'd twice for PVRs in the 700s. Today a wilhelm was placed with 650cc urine drained. He is prescribed flomax as outpatient but only takes it 1-2x per week because he does not like having to pee more frequently. At his last urology office visit, surgical intervention for BPH was discussed and patient deferred final decision until a follow up appointment later this year.      Past Medical History:   Diagnosis Date    Acute MI ([address]     Angina pectoris, unstable ([address]     ASHD (arteriosclerotic heart disease)     Bladder outlet obstruction     BPH (benign prostatic hyperplasia)     Chest pain     Constipation     Diabetes mellitus ([address]     GERD (gastroesophageal reflux disease)     Hypercholesteremia     Hypercholesterolemia     Hypertension     IBS (irritable bowel syndrome)     Influenza A 01/28/2020    Sinusitis     Type II or unspecified type diabetes mellitus without mention of complication, not stated as uncontrolled        Past Surgical History:   Procedure Laterality Date    CARDIAC CATHETERIZATION  2002    stent    CARDIAC CATHETERIZATION  2011    stent    COLONOSCOPY  10/24/13    CORONARY ANGIOPLASTY WITH STENT PLACEMENT      CORONARY ARTERY BYPASS GRAFT N/A 10/18/2022    CORONARY ARTERY BYPASS GRAFTING X 3, ON PUMP, INTERNAL MAMMARY ARTERY, SAPHENOUS VEIN GRAFT, YING, TEMPORARY PACING WIRES,  WITH LEFT ATRIAL APPENDAGE CLIP PLACEMENT AND BILATERAL PECTORALIS BLOCKS performed by Tarsha Sotomayor MD at [address]       Medication List reviewed:      Current Facility-Administered Medications   Medication Dose Route Frequency Provider Last Rate Last Admin    tamsulosin (FLOMAX) capsule 0.4 mg  0.4 mg Oral Daily Сергей Martinez, APRN - CNP        metoprolol tartrate (LOPRESSOR) tablet 25 mg  25 mg Oral BID Marylee Milder, APRN - CNP        metoprolol tartrate (LOPRESSOR) tablet 12.5 mg  12.5 mg Oral Once Marylee Milder, APRN - CNP        rosuvastatin (CRESTOR) tablet 40 mg  40 mg Oral Nightly Marylee Milder, APRN - CNP        methocarbamol (ROBAXIN) tablet 750 mg  750 mg Oral 4x Daily PRN YANNICK Umanzor - CNP        sodium chloride flush 0.9 % injection 5-40 mL  5-40 mL IntraVENous 2 times per day Eli Mathis MD   10 mL at 10/20/22 0758    sodium chloride flush 0.9 % injection 5-40 mL  5-40 mL IntraVENous PRN Eli Mathis MD        0.9 % sodium chloride infusion   IntraVENous PRN Eli Mathis MD 50 mL/hr at 10/18/22 2000 Associate Infusion Device at 10/18/22 2000    fondaparinux (ARIXTRA) injection 2.5 mg  2.5 mg SubCUTAneous Daily Eli Mathis MD   2.5 mg at 10/20/22 0758    ondansetron (ZOFRAN-ODT) disintegrating tablet 4 mg  4 mg Oral Q8H PRN Eli Mathis MD        Or    [facility]ron [facility] injection 4 mg  4 mg IntraVENous Q6H PRN Eli Mathis MD        aspirin EC tablet 81 mg  81 mg Oral Daily Eli Mathis MD   81 mg at 10/20/22 0757    clopidogrel (PLAVIX) tablet 75 mg  75 mg Oral Daily Eli Mathis MD   75 mg at 10/20/22 0757    acetaminophen (TYLENOL) tablet 650 mg  650 mg Oral Q6H Eli Mathis MD   650 mg at 10/20/22 0615    oxyCODONE (ROXICODONE) immediate release tablet 5 mg  5 mg Oral Q4H PRN Eli Mathis MD   5 mg at 10/19/22 9936    morphine (PF) injection 2 mg  2 mg IntraVENous Q2H PRN Eli Mathis MD   2 mg at 10/19/22 1338    Or    morphine sulfate (PF) injection 4 mg  4 mg IntraVENous Q2H PRN Eli Mathis MD        chlorhexidine (PERIDEX) 0.12 % solution 15 mL  15 mL Mouth/Throat BID Eli Mathis MD   15 mL at 10/20/22 0806    furosemide (LASIX) injection 40 mg  40 mg IntraVENous BID Eli Mathis MD   40 mg at 10/20/22 0758    [START ON 10/21/2022] furosemide (LASIX) tablet 40 mg  40 mg Oral BID Yannick Gallegos MD        hydrALAZINE (APRESOLINE) injection 5 mg  5 mg IntraVENous Q5 Min PRN Yannick Gallegos MD   5 mg at 10/20/22 0618    metoprolol (LOPRESSOR) injection 2.5 mg  2.5 mg IntraVENous Q10 Min PRN Yannick Gallegos MD        magnesium oxide (MAG-OX) tablet 400 mg  400 mg Oral BID Yannick Gallegos MD   400 mg at 10/19/22 0920    mupirocin (BACTROBAN) 2 % ointment   Nasal BID Yannick Gallegos MD   Given at 10/20/22 5545    potassium chloride (KLOR-CON M) extended release tablet 10 mEq  10 mEq Oral TID WC Yannick Gallegos MD   10 mEq at 10/20/22 0757    bisacodyl (DULCOLAX) EC tablet 5 mg  5 mg Oral Daily Yannick Gallegos MD   5 mg at 10/20/22 0757    polyethylene glycol (GLYCOLAX) packet 17 g  17 g Oral Daily Yannick Gallegos MD   17 g at 10/20/22 0758    magnesium hydroxide (MILK OF MAGNESIA) 400 MG/5ML suspension 30 mL  30 mL Oral Daily PRN Yannick Gallegos MD        famotidine (PEPCID) tablet 20 mg  20 mg Oral BID Yannick Gallegos MD   20 mg at 10/20/22 0757    potassium chloride 20 mEq/50 mL IVPB (Central Line)  20 mEq IntraVENous PRN Yannick Gallegos MD 50 mL/hr at 10/20/22 0658 20 mEq at 10/20/22 0658    magnesium sulfate 2000 mg in 50 mL IVPB premix  2,000 mg IntraVENous PRN Yannick Gallegos MD        albuterol (PROVENTIL) nebulizer solution 2.5 mg  2.5 mg Nebulization Q4H WA Yannick Gallegos MD   2.5 mg at 10/20/22 0804    albumin human 5 % IV solution 25 g  25 g IntraVENous PRN Yannick Gallegos  mL/hr at 10/18/22 2002 25 g at 10/18/22 2002    insulin regular (HUMULIN R;NOVOLIN R) 100 Units in sodium chloride 0.9 % 100 mL infusion  1 Units/hr IntraVENous Continuous Yannick Gallegos MD 4 mL/hr at 10/20/22 0543 3.96 Units/hr at 10/20/22 0543    insulin glargine (LANTUS) injection vial 15 Units  0.15 Units/kg SubCUTAneous Nightly Yannick Gallegos MD        [START ON 10/21/2022] insulin lispro (HUMALOG) injection vial 0-12 Units  0-12 Units SubCUTAneous TID WC Serena Mena MD        Nichole De Jesustiffany ON 10/21/2022] insulin lispro (HUMALOG) injection vial 0-6 Units  0-6 Units SubCUTAneous Nightly Serena Mena MD        glucose chewable tablet 16 g  4 tablet Oral PRN Serena Mena MD        dextrose bolus 10% 125 mL  125 mL IntraVENous PRN Serena Mena MD        Or    dextrose bolus 10% 250 mL  250 mL IntraVENous PRN Serena Mena MD        glucagon (rDNA) injection 1 mg  1 mg IntraMUSCular PRN Serena Mena MD        dextrose 10 % infusion   IntraVENous Continuous PRN Serena Mena MD           Allergies   Allergen Reactions    Prednisone Other (See Comments)     Made patient very agitated (medrol dospak)    Codeine Other (See Comments)     constipation       Family History   Problem Relation Age of Onset    Diabetes Mother     High Blood Pressure Mother     Arthritis Mother        Social History     Tobacco Use    Smoking status: Former     Packs/day: 5.00     Years: 10.00     Pack years: 50.00     Types: Cigarettes     Quit date: 3/13/1976     Years since quittin.6    Smokeless tobacco: Never   Substance Use Topics    Alcohol use: No     Comment: 3 beers a month    Drug use: No         Review of Systems: A 12 point ROS was performed and was unremarkable unless listed in the history of present illness. I/O last 3 completed shifts: In: 5046 [P.O.:120; I.V.:3426; IV Piggyback:1500]  Out: 0814 [Urine:3630; Chest Tube:510]    Vitals:  /63   Pulse 90   Temp 97.8 °F (36.6 °C)   Resp 20   Ht 6' 1\" (1.854 m)   Wt 232 lb 9.6 oz (105.5 kg)   SpO2 93%   BMI 30.69 kg/m²   Temp  Av.8 °F (37.1 °C)  Min: 97.8 °F (36.6 °C)  Max: 99.7 °F (37.6 °C)    Intake/Output Summary (Last 24 hours) at 10/20/2022 1024  Last data filed at 10/20/2022 1000  Gross per 24 hour   Intake 495 ml   Output 4185 ml   Net -3690 ml         Physical:  Well developed, well nourished in no acute distress  Mood indicates no abnormalities.  Pt doesnt appear depressed  Orientated to time and place  Wilhelm in place with yellow urine in bag    Labs:  WBC:    Lab Results   Component Value Date/Time    WBC 14.4 10/20/2022 05:35 AM     Hemoglobin/Hematocrit:    Lab Results   Component Value Date/Time    HGB 12.2 10/20/2022 05:35 AM    HCT 36.9 10/20/2022 05:35 AM     BMP:    Lab Results   Component Value Date/Time     10/20/2022 05:35 AM    K 3.9 10/20/2022 05:35 AM    K 3.7 10/13/2022 07:22 AM     10/20/2022 05:35 AM    CO2 22 10/20/2022 05:35 AM    BUN 14 10/20/2022 05:35 AM    LABALBU 4.0 10/18/2022 03:02 AM    CREATININE 1.1 10/20/2022 05:35 AM    CALCIUM 8.0 10/20/2022 05:35 AM    GFRAA >60 10/18/2022 03:02 AM    GFRAA >60 02/06/2013 08:53 AM    LABGLOM >60 10/20/2022 05:35 AM     PT/INR:    Lab Results   Component Value Date/Time    PROTIME 16.5 10/20/2022 05:35 AM    INR 1.34 10/20/2022 05:35 AM     PTT:    Lab Results   Component Value Date/Time    APTT 30.3 10/18/2022 01:30 PM   [APTT    Impression/Plan:     BPH  Acute urinary retention  - wilhelm placed on 10/20/22 by RN with 650cc of urine drained  - he is not compliant with flomax at home, this was restarted daily on 10/20  - could consider voiding trial in a few days after being back on scheduled flomax and when he regains some strength. If fails voiding trial at that time, he will need to be discharged to home with a wilhelm in place.   He can follow up with Dr. Jennifer Cortez to discuss surgical intervention for prostate after he recovers from cardiac surgery     Kari Ramírez PA-C  10/20/2022

## 2022-10-20 NOTE — PROGRESS NOTES
[facility]   Daily Progress Note    Admit Date:  10/12/2022  HPI:   Radha Sparrow presented to [facility] with ss chest pain/ heaviness associated with shortness of breath, nausea and belching. Hx of CAD with prior PCI to LCX and known  of RCA, recent abnormal stress test for angina, HYPERTENSION, HLD, DM2 and GERD. Transferred to [facility] for interventional cardiology consult. EKG and troponin negative for ACS. LHC on 10/14/22 with MV CAD including LM. CABG X3 on 10/18/22    Subjective:  Mr. Ashok Kothari sitting up in chair, issues with urinary retention overnight requiring Tamayo cath replacement. Due to pain up on urination, BP and heart rate elevated. Denies any chest pain or shortness of breath. Still with significant fatigue. Appetite poor.   Objective:   Patient Vitals for the past 24 hrs:   BP Temp Temp src Pulse Resp SpO2 Weight   10/20/22 1100 134/66 -- -- 81 -- 96 % --   10/20/22 1024 121/63 -- -- 89 -- 98 % --   10/20/22 1015 121/63 -- -- 90 -- -- --   10/20/22 1000 -- -- -- 89 -- -- --   10/20/22 0900 -- -- -- (!) 112 -- -- --   10/20/22 0804 -- -- -- (!) 102 20 93 % --   10/20/22 0800 (!) 144/67 97.8 °F (36.6 °C) Oral (!) 103 (!) 31 93 % --   10/20/22 0700 (!) 133/53 -- -- 98 (!) 32 93 % --   10/20/22 0600 (!) 127/57 -- -- 81 29 92 % 232 lb 9.6 oz (105.5 kg)   10/20/22 0500 (!) 113/54 -- -- 83 (!) 31 93 % --   10/20/22 0400 (!) 116/57 98.7 °F (37.1 °C) Oral 95 23 92 % --   10/20/22 0300 (!) 104/49 -- -- 80 26 91 % --   10/20/22 0200 (!) 114/54 98.4 °F (36.9 °C) Oral 83 29 92 % --   10/20/22 0100 130/77 -- -- 80 27 93 % --   10/20/22 0000 125/75 98.3 °F (36.8 °C) Oral 71 25 93 % --   10/19/22 2300 128/72 -- -- 80 26 90 % --   10/19/22 2200 (!) 141/64 -- -- (!) 101 24 92 % --   10/19/22 2100 138/61 98.8 °F (37.1 °C) Oral (!) 106 26 90 % --   10/19/22 2025 -- -- -- (!) 104 24 95 % --   10/19/22 2024 -- -- -- 98 23 94 % --   10/19/22 2000 132/60 -- -- (!) 107 29 94 % --   10/19/22 1900 136/61 99.5 °F (37.5 °C) Oral (!) 105 (!) 34 93 % --   10/19/22 1800 -- -- -- 81 26 95 % --   10/19/22 1745 -- -- -- 75 25 99 % --   10/19/22 1730 -- -- -- 81 28 97 % --   10/19/22 1715 -- -- -- 74 24 97 % --   10/19/22 1700 -- -- -- 76 25 96 % --   10/19/22 1645 -- -- -- 77 23 96 % --   10/19/22 1630 -- -- -- 77 21 96 % --   10/19/22 1615 -- -- -- 85 25 94 % --   10/19/22 1600 -- 99.7 °F (37.6 °C) Bladder 86 26 93 % --   10/19/22 1545 -- -- -- 84 23 94 % --   10/19/22 1530 -- -- -- 81 22 93 % --   10/19/22 1515 -- -- -- 85 19 (!) 88 % --   10/19/22 1500 -- -- -- 74 22 92 % --   10/19/22 1445 -- -- -- 69 23 93 % --   10/19/22 1430 -- -- -- 79 22 93 % --   10/19/22 1415 -- -- -- 82 24 95 % --   10/19/22 1400 -- -- -- 83 25 97 % --   10/19/22 1300 -- -- -- 86 22 95 % --         Intake/Output Summary (Last 24 hours) at 10/20/2022 1207  Last data filed at 10/20/2022 1000  Gross per 24 hour   Intake 495 ml   Output 3485 ml   Net -2990 ml       Wt Readings from Last 3 Encounters:   10/20/22 232 lb 9.6 oz (105.5 kg)   10/12/22 235 lb (106.6 kg)   09/28/22 235 lb (106.6 kg)         ASSESSMENT:   UNSTABLE ANGINA: EKG and troponin negative for ACS  CAD: hx PCI to LCX and known  -RCA; + ulcerated plaque LM, severe MV CAD, s/p CABG x3, on aspirin, Plavix, Lipitor, metoprolol  HYPERTENSION: Required IV Levophed overnight as well as milrinone as inotropic for low cardiac index-now discontinued  HLD: , on  high intensity statin  DM2: A1c 8.2 per IM      PLAN:  Continue DAPT, statin, beta-blocker  Progressing well per postop CT surgery course  Agree with diuresis Lasix 40 mg IV twice daily  Agree with increase in metoprolol to 25 mg twice daily  Nothing further to contribute from a cardiology perspective. We will sign off. Will have office arrange follow-up appointment in 1 month with Dr. Jose Ramon Brown.     Ming Colorado, APRN - CNP, 10/20/2022, 12:07 PM  [facility] [address]   [phone]       Telemetry: SR 60-70, PACs and PVCs  NYHA: III    Physical Exam:  General:  Awake, alert, NAD  Skin:  Warm and dry  Neck:  JVP unable to assess  Chest:  Clear to auscultation posteriorly  Cardiovascular:  RRR, normal S1S2, no m/g/r  Abdomen:  Soft, nontender, +bowel sounds  Extremities:  No BLE edema, Ace wrap to RLE      Medications:    tamsulosin  0.4 mg Oral Daily    metoprolol tartrate  25 mg Oral BID    rosuvastatin  40 mg Oral Nightly    sodium chloride flush  5-40 mL IntraVENous 2 times per day    fondaparinux  2.5 mg SubCUTAneous Daily    aspirin  81 mg Oral Daily    clopidogrel  75 mg Oral Daily    acetaminophen  650 mg Oral Q6H    chlorhexidine  15 mL Mouth/Throat BID    furosemide  40 mg IntraVENous BID    [START ON 10/21/2022] furosemide  40 mg Oral BID    magnesium oxide  400 mg Oral BID    mupirocin   Nasal BID    potassium chloride  10 mEq Oral TID WC    bisacodyl  5 mg Oral Daily    polyethylene glycol  17 g Oral Daily    famotidine  20 mg Oral BID    albuterol  2.5 mg Nebulization Q4H WA    insulin glargine  0.15 Units/kg SubCUTAneous Nightly    [START ON 10/21/2022] insulin lispro  0-12 Units SubCUTAneous TID WC    [START ON 10/21/2022] insulin lispro  0-6 Units SubCUTAneous Nightly      sodium chloride 50 mL/hr at 10/18/22 2000    insulin 3.96 Units/hr (10/20/22 0543)    dextrose         Lab Data: Lab results independently reviewed and analyzed by myself 10/20/2022    CBC:   Recent Labs     10/18/22  1336 10/19/22  0510 10/20/22  0535   WBC 12.6* 16.4* 14.4*   HGB 13.7 12.2* 12.2*    173 167       BMP:    Recent Labs     10/18/22  1336 10/18/22  1858 10/19/22  0130 10/19/22  0510 10/20/22  0535   *  --   --  137 134*   K 4.1   < > 3.9 4.0 3.9   CO2 22  --   --  21 22   BUN 16  --   --  13 14   CREATININE 1.2  --   --  1.1 1.1    < > = values in this interval not displayed.        INR:    Recent Labs     10/18/22  1330 10/19/22  0510 10/20/22  0535   INR 1.48* 1.32* 1.34*       BNP:    No results for input(s): PROBNP in the last 72 hours. Cardiac Enzymes:   No results for input(s): TROPONINI in the last 72 hours. Lipids:   Lab Results   Component Value Date/Time    TRIG 93 10/18/2022 03:02 AM    TRIG 77 08/29/2022 09:22 AM    HDL 49 10/18/2022 03:02 AM    HDL 54 08/29/2022 09:22 AM    HDL 41 01/24/2011 03:25 AM    LDLCALC 95 10/18/2022 03:02 AM    LDLCALC 103 08/29/2022 09:22 AM       Cardiac Imaging:   Procedure(s): 10/18/2022:   CORONARY ARTERY BYPASS GRAFTING X 3, ON PUMP, INTERNAL MAMMARY ARTERY, SAPHENOUS VEIN GRAFT, YING, TEMPORARY PACING WIRES,  WITH LEFT ATRIAL APPENDAGE CLIP PLACEMENT AND BILATERAL PECTORALIS BLOCKS  BYPASS GRAFTS:  1. LIMA to mid LAD  2. SVG to OM1  3. SVG to acute marginal       ECHO 10/14/2022:    Summary   Normal left ventricular systolic function with ejection fraction of 55-60%. No regional wall motion abnormalites are seen. Normal left ventricular size with mild-moderate concentric left ventricular   hypertrophy. Grade I diastolic dysfunction with normal filling pressure. Normal RV size and systolic function. Trivial mitral regurgitation. No tricuspid regurgitation to estimate systolic pulmonary artery pressure   (SPAP). CARDIAC CATH 10/14/2022:   FINDINGS   LVGRAM   LVEDP 13   GRADIENT ACROSS AORTIC VALVE None   LV FUNCTION EF 60%   WALL MOTION Normal   MITRAL REGURGITATION Mild     CORONARY ARTERIES   LM Aneurysmal with ulcerated plaque, Prox <10%, mid-distal 30% stenosis. LAD Calcified, proximal-mid 50 to 75% stenosis, mid-distal 80% stenosis. LCX Tortuous, calcified, proximal 50 to 75% stenosis. Mid to distal 80 to 90% stenosis that extends into OM1. There is a mid-distal circumflex stent that has 40% in-stent restenosis         RCA Large vessel, dominant, very high anterior takeoff near the left coronary cusp, it is calcified.   There is 100% proximal-mid  with well-developed right to right collaterals and lesser left to right collaterals PERCUTANEOUS INTERVENTION DESCRIPTION      Heparin was used for anticoagulation, a 6 Maori VL 3.5 guiding catheter was used to intubate the left main. A choice floppy wire was used to cross the left main into the LAD. IVUS was performed to left main and LAD. In the proximal LAD there is mild disease just at the ostium and in the left main there was distal 30% stenosis with moderate plaque, there is no clear unstable plaque or ulceration noted on IVUS. CONCLUSIONS:      Multivessel CAD/ASHD with left main ulcerated plaque  Will refer to CT surgery for consideration of CABG  Start heparin drip without bolus in 3 hours  Transfer to intermediate care, C4          Stress Test October 2022 at [facility] health: Interpetation Summary:   The LV EF is 67%   Normal global and regional wall motion in all territories. There is a medium sized, partially reversible defect in the inferior wall   consistent with moderate sandhya-infarct ischemia.        o Negative ECG for ischemia with pharmocologic stress. o Positive nuclear stress imaging suggestive of inducible ischemia in the        inferior wall.      o Nuclear stress image findings indicate intermediate risk for future        ischemic event .      o Normal left ventricular systolic function. Cath 2011 at [facility]:  Cath 01/2011    CORONARY ANATOMY:      The left main coronary artery has no significant disease. The left anterior descending artery has a 30% stenosis, mid   distal lumen  irregularities. The ramus intermedius branch has a 90% mid vessel stenosis. The circumflex artery gives rise to significant obtuse marginal   branch. There is a patent stent in the distal portion of this AV group   circumflex,  but no evidence of hemodynamic disease. The right coronary artery is known to be occluded per angiogram   performed in  2001.   The artery was not re-imaged during this case (also not   imaged during  the 2003 case), stopped looking for the right coronary artery,   felt it was  best to conserve on contrast totals as opposed to locating a   chronically  occluded right coronary artery. Aortic root angiogram was performed. This did not show a right   coronary  artery taking off from the right cusp, however, did not identify   anomalous  takeoff either. The aortic root does appear mildly dilated   towards upper  limits of normal size. Left ventriculogram shows normal wall motion ejection fraction of   50 to 55%. Percutaneous coronary intervention/stent implantation x 1 in the   ramus  intermedius branch. Preintervention stenosis was 90%,   postintervention  stenosis was 0%. Guide catheter was a 6 Naa guide   catheter. Guidewire was Kinetics guidewire. Predilation was performed with   a 2.0 x 8  Voyager stent replaced with a 2.5 x 12 Vision bare-metal stent. Postdilatation was performed with a 2.75 x 6 Voyager   non-compliant balloon. INTERVENTION MEDICATIONS:    1. Angiomax. 2.  Aspirin. 3.  Effient. COMPLICATIONS:  None. IMPRESSION:    1. Patent stent in the circumflex artery. 2.  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 3.  Severe stenosis of the ramus intermedius branch. 4.  Status post successful balloon angioplasty stent deployment   ramus  intermedius branch. 5.  Normal left ventricular systolic function.

## 2022-10-20 NOTE — PLAN OF CARE
Problem: Discharge Planning  Goal: Discharge to home or other facility with appropriate resources  Outcome: Progressing     Problem: Pain  Goal: Verbalizes/displays adequate comfort level or baseline comfort level  Outcome: Progressing     Problem: Chronic Conditions and Co-morbidities  Goal: Ability to maintain appropriate glucose levels will improve  Description: Ability to maintain appropriate glucose levels will improve  Outcome: Progressing     Problem: ABCDS Injury Assessment  Goal: Absence of physical injury  Outcome: Progressing     Problem: Neurosensory - Adult  Goal: Achieves stable or improved neurological status  Outcome: Progressing  Goal: Achieves maximal functionality and self care  Outcome: Progressing     Problem: Respiratory - Adult  Goal: Achieves optimal ventilation and oxygenation  Outcome: Progressing     Problem: Cardiovascular - Adult  Goal: Maintains optimal cardiac output and hemodynamic stability  Outcome: Progressing  Goal: Absence of cardiac dysrhythmias or at baseline  Outcome: Progressing     Problem: Skin/Tissue Integrity - Adult  Goal: Skin integrity remains intact  Outcome: Progressing  Flowsheets (Taken 10/20/2022 1139)  Skin Integrity Remains Intact:   Monitor for areas of redness and/or skin breakdown   Assess vascular access sites hourly  Goal: Incisions, wounds, or drain sites healing without S/S of infection  Outcome: Progressing  Flowsheets (Taken 10/20/2022 1139)  Incisions, Wounds, or Drain Sites Healing Without Sign and Symptoms of Infection:   ADMISSION and DAILY: Assess and document risk factors for pressure ulcer development   TWICE DAILY: Assess and document dressing/incision, wound bed, drain sites and surrounding tissue   Implement wound care per orders   TWICE DAILY: Assess and document skin integrity   Initiate pressure ulcer prevention bundle as indicated  Goal: Oral mucous membranes remain intact  Outcome: Progressing  Flowsheets (Taken 10/20/2022 1139)  Oral Mucous Membranes Remain Intact:   Assess oral mucosa and hygiene practices   Implement preventative oral hygiene regimen   Implement oral medicated treatments as ordered     Problem: Musculoskeletal - Adult  Goal: Return mobility to safest level of function  Outcome: Progressing  Goal: Return ADL status to a safe level of function  Outcome: Progressing     Problem: Gastrointestinal - Adult  Goal: Maintains or returns to baseline bowel function  Outcome: Progressing  Goal: Maintains adequate nutritional intake  Outcome: Progressing     Problem: Genitourinary - Adult  Goal: Absence of urinary retention  Outcome: Progressing     Problem: Metabolic/Fluid and Electrolytes - Adult  Goal: Electrolytes maintained within normal limits  Outcome: Progressing  Goal: Hemodynamic stability and optimal renal function maintained  Outcome: Progressing  Goal: Glucose maintained within prescribed range  Outcome: Progressing     Problem: Hematologic - Adult  Goal: Maintains hematologic stability  Outcome: Progressing     Problem: Nutrition Deficit:  Goal: Optimize nutritional status  Outcome: Progressing

## 2022-10-20 NOTE — PROGRESS NOTES
Physical Therapy  Facility/Department: [facility] C2 CARD TELEMETRY  Daily Treatment Note  NAME: Valentino Ferguson  : 1945  MRN: [id number]    Date of Service: 10/20/2022    Discharge Recommendations:  24 hour supervision or assist   PT Equipment Recommendations  Other: Will CTA - anticipate no DME needs by time of D/C    [facility] 6 Clicks Inpatient Mobility:  AM-PAC Mobility Inpatient   How much difficulty turning over in bed?: A Little  How much difficulty sitting down on / standing up from a chair with arms?: A Little  How much difficulty moving from lying on back to sitting on side of bed?: A Little  How much help from another person moving to and from a bed to a chair?: A Little  How much help from another person needed to walk in hospital room?: A Little  How much help from another person for climbing 3-5 steps with a railing?: A Little  AM-PAC Inpatient Mobility Raw Score : 18  AM-PAC Inpatient T-Scale Score : 43.63  Mobility Inpatient CMS 0-100% Score: 46.58  Mobility Inpatient CMS G-Code Modifier : CK    Patient Diagnosis(es): There were no encounter diagnoses. Assessment   Assessment: Pt participated well with PT today, ambulating markedly increased distance with support of 4WW at SBA level. Pt performing sit<>stand with CGA/SBA x 1 although still requires regular cues to maintain sternal precautions with transfers. Recommend pt return home with 24-hr sup/assist from family for safety. Will CTA for possible home PT or DME needs, although anticipate pt will not have needs by time of discharge. Activity Tolerance: Patient tolerated treatment well  Other: Will CTA - anticipate no DME needs by time of D/C     Plan    General Plan: 5-7 times per week  Specific Instructions for Next Treatment: Progress ther ex and mobility as tolerated  Current Treatment Recommendations: Strengthening;Balance training;Functional mobility training;Transfer training; Endurance training;Gait training;Stair training;Home exercise program;Safety education & training; Therapeutic activities     Restrictions  Restrictions/Precautions  Restrictions/Precautions: General Precautions, Cardiac  Position Activity Restriction  Sternal Precautions: No Pushing, No Pulling, 5# Lifting Restrictions  Other position/activity restrictions: Ambulate pt every shift, low fall risk per nursing assessment, chest tube, Tamayo, telemetry with ICU monitoring     Subjective    Subjective: Pt agreeable to work with PT this morning, pleasant and cooperative. Pain: Pt denies any pain throughout duration of session, just c/o \"feeling exhausted. \"  Overall Orientation Status: Within Normal Limits     Objective   Vitals  Heart Rate: 89  Heart Rate Source: Monitor  BP: 121/63  BP Location: Left upper arm  BP Method: Automatic  Patient Position: Up in chair;Sitting  MAP (Calculated): 82.33  SpO2: 98 %  O2 Device: None (Room air)    Bed Mobility Training  Interventions: Verbal cues  Rolling: Contact-guard assistance  Supine to Sit: Other (comment) (MARINA - pt up in chair upon entry of PT)  Sit to Supine: Moderate assistance  Scooting: Moderate assistance (to scoot up in bed)    Balance  Sitting: Intact  Standing: Impaired  Standing - Static: Good  Standing - Dynamic: Fair;Occasional    Transfer Training  Interventions: Safety awareness training;Verbal cues; Visual cues  Sit to Stand: Contact-guard assistance  Stand to Sit: Stand-by assistance  Bed to Chair: Stand-by assistance (using 4WW, moving from chair>bed)    Gait Training  Overall Level of Assistance: Stand-by assistance  Interventions: Verbal cues  Base of Support: Widened  Speed/Tory: Pace decreased (< 100 feet/min)  Step Length: Left shortened;Right shortened  Gait Abnormalities: Decreased step clearance (pt amb with steady tory with equal stride length from R to L sides, safe and steady without LOB, still requires 4WW for balance and to aid in energy conservation)  Distance (ft): 200 Feet  Assistive Device: Walker, rollator;Gait belt    PT Exercises  Exercise Treatment: x 15 BLE: Ankle pumps, LAQ, seated clamshells, seated marching    Safety Devices  Type of Devices: All fall risk precautions in place;Call light within reach;Gait belt;Left in bed;Nurse notified     Goals  Short Term Goals  Time Frame for Short Term Goals: 1 week, 10/26/22 (unless otherwise specified)  Short Term Goal 1: Pt will transfer supine <-> sit with supervision  Short Term Goal 2: Pt will transfer sit <-> stand and bed>chair with modified(I)  Short Term Goal 3: Pt will ambulate x 200 feet using least AD with supervision/modified(I)  Short Term Goal 4: By 10/22/22: Pt will tolerate 12-15 reps BLE exercise for strengthening, balance, and endurance - MET 10/20/22, goal ongoing to continue to build strength  Short Term Goal 5: Pt will ambulate up/down at least 1 step using no handrails with SBA/supervision  Patient Goals   Patient Goals : \"To go home\"    Education  Patient Education  Education Given To: Patient  Education Provided: Role of Therapy;Plan of Care;Home Exercise Program;Precautions;Transfer Training;Energy Conservation;Equipment  Education Method: Demonstration;Verbal  Barriers to Learning: None  Education Outcome: Verbalized understanding;Demonstrated understanding    Therapy Time   Individual Concurrent Group Co-treatment   Time In 1000         Time Out 1040         Minutes 40         Timed Code Treatment Minutes: Phoenix Harry, DPREDDY #[id number]    If pt is unable to be seen after this session, please let this note serve as discharge summary. Please see case management note for discharge disposition. Thank you.

## 2022-10-20 NOTE — CARE COORDINATION
[facility]    Referral received from  to follow for home care services. I will follow for needs, and speak with patient to verify demos.     Teresita Haider RN, BSN CTN  [facility] [phone]

## 2022-10-21 ENCOUNTER — APPOINTMENT (OUTPATIENT)
Dept: GENERAL RADIOLOGY | Age: 77
DRG: 234 | End: 2022-10-21
Attending: INTERNAL MEDICINE
Payer: MEDICARE

## 2022-10-21 PROBLEM — I20.0 UNSTABLE ANGINA (HCC): Status: RESOLVED | Noted: 2022-10-12 | Resolved: 2022-10-21

## 2022-10-21 LAB
ANION GAP SERPL CALCULATED.3IONS-SCNC: 7 MMOL/L (ref 3–16)
BLOOD BANK DISPENSE STATUS: NORMAL
BLOOD BANK DISPENSE STATUS: NORMAL
BLOOD BANK PRODUCT CODE: NORMAL
BLOOD BANK PRODUCT CODE: NORMAL
BPU ID: NORMAL
BPU ID: NORMAL
BUN BLDV-MCNC: 14 MG/DL (ref 7–20)
CALCIUM SERPL-MCNC: 7.6 MG/DL (ref 8.3–10.6)
CHLORIDE BLD-SCNC: 104 MMOL/L (ref 99–110)
CO2: 25 MMOL/L (ref 21–32)
CREAT SERPL-MCNC: 1.1 MG/DL (ref 0.8–1.3)
DESCRIPTION BLOOD BANK: NORMAL
DESCRIPTION BLOOD BANK: NORMAL
GFR SERPL CREATININE-BSD FRML MDRD: >60 ML/MIN/{1.73_M2}
GLUCOSE BLD-MCNC: 110 MG/DL (ref 70–99)
GLUCOSE BLD-MCNC: 136 MG/DL (ref 70–99)
GLUCOSE BLD-MCNC: 153 MG/DL (ref 70–99)
GLUCOSE BLD-MCNC: 170 MG/DL (ref 70–99)
GLUCOSE BLD-MCNC: 188 MG/DL (ref 70–99)
GLUCOSE BLD-MCNC: 199 MG/DL (ref 70–99)
GLUCOSE BLD-MCNC: 219 MG/DL (ref 70–99)
GLUCOSE BLD-MCNC: 246 MG/DL (ref 70–99)
GLUCOSE BLD-MCNC: 330 MG/DL (ref 70–99)
GLUCOSE BLD-MCNC: 67 MG/DL (ref 70–99)
HCT VFR BLD CALC: 34.5 % (ref 40.5–52.5)
HCT VFR BLD CALC: 36.2 % (ref 40.5–52.5)
HEMOGLOBIN: 11.5 G/DL (ref 13.5–17.5)
HEMOGLOBIN: 11.8 G/DL (ref 13.5–17.5)
MCH RBC QN AUTO: 32 PG (ref 26–34)
MCHC RBC AUTO-ENTMCNC: 33.4 G/DL (ref 31–36)
MCV RBC AUTO: 96 FL (ref 80–100)
PDW BLD-RTO: 13.9 % (ref 12.4–15.4)
PERFORMED ON: ABNORMAL
PLATELET # BLD: 157 K/UL (ref 135–450)
PMV BLD AUTO: 7.8 FL (ref 5–10.5)
POTASSIUM SERPL-SCNC: 4 MMOL/L (ref 3.5–5.1)
RBC # BLD: 3.59 M/UL (ref 4.2–5.9)
SODIUM BLD-SCNC: 136 MMOL/L (ref 136–145)
WBC # BLD: 11.9 K/UL (ref 4–11)

## 2022-10-21 PROCEDURE — 85018 HEMOGLOBIN: CPT

## 2022-10-21 PROCEDURE — 97116 GAIT TRAINING THERAPY: CPT

## 2022-10-21 PROCEDURE — 6370000000 HC RX 637 (ALT 250 FOR IP): Performed by: NURSE PRACTITIONER

## 2022-10-21 PROCEDURE — 99024 POSTOP FOLLOW-UP VISIT: CPT | Performed by: NURSE PRACTITIONER

## 2022-10-21 PROCEDURE — 6360000002 HC RX W HCPCS: Performed by: STUDENT IN AN ORGANIZED HEALTH CARE EDUCATION/TRAINING PROGRAM

## 2022-10-21 PROCEDURE — 2100000000 HC CCU R&B

## 2022-10-21 PROCEDURE — 94669 MECHANICAL CHEST WALL OSCILL: CPT

## 2022-10-21 PROCEDURE — 97110 THERAPEUTIC EXERCISES: CPT

## 2022-10-21 PROCEDURE — 2580000003 HC RX 258: Performed by: STUDENT IN AN ORGANIZED HEALTH CARE EDUCATION/TRAINING PROGRAM

## 2022-10-21 PROCEDURE — 94640 AIRWAY INHALATION TREATMENT: CPT

## 2022-10-21 PROCEDURE — 80048 BASIC METABOLIC PNL TOTAL CA: CPT

## 2022-10-21 PROCEDURE — 85014 HEMATOCRIT: CPT

## 2022-10-21 PROCEDURE — 85027 COMPLETE CBC AUTOMATED: CPT

## 2022-10-21 PROCEDURE — 97530 THERAPEUTIC ACTIVITIES: CPT

## 2022-10-21 PROCEDURE — 97535 SELF CARE MNGMENT TRAINING: CPT

## 2022-10-21 PROCEDURE — 6370000000 HC RX 637 (ALT 250 FOR IP): Performed by: STUDENT IN AN ORGANIZED HEALTH CARE EDUCATION/TRAINING PROGRAM

## 2022-10-21 PROCEDURE — 71045 X-RAY EXAM CHEST 1 VIEW: CPT

## 2022-10-21 RX ORDER — BISACODYL 10 MG
10 SUPPOSITORY, RECTAL RECTAL ONCE
Status: DISCONTINUED | OUTPATIENT
Start: 2022-10-21 | End: 2022-10-22 | Stop reason: HOSPADM

## 2022-10-21 RX ADMIN — SODIUM CHLORIDE, PRESERVATIVE FREE 10 ML: 5 INJECTION INTRAVENOUS at 20:07

## 2022-10-21 RX ADMIN — ACETAMINOPHEN 650 MG: 325 TABLET ORAL at 23:05

## 2022-10-21 RX ADMIN — Medication 400 MG: at 08:31

## 2022-10-21 RX ADMIN — FAMOTIDINE 20 MG: 20 TABLET, FILM COATED ORAL at 08:31

## 2022-10-21 RX ADMIN — INSULIN LISPRO 4 UNITS: 100 INJECTION, SOLUTION INTRAVENOUS; SUBCUTANEOUS at 20:09

## 2022-10-21 RX ADMIN — SODIUM CHLORIDE, PRESERVATIVE FREE 10 ML: 5 INJECTION INTRAVENOUS at 08:31

## 2022-10-21 RX ADMIN — Medication 15 ML: at 20:07

## 2022-10-21 RX ADMIN — POLYETHYLENE GLYCOL 3350 17 G: 17 POWDER, FOR SOLUTION ORAL at 08:30

## 2022-10-21 RX ADMIN — INSULIN LISPRO 4 UNITS: 100 INJECTION, SOLUTION INTRAVENOUS; SUBCUTANEOUS at 17:18

## 2022-10-21 RX ADMIN — MUPIROCIN: 20 OINTMENT TOPICAL at 08:31

## 2022-10-21 RX ADMIN — Medication 15 ML: at 08:31

## 2022-10-21 RX ADMIN — INSULIN LISPRO 4 UNITS: 100 INJECTION, SOLUTION INTRAVENOUS; SUBCUTANEOUS at 12:40

## 2022-10-21 RX ADMIN — Medication 400 MG: at 20:07

## 2022-10-21 RX ADMIN — METOPROLOL TARTRATE 25 MG: 25 TABLET, FILM COATED ORAL at 20:07

## 2022-10-21 RX ADMIN — ACETAMINOPHEN 650 MG: 325 TABLET ORAL at 12:34

## 2022-10-21 RX ADMIN — BISACODYL 5 MG: 5 TABLET, COATED ORAL at 08:30

## 2022-10-21 RX ADMIN — POTASSIUM CHLORIDE 10 MEQ: 750 TABLET, EXTENDED RELEASE ORAL at 12:34

## 2022-10-21 RX ADMIN — ALBUTEROL SULFATE 2.5 MG: 2.5 SOLUTION RESPIRATORY (INHALATION) at 19:19

## 2022-10-21 RX ADMIN — POTASSIUM CHLORIDE 10 MEQ: 750 TABLET, EXTENDED RELEASE ORAL at 08:31

## 2022-10-21 RX ADMIN — FUROSEMIDE 40 MG: 40 TABLET ORAL at 08:30

## 2022-10-21 RX ADMIN — ASPIRIN 325 MG: 325 TABLET, COATED ORAL at 08:31

## 2022-10-21 RX ADMIN — INSULIN GLARGINE 15 UNITS: 100 INJECTION, SOLUTION SUBCUTANEOUS at 20:10

## 2022-10-21 RX ADMIN — FAMOTIDINE 20 MG: 20 TABLET, FILM COATED ORAL at 20:07

## 2022-10-21 RX ADMIN — ALBUTEROL SULFATE 2.5 MG: 2.5 SOLUTION RESPIRATORY (INHALATION) at 15:27

## 2022-10-21 RX ADMIN — POTASSIUM CHLORIDE 10 MEQ: 750 TABLET, EXTENDED RELEASE ORAL at 17:18

## 2022-10-21 RX ADMIN — ROSUVASTATIN CALCIUM 40 MG: 10 TABLET, FILM COATED ORAL at 20:07

## 2022-10-21 RX ADMIN — ACETAMINOPHEN 650 MG: 325 TABLET ORAL at 06:27

## 2022-10-21 RX ADMIN — MUPIROCIN: 20 OINTMENT TOPICAL at 20:07

## 2022-10-21 RX ADMIN — METOPROLOL TARTRATE 25 MG: 25 TABLET, FILM COATED ORAL at 08:31

## 2022-10-21 RX ADMIN — TAMSULOSIN HYDROCHLORIDE 0.4 MG: 0.4 CAPSULE ORAL at 08:30

## 2022-10-21 RX ADMIN — ALBUTEROL SULFATE 2.5 MG: 2.5 SOLUTION RESPIRATORY (INHALATION) at 07:38

## 2022-10-21 RX ADMIN — FUROSEMIDE 40 MG: 40 TABLET ORAL at 17:18

## 2022-10-21 RX ADMIN — ACETAMINOPHEN 650 MG: 325 TABLET ORAL at 17:18

## 2022-10-21 ASSESSMENT — PAIN SCALES - GENERAL
PAINLEVEL_OUTOF10: 0
PAINLEVEL_OUTOF10: 2
PAINLEVEL_OUTOF10: 0
PAINLEVEL_OUTOF10: 0

## 2022-10-21 NOTE — PROGRESS NOTES
CVTS Cardiothoracic Progress Note:                                CC:  Post op follow up     Surgery: 10/18/22 CORONARY ARTERY BYPASS GRAFTING X 3, ON PUMP, INTERNAL MAMMARY ARTERY, SAPHENOUS VEIN GRAFT, YING, TEMPORARY PACING WIRES,  WITH LEFT ATRIAL APPENDAGE CLIP PLACEMENT AND BILATERAL [facility] course:  10/19 No acute events overnight. Hemodynamically stable on low dose levo. Milrinone weaned off.   10/20 Issues with urinary retention overnight. Otherwise remains hemodynamically stable in SR.   10/21 No acute events overnight. Hemodynamically stable in SR. Hematuria.      Past Medical History:   Diagnosis Date    Acute MI ([address]     Angina pectoris, unstable ([address]     ASHD (arteriosclerotic heart disease)     Bladder outlet obstruction     BPH (benign prostatic hyperplasia)     Chest pain     Constipation     Diabetes mellitus ([address]     GERD (gastroesophageal reflux disease)     Hypercholesteremia     Hypercholesterolemia     Hypertension     IBS (irritable bowel syndrome)     Influenza A 01/28/2020    Sinusitis     Type II or unspecified type diabetes mellitus without mention of complication, not stated as uncontrolled         Past Surgical History:   Procedure Laterality Date    CARDIAC CATHETERIZATION  2002    stent    CARDIAC CATHETERIZATION  2011    stent    COLONOSCOPY  10/24/13    CORONARY ANGIOPLASTY WITH STENT PLACEMENT      CORONARY ARTERY BYPASS GRAFT N/A 10/18/2022    CORONARY ARTERY BYPASS GRAFTING X 3, ON PUMP, INTERNAL MAMMARY ARTERY, SAPHENOUS VEIN GRAFT, YING, TEMPORARY PACING WIRES,  WITH LEFT ATRIAL APPENDAGE CLIP PLACEMENT AND BILATERAL PECTORALIS BLOCKS performed by Nadir Dawson MD at [address] [place] [address] as of 10/12/2022 - Fully Reviewed 10/12/2022   Allergen Reaction Noted    Prednisone Other (See Comments) 07/09/2014    Codeine Other (See Comments) 01/23/2011        Patient Active Problem List   Diagnosis    Angina pectoris, unstable ([address]    Chest pain Diabetes mellitus type II, controlled ([facility]    Bladder outlet obstruction    S/P CABG x 3    Mixed hyperlipidemia    Constipation    Benign non-nodular prostatic hyperplasia with lower urinary tract symptoms    Anxiety    DM (diabetes mellitus), secondary, uncontrolled, w/neurologic complic    Primary hypertension    Coronary artery disease involving native coronary artery of native heart with angina pectoris ([facility]    Pure hypercholesterolemia    Benign non-nodular prostatic hyperplasia without lower urinary tract symptoms    Type 2 diabetes mellitus with diabetic polyneuropathy, without long-term current use of insulin (HCC)    Keloid    Primary insomnia    COVID-19    Unstable angina (HCC)        Vital Signs: BP (!) 148/77   Pulse (!) 105   Temp 98 °F (36.7 °C) (Oral)   Resp 18   Ht 6' 1\" (1.854 m)   Wt 231 lb 14.4 oz (105.2 kg)   SpO2 92%   BMI 30.60 kg/m²  O2 Flow Rate (L/min): 1 L/min     Admission Weight: Weight: 235 lb (106.6 kg)    Weight on 10/18 (102.6 kg) Pre-op   Weight on 10/19 (109.5 kg)  10/20 105.5 kg   10/21 105.2 kg     Intake/Output:   Intake/Output Summary (Last 24 hours) at 10/21/2022 0920  Last data filed at 10/21/2022 0600  Gross per 24 hour   Intake 565 ml   Output 2900 ml   Net -2335 ml          LABORATORY DATA:     CBC:   Recent Labs     10/19/22  0510 10/20/22  0535 10/21/22  0410   WBC 16.4* 14.4* 11.9*   HGB 12.2* 12.2* 11.5*   HCT 36.3* 36.9* 34.5*   MCV 93.7 92.9 96.0    167 157     BMP:   Recent Labs     10/19/22  0510 10/20/22  0535 10/21/22  0410    134* 136   K 4.0 3.9 4.0    103 104   CO2 21 22 25   BUN 13 14 14   CREATININE 1.1 1.1 1.1     MG:    Recent Labs     10/18/22  1336 10/19/22  0510 10/20/22  0535   MG 3.50* 2.40 2.30      Cardiac Enzymes: No results for input(s): CKTOTAL, CKMB, CKMBINDEX, TROPONINI in the last 72 hours.   PT/INR:   Recent Labs     10/18/22  1330 10/19/22  0510 10/20/22  0535   PROTIME 17.8* 16.3* 16.5*   INR 1.48* 1.32* 1.34* APTT:   Recent Labs     10/18/22  1330   APTT 30.3       CXR: 10/21/22  FINDINGS:   Sternotomy wires and left atrial appendage clip are present. Right jugular   venous catheter terminates over the superior vena cava. There are bilateral   chest drains. Cardiac silhouette remains prominent. There remains bibasilar atelectasis,   greater on the left. No pneumothorax is identified. Impression   Supportive tubing is in normal position. Bibasilar atelectasis. ________________________________________________________________________      Subjective:   Dietary Intake: slowly improving   no Nausea   Pain Control: adequate  Complaints: expected post operative pain   Bowels: no BM     Objective:   General appearance: resting comfortably in chair, in nad   Lungs: diminished bilateral bases   Heart: S1S2 normal; SR on monitor  Chest: symmetrical expansion with inspiration and expirations; no rocking of sternum noted   Abdomen: soft, non-tender  Bowel sounds: normoactive   Kidneys: UOP 3370 ml over 24 hours; Cr 1.1  Wound/Incisions: Midsternal incision CDI; RLE incisions CDI  Extremities: BLE pulses present; trace swelling noted in BLE  Neurological: alert, oriented and grossly non focal   Chest tubes/Drains: Right pleural chest tube with 210-0-98=839 ml of serosanguinous drainage over 24 hours; no airleak noted    Assessment:   Post-op: 3 days. Condition: In stable condition. Plan:   1. Cardiovascular:   CAD s/p CABG x 3 with NAS clip- ASA, statin, Plavix, BB   HLD- statin     2. Pulmonary:   Stable AM CXR unchanged from prior study with expected postoperative changes, atelectasis, and small right pleural effusion   Satisfactory oxygen saturations on RA  Expansion measures  Scheduled albuterol   Hx of tobacco use   Remove pleural chest tubes    3. Neurology:   Post op pain control with PRN morphine or oxy, robaxin Scheduled tylenol    4.  Nephrology:   Satisfactory 24 hour UOP with Cr of 1.1  Weight trending down but still up from pre-op   Continue to diurese as tolerated- transition to PO Lasix     5. Urology:  Urinary retention- hx of BPH. Flomax resumed 10/20. Tamayo catheter re-inserted. Urology following     6. Endocrinology:   Glucose controlled. Lantus, SSI   DMII- A1C 8 on 10/18/22. Will ensure close follow up with PCP upon discharge to ensure good glucose control     7. Hematology:   Acute blood loss anemia- H&H stable. Monitor     8. Microbiology:   Leukocytosis- WBC 11.9, and trending down. Afebrile. Likely reactive. Monitor     9. Nutrition:   Continue diet as ordered, ONS     10. Labs:   Labs and imaging reviewed as above     11. Post-op Drains/Wires: Remove pleural chest tubes  Right IJ CVC (10/18)    12. D/C Goals: CM following. PT/OT with recs for  home with 24 hours supervision/assist. Potentially home later today or tomorrow.      13. Continue post-op care of patient in the ICU    GI prophylaxis: Pepcid  DVT prophylaxis: arixtra   ________________________________________________________________________  YANNICK Bui - CNP  10/21/2022  9:20 AM

## 2022-10-21 NOTE — PROGRESS NOTES
Shift: 10/20/2022 1900 - 10/21/2022 0700.     Procedure: CABG X 3    Admit from OR (time and date): 2563 10/18/22    Transition (time and date): 0745    Surgery, return to OR no     Nursing assessment at handoff  stable    Most recent vitals: /67   Pulse 84   Temp 98 °F (36.7 °C) (Oral)   Resp 18   Ht 6' 1\" (1.854 m)   Wt 231 lb 14.4 oz (105.2 kg)   SpO2 92%   BMI 30.60 kg/m²      Increased O2 requirements: no O2 requirements: Oxygen Therapy  SpO2: 92 %  Pulse Oximetry Type: Continuous  Pulse Oximeter Device Mode: Continuous  Pulse Oximeter Device Location: Finger  O2 Device: Nasal cannula  Skin Assessment: Clean, dry, & intact  FiO2 : 50 %  O2 Flow Rate (L/min): 1 L/min  Vent Mode: AC/VC     Admission weight Weight: 235 lb (106.6 kg)  Today's weight   Wt Readings from Last 1 Encounters:   10/21/22 231 lb 14.4 oz (105.2 kg)         EF: 55-60%    Drop in Urinary Output no     Rhythm Changes Sinus Tach & SR  Pacing Wires Removed:  [x] Yes  [] NO  [] Platelets < 56,417  [] Arrhythmia  [] Bradycardia [] Valve Replacement  [] Pacing for Cardiac Index  []Physician Order    Lines/Drains  LDA Insertion Date Discontinued Date Dressing Changes   Art line 10/18/22     Central Line 10/18/22     Tamayo 10/18/22 10/19/22     Chest Tube 10/18/22 Mediastinal 10/19/22    Wires  10/18/22 10/19/22    ETT 10/18/22 10/18/22 1538    TERE Drain      VasCath      Impella        Interventions After Office Hours  Problem(Brief) Date Time Intervention Physician contacted                                               Drip rates at handoff:    sodium chloride 50 mL/hr at 10/18/22 2000    insulin 3 Units/hr (10/21/22 0613)    dextrose         Hospital Course:  POD# 0 10/18/22  -K replaced  -albumin given  - SB out of OR CI 1-2, unable to resolve with V pacing     POD DOS Nights  -KCL replacement  -250 albumin for low CVP/CI/BP  -Sinus arrhythmia/irregular with PVC's  -HR goes from the 50's to the 80's  -BP down to 69'K systolic, CI down around 2 with slower rate. -Milrinone at 0.25mcg (leave)  -Levo at 4mcg  -Insulin  -UOP - 695ml/12hour  -CT Pleural - 150ml/12hour  -CT MS - 110ml/12hour   -oxycodone for pain    POD# 1 10/19/22  - wires, mediastinal CT, and Wilhelm removed  - Levo, Milrinone, and D5 0.5 NS stopped  - up in chair with meals tolerated well    POD#1 NOC  - Elevated BP when attempting to void  - Urinary retention, I&O urinary catheter x2     - Takes flomax at home, would like to resume if possible. - CVP removed  - Up in chair once at [address] on insulin drip and TKO fluid  - Mediastinal CT, serosanguinous fluid 110 mL output this shift. POD#2 Days  Pt having urinary retention, wilhelm placed back in with bloody urine. Flomax restarted. CT output 200ml left in place per Dr. Jolly Santana. A line, CVP removed  Insulin gtt titrated    POD #2 Nights  -Insulin gtt continued  -Patient slept well overnight, pain controlled  -Required 1 L nasal canula when sleeping  -Chest tube output 30 ml from morning dump  Stable at shift change      Lab Data:  CBC:   Recent Labs     10/20/22  0535 10/21/22  0410   WBC 14.4* 11.9*   HGB 12.2* 11.5*   HCT 36.9* 34.5*   MCV 92.9 96.0    157       BMP:    Recent Labs     10/20/22  0535 10/21/22  0410   * 136   K 3.9 4.0   CO2 22 25   BUN 14 14   CREATININE 1.1 1.1       LIVR:   No results for input(s): AST, ALT in the last 72 hours.     PT/INR:   Recent Labs     10/19/22  0510 10/20/22  0535   INR 1.32* 1.34*       APTT:   Recent Labs     10/18/22  1330   APTT 30.3       ABG:   Recent Labs     10/18/22  1404 10/18/22  1453   PHART 7.428 7.438   PAF6BSK 35.7 33.4*   PO2ART 116.2* 149.2*      Brant Tucker RN

## 2022-10-21 NOTE — PROGRESS NOTES
Occupational Therapy  Facility/Department: [facility] C2 CARD TELEMETRY  Daily Treatment Note  NAME: Mary Arciniega  : 1945  MRN: [id number]    Date of Service: 10/21/2022    Discharge Recommendations:  24 hour supervision or assist  OT Equipment Recommendations  Equipment Needed: No  Other: Pt has a shower chair      Patient Diagnosis(es): There were no encounter diagnoses. Assessment      Assessment: Pt is progressing well with his acute goals. Pt completed toilet transfer with SBA and completed grooming tasks while standing for 5 mins with SBA. Pt was able to verbalize and maintain sternal precautions throughout session, but required some VC for walker management during functional mobility to/from bathroom. Recommend home with 24hr upon d/c. Activity Tolerance: Patient tolerated treatment well  Discharge Recommendations: 24 hour supervision or assist  Equipment Needed: No  Other: Pt has a shower chair      Plan   Occupational Therapy Plan  Times Per Week: 4-6x/wk  Current Treatment Recommendations: Functional mobility training; Safety education & training;Patient/Caregiver education & training;Self-Care / ADL     Restrictions   Sternal precautions    Subjective   Subjective  Subjective: Pt seated upright in chair upon arrival. Pt agreeable to OT tx. Pain: Pt denied pain throughout session.   Orientation  Overall Orientation Status: Within Normal Limits  Orientation Level: Oriented X4  Cognition  Overall Cognitive Status: WNL        Objective    Vitals  Vitals  Heart Rate: 78 (80 at end of session)  Heart Rate Source: Monitor  BP: 121/71 (131/79 at end of session)  BP Location: Right upper arm  BP Method: Automatic  Patient Position: Up in chair  MAP (Calculated): 87.67  SpO2: 95 % (96 at end of session)  O2 Device: None (Room air)        ADL  Grooming: Stand by assistance (stand at sink with 4WW for 5 mins to brush teeth and wash face)  Toileting: Stand by assistance (Able to complete toilet transfer with SBA. Attempted to have bowel movement but was unsuccessful.)        Safety Devices  Type of Devices: All fall risk precautions in place;Gait belt;Call light within reach;Nurse notified  Restraints  Restraints Initially in Place: No     Patient Education  Education Given To: Patient  Education Provided: Role of Therapy;Plan of Care;Precautions;Transfer Training;ADL Adaptive Strategies  Education Provided Comments:     Disease specific: sternal precautions, OOB activity, walker management    Education Method: Demonstration;Verbal  Barriers to Learning: None  Education Outcome: Verbalized understanding;Demonstrated understanding    AM-PAC Daily Activity Inpatient   How much help for putting on and taking off regular lower body clothing?: A Lot  How much help for Bathing?: A Little  How much help for Toileting?: A Little  How much help for putting on and taking off regular upper body clothing?: A Little  How much help for taking care of personal grooming?: None  How much help for eating meals?: None  AM-[facility] Inpatient Daily Activity Raw Score: 19  AM-PAC Inpatient ADL T-Scale Score : 40.22  ADL Inpatient CMS 0-100% Score: 42.8  ADL Inpatient CMS G-Code Modifier : CK     Goals  Short Term Goals  Time Frame for Short Term Goals: By 10/29/22  Short Term Goal 1: Pt will complete toilet transfers with SBA- goal met 10/21 keep for consistency once wilhelm is removed  Short Term Goal 2: Pt will complete LB dressing with min A- goal not addressed  Short Term Goal 3: Pt will complete toileting with SBA- goal met 10/21, keep for consistency once wilhelm is removed  Short Term Goal 4: Pt will increase standing tolerance to 4 mins to complete sink level ADLs- goal met 10/21, New goal: Pt will stand for at least 8 mins to complete ADLs.   Patient Goals   Patient goals : \" to get stronger\"       Therapy Time   Individual Concurrent Group Co-treatment   Time In 1126         Time Out 1149         Minutes 23         Timed Code Treatment Minutes: 23 Minutes     If pt is unable to be seen after this session, please let this note serve as discharge summary. Please see case management note for discharge disposition. Thank you.      MADISON Wakefield/L

## 2022-10-21 NOTE — PROGRESS NOTES
Physical Therapy  Facility/Department: [facility] C2 CARD TELEMETRY  Daily Treatment Note  NAME: Claudene Crystal  : 1945  MRN: [id number]    Date of Service: 10/21/2022    Discharge Recommendations:  24 hour supervision or assist   PT Equipment Recommendations  Equipment Needed: Yes  Mobility Devices: Preet Said: Rolling    [facility] Inpatient Mobility:  AM-PAC Mobility Inpatient   How much difficulty turning over in bed?: A Little  How much difficulty sitting down on / standing up from a chair with arms?: A Little  How much difficulty moving from lying on back to sitting on side of bed?: A Little  How much help from another person moving to and from a bed to a chair?: A Little  How much help from another person needed to walk in hospital room?: A Little  How much help from another person for climbing 3-5 steps with a railing?: A Little  AM-PAC Inpatient Mobility Raw Score : 18  AM-PAC Inpatient T-Scale Score : 43.63  Mobility Inpatient CMS 0-100% Score: 46.58  Mobility Inpatient CMS G-Code Modifier : CK      Patient Diagnosis(es): There were no encounter diagnoses. Assessment   Assessment: Pt continues to progress well towards PT goals. Pt ambulates 250 ft with rollator and SBA. Overall, pt demo safey steady pace with no LOB. Pt also performs one step x2 trials to practice entering home. Pt continues to require VC for sternal precautions. Pt would benefit from continued skilled PT to address current deficits. Recommend Home with 24hr supervision and HHPT upon d/c. Activity Tolerance: Patient tolerated treatment well  Equipment Needed: Yes  Mobility Devices: [address]    Physcial Therapy Plan  General Plan: 5-7 times per week  Specific Instructions for Next Treatment: Progress ther ex and mobility as tolerated  Current Treatment Recommendations: Strengthening;Balance training;Functional mobility training;Transfer training; Endurance training;Gait training;Stair training;Home exercise program;Safety education & training; Therapeutic activities     Restrictions  Restrictions/Precautions  Restrictions/Precautions: General Precautions, Cardiac  Position Activity Restriction  Sternal Precautions: No Pushing, No Pulling, 5# Lifting Restrictions  Other position/activity restrictions: Ambulate pt every shift, low fall risk per nursing assessment, Roni, telemetry with ICU monitoring     Subjective    Subjective  Subjective: Pt sitting up in chair upon arrival, agreeable to PT tx  Pain: Denies pain  Orientation  Overall Orientation Status: Within Normal Limits  Orientation Level: Oriented X4  Cognition  Overall Cognitive Status: WNL     Objective   Vitals  /68, HR 88 bpm, SpO2 95% on RA     Bed Mobility Training  Bed Mobility Training: No (Pt sitting up in chair at beginning and end of session)  Balance  Sitting: Intact  Standing: Impaired  Standing - Static: Good  Standing - Dynamic: Fair;Occasional  Transfer Training  Transfer Training: Yes  Interventions: Safety awareness training;Verbal cues; Visual cues (VC for sternal precautions and scooting forward in chair prior to standing)  Sit to Stand: Contact-guard assistance  Stand to Sit: Stand-by assistance  Gait Training  Gait Training: Yes  Gait  Overall Level of Assistance: Stand-by assistance  Interventions: Verbal cues  Base of Support: Widened  Speed/Tory: Pace decreased (< 100 feet/min)  Step Length: Left shortened;Right shortened  Stance: Left increased;Right increased;Time  Gait Abnormalities: Decreased step clearance (no LOB noted during ambulation,)  Distance (ft): 250 Feet (+ 10 ft to/from bathroom, pt able to don brief)   Assistive Device: Walker, rollator;Gait belt  Stairs - Level of Assistance: Contact-guard assistance  Number of Stairs Trained: 2 (Pt perform 1 step up x2 reps; first rep performed with BHR; second rep performed with L HR only)  Therapeutic Exercise  Pt performed the following exercises x15 reps each.  Rest breaks between each set as needed  []Supine   [x]Sitting  []Standing   [x] Ankle Pumps  [] Heel Slides  [x] Hip Abduction  [x] Long Arc Quad  [] Short Arc Quad  [x] Seated March  [] Straight Leg Raise  [x] Glute Set  [] Quad Set  [] Repeated STS  [] Shoulder Shrugs  [] Bicep Curl  [] Hands open/close                                        Safety Devices  Type of Devices: All fall risk precautions in place;Gait belt;Call light within reach;Nurse notified  Restraints  Restraints Initially in Place: No       Goals  Short Term Goals  Time Frame for Short Term Goals: 1 week, 10/26/22 (unless otherwise specified)--10/21 All goals continue  Short Term Goal 1: Pt will transfer supine <-> sit with supervision  Short Term Goal 2: Pt will transfer sit <-> stand and bed>chair with modified(I)  Short Term Goal 3: Pt will ambulate x 200 feet using least AD with supervision/modified(I)  Short Term Goal 4: By 10/22/22: Pt will tolerate 12-15 reps BLE exercise for strengthening, balance, and endurance - MET 10/20/22, goal ongoing to continue to build strength  Short Term Goal 5: Pt will ambulate up/down at least 1 step using no handrails with SBA/supervision  Patient Goals   Patient Goals : \"To go home\"    Education  Patient Education  Education Given To: Patient  Education Provided: Role of Therapy;Plan of Care;Home Exercise Program;Precautions;Transfer Training;Energy Conservation;Equipment  Education Provided Comments: Sternal precautions require reinforcement  Education Method: Demonstration;Verbal  Barriers to Learning: None  Education Outcome: Verbalized understanding;Demonstrated understanding;Continued education needed    Therapy Time   Individual Concurrent Group Co-treatment   Time In 1355         Time Out 1436         Minutes 41         Timed Code Treatment Minutes: 41 Minutes     If pt is unable to be seen after this session, please let this note serve as discharge summary.   Please see case management note for discharge disposition. Thank you.     Mattie Dunn, PT

## 2022-10-21 NOTE — PROGRESS NOTES
Urology Attending Progress Note      Subjective: pt resting    Vitals:  BP (!) 148/77   Pulse (!) 109   Temp 98.1 °F (36.7 °C) (Oral)   Resp 18   Ht 6' 1\" (1.854 m)   Wt 231 lb 14.4 oz (105.2 kg)   SpO2 95%   BMI 30.60 kg/m²   Temp  Av.6 °F (37 °C)  Min: 98 °F (36.7 °C)  Max: 99.3 °F (37.4 °C)    Intake/Output Summary (Last 24 hours) at 10/21/2022 1032  Last data filed at 10/21/2022 0600  Gross per 24 hour   Intake 325 ml   Output 2090 ml   Net -1765 ml       Exam: wilhelm with pink urine    Labs:  WBC:    Lab Results   Component Value Date/Time    WBC 11.9 10/21/2022 04:10 AM     Hemoglobin/Hematocrit:    Lab Results   Component Value Date/Time    HGB 11.5 10/21/2022 04:10 AM    HCT 34.5 10/21/2022 04:10 AM     BMP:    Lab Results   Component Value Date/Time     10/21/2022 04:10 AM    K 4.0 10/21/2022 04:10 AM    K 3.7 10/13/2022 07:22 AM     10/21/2022 04:10 AM    CO2 25 10/21/2022 04:10 AM    BUN 14 10/21/2022 04:10 AM    LABALBU 4.0 10/18/2022 03:02 AM    CREATININE 1.1 10/21/2022 04:10 AM    CALCIUM 7.6 10/21/2022 04:10 AM    GFRAA >60 10/18/2022 03:02 AM    GFRAA >60 2013 08:53 AM    LABGLOM >60 10/21/2022 04:10 AM     PT/INR:    Lab Results   Component Value Date/Time    PROTIME 16.5 10/20/2022 05:35 AM    INR 1.34 10/20/2022 05:35 AM     PTT:    Lab Results   Component Value Date/Time    APTT 30.3 10/18/2022 01:30 PM   [APTT    Impression/Plan: urinary retention, hematuria  Ok for discharge today with wilhelm and on Flomax  Will arrange outpatient f/u with Dr Dylon uVong next week    Kirstie Watts MD

## 2022-10-21 NOTE — DISCHARGE INSTR - COC
Continuity of Care Form    Patient Name: Rosalia Ellis   :  1945  MRN:  [id number]    Admit date:  10/12/2022  Discharge date:  ***    Code Status Order: Full Code   Advance Directives:     Admitting Physician:  No admitting provider for patient encounter.   PCP: Joyce Dean MD    Discharging Nurse: [facility]/Room#: 7075/1889-74  Discharging Unit Phone Number: ***    Emergency Contact:   Extended Emergency Contact Information  Primary Emergency Contact: Otilia Hartley  Address: [address] [place] [address] Phone: [phone]  Mobile Phone: [phone]  Relation: Spouse    Past Surgical History:  Past Surgical History:   Procedure Laterality Date    CARDIAC CATHETERIZATION      stent    CARDIAC CATHETERIZATION      stent    COLONOSCOPY  10/24/13    CORONARY ANGIOPLASTY WITH STENT PLACEMENT      CORONARY ARTERY BYPASS GRAFT N/A 10/18/2022    CORONARY ARTERY BYPASS GRAFTING X 3, ON PUMP, INTERNAL MAMMARY ARTERY, SAPHENOUS VEIN GRAFT, YING, TEMPORARY PACING WIRES,  WITH LEFT ATRIAL APPENDAGE CLIP PLACEMENT AND BILATERAL PECTORALIS BLOCKS performed by Yannick Gallegos MD at [address]       Immunization History:   Immunization History   Administered Date(s) Administered    COVID-19, MODERNA BLUE border, Primary or Immunocompromised, (age 12y+), IM, 100 mcg/0.5mL 2021, 2021, 2021    Influenza Vaccine, unspecified formulation 10/27/2016    Influenza Virus Vaccine 10/03/2014, 10/06/2015    Influenza, AFLURIA (age 1 yrs+), FLUZONE, (age 10 mo+), MDV, 0.5mL 10/24/2017, 10/18/2018    Influenza, FLUARIX, FLULAVAL, FLUZONE (age 10 mo+) AND AFLURIA, (age 1 y+), PF, 0.5mL 2019, 10/09/2020    Pneumococcal Conjugate 13-valent ([id number]) 2017    Pneumococcal Polysaccharide (Lakaqzmpk41) 2013, 10/06/2015    Pneumococcal Vaccine 2006    Td vaccine (adult) 2002       Active Problems:  Patient Active Problem List   Diagnosis Code    Angina pectoris, unstable ([facility] I20.0    Chest pain R07.9    Diabetes mellitus type II, controlled ([facility] E11.9    Bladder outlet obstruction N32.0    S/P CABG x 3 Z95.1    Mixed hyperlipidemia E78.2    Constipation K59.00    Benign non-nodular prostatic hyperplasia with lower urinary tract symptoms N40.1    Anxiety F41.9    DM (diabetes mellitus), secondary, uncontrolled, w/neurologic complic [id number]    Primary hypertension I10    Coronary artery disease involving native coronary artery of native heart with angina pectoris ([facility] I25.119    Pure hypercholesterolemia E78.00    Benign non-nodular prostatic hyperplasia without lower urinary tract symptoms N40.0    Type 2 diabetes mellitus with diabetic polyneuropathy, without long-term current use of insulin (HCC) E11.42    Keloid L91.0    Primary insomnia F51.01    COVID-19 U07.1    Unstable angina ([facility]) I20.0       Isolation/Infection:   Isolation            No Isolation          Patient Infection Status       Infection Onset Added Last Indicated Last Indicated By Review Planned Expiration Resolved Resolved By    None active    Resolved    COVID-19 (Rule Out) 21 COVID-19, Rapid (Ordered)   21 Rule-Out Test Resulted    COVID-19 (Rule Out) 21 COVID-19 (Ordered)   21 Rule-Out Test Resulted    Influenza 20 Rapid influenza A/B antigens   20             Nurse Assessment:  Last Vital Signs: /67   Pulse 84   Temp 98 °F (36.7 °C) (Oral)   Resp 18   Ht 6' 1\" (1.854 m)   Wt 231 lb 14.4 oz (105.2 kg)   SpO2 92%   BMI 30.60 kg/m²     Last documented pain score (0-10 scale): Pain Level: 2  Last Weight:   Wt Readings from Last 1 Encounters:   10/21/22 231 lb 14.4 oz (105.2 kg)     Mental Status:  {IP PT MENTAL STATUS:}    IV Access:  {[facility] ACCESS:601659487}    Nursing Mobility/ADLs:  Walking   {[facility] DME [facility]:777008565}  Transfer  {CHP DME BYUW:084884162}  Bathing  {CHP DME UUVE:111361661}  Dressing  {CHP DME BYZM:342413943}  Toileting  {CHP DME FCBN:480555226}  Feeding  {CHP DME IPZP:066775115}  Med Admin  {CHP DME FREP:892076615}  Med Delivery   { [facility] MED Delivery:135200139}    Wound Care Documentation and Therapy:  Incision 10/18/22 Leg Right (Active)   Dressing Status Clean;Dry; Intact 10/20/22 0800   Dressing Change Due 10/20/22 10/19/22 2000   Dressing/Treatment Open to air 10/21/22 0400   Closure Surgical glue 10/21/22 0400   Margins Approximated 10/21/22 0400   Incision Assessment Dry 10/21/22 0400   Drainage Amount None 10/21/22 0400   Number of days: 2       Incision 10/18/22 Sternum Anterior;Mid (Active)   Dressing Status Clean;Dry; Intact 10/20/22 0800   Dressing Change Due 10/20/22 10/19/22 1600   Dressing/Treatment Open to air 10/21/22 0400   Closure Surgical glue 10/21/22 0400   Margins Approximated 10/21/22 0400   Incision Assessment Dry 10/21/22 0400   Drainage Amount None 10/21/22 0400   Mckayla-incision Assessment Intact 10/20/22 0800   Number of days: 2        Elimination:  Continence: Bowel: {YES / SY:17757}  Bladder: {YES / KO:33553}  Urinary Catheter: {Urinary Catheter:967902881}   Colostomy/Ileostomy/Ileal Conduit: {YES / VS:89155}       Date of Last BM: ***    Intake/Output Summary (Last 24 hours) at 10/21/2022 0757  Last data filed at 10/21/2022 0600  Gross per 24 hour   Intake 565 ml   Output 3610 ml   Net -3045 ml     I/O last 3 completed shifts: In: 1060 [P.O.:600;  I.V.:160; IV Piggyback:300]  Out: 2708 [NSFGR:8192; Chest Tube:350]    Safety Concerns:     [address] SEEMA Safety Concerns:087132252}    Impairments/Disabilities:      [address] Impairments/Disabilities:293646316}    Nutrition Therapy:  Current Nutrition Therapy:   [address] Diet List:457082884}    Routes of Feeding: {CHP DME Other Feedings:540331212}  Liquids: {Slp liquid thickness:71333}  Daily Fluid Restriction: {CHP DME Yes amt CWVQDKU:515117859}  Last Modified Barium Swallow with Video (Video Swallowing Test): {Done Not Done XUET:206323089}    Treatments at the Time of Hospital Discharge:   Respiratory Treatments: ***  Oxygen Therapy:  {Therapy; copd oxygen:20075}  Ventilator:    {MH CC Vent RYKU:166173584}    Rehab Therapies: Nurse; HRS if patient agreeable  Weight Bearing Status/Restrictions: [facility] Weight Bearin}  Other Medical Equipment (for information only, NOT a DME order):  {EQUIPMENT:813196122}  Other Treatments: HOME HEALTH CARE: LEVEL 3 [address] to establish plan of care for patient over 60 day period   Nursing  Initial home SN evaluation visit to occur within 24-48 hours for:  1)  medication management  2)  VS and clinical assessment  3)  S&S chronic disease exacerbation education + when to contact MD/NP  4)  care coordination  Medication Reconciliation during 1st SN visit  PT/OT/Speech   Evaluations in home within 24-48 hours of discharge to include DME and home safety   Frontload therapy 5 days, then 3x a week   OT to evaluate if patient has [address] needs for personal care    evaluation within 24-48 hours to evaluate resources & insurance for potential AL, [place], LTC, and Medicaid options   Palliative Care referral within 5 days of hospital discharge   PCP Visit scheduled within 3 - 7 days of hospital discharge    [place] [facility] Vitals (If patient is agreeable and meets guidelines)    -[facility] care education    Patient's personal belongings (please select all that are sent with patient):  {CHP DME Belongings:811121248}    RN SIGNATURE:  {Esignature:557662269}    CASE MANAGEMENT/SOCIAL WORK SECTION    Inpatient Status Date: 10/12/2022    Readmission Risk Assessment Score:  Readmission Risk              Risk of Unplanned Readmission:  21           Discharging to Facility/ [address]   [address] [facility]   [address] [facility] [address]   [phone]       / signature: Electronically signed by Marcelino Lorenz RN on 10/22/22 at 11:25 AM EDT    PHYSICIAN SECTION    Prognosis: Good    Condition at Discharge: Stable    Rehab Potential (if transferring to Rehab): Good    Recommended Labs or Other Treatments After Discharge: ***    Physician Certification: I certify the above information and transfer of Scooter Capellan  is necessary for the continuing treatment of the diagnosis listed and that he requires Home Care for less 30 days.      Update Admission H&P: No change in H&P    PHYSICIAN SIGNATURE:  Electronically signed by YANNICK Whitaker CNP on 10/21/22 at 1:04 PM EDT

## 2022-10-21 NOTE — CARE COORDINATION
LOS 9. Care managed by CV surg. S/P CABG. From home w spouse. ARU declined- pt too high functioning. Plan home w spouse and [facility].   Yosi Brandt RN

## 2022-10-21 NOTE — DISCHARGE INSTRUCTIONS
FOLLOW-UP APPOINTMENTS    [place] OFFICE - Follow-up appointment on November 18th at 1:15 pm with  Suki Maloney CNP, [address]. You and your one visitor will need to have your mouth and nose covered  with a mask. No children please. [facility],  [facility], [address] [facility] [address], [address] [facility], [facility]. Office #: [phone]. If you are unable to make this appointment, please call to reschedule. Directions to [employer]  [facility] towards [place]. [address] [address] exit. Right off exit. Cross over [employer]. Right on [address]. Left into hospital. Follow the signs to the emergency room ( turn left toward the Emergency room). Go right at the first stop sign. Just past the Emergency room at the second stop sign turn right and go up the ramp and park on the top level if possible. Go in the glass doors of the [facility] we on the top level of the garage [address]. As soon as you get in the door turn left and our office is the one with the glass doors. Discharge Instructions for Open Heart Surgery       What you will need at home:  * Accurate Scale   * Digital Thermometer   * Antibacterial Soap   * Clean Wash Cloths   * Someone to be with you for one week       DO NOT LIFT, PUSH, OR PULL ANYTHING OVER 5 POUNDS FOR 4 WEEKS AND THEN DO NOT LIFT, PUSH OR PULL ANYTHING OVER 10 POUNDS FOR THE NEXT 4 WEEKS. from the day of surgery. This could prevent your sternum from healing properly. ? When you are given permission from your surgeon to begin lifting again,   do so gradually. You will need time to build your muscle strength. ? A gallon of milk is more than 5 lbs. you should buy ½ gallons. NEVER SMOKE AGAIN! Absolutely no tobacco products! Do not allow others to smoke around you. Second hand smoke can be just as bad. The [employer] has a free program available, call 6-135.952.1734. Activity:   See your activity diary in your binder for your walking plan.  Plan to walk indoors on days the temperature is below 40? or over 80? or during smog alerts. At first limit your stair climbing to once or twice a day. You may use the handrail for balance only. Do not pull yourself up with it. It is not unusual to feel tired for the first few weeks, but walking builds up your strength. Driving  : Do not drive a car or any vehicle for 4 weeks from the day of surgery. You can not drive a truck, tractor or  for 8 weeks from the day of surgery. After 4 weeks, you can drive vehicles with power steering only. Do not drive while on pain medication. Incentive Spirometer:  Continue to use your lung exerciser for the next 4 weeks. Support your chest and cough each time you complete the 10 exercises. Weight:  Weigh yourself every morning. Call the surgeon if you gain 3 pounds or more overnight or 5 pounds in a week. This may be a sign of fluid retention. Medication:   Pain pills are ordered to keep you comfortable and able to increase activity level. Your need for pain pills should decrease over the next 2 weeks. For mild pain you take Tylenol, but do not exceed 4000mg of Tylenol a day. Vicodin contains Tylenol, so watch how much Tylenol (Acetaminophen) you take     Stool Softners: You may have been prescribed Colace (docusate), to help prevent straining with bowel movements. If your bowels have not moved by the second day home, take a laxative of your choice. If no bowel movement by the third day, call your surgeon. If you find you have the opposite problem and your stools are too soft, stop taking the stool softener. Avoid herbal, dietary products and vitamins unless OKd by your surgeon. If on Coumadin monitor Vitamin K intake and keep it consistent. Call during business hours Monday through Friday for medication refills. [phone]       Incision Care:  [facility] YOUR INCISIONS DAILY WITH A CLEAN WASHCLOTH AND ANTIBACTERIAL SOAP.  Do not wash your incisions after you have cleansed other parts of your body. You may shower but keep your back to the spray. Avoid hot water, comfortably warm is OK. ? If you went home with a dressing on any incision: Remove the dressing daily   and wash the incision with antibacterial soap and water and pat dry. Only   cover the incision with a dressing if it is draining. Otherwise leave it   uncovered (unless your doctor told you otherwise)   ? No tub baths until your incisions are healed. ? DO NOT APPLY ANYTHING OTHER THAN ANTIBACTERIAL SOAP AND   WATER TO YOUR INCISIONS. Do not apply lotions, creams, ointments,   hydrogen peroxide or powder. ? Keep your incisions CLEAN. Think CLEAN. No one should touch your   incisions without washing their hands first. Do not let pets touch your incisions   (have incisions covered with clothing when around pets). Keep your heart   pillow clean and off the floor. ? Expect some swelling in the leg with the incision. Keep your legs elevated   above the level of your heart when lying or sitting. ? Wear loose clothing. For support women should wear a bra. CHELSEY Hose (white stockings): Should be worn during the day and off at night. Someone other than the patient will need to put on and take off the hose. It is too much pulling and tugging for the patient. Expect them to be difficult to put on and they should feel snug. These are usually worn for 2-4 weeks. Wash them by hand to keep their elasticity. Diet:   Eat a low fat, low salt diet. Eat a high fiber diet to avoid constipation and straining during bowel movements (whole grains, raw veggies, fruits)     Diabetics:  Check blood sugars twice a day. Contact your primary care physician if your blood sugar is consistently above 130. Good tissue healing occurs when blood sugars are less than 130. Housework:   Do not cut the grass, vacuum, sweep or do any heavy housework. Riding: You may ride in the car for local trips, up to 45 minutes.  If it you have to travel further stop every 45 min. Wear your lap and shoulder belts in the car. Place your heart pillow between your chest and the shoulder belt. Sexual Activity:  When you can climb 2 flights of stairs without chest pain, shortness of breath or fatigue, it is generally OK to resume sexual activity. Depression: It is not unusual to have feelings of anxiety, fear or depression after surgery. If you need help with these feelings, call your primary care physician. There are medications to help and healing usually occurs sooner if youre not depressed. Heart Valve Replacement:  If you had your heart valve replaced you should receive a card for your wallet. Show ALL your doctors and dentist the card before treatment. You will need to take antibiotics before and after surgery, teeth cleaning etc. for the rest of your life. ? If you get a sore throat or fever over 101? that lasts more than a day or   two call your doctor. ? If you are exposed to someone with strept throat, then get a sore throat   yourself, call your doctor IMMEDIATELY. Any doubts about taking antibiotics before or after a procedure call your surgeons office. When to call the doctor: [phone])   If you have sudden, severe shortness of breath or shortness of breath while resting. Pain, tenderness, warmth or redness in your calf. Weight gain of 3 pounds in one day or 5 pounds in one week. If your heart rate is below 50 or over 110 beats per minute, or symptoms of fluttering in your chest or irregular pulse. Temperature (taken daily) greater than 101? Les Pimple If your bowels have not moved by the third day home. Persistent diarrhea, nausea or vomiting. If you are unable to eat, or unable to drink 5 glasses of fluid a day for more than one day. For redness, warmth, tenderness, drainage or swelling of any incision. For ANY chest incision drainage. If you have pain not relieved by pain medication.    If you experience the same pain or other symptoms that brought you to the hospital or doctor before surgery. Diabetics   : Call Primary Care Physician for blood sugars consistently above 130. Depression   : Call Primary Care Physician if feelings of depression persist.       If you think something is seriously wrong go to the nearest emergency room!        Call 911 for any EMERGENCY and go to the nearest hospital!

## 2022-10-21 NOTE — CARE COORDINATION
[facility]    DC order noted, all docs needed have been faxed to [facility] for home care services.     Home care to see patient within 24-48 hrs    Kourtney Andrade RN, BSN CTN  [facility] [phone]

## 2022-10-21 NOTE — CONSULTS
Department of [facility]  Dr. Cindy Marcos Progress Note  10/21/2022  3:54 PM    Patient Name:   Catherine Ayers  YOB: 1945    Diagnosis: Chest pain        Subjective:  Rehab consult received. Chart reviewed. Patient discussed with our rehab unit admission nurse. 79 yo patient presented to [facility] with chest pain. Had hx of CAD with prior PCI to LCX and known  of RCA, recent abnormal stress test for angina, HYPERTENSION, HLD, DM2 and GERD. EKG and troponin negative for ACS. Patient had LHC on 10/14/22 with MV CAD including LM. Patient then had  CABG X3 on 10/18/22. Patient improved with treatment, and started therapies. In therapies today, Pt continues to progress well towards PT goals. Pt ambulates 250 ft with rollator and SBA. Overall, pt demo safey steady pace with no LOB. Therapies recommend going home with 24hr upon d/c. No need for intensive Rehab Unit treatment.      /76   Pulse 82   Temp 98 °F (36.7 °C) (Oral)   Resp 18   Ht 6' 1\" (1.854 m)   Wt 231 lb 14.4 oz (105.2 kg)   SpO2 97%   BMI 30.60 kg/m²     Last 24 hour lab  Recent Results (from the past 24 hour(s))   POCT Glucose    Collection Time: 10/20/22  5:06 PM   Result Value Ref Range    POC Glucose 273 (H) 70 - 99 mg/dl    Performed on ACCU-CHEK    POCT Glucose    Collection Time: 10/20/22  6:02 PM   Result Value Ref Range    POC Glucose 230 (H) 70 - 99 mg/dl    Performed on ACCU-CHEK    Calcium, Ionized    Collection Time: 10/20/22  6:20 PM   Result Value Ref Range    Calcium, Ionized 1.04 (L) 1.12 - 1.32 mmol/L    pH, Benjy 7.439 7.350 - 7.450   POCT Glucose    Collection Time: 10/20/22  7:34 PM   Result Value Ref Range    POC Glucose 166 (H) 70 - 99 mg/dl    Performed on ACCU-CHEK    POCT Glucose    Collection Time: 10/20/22  8:40 PM   Result Value Ref Range    POC Glucose 131 (H) 70 - 99 mg/dl    Performed on ACCU-CHEK    POCT Glucose    Collection Time: 10/20/22 10:22 PM   Result Value Ref Range    POC Glucose 94 70 - 99 mg/dl    Performed on ACCU-CHEK    POCT Glucose    Collection Time: 10/21/22 12:29 AM   Result Value Ref Range    POC Glucose 67 (L) 70 - 99 mg/dl    Performed on ACCU-CHEK    POCT Glucose    Collection Time: 10/21/22  1:53 AM   Result Value Ref Range    POC Glucose 188 (H) 70 - 99 mg/dl    Performed on ACCU-CHEK    POCT Glucose    Collection Time: 10/21/22  3:05 AM   Result Value Ref Range    POC Glucose 199 (H) 70 - 99 mg/dl    Performed on ACCU-CHEK    POCT Glucose    Collection Time: 10/21/22  4:06 AM   Result Value Ref Range    POC Glucose 153 (H) 70 - 99 mg/dl    Performed on ACCU-CHEK    Basic Metabolic Panel    Collection Time: 10/21/22  4:10 AM   Result Value Ref Range    Sodium 136 136 - 145 mmol/L    Potassium 4.0 3.5 - 5.1 mmol/L    Chloride 104 99 - 110 mmol/L    CO2 25 21 - 32 mmol/L    Anion Gap 7 3 - 16    Glucose 170 (H) 70 - 99 mg/dL    BUN 14 7 - 20 mg/dL    Creatinine 1.1 0.8 - 1.3 mg/dL    Est, Glom Filt Rate >60 >60    Calcium 7.6 (L) 8.3 - 10.6 mg/dL   CBC    Collection Time: 10/21/22  4:10 AM   Result Value Ref Range    WBC 11.9 (H) 4.0 - 11.0 K/uL    RBC 3.59 (L) 4.20 - 5.90 M/uL    Hemoglobin 11.5 (L) 13.5 - 17.5 g/dL    Hematocrit 34.5 (L) 40.5 - 52.5 %    MCV 96.0 80.0 - 100.0 fL    MCH 32.0 26.0 - 34.0 pg    MCHC 33.4 31.0 - 36.0 g/dL    RDW 13.9 12.4 - 15.4 %    Platelets 778 451 - 274 K/uL    MPV 7.8 5.0 - 10.5 fL   POCT Glucose    Collection Time: 10/21/22  6:12 AM   Result Value Ref Range    POC Glucose 110 (H) 70 - 99 mg/dl    Performed on ACCU-CHEK    POCT Glucose    Collection Time: 10/21/22  8:30 AM   Result Value Ref Range    POC Glucose 136 (H) 70 - 99 mg/dl    Performed on ACCU-CHEK    Hemoglobin and Hematocrit    Collection Time: 10/21/22 12:35 PM   Result Value Ref Range    Hemoglobin 11.8 (L) 13.5 - 17.5 g/dL    Hematocrit 36.2 (L) 40.5 - 52.5 %   POCT Glucose    Collection Time: 10/21/22 12:37 PM   Result Value Ref Range    POC Glucose 246 (H) 70 - 99 mg/dl    Performed on ACCU-CHEK          Plan: Patient can go home in 1-2 days with home therapy. Does not need intensive rehab unit treatment.        Dr. Alicia Glez

## 2022-10-22 ENCOUNTER — APPOINTMENT (OUTPATIENT)
Dept: GENERAL RADIOLOGY | Age: 77
DRG: 234 | End: 2022-10-22
Attending: INTERNAL MEDICINE
Payer: MEDICARE

## 2022-10-22 VITALS
WEIGHT: 232.81 LBS | HEIGHT: 73 IN | SYSTOLIC BLOOD PRESSURE: 126 MMHG | OXYGEN SATURATION: 95 % | DIASTOLIC BLOOD PRESSURE: 66 MMHG | HEART RATE: 78 BPM | TEMPERATURE: 98.6 F | BODY MASS INDEX: 30.85 KG/M2 | RESPIRATION RATE: 18 BRPM

## 2022-10-22 LAB
ANION GAP SERPL CALCULATED.3IONS-SCNC: 8 MMOL/L (ref 3–16)
BUN BLDV-MCNC: 17 MG/DL (ref 7–20)
CALCIUM SERPL-MCNC: 7.9 MG/DL (ref 8.3–10.6)
CHLORIDE BLD-SCNC: 102 MMOL/L (ref 99–110)
CO2: 26 MMOL/L (ref 21–32)
CREAT SERPL-MCNC: 1 MG/DL (ref 0.8–1.3)
GFR SERPL CREATININE-BSD FRML MDRD: >60 ML/MIN/{1.73_M2}
GLUCOSE BLD-MCNC: 209 MG/DL (ref 70–99)
HCT VFR BLD CALC: 34.4 % (ref 40.5–52.5)
HEMOGLOBIN: 11.2 G/DL (ref 13.5–17.5)
MCH RBC QN AUTO: 31.1 PG (ref 26–34)
MCHC RBC AUTO-ENTMCNC: 32.6 G/DL (ref 31–36)
MCV RBC AUTO: 95.5 FL (ref 80–100)
PDW BLD-RTO: 13.2 % (ref 12.4–15.4)
PLATELET # BLD: 210 K/UL (ref 135–450)
PMV BLD AUTO: 8.1 FL (ref 5–10.5)
POTASSIUM SERPL-SCNC: 4.3 MMOL/L (ref 3.5–5.1)
RBC # BLD: 3.6 M/UL (ref 4.2–5.9)
SODIUM BLD-SCNC: 136 MMOL/L (ref 136–145)
WBC # BLD: 10 K/UL (ref 4–11)

## 2022-10-22 PROCEDURE — 94669 MECHANICAL CHEST WALL OSCILL: CPT

## 2022-10-22 PROCEDURE — 6370000000 HC RX 637 (ALT 250 FOR IP): Performed by: NURSE PRACTITIONER

## 2022-10-22 PROCEDURE — 94640 AIRWAY INHALATION TREATMENT: CPT

## 2022-10-22 PROCEDURE — 85027 COMPLETE CBC AUTOMATED: CPT

## 2022-10-22 PROCEDURE — 6370000000 HC RX 637 (ALT 250 FOR IP): Performed by: STUDENT IN AN ORGANIZED HEALTH CARE EDUCATION/TRAINING PROGRAM

## 2022-10-22 PROCEDURE — 97110 THERAPEUTIC EXERCISES: CPT

## 2022-10-22 PROCEDURE — 97530 THERAPEUTIC ACTIVITIES: CPT

## 2022-10-22 PROCEDURE — 6360000002 HC RX W HCPCS: Performed by: STUDENT IN AN ORGANIZED HEALTH CARE EDUCATION/TRAINING PROGRAM

## 2022-10-22 PROCEDURE — 80048 BASIC METABOLIC PNL TOTAL CA: CPT

## 2022-10-22 PROCEDURE — 97116 GAIT TRAINING THERAPY: CPT

## 2022-10-22 PROCEDURE — 71045 X-RAY EXAM CHEST 1 VIEW: CPT

## 2022-10-22 RX ORDER — FAMOTIDINE 20 MG/1
20 TABLET, FILM COATED ORAL DAILY
Qty: 30 TABLET | Refills: 0 | Status: SHIPPED | OUTPATIENT
Start: 2022-10-22 | End: 2022-10-22 | Stop reason: SDUPTHER

## 2022-10-22 RX ORDER — LANOLIN ALCOHOL/MO/W.PET/CERES
400 CREAM (GRAM) TOPICAL DAILY
Qty: 7 TABLET | Refills: 0 | Status: SHIPPED | OUTPATIENT
Start: 2022-10-22 | End: 2022-10-31 | Stop reason: ALTCHOICE

## 2022-10-22 RX ORDER — OXYCODONE HYDROCHLORIDE 5 MG/1
5 TABLET ORAL EVERY 6 HOURS PRN
Qty: 28 TABLET | Refills: 0 | Status: SHIPPED | OUTPATIENT
Start: 2022-10-22 | End: 2022-10-29

## 2022-10-22 RX ORDER — FUROSEMIDE 40 MG/1
20 TABLET ORAL 2 TIMES DAILY
Qty: 14 TABLET | Refills: 0 | Status: SHIPPED | OUTPATIENT
Start: 2022-10-22 | End: 2022-10-31 | Stop reason: ALTCHOICE

## 2022-10-22 RX ORDER — POTASSIUM CHLORIDE 750 MG/1
10 TABLET, EXTENDED RELEASE ORAL 2 TIMES DAILY
Qty: 14 TABLET | Refills: 0 | Status: SHIPPED | OUTPATIENT
Start: 2022-10-22 | End: 2022-10-31 | Stop reason: ALTCHOICE

## 2022-10-22 RX ORDER — POTASSIUM CHLORIDE 750 MG/1
10 TABLET, EXTENDED RELEASE ORAL 2 TIMES DAILY
Qty: 14 TABLET | Refills: 0 | Status: SHIPPED | OUTPATIENT
Start: 2022-10-22 | End: 2022-10-22 | Stop reason: SDUPTHER

## 2022-10-22 RX ORDER — LANOLIN ALCOHOL/MO/W.PET/CERES
400 CREAM (GRAM) TOPICAL DAILY
Qty: 7 TABLET | Refills: 0 | Status: SHIPPED | OUTPATIENT
Start: 2022-10-22 | End: 2022-10-22 | Stop reason: SDUPTHER

## 2022-10-22 RX ORDER — FUROSEMIDE 40 MG/1
20 TABLET ORAL 2 TIMES DAILY
Qty: 14 TABLET | Refills: 0 | Status: SHIPPED | OUTPATIENT
Start: 2022-10-22 | End: 2022-10-22 | Stop reason: SDUPTHER

## 2022-10-22 RX ORDER — FAMOTIDINE 20 MG/1
20 TABLET, FILM COATED ORAL DAILY
Qty: 30 TABLET | Refills: 0 | Status: SHIPPED | OUTPATIENT
Start: 2022-10-22 | End: 2022-11-21

## 2022-10-22 RX ORDER — OXYCODONE HYDROCHLORIDE 5 MG/1
5 TABLET ORAL EVERY 6 HOURS PRN
Qty: 28 TABLET | Refills: 0 | Status: SHIPPED | OUTPATIENT
Start: 2022-10-22 | End: 2022-10-22 | Stop reason: SDUPTHER

## 2022-10-22 RX ADMIN — TAMSULOSIN HYDROCHLORIDE 0.4 MG: 0.4 CAPSULE ORAL at 08:11

## 2022-10-22 RX ADMIN — POTASSIUM CHLORIDE 10 MEQ: 750 TABLET, EXTENDED RELEASE ORAL at 08:12

## 2022-10-22 RX ADMIN — BISACODYL 5 MG: 5 TABLET, COATED ORAL at 08:11

## 2022-10-22 RX ADMIN — ACETAMINOPHEN 650 MG: 325 TABLET ORAL at 06:04

## 2022-10-22 RX ADMIN — FAMOTIDINE 20 MG: 20 TABLET, FILM COATED ORAL at 08:11

## 2022-10-22 RX ADMIN — METOPROLOL TARTRATE 25 MG: 25 TABLET, FILM COATED ORAL at 08:12

## 2022-10-22 RX ADMIN — ALBUTEROL SULFATE 2.5 MG: 2.5 SOLUTION RESPIRATORY (INHALATION) at 08:00

## 2022-10-22 RX ADMIN — MUPIROCIN: 20 OINTMENT TOPICAL at 08:12

## 2022-10-22 RX ADMIN — Medication 15 ML: at 08:12

## 2022-10-22 RX ADMIN — FUROSEMIDE 40 MG: 40 TABLET ORAL at 08:11

## 2022-10-22 RX ADMIN — Medication 400 MG: at 08:11

## 2022-10-22 RX ADMIN — ASPIRIN 325 MG: 325 TABLET, COATED ORAL at 08:11

## 2022-10-22 ASSESSMENT — PAIN SCALES - GENERAL: PAINLEVEL_OUTOF10: 0

## 2022-10-22 NOTE — PROGRESS NOTES
Physical Therapy  Facility/Department: [facility] C2 CARD TELEMETRY  Daily Treatment Note  NAME: Catherine Ayers  : 1945  MRN: [id number]    Date of Service: 10/22/2022    Discharge Recommendations:  24 hour supervision or assist, Home with Home health PT   PT Equipment Recommendations  Equipment Needed: Yes  Mobility Devices: Kerwin Jessica: Rolling    AMPAC 6 Clicks Inpatient Mobility:  AM-PAC Mobility Inpatient   How much difficulty turning over in bed?: A Little  How much difficulty sitting down on / standing up from a chair with arms?: A Little  How much difficulty moving from lying on back to sitting on side of bed?: A Little  How much help from another person moving to and from a bed to a chair?: A Little  How much help from another person needed to walk in hospital room?: A Little  How much help from another person for climbing 3-5 steps with a railing?: A Little  AM-PAC Inpatient Mobility Raw Score : 18  AM-PAC Inpatient T-Scale Score : 43.63  Mobility Inpatient CMS 0-100% Score: 46.58  Mobility Inpatient CMS G-Code Modifier : CK     Patient Diagnosis(es): The encounter diagnosis was S/P CABG x 3.     Assessment   Assessment: pt progressing well toward meeting Acute PT goals as evidenced by increased gait distance to 300ft with RW with CGA, educated pt regarding benefit of RW v 4ww, pt tolerated 4 steps with B HR with CGA, educated pt regarding instruction for one step with RW as this is set up to enter pt's house, pt verbalized understanding, pt tolerated seated therex for B LE strengthening with good carryover to remember technique from previous sessions, pt will benefit from continued Acute Inpatient Skilled PT to address functional mobility deficits, agree with previous PT recommendation to return home with 24/7 supervision or assist, HHPT, and RW  Activity Tolerance: Patient tolerated treatment well  Equipment Needed: Yes  Mobility Devices: [address] Therapy Plan  General Plan: 5-7 times per week  Specific Instructions for Next Treatment: Progress ther ex and mobility as tolerated  Current Treatment Recommendations: Strengthening;Balance training;Functional mobility training;Transfer training; Endurance training;Gait training;Stair training;Home exercise program;Safety education & training; Therapeutic activities     Restrictions  Restrictions/Precautions  Restrictions/Precautions: General Precautions, Cardiac  Position Activity Restriction  Sternal Precautions: No Pushing, No Pulling, 5# Lifting Restrictions  Other position/activity restrictions: Ambulate pt every shift, low fall risk per nursing assessment, Tamayo, telemetry with ICU monitoring     Subjective    Subjective  Subjective: pt motivated to participate  Pain: none reported throughout session  Orientation  Overall Orientation Status: Within Normal Limits  Orientation Level: Oriented X4  Cognition  Overall Cognitive Status: WNL     Objective   Vitals: upon arrival: 142/77, 83bpm, 94% on room air; after gait training 151/80, 86 bpm, 95%          Bed Mobility Training  Bed Mobility Training: No (pt in chair at start and end of session)  Balance  Sitting: Intact  Standing: Impaired  Standing - Static: Good  Standing - Dynamic: Fair;Occasional  Transfer Training  Transfer Training: Yes  Overall Level of Assistance: Contact-guard assistance  Interventions: Weight shifting training/pressure relief (training to scoot forward in chair)  Sit to Stand: Minimum assistance  Stand to Sit: Contact-guard assistance (cga as tactile cue to slow descent into recliner)  Bed to Chair: Stand-by assistance (via ambulation with RW)  Gait Training  Gait Training: Yes  Gait  Overall Level of Assistance: Stand-by assistance  Base of Support: Widened  Speed/Tory: Pace decreased (< 100 feet/min)  Gait Abnormalities: Decreased step clearance (decreased heel strike B LE)  Distance (ft): 300 Feet  Assistive Device: Walker, rolling  Stairs - Level of

## 2022-10-22 NOTE — CARE COORDINATION
CASE MANAGEMENT DISCHARGE SUMMARY      Discharge to: Home with Vicki Campbell     Transportation:    Family/car: yes     Confirmed discharge plan with:     Patient: yes per RN     Family:  yes per pt     Facility/Agency, name:  [address] [place] [address] Chayito bullock     RN, name: Alec Conde RN    Note: Discharging nurse to complete SEEMA, reconcile AVS, and place final copy with patient's discharge packet. RN to ensure that written prescriptions for  Level II medications are sent with patient to the facility as per protocol.

## 2022-10-22 NOTE — PROGRESS NOTES
CVTS Cardiothoracic Progress Note:                                CC:  Post op follow up     Surgery: 10/18/22 CORONARY ARTERY BYPASS GRAFTING X 3, ON PUMP, INTERNAL MAMMARY ARTERY, SAPHENOUS VEIN GRAFT, YING, TEMPORARY PACING WIRES,  WITH LEFT ATRIAL APPENDAGE CLIP PLACEMENT AND BILATERAL [facility] course:  10/19 No acute events overnight. Hemodynamically stable on low dose levo. Milrinone weaned off.   10/20 Issues with urinary retention overnight. Otherwise remains hemodynamically stable in SR.   10/21 No acute events overnight. Hemodynamically stable in SR. Hematuria.    10/22 some hematuria, hgb stable    Past Medical History:   Diagnosis Date    Acute MI ([address]     Angina pectoris, unstable ([address]     ASHD (arteriosclerotic heart disease)     Bladder outlet obstruction     BPH (benign prostatic hyperplasia)     Chest pain     Constipation     Diabetes mellitus ([address]     GERD (gastroesophageal reflux disease)     Hypercholesteremia     Hypercholesterolemia     Hypertension     IBS (irritable bowel syndrome)     Influenza A 01/28/2020    Sinusitis     Type II or unspecified type diabetes mellitus without mention of complication, not stated as uncontrolled         Past Surgical History:   Procedure Laterality Date    CARDIAC CATHETERIZATION  2002    stent    CARDIAC CATHETERIZATION  2011    stent    COLONOSCOPY  10/24/13    CORONARY ANGIOPLASTY WITH STENT PLACEMENT      CORONARY ARTERY BYPASS GRAFT N/A 10/18/2022    CORONARY ARTERY BYPASS GRAFTING X 3, ON PUMP, INTERNAL MAMMARY ARTERY, SAPHENOUS VEIN GRAFT, YING, TEMPORARY PACING WIRES,  WITH LEFT ATRIAL APPENDAGE CLIP PLACEMENT AND BILATERAL PECTORALIS BLOCKS performed by Brenna Healy MD at [address] [place] [address] as of 10/12/2022 - Fully Reviewed 10/12/2022   Allergen Reaction Noted    Prednisone Other (See Comments) 07/09/2014    Codeine Other (See Comments) 01/23/2011        Patient Active Problem List   Diagnosis    Angina pectoris, unstable ([facility]    Diabetes mellitus type II, controlled ([facility]    Bladder outlet obstruction    S/P CABG x 3    Mixed hyperlipidemia    Constipation    Benign non-nodular prostatic hyperplasia with lower urinary tract symptoms    Anxiety    DM (diabetes mellitus), secondary, uncontrolled, w/neurologic complic    Primary hypertension    Coronary artery disease involving native coronary artery of native heart with angina pectoris ([facility]    Pure hypercholesterolemia    Benign non-nodular prostatic hyperplasia without lower urinary tract symptoms    Type 2 diabetes mellitus with diabetic polyneuropathy, without long-term current use of insulin ([facility])    Keloid    Primary insomnia    COVID-19        Vital Signs: BP (!) 156/89   Pulse 87   Temp 98.7 °F (37.1 °C) (Oral)   Resp 18   Ht 6' 1\" (1.854 m)   Wt 232 lb 12.9 oz (105.6 kg)   SpO2 92%   BMI 30.71 kg/m²  O2 Flow Rate (L/min): 1 L/min     Admission Weight: Weight: 235 lb (106.6 kg)    Weight on 10/18 (102.6 kg) Pre-op   Weight on 10/19 (109.5 kg)  10/20 105.5 kg   10/21 105.2 kg     Intake/Output:   Intake/Output Summary (Last 24 hours) at 10/22/2022 0801  Last data filed at 10/22/2022 0600  Gross per 24 hour   Intake 640 ml   Output 2410 ml   Net -1770 ml            LABORATORY DATA:     CBC:   Recent Labs     10/20/22  0535 10/21/22  0410 10/21/22  1235 10/22/22  0610   WBC 14.4* 11.9*  --  10.0   HGB 12.2* 11.5* 11.8* 11.2*   HCT 36.9* 34.5* 36.2* 34.4*   MCV 92.9 96.0  --  95.5    157  --  210       BMP:   Recent Labs     10/20/22  0535 10/21/22  0410 10/22/22  0610   * 136 136   K 3.9 4.0 4.3    104 102   CO2 22 25 26   BUN 14 14 17   CREATININE 1.1 1.1 1.0       MG:    Recent Labs     10/20/22  0535   MG 2.30        Cardiac Enzymes: No results for input(s): CKTOTAL, CKMB, CKMBINDEX, TROPONINI in the last 72 hours. PT/INR:   Recent Labs     10/20/22  0535   PROTIME 16.5*   INR 1.34*       APTT:   No results for input(s):  APTT in the last 72 hours.      CXR: 10/21/22  FINDINGS:   Sternotomy wires and left atrial appendage clip are present. Right jugular   venous catheter terminates over the superior vena cava. There are bilateral   chest drains. Cardiac silhouette remains prominent. There remains bibasilar atelectasis,   greater on the left. No pneumothorax is identified. Impression   Supportive tubing is in normal position. Bibasilar atelectasis. ________________________________________________________________________      Subjective:   Dietary Intake: slowly improving   no Nausea   Pain Control: adequate  Complaints: expected post operative pain   Bowels: no BM     Objective:   General appearance: resting comfortably in chair, in nad   Lungs: diminished bilateral bases   Heart: S1S2 normal; SR on monitor  Chest: symmetrical expansion with inspiration and expirations; no rocking of sternum noted   Abdomen: soft, non-tender  Bowel sounds: normoactive   Kidneys: UOP 3370 ml over 24 hours; Cr 1.1  Wound/Incisions: Midsternal incision CDI; RLE incisions CDI  Extremities: BLE pulses present; trace swelling noted in BLE  Neurological: alert, oriented and grossly non focal   Chest tubes/Drains: Right pleural chest tube with 210-0-48=730 ml of serosanguinous drainage over 24 hours; no airleak noted    Assessment:   Post-op: 3 days. Condition: In stable condition. Plan:   1. Cardiovascular:   CAD s/p CABG x 3 with NAS clip- ASA, statin, Plavix, BB   HLD- statin     2. Pulmonary:   Stable AM CXR unchanged from prior study with expected postoperative changes, atelectasis, and small right pleural effusion   Satisfactory oxygen saturations on RA  Expansion measures  Scheduled albuterol   Hx of tobacco use   Remove pleural chest tubes    3. Neurology:   Post op pain control with PRN morphine or oxy, robaxin Scheduled tylenol    4.  Nephrology:   Satisfactory 24 hour UOP with Cr of 1.1  Weight trending down but still up from pre-op Continue to diurese as tolerated- transition to PO Lasix     5. Urology:  Urinary retention- hx of BPH. Flomax resumed 10/20. Wilhelm catheter re-inserted. Urology following   Home with wilhelm    6. Endocrinology:   Glucose controlled. STEPHANIE Heller   DMII- A1C 8 on 10/18/22. Will ensure close follow up with PCP upon discharge to ensure good glucose control     7. Hematology:   Acute blood loss anemia- H&H stable. Monitor     8. Microbiology:   Leukocytosis- WBC 11.9, and trending down. Afebrile. Likely reactive. Monitor     9. Nutrition:   Continue diet as ordered, ONS     10. Labs:   Labs and imaging reviewed as above     11. Post-op Drains/Wires: Remove pleural chest tubes  Right IJ CVC (10/18)    12. D/C Goals: CM following. PT/OT with recs for  home with 24 hours supervision/assist. Potentially home later today or tomorrow.      13. Continue post-op care of patient in the ICU    GI prophylaxis: Pepcid  DVT prophylaxis: arixtra   ________________________________________________________________________  Dolly Ingram MD  10/22/2022  8:01 AM

## 2022-10-24 ENCOUNTER — TELEPHONE (OUTPATIENT)
Dept: CARDIOTHORACIC SURGERY | Age: 77
End: 2022-10-24

## 2022-10-24 ENCOUNTER — CARE COORDINATION (OUTPATIENT)
Dept: CASE MANAGEMENT | Age: 77
End: 2022-10-24

## 2022-10-24 DIAGNOSIS — Z95.1 S/P CABG (CORONARY ARTERY BYPASS GRAFT): Primary | ICD-10-CM

## 2022-10-24 DIAGNOSIS — I25.119 CORONARY ARTERY DISEASE INVOLVING NATIVE CORONARY ARTERY OF NATIVE HEART WITH ANGINA PECTORIS (HCC): Primary | ICD-10-CM

## 2022-10-24 PROCEDURE — 1111F DSCHRG MED/CURRENT MED MERGE: CPT | Performed by: FAMILY MEDICINE

## 2022-10-24 NOTE — TELEPHONE ENCOUNTER
Called patient to see how he has been since being discharged from hospital. He stated he is feeling better. He is tired and weak. I advised that this was normal and will get better with time. He states that pain is under control. He denies any SOB. He states his incisions look good. He still has his wilhelm catheter in place and is scheduled to see urology on Wednesday at 9 a.m. I reminded him of his appointment with Dr. Bjorn Harkins on Monday the 31st at 9:30a.m. I asked that he stop in radiology prior to his appointment to obtain a CXR. He verbalized understanding and denied any further questions or concerns. Order was placed.        Ladan Matson RN

## 2022-10-24 NOTE — CARE COORDINATION
[facility] Care Transitions Initial Follow Up Call    Call within 2 business days of discharge: Yes    Care Transition Nurse contacted the patient by telephone to perform post hospital discharge assessment. Verified name and  with patient as identifiers. Provided introduction to self, and explanation of the Care Transition Nurse role. Patient: Ramses Ruiz : 1945   MRN: [id number]  Reason for Admission: s/p CABG   Discharge Date: 10/22/22 RARS: Readmission Risk Score: 12.8      Last Discharge  Street       Date Complaint Diagnosis Description Type Department Provider    10/12/22  S/P CABG x 3 Admission (Discharged) [facility]     10/12/22 Chest Pain Chest pain, unspecified type . .. ED (TRANSFER) [address] [facility] Divina Lara MD            Was this an external facility discharge? No Discharge Facility: n.a    Challenges to be reviewed by the provider   Additional needs identified to be addressed with provider: No  none               Method of communication with provider: none. CTN spoke with patient who reported he is doing alright. Patient reported his incision sites are bruised but denied any drainage or open areas. Patient reported his appetite has improved and he reports bowels are regular. Patient denied any swelling and reported his scale doesn't work very well and CTN offered RPM, patient agreeable. CTN reviewed all medications with patient who reported she is taking all as prescribed. Patient has apt with surgeon on 10/31 and with urologist on 10/26. Patient reported [address] was out today. Denies any acute needs at present time. Agreeable to f/u calls. Educated on the use of urgent care or physicians 24 hr access line if assistance is needed after hours & that they can always contact their home care provider to request a nurse visit even if it isn't their regularly scheduled day for their nurse to visit.          Care Transition Nurse reviewed discharge instructions, medical action plan, and red flags with patient who verbalized understanding. The patient was given an opportunity to ask questions and does not have any further questions or concerns at this time. Were discharge instructions available to patient? Yes. Reviewed appropriate site of care based on symptoms and resources available to patient including: PCP  Specialist  Home health  When to call 911. The patient agrees to contact the PCP office for questions related to their healthcare. Advance Care Planning:   Does patient have an Advance Directive: referral to internal ACP facilitator. Advance Care Planning   Healthcare Decision Maker:    Primary Decision Maker: Lula Flores - Spouse - [phone]    Click here to complete Healthcare Decision Makers including selection of the Healthcare Decision Maker Relationship (ie \"Primary\"). Today we referred to ACP Clinical Specialist for assistance. Medication reconciliation was performed with patient, who verbalizes understanding of administration of home medications. Medications reviewed, 1111F entered: yes    Was patient discharged with a pulse oximeter? no    Non-face-to-face services provided:  Obtained and reviewed discharge summary and/or continuity of care documents  Education of patient/family/caregiver/guardian to support self-management-. Offered patient enrollment in the Remote Patient Monitoring (RPM) program for in-home monitoring: Yes, patient enrolled. Remote Patient Monitoring Enrollment Note      Date/Time:  10/24/2022 11:03 AM    Offered patient enrollment in the [facility] Remote Patient Monitoring (RPM) program for in home monitoring for Diabetes and HTN. Patient accepted RPM services.     Patient will be monitoring the following daily:  blood pressure heart rate  glucose reading  pulse ox heart rate  weight    CTN reviewed the information below with patient:    Emergency Contact: Shad Hawthorne     [x] A member from the care coordination team will reach out to notify the patient once the RPM kit is ordered. [x] Once the kit is delivered, the [facility] team will contact the patient after UPS deliver to assist with set up. [x] Determined BP cuff size: regular (9.05\"-15.74\")      [x] Determined weight scale: regular (<330lbs)                                                 [x] Hours of ACM monitoring - Monday-Friday [id number]                         All questions about RPM program answered at this time. Care Transitions 24 Hour Call    Do you have a copy of your discharge instructions?: Yes  Do you have all of your prescriptions and are they filled?: Yes  Have you been contacted by a [address]?: No  Have you scheduled your follow up appointment?: Yes  How are you going to get to your appointment?: Car - family or friend to transport  Do you have support at home?: Partner/Spouse/SO  Do you feel like you have everything you need to keep you well at home?: Yes  Care Transitions Interventions     Other Services: Completed (Comment: RPM enrollement)     Social Work: Completed             Follow Up  Future Appointments   Date Time Provider Michael Marti   10/31/2022  9:30 AM MD ARLET Rubalcava [facility]   11/18/2022  1:15 PM YANNICK Musa - PAYTON Mott [facility]   12/19/2022  2:20 PM Dania Knowles MD [id number] [facility] Transition Nurse provided contact information. Plan for follow-up call in 3-5 days based on severity of symptoms and risk factors.   Plan for next call: symptom management-cabg recovery   self management-cabg recovery bowels appetite incision sites   follow-up appointment-Urologist     Mauri RODRIGUESN, RN, [facility]  Care Transition Nurse  [phone] mobile

## 2022-10-24 NOTE — DISCHARGE SUMMARY
Cardiac, Vascular & Thoracic Surgery  Discharge Summary    Patient:  Marcelo Anne 1945 [id number]   Admission Date:  10/12/2022 12:42 PM  Discharge Date:  10/22/2022 [facility]     Principle Diagnosis:  Severe multivessel CAD     Secondary Diagnosis:  Principal Problem (Resolved):    Chest pain  Active Problems:    S/P CABG x 3    Mixed hyperlipidemia    Primary hypertension    Coronary artery disease involving native coronary artery of native heart with angina pectoris [facility])  Resolved Problems:    Unstable angina ([address]    Cardiac Cath: 10/13/22  LVGRAM     LVEDP 13   GRADIENT ACROSS AORTIC VALVE None   LV FUNCTION EF 60%   WALL MOTION Normal   MITRAL REGURGITATION Mild      CORONARY ARTERIES     LM Aneurysmal with ulcerated plaque, Prox <10%, mid-distal 30% stenosis. LAD Calcified, proximal-mid 50 to 75% stenosis, mid-distal 80% stenosis. LCX Tortuous, calcified, proximal 50 to 75% stenosis. Mid to distal 80 to 90% stenosis that extends into OM1. There is a mid-distal circumflex stent that has 40% in-stent restenosis         RCA Large vessel, dominant, very high anterior takeoff near the left coronary cusp, it is calcified. There is 100% proximal-mid  with well-developed right to right collaterals and lesser left to right collaterals      PERCUTANEOUS INTERVENTION DESCRIPTION      Heparin was used for anticoagulation, a 6 Luxembourgish VL 3.5 guiding catheter was used to intubate the left main. A choice floppy wire was used to cross the left main into the LAD. IVUS was performed to left main and LAD. In the proximal LAD there is mild disease just at the ostium and in the left main there was distal 30% stenosis with moderate plaque, there is no clear unstable plaque or ulceration noted on IVUS. TTE: 10/13/22  FINDINGS- 2D Doppler echocardiography. The left ventricle is normal   in size and systolic function. Estimated ejection fraction is 55-   60%.  The Doppler exam is consistent with normal diastolic function. The right ventricle is mildly dilated with preserved systolic   function. The left atrium is mildly dilated. The right atrium is   mildly dilated. The mitral valve is structurally normal without   significant regurgitation. The aortic valve is structurally normal   with normal Doppler exam. The tricuspid valve is incompletely   visualized, Doppler exam is unremarkable. The pulmonic valve is not   well seen. There is no significant pericardial effusion. The aortic   root is normal in size. The vena cava is not dilated. IMPRESSION-          1. Normal left ventricular size and systolic function. 2.    Biatrial enlargement. 3.    Dilated right ventricle with preserved systolic function. Procedure:  10/18/22 CORONARY ARTERY BYPASS GRAFTING X 3, ON PUMP, INTERNAL MAMMARY ARTERY, SAPHENOUS VEIN GRAFT, YING, TEMPORARY PACING WIRES,  WITH LEFT ATRIAL APPENDAGE CLIP PLACEMENT AND BILATERAL PECTORALIS BLOCKS  BYPASS GRAFTS:  1. LIMA to mid LAD  2. SVG to OM1  3. SVG to acute marginal    History:  The patient is a 78 y.o. male , former smoker, with significant past medical history of BPH, GERD, DMII, IBS, HLD, HTN, and CAD s/p PCI who presented to [facility] ED on 10/12 with complaints of chest and abdominal pain intermittent for several days. Chest pain described as a pressure. He states it often feels like heartburn or indigestion. Frequent belching. Symptoms not associated with activity. Symptoms similar to prior episodes of angina. He took nitro at home with relief in his symptoms. Of note, the patient was seen in the ED on 9/27 with similar complaints. His work-up at that time was unremarkable. Potential admission was discussed, however decision was made to discharge the patient home with close follow-up. In the emergency department patient was noted to be hypertensive with BP of 180/81. Troponin and repeat troponin negative. Creatinine 1.2. LFTs and CBC unremarkable.   CXR without acute findings. EKG without acute ischemic changes. The patient underwent stress test on 10/10 which was concerning for ischemia. Plans were being made for outpatient cardiac catheterization. Decision was made to admit the patient for further investigation and treatment. [address] [place] [address] was initially contacted for transfer however there were no beds available at that time. Cardiac catheterization today reveals severe multivessel CAD including 30% distal left main stenosis and  of RCA. We have been consulted for consideration of surgical revascularization. Hospital Course: The patient underwent CABG x 3 with NAS clip on 10/18. He progressed well postoperatively. He was ambulating without difficulty and tolerating PO intake. His pain was well controlled. He had urinary retention and hematuria. Plavix was discontinued and ASA was increased to full dose. Wilhelm catheter was reinserted and Urology evaluated the patient. He will be discharged with wilhelm catheter in place and Urology follow up. The patient was discharged home on 10/22/2022 with [facility] AT [facility]. Discharged Condition: stable    Disposition:  home    Medications:  Aspirin:start (full dose)   ADP Inhibitor (plavix/brillinta/effient):discontinued prior to discharge due to hematuria   ACE/ARB:discontinue. Not initiated prior to discharge due to ongoing diuresis and need for titration of BB   Betablocker:start  Statin:continue    Discharge Medications:     Medication List        START taking these medications      aspirin 325 MG EC tablet  Take 1 tablet by mouth daily  Replaces: aspirin 325 MG tablet     bisacodyl 5 MG EC tablet  Commonly known as: DULCOLAX  Take 1 tablet by mouth daily as needed for Constipation     famotidine 20 MG tablet  Commonly known as: PEPCID  Take 1 tablet by mouth daily     furosemide 40 MG tablet  Commonly known as: LASIX  Take 0.5 tablets by mouth in the morning and 0.5 tablets in the evening.  Do all this for 7 days.     magnesium oxide 400 (240 Mg) MG tablet  Commonly known as: MAG-OX  Take 1 tablet by mouth daily for 7 days     metoprolol tartrate 25 MG tablet  Commonly known as: LOPRESSOR  Take 1 tablet by mouth 2 times daily Hold for SBP <100 or HR <65     oxyCODONE 5 MG immediate release tablet  Commonly known as: ROXICODONE  Take 1 tablet by mouth every 6 hours as needed for Pain for up to 7 days. potassium chloride 10 MEQ extended release tablet  Commonly known as: KLOR-CON M  Take 1 tablet by mouth 2 times daily for 7 days            CHANGE how you take these medications      tamsulosin 0.4 MG capsule  Commonly known as: FLOMAX  What changed: Another medication with the same name was removed. Continue taking this medication, and follow the directions you see here. traZODone 100 MG tablet  Commonly known as: DESYREL  TAKE ONE TABLET BY MOUTH ONCE NIGHTLY AS NEEDED FOR SLEEP  What changed: See the new instructions.             CONTINUE taking these medications      albuterol sulfate  (90 Base) MCG/ACT inhaler  Commonly known as: Ventolin HFA  Inhale 2 puffs into the lungs 4 times daily as needed for Wheezing     metFORMIN 1000 MG tablet  Commonly known as: GLUCOPHAGE     OneTouch Ultra strip  Generic drug: blood glucose test strips  USE TO TEST TWO TIMES A DAY     rosuvastatin 40 MG tablet  Commonly known as: CRESTOR  Take 1 tablet by mouth every evening            STOP taking these medications      amLODIPine 10 MG tablet  Commonly known as: NORVASC     aspirin 325 MG tablet  Replaced by: aspirin 325 MG EC tablet     fish oil 1000 MG capsule     losartan 100 MG tablet  Commonly known as: COZAAR     nitroGLYCERIN 0.4 MG SL tablet  Commonly known as: Nitrostat            ASK your doctor about these medications      glimepiride 4 MG tablet  Commonly known as: AMARYL  Take 1 tablet by mouth in the morning and at bedtime               Where to Get Your Medications        These medications were sent to [facility] [id number] - [place], [place] - [address] - P [phone] Jennifer Parent [phone]  [address] [facility] [address]      Phone: [phone]   aspirin 325 MG EC tablet  bisacodyl 5 MG EC tablet  famotidine 20 MG tablet  furosemide 40 MG tablet  magnesium oxide 400 (240 Mg) MG tablet  metoprolol tartrate 25 MG tablet  oxyCODONE 5 MG immediate release tablet  potassium chloride 10 MEQ extended release tablet          Patient Instructions: Activity: DO NOT LIFT, PUSH, OR PULL ANYTHING OVER 5 POUNDS FOR 6 WEEK from the day of surgery  Diet:  cardiac diet and diabetic diet  Wound Care:  [address] YOUR INCISIONS DAILY WITH A CLEAN WASHCLOTH AND ANTIBACTERIAL SOAP. Do not wash your incisions after you have cleansed other parts of your body    Follow up with Cardiothoracic Surgeon, Dr. Marthenia Saint, on 10/31. Follow up with Cardiology NP, Suki Maloney, on 11/18.      YANNICK Johnson - CNP

## 2022-10-25 ENCOUNTER — CARE COORDINATION (OUTPATIENT)
Dept: CASE MANAGEMENT | Age: 77
End: 2022-10-25

## 2022-10-25 ENCOUNTER — CARE COORDINATION (OUTPATIENT)
Dept: CARE COORDINATION | Age: 77
End: 2022-10-25

## 2022-10-25 DIAGNOSIS — E11.42 TYPE 2 DIABETES MELLITUS WITH DIABETIC POLYNEUROPATHY, WITHOUT LONG-TERM CURRENT USE OF INSULIN (HCC): ICD-10-CM

## 2022-10-25 DIAGNOSIS — I10 PRIMARY HYPERTENSION: Primary | ICD-10-CM

## 2022-10-25 NOTE — CARE COORDINATION
Remote Patient Kit Ordering Note      Date/Time:  10/25/2022 10:44 AM      [x] CCSS confirmed patient shipping address  [x] Patient will receive package over the next 2-4 business days. Someone 21 years or older must be present to sign for UPS delivery. [x] Patient to contact virtual installation-specific phone number listed in the patient instructions. [x] If the patient does not contact HRS within 24 hours, an [employer] Ambassador Wilian Vance will call the patient directly: If the patient does not answer, HRS will follow up with the clinical team notifying them about the unsuccessful attempt to contact the patient. HRS will make three call attempts to the patient. [x] LPN will contact patient once equipment is active to welcome them to the program.                                                         [x] Hours of RPM monitoring - Monday-Friday [phone]                     All questions answered at this time. CTN made aware the Remote Patient Monitoring set up has been completed. CCSS notified patient of RPM equipment order. ** patient had a few questions for CTN, please give wife a call.

## 2022-10-25 NOTE — CARE COORDINATION
[facility] Care Transitions Follow Up Call    Care Transition Nurse contacted the patient by telephone. Verified name and  with patient as identifiers. Patient: Kwame Mcmillan  Patient : 1945   MRN: [id number]  Reason for Admission: CABG   Discharge Date: 10/22/22 RARS: Readmission Risk Score: 12.8      Follow Up  Future Appointments   Date Time Provider Michael Marti   10/31/2022  9:30 AM [facility]   2022  1:15 PM Girish Covert, APRN - CNP [facility]   2022  2:20 PM [facility] Senate, [facility]       CTN received message from  requesting CTN to call patient that has questions. CTN contacted patient who reported he just wanted to verify address for urology apt tomorrow. CTN unable to view [facility] apt schedule and provided patient with number for [facility].       Care Transitions Subsequent and Final Call    Subsequent and Final Calls  Care Transitions Interventions     Other Services: Completed (Comment: RPM enrollement)     Social Work: Completed    Other Interventions:           Kavitha SCHWAB, RN, [facility]  Care Transition Nurse  [phone] mobile

## 2022-10-25 NOTE — PROGRESS NOTES
10/25/22 11:39 AM       Remote Patient Monitoring Treatment Plan    Received request from ACTATIANA/SHAKEEL Cummins RN to order remote patient monitoring for in home monitoring of Diabetes and HTN and order completed. Patient will be monitoring activity  blood pressure   glucose  pulse ox   weight  survey questions daily. Patient will engage in Remote Patient Monitoring each day to develop the skills necessary for self management. RPM Care Team Responsibilities:   Alerts will be reviewed daily and addressed within 2-4 hours during operational hours (Monday -Friday 9 am-4 pm)  Alert response and intervention documented in patient medical record  Alert response escalated to PCP per protocol and documented in patient medical record  Patient monitored over approximately  days  Discharge from program based on self-management readiness    See care coordination encounters for additional details.      Edgar Noe DNP, FNP-C, Remote Patient Monitoring NP, () [phone]

## 2022-10-26 ENCOUNTER — TELEPHONE (OUTPATIENT)
Dept: CARDIOTHORACIC SURGERY | Age: 77
End: 2022-10-26

## 2022-10-26 NOTE — TELEPHONE ENCOUNTER
Patient's wife called stating that she needed help obtaining a walker for her . I advised that I would contact Angelica uLcio to navigate for her. I called [facility] who directed me to [facility]. Order faxed to Saida who will contact patient. Notified patient's wife. I also asked how the patient's urology appointment went today. The patient stated he has the wilhelm catheter still in and goes back on Friday to learn how to straight cath himself. I advised to call us with any questions or concerns.      Flores Lema RN

## 2022-10-27 ENCOUNTER — CARE COORDINATION (OUTPATIENT)
Dept: CASE MANAGEMENT | Age: 77
End: 2022-10-27

## 2022-10-27 NOTE — CARE COORDINATION
[facility] Care Transitions Follow Up Call    Care Transition Nurse contacted the patient by telephone. Patient: Chepe Modi  Patient : 1945   MRN: [id number]  Reason for Admission: CABG   Discharge Date: 10/22/22 RARS: Readmission Risk Score: 12.8          Attempted to reach patient via phone for care transition. VM left stating purpose of call along with my contact information requesting a return call.             Follow Up  Future Appointments   Date Time Provider Michael Marti   10/31/2022  9:30 AM [facility]   2022  1:15 PM YANNICK Hahn - CNP [facility]   2022  2:20 PM Filippo Gill [facility]        Care Transitions Subsequent and Final Call    Subsequent and Final Calls  Care Transitions Interventions     Other Services: Completed (Comment: RPM enrollement)     Social Work: Completed    Other Interventions:           Dagoberto SCHWAB, RN, [facility]  Care Transition Nurse  [phone] mobile

## 2022-10-28 ENCOUNTER — CARE COORDINATION (OUTPATIENT)
Dept: CASE MANAGEMENT | Age: 77
End: 2022-10-28

## 2022-10-28 NOTE — CARE COORDINATION
[facility] Care Transitions Follow Up Call    Care Transition Nurse contacted the patient by telephone. Patient: Dacia Eisenberg  Patient : 1945   MRN: [id number]  Reason for Admission: CABG   Discharge Date: 10/22/22 RARS: Readmission Risk Score: 12.8          CTN spoke with patient's spouse who reported patient is sleeping. Ctn provided contact information and patient will return call at his convenience.        Follow Up  Future Appointments   Date Time Provider Michael Marti   10/31/2022  9:30 AM MD ARLET Serrano [facility]   2022  1:15 PM YANNICK Rivera - PAYTON Mann [facility]   2022  2:20 PM Bianca Ramos MD [facility] [place] - RADHA            Care Transitions Subsequent and Final Call    Subsequent and Final Calls  Care Transitions Interventions     Other Services: Completed (Comment: RPM enrollement)     Social Work: Completed    Other Interventions:           Geneva SCHWAB, RN, [facility]  Care Transition Nurse  [phone] mobile

## 2022-10-31 ENCOUNTER — CARE COORDINATION (OUTPATIENT)
Dept: CASE MANAGEMENT | Age: 77
End: 2022-10-31

## 2022-10-31 ENCOUNTER — CARE COORDINATION (OUTPATIENT)
Dept: CARE COORDINATION | Age: 77
End: 2022-10-31

## 2022-10-31 ENCOUNTER — OFFICE VISIT (OUTPATIENT)
Dept: CARDIOTHORACIC SURGERY | Age: 77
End: 2022-10-31

## 2022-10-31 ENCOUNTER — TELEPHONE (OUTPATIENT)
Dept: CARDIOTHORACIC SURGERY | Age: 77
End: 2022-10-31

## 2022-10-31 VITALS
DIASTOLIC BLOOD PRESSURE: 61 MMHG | OXYGEN SATURATION: 98 % | SYSTOLIC BLOOD PRESSURE: 140 MMHG | TEMPERATURE: 97.5 F | WEIGHT: 222.4 LBS | BODY MASS INDEX: 29.48 KG/M2 | HEIGHT: 73 IN | HEART RATE: 96 BPM

## 2022-10-31 DIAGNOSIS — E11.42 TYPE 2 DIABETES MELLITUS WITH DIABETIC POLYNEUROPATHY, WITHOUT LONG-TERM CURRENT USE OF INSULIN (HCC): ICD-10-CM

## 2022-10-31 DIAGNOSIS — I10 PRIMARY HYPERTENSION: Primary | ICD-10-CM

## 2022-10-31 DIAGNOSIS — R00.8 TRIGEMINY: ICD-10-CM

## 2022-10-31 DIAGNOSIS — Z95.1 S/P CABG X 3: Primary | ICD-10-CM

## 2022-10-31 DIAGNOSIS — R33.9 URINE RETENTION: ICD-10-CM

## 2022-10-31 PROCEDURE — 99024 POSTOP FOLLOW-UP VISIT: CPT | Performed by: STUDENT IN AN ORGANIZED HEALTH CARE EDUCATION/TRAINING PROGRAM

## 2022-10-31 NOTE — CARE COORDINATION
Received notice from [facility] virtual install that patient refused install & stated he wanted equipment picked up. CTN attempted to outreach patient, no success. CCSS spoke to wife, Annette Toth who reports patient does not want to participate. Return request has been submitted. CCSS informed wife to pack everything up, UPS will be at the home in 1-4 business days. Wife verbalized understanding. Will update after UPS tracking is received.     UPS tracking [id number]

## 2022-10-31 NOTE — PROGRESS NOTES
10/31/22 1:08 PM     Remote Patient Order Discontinued    Received request from Pennie Antony RN  to discontinue order for remote patient monitoring of Diabetes and HTN and order completed.       Kirk Villalobos DNP, FNP-C, Remote Patient Monitoring NP, () [phone]

## 2022-10-31 NOTE — CARE COORDINATION
[facility] Care Transitions Follow Up Call    Care Transition Nurse contacted the patient by telephone. Patient: Radha Sparrow  Patient : 1945   MRN: [id number]  Reason for Admission: CABG   Discharge Date: 10/22/22 RARS: Readmission Risk Score: 12.8          Final attempt made to reach patient for post hospital follow up call. Left a voice message for patient with my contact information and informed of final outreach attempt on home number. CTN contacted mobile number and received message vm full, unable to leave message. Follow Up  Future Appointments   Date Time Provider Michael Marti   2022  1:15 PM YANNICK Zaragoza - PAYTON Kaye [facility]   2022  9:30 AM MD ARLET Castellanos [facility]   2022  2:20 PM Dyllan Childs [facility]       Offered patient enrollment in the Remote Patient Monitoring (RPM) program for in-home monitoring:  sending orders to [place] as CTN received email from [facility] that virtual  spoke with patient and patient declined install .      Care Transitions Subsequent and Final Call    Subsequent and Final Calls  Care Transitions Interventions     Other Services: Completed (Comment: RPM enrollement)     Social Work: Completed    Other Interventions:           Nisha SCHWAB, RN, [place] [facility]  Care Transition Nurse  [phone] mobile

## 2022-10-31 NOTE — PROGRESS NOTES
Cardiac, Vascular and Thoracic Surgeons   Post-opClinic Note     10/31/2022 10:18 AM  Surgeon:  Laurie Ramey     S/P :  CABG x3, NAS clip on 10/18/22 w/ [facility]. Chief complaint : 1st post op follow up   Subjective:  Mr. Frantz Dobbins is doing good. Denies SOB or swelling. MSI, CT and SVG sites with out erythema or purulence. Cardiology follow up on 11/18. He notices an occasional PVC but not c/w the trigeminy. His wilhelm came out and he has no issues      Vital Signs: BP (!) 140/61 (Site: Left Upper Arm, Position: Sitting)   Pulse 96   Temp 97.5 °F (36.4 °C) (Infrared)   Ht 6' 1\" (1.854 m)   Wt 222 lb 6.4 oz (100.9 kg)   SpO2 98%   BMI 29.34 kg/m²      I/O:  No intake or output data in the 24 hours ending 10/31/22 1018    Exam:   Physical Exam    Sternum stable, wounds healing well      Chest X-Ray:   pending      Labs:   CBC: No results for input(s): WBC, HGB, HCT, MCV, PLT in the last 72 hours. BMP: No results for input(s): NA, K, CL, CO2, PHOS, BUN, CREATININE, CA in the last 72 hours. PT/INR: No results for input(s): PROTIME, INR in the last 72 hours. APTT: No results for input(s): APTT in the last 72 hours. No results found for this or any previous visit from the past 90 days.        Scheduled Meds:     Patient Active Problem List   Diagnosis    Angina pectoris, unstable (HCC)    Diabetes mellitus type II, controlled ([address]    Bladder outlet obstruction    S/P CABG x 3    Mixed hyperlipidemia    Constipation    Benign non-nodular prostatic hyperplasia with lower urinary tract symptoms    Anxiety    DM (diabetes mellitus), secondary, uncontrolled, w/neurologic complic    Primary hypertension    Coronary artery disease involving native coronary artery of native heart with angina pectoris ([address]    Pure hypercholesterolemia    Benign non-nodular prostatic hyperplasia without lower urinary tract symptoms    Type 2 diabetes mellitus with diabetic polyneuropathy, without long-term current use of insulin ([address] Keloid    Primary insomnia    COVID-19       Assessment/Plan:   Coronary disease-status post CABG. We went over sternal precautions which would include 6 weeks from the operation date of no driving. After 6 weeks, the next 6-week time period would involve a gradual return to full activity at which 3 months post-surgery they have no restrictions. Around the 8-week time, I recommend cardiac rehab which we will send referral out for. In addition aspirin for life, continue statin and beta-blocker. Xr pending  Trigeminy - will increase BB. Next cardiology f/u is 11/18, will see if that can get moved up with his PVCs.  He is asymptomatic however  Urine retention - wilhelm out, hematuria cleared, doing Angelika Carlos MD

## 2022-11-02 ENCOUNTER — CARE COORDINATION (OUTPATIENT)
Dept: CARE COORDINATION | Age: 77
End: 2022-11-02

## 2022-11-02 ENCOUNTER — TELEPHONE (OUTPATIENT)
Dept: CARDIOTHORACIC SURGERY | Age: 77
End: 2022-11-02

## 2022-11-02 NOTE — ACP (ADVANCE CARE PLANNING)
Call to patient to confirm interest in Advance Directives. Pt confirmed. ACP Specialist will have forms mailed and will f/u to confirm receipt and schedule day to complete, if interested.

## 2022-11-02 NOTE — TELEPHONE ENCOUNTER
[facility] AT [facility] RN called to clarify medications for patient. He had mentioned that he thought Dr. Tommie Coleman sent a new script in for him. I advised that he increased his Metoprolol to 50 mg BID due to trigeminy. That was the only change at this time. Refill was sent to pharmacy. RN verbalized understanding and would relay the message to the patient.        Bruce Garcia RN

## 2022-11-14 RX ORDER — METFORMIN HYDROCHLORIDE 500 MG/1
TABLET, EXTENDED RELEASE ORAL
Qty: 360 TABLET | Refills: 3 | Status: SHIPPED | OUTPATIENT
Start: 2022-11-14

## 2022-11-15 ENCOUNTER — TELEPHONE (OUTPATIENT)
Dept: CARDIOTHORACIC SURGERY | Age: 77
End: 2022-11-15

## 2022-11-15 DIAGNOSIS — Z95.1 S/P CABG (CORONARY ARTERY BYPASS GRAFT): Primary | ICD-10-CM

## 2022-11-15 RX ORDER — AMLODIPINE BESYLATE 5 MG/1
5 TABLET ORAL DAILY
Qty: 30 TABLET | Refills: 1 | Status: SHIPPED | OUTPATIENT
Start: 2022-11-15

## 2022-11-22 ENCOUNTER — OFFICE VISIT (OUTPATIENT)
Dept: CARDIOTHORACIC SURGERY | Age: 77
End: 2022-11-22

## 2022-11-22 VITALS
HEART RATE: 63 BPM | WEIGHT: 225.4 LBS | BODY MASS INDEX: 29.87 KG/M2 | OXYGEN SATURATION: 97 % | HEIGHT: 73 IN | DIASTOLIC BLOOD PRESSURE: 80 MMHG | SYSTOLIC BLOOD PRESSURE: 138 MMHG | TEMPERATURE: 98.1 F

## 2022-11-22 DIAGNOSIS — Z95.1 S/P CABG X 3: Primary | ICD-10-CM

## 2022-11-22 PROCEDURE — 99024 POSTOP FOLLOW-UP VISIT: CPT | Performed by: STUDENT IN AN ORGANIZED HEALTH CARE EDUCATION/TRAINING PROGRAM

## 2022-11-22 NOTE — PROGRESS NOTES
Cardiac, Vascular and Thoracic Surgeons  Clinic Note     11/22/2022 9:43 AM  Surgeon:  Alban Joshi     S/P :  2nd post op s/p CABG x 3, NAS clip on 10/18/22 w/ [facility]. Chief complaint : 2nd post-op  Subjective:  Mr. Will Fox is doing well post-op. He denies pain today. He states his main concern is that he is having pretty intense night sweats. These were occurring prior to surgery, but they seem to have gotten worse. His PCP said it could be hormonal. Denies swelling to extremities. MSI and previous chest tube sites & RLE SVG sites healing very nicely, no redness or drainage. Informed patient to r/s cardiology appt to get set up with cardiac rehab. Vital Signs: /80 (Site: Left Upper Arm, Position: Sitting, Cuff Size: Medium Adult)   Pulse 63   Temp 98.1 °F (36.7 °C) (Temporal)   Ht 6' 1\" (1.854 m)   Wt 225 lb 6.4 oz (102.2 kg)   SpO2 97%   BMI 29.74 kg/m²      I/O:  No intake or output data in the 24 hours ending 11/22/22 0943    Exam:   Cardiovascular:  Pulmonary:    Lower extremity:  Incision:    Chest X-Ray:  normal     Labs:   CBC: No results for input(s): WBC, HGB, HCT, MCV, PLT in the last 72 hours. BMP: No results for input(s): NA, K, CL, CO2, PHOS, BUN, CREATININE, CA in the last 72 hours. PT/INR: No results for input(s): PROTIME, INR in the last 72 hours. APTT: No results for input(s): APTT in the last 72 hours.     Scheduled Meds:     Patient Active Problem List   Diagnosis    Angina pectoris, unstable (HCC)    Diabetes mellitus type II, controlled ([address]    Bladder outlet obstruction    S/P CABG x 3    Mixed hyperlipidemia    Constipation    Benign non-nodular prostatic hyperplasia with lower urinary tract symptoms    Anxiety    DM (diabetes mellitus), secondary, uncontrolled, w/neurologic complic    Primary hypertension    Coronary artery disease involving native coronary artery of native heart with angina pectoris ([address]    Pure hypercholesterolemia    Benign non-nodular prostatic hyperplasia without lower urinary tract symptoms    Type 2 diabetes mellitus with diabetic polyneuropathy, without long-term current use of insulin (HCC)    Keloid    Primary insomnia    COVID-19       Assessment/Plan:   Coronary disease-status post CABG. We went over sternal precautions which would include 6 weeks from the operation date of no driving. After 6 weeks, the next 6-week time period would involve a gradual return to full activity at which 3 months post-surgery they have no restrictions. Around the 8-week time, I recommend cardiac rehab which we will send referral out for. In addition aspirin for life, Plavix for 1 year after the operation continue statin and beta-blocker.        Remyreld MD Steve

## 2022-11-23 ENCOUNTER — TELEPHONE (OUTPATIENT)
Dept: FAMILY MEDICINE CLINIC | Age: 77
End: 2022-11-23

## 2022-11-23 DIAGNOSIS — E11.41 CONTROLLED TYPE 2 DIABETES MELLITUS WITH DIABETIC MONONEUROPATHY, WITHOUT LONG-TERM CURRENT USE OF INSULIN (HCC): ICD-10-CM

## 2022-11-23 RX ORDER — GLIMEPIRIDE 4 MG/1
4 TABLET ORAL
Qty: 30 TABLET | Refills: 11
Start: 2022-11-23

## 2022-11-23 NOTE — TELEPHONE ENCOUNTER
[facility] to report that today is her last appt with pt. States that for the last 2 mornings pt's glucose readings have been really low, in the 40's-60's.    Call back pt with any recommendations [phone]  Routing to Monika due to PCP out of office

## 2022-11-23 NOTE — TELEPHONE ENCOUNTER
Metformin is listed on the patient's medication profile twice, at 500 mg take 2 twice a day and 1000 mg take 1 twice a day. It is also listed at glimepiride 4 mg p.o. twice daily. Please verify that patient is only taking 1000 mg of metformin twice a day regardless of whether accepted 1000 mg or 500 mg tablets. Verify also the glimepiride directions. If all of that is correct, then reduce glimepiride to 4 mg every morning.   Patient reports sugars readings in 1 week

## 2022-11-23 NOTE — TELEPHONE ENCOUNTER
Patient called and medication list updated.  He is taking 500mg of metformin as two tablets twice daily and was taking the glimeperide twice daily so informed him to begin taking the glimeperide only once a day and adjusted the sig on his script and also informed him to call with his blood sugar readings in one week

## 2022-11-28 RX ORDER — TAMSULOSIN HYDROCHLORIDE 0.4 MG/1
0.4 CAPSULE ORAL DAILY
Qty: 90 CAPSULE | Refills: 0 | Status: SHIPPED | OUTPATIENT
Start: 2022-11-28

## 2022-11-30 ENCOUNTER — TELEPHONE (OUTPATIENT)
Dept: FAMILY MEDICINE CLINIC | Age: 77
End: 2022-11-30

## 2022-11-30 DIAGNOSIS — F51.01 PRIMARY INSOMNIA: ICD-10-CM

## 2022-11-30 DIAGNOSIS — R61 CHRONIC NIGHT SWEATS: Primary | ICD-10-CM

## 2022-11-30 RX ORDER — TRAZODONE HYDROCHLORIDE 100 MG/1
TABLET ORAL
Qty: 30 TABLET | Refills: 0 | Status: SHIPPED | OUTPATIENT
Start: 2022-11-30

## 2022-11-30 NOTE — TELEPHONE ENCOUNTER
Pt called stating that he's been having night sweats for awhile. He has discussed with PCP, but they've been getting worse. Pt is okay to wait for PCP to respond once he's back. Pt just wanting to know if there's anything he can do to help with it.  Call back pt with recommendations [phone]  Routing to Angelica Pan due to PCP out of office

## 2022-11-30 NOTE — TELEPHONE ENCOUNTER
Verified with pt that he does get the 100mg.    Next appt 1/10/2023  Routing to Monika due to PCP out of office

## 2022-11-30 NOTE — TELEPHONE ENCOUNTER
Patient called and informed of provider wanting him to go to see endocrinology and referral was placed and patient was provided with number to call and schedule

## 2022-12-01 ENCOUNTER — TELEPHONE (OUTPATIENT)
Dept: CARDIAC REHAB | Age: 77
End: 2022-12-01

## 2022-12-01 NOTE — TELEPHONE ENCOUNTER
Received voicemail from patient requesting a call back to schedule cardiac rehab. Returned call, patient not available at this time and was instructed to call back in an hour.

## 2022-12-05 ENCOUNTER — TELEPHONE (OUTPATIENT)
Dept: CARDIAC REHAB | Age: 77
End: 2022-12-05

## 2022-12-05 ENCOUNTER — TELEPHONE (OUTPATIENT)
Dept: CARDIOTHORACIC SURGERY | Age: 77
End: 2022-12-05

## 2022-12-05 NOTE — TELEPHONE ENCOUNTER
Called patient to make sure that he had cardiology follow up. Patient stated he is scheduled on 12/12/22. I advised to call us if he needs anything further in the future. He denied any further questions or concerns at this time.      Ladan Matson RN

## 2022-12-08 ENCOUNTER — TELEPHONE (OUTPATIENT)
Dept: FAMILY MEDICINE CLINIC | Age: 77
End: 2022-12-08

## 2022-12-08 NOTE — TELEPHONE ENCOUNTER
----- Message from Jian Woodward sent at 12/8/2022  1:51 PM EST -----  Subject: Message to Provider    QUESTIONS  Information for Provider? Patient tried to make an appt to see the   endocrinologist and they stated they need more information.  ---------------------------------------------------------------------------  --------------  Kristal MAJOR  [phone]; OK to leave message on voicemail  ---------------------------------------------------------------------------  --------------  SCRIPT ANSWERS  Relationship to Patient?  Self

## 2022-12-09 NOTE — TELEPHONE ENCOUNTER
Spoke with Edu at [phone] and she advised that they don't see patients with the Dx of nights sweats.

## 2022-12-12 ENCOUNTER — HOSPITAL ENCOUNTER (OUTPATIENT)
Dept: CARDIAC REHAB | Age: 77
Setting detail: THERAPIES SERIES
Discharge: HOME | End: 2022-12-12
Payer: MEDICARE

## 2022-12-12 VITALS — WEIGHT: 227 LBS | OXYGEN SATURATION: 97 % | RESPIRATION RATE: 16 BRPM | BODY MASS INDEX: 29.95 KG/M2

## 2022-12-12 ASSESSMENT — PATIENT HEALTH QUESTIONNAIRE - PHQ9
9. THOUGHTS THAT YOU WOULD BE BETTER OFF DEAD, OR OF HURTING YOURSELF: 0
SUM OF ALL RESPONSES TO PHQ QUESTIONS 1-9: 5
1. LITTLE INTEREST OR PLEASURE IN DOING THINGS: 0
2. FEELING DOWN, DEPRESSED OR HOPELESS: 1
SUM OF ALL RESPONSES TO PHQ QUESTIONS 1-9: 5
8. MOVING OR SPEAKING SO SLOWLY THAT OTHER PEOPLE COULD HAVE NOTICED. OR THE OPPOSITE, BEING SO FIGETY OR RESTLESS THAT YOU HAVE BEEN MOVING AROUND A LOT MORE THAN USUAL: 1
5. POOR APPETITE OR OVEREATING: 0
SUM OF ALL RESPONSES TO PHQ9 QUESTIONS 1 & 2: 1
SUM OF ALL RESPONSES TO PHQ QUESTIONS 1-9: 5
4. FEELING TIRED OR HAVING LITTLE ENERGY: 1
SUM OF ALL RESPONSES TO PHQ QUESTIONS 1-9: 5
6. FEELING BAD ABOUT YOURSELF - OR THAT YOU ARE A FAILURE OR HAVE LET YOURSELF OR YOUR FAMILY DOWN: 1
3. TROUBLE FALLING OR STAYING ASLEEP: 1
7. TROUBLE CONCENTRATING ON THINGS, SUCH AS READING THE NEWSPAPER OR WATCHING TELEVISION: 0

## 2022-12-12 ASSESSMENT — EXERCISE STRESS TEST
PEAK_HR: 98
PEAK_BP: 194/90

## 2022-12-12 NOTE — PLAN OF CARE
Individual Treatment Plan  Cardiac Rehabilitation    Initial Assessment  Name: Jarad Weldon Admit to Rehab: 2022   : 1945 Primary Diagnosis: CABG    Age: 78 y.o. Referring Physician:  Therese Davey   MRN: [id number] Primary Care Physician: Florentino De Guzman MD     Allergies   Allergen Reactions    Prednisone Other (See Comments)     Made patient very agitated (medrol dospak)    Codeine Other (See Comments)     constipation    Lisinopril Cough       Exercise Assessment  Stages of Change: Preparation   Risk Stratification: High    Fall Risk: Low  Assistive Devices:None    Initial Assessment/: 6 Minute Walk Test     Pre-Test Vitals: 148/74      Peak Vitals: Data Measured Immediately After Walk  Distance Walked (ft): 5792 ft  Peak Heart Rate: 98  Peak Blood Pressure: 194/90  RPE: 10/20  O2 Saturation: 97 (RA)  Met: 2.4       During:     Number of Rest: 0    Post-Test Vitals: Data Measured at 5 Minutes After Walk  Heart Rate: 74  Blood Pressure: 148/84  SpO2: 98 % (RA)    Key Anti-Hypertensive Meds            amLODIPine (NORVASC) 5 MG tablet    Sig - Route: Take 1 tablet by mouth daily - Oral    Cosign for Ordering: Accepted by Dolly Ingram MD on 11/15/2022  3:01 PM    metoprolol tartrate (LOPRESSOR) 25 MG tablet    Sig - Route: Take 2 tablets by mouth 2 times daily - Oral    metoprolol tartrate (LOPRESSOR) 25 MG tablet    Sig - Route: Take 1 tablet by mouth 2 times daily Hold for SBP <100 or HR <65 - Oral             Exercise Prescription        Mode: . Treadmill, Bike (Upright or recumbent), NuStep, Arm Ergometer, Recumbent Eliptical, and Walking      Frequency: 2-3 days/week supervised      Intensity:RPE 11-14      Target Heart Rate: Resting HR + 20-30      Duration: 30+ minutes/ 3 days a week      Resistance Training: Yes, 3 days per week      Progression:Increase exercise by 0.5 METs every 1 to 2 weeks to goal of 4 to 6 METs      Supplemental oxygen:Yes, 6Liters per minute oxygen via E cylinder oxygen    Plan  Home Exercise: Yes  Mode:Treadmill  Resistance Training:Yes    Target Goals  Achieve exercise to 3-4.9 METs   Education  Mr. Frantz Dobbins was educated on the importance of   warm up and cool down, signs and symptoms to report, exercise safety, Jason Scale of Perceived Exertion use, Importance of physical activity and exercise progression, equipment orientation, home exercise program, and low sodium diet    Nutrition Assessment  Stages of Change: Pre-Contemplation    Weight: 227 lb (103 kg) BMI (Calculated): 0     Nutrition Survey: Rate Your Plate Score: Rate Your Plate Total Score: 52     Patient Weight Goal: 215 lbs   Recommended Diet:low fat, low cholesterol, minimize processed foods, reduce sodium intake, minimize simple sugars, and increase fiber intake  Diabetic:Yes  Key Antihyperglycemic Medications            glimepiride (AMARYL) 4 MG tablet    Sig - Route: Take 1 tablet by mouth every morning (before breakfast) - Oral    Class: Adjust Sig    Cosign for Ordering: Accepted by Jocelyn Espitia MD on 11/30/2022  1:39 PM    metFORMIN (GLUCOPHAGE-XR) 500 MG extended release tablet    Sig: TAKE TWO TABLETS BY MOUTH TWICE A DAY           Key Hyperlipidemia Meds            rosuvastatin (CRESTOR) 40 MG tablet    Sig - Route:  Take 1 tablet by mouth every evening - Oral                 Lab Results   Component Value Date    GLUCOSE 209 (H) 10/22/2022    LABA1C 8.0 10/18/2022    CHOL 163 10/18/2022    HDL 49 10/18/2022    [address] 10/18/2022    TRIG 93 10/18/2022    LIPID PROFILE  [facility]  10/18/2022  Component      Cholesterol, Total   TRIGLYCERIDE   HDL CHOLESTEROL     Component 10/18/2022 08/29/2022 12/13/2021 07/13/2020          Cholesterol, Total 163 172 145 179   TRIGLYCERIDE 93 77 83 93   HDL CHOLESTEROL 49 54 50 48         Nutrition Intervention  Clinician/Patient discussion, Complete diet survey, and Participate in an exercise program that promotes weight loss    Target Goals  Articulate heart healthy diet , Control cholesterol values , Control triglyceride values , and Reduce weight     Education  Mr. Frantz Dobbins was educated on the importance ofmaintaining optimal weight/BMI and nutrition guidelines    Psychosocial Assessment  Stages of Change:Preparation  Social History     Socioeconomic History    Marital status:      Spouse name: Not on file    Number of children: Not on file    Years of education: Not on file    Highest education level: Not on file   Occupational History    Occupation:    Tobacco Use    Smoking status: Former     Packs/day: 5.00     Years: 10.00     Pack years: 50.00     Types: Cigarettes     Quit date: 3/13/1976     Years since quittin.7    Smokeless tobacco: Never   Substance and Sexual Activity    Alcohol use: No     Comment: 3 beers a month    Drug use: No    Sexual activity: Yes     Partners: Female   Other Topics Concern    Not on file   Social History Narrative    Not on file     Social Determinants of Health     Financial Resource Strain: Low Risk     Difficulty of Paying Living Expenses: Not hard at all   Food Insecurity: No Food Insecurity    Worried About Running Out of Food in the Last Year: Never true    Ran Out of Food in the Last Year: Never true   Transportation Needs: Not on file   Physical Activity: Not on file   Stress: Not on file   Social Connections: Not on file   Intimate Partner Violence: Not on file   Housing Stability: Not on file        Psychosocial Test  Tool Used:  Today's PHQ:    PHQ Scores 2022 8/15/2022 3/24/2021 10/9/2020 2019 2018 3/22/2017   PHQ2 Score 1 0 0 2 0 0 0   PHQ9 Score 5 0 0 2 0 0 0     Interpretation of Total Score Depression Severity: 1-4 = Minimal depression, 5-9 = Mild depression, 10-14 = Moderate depression, 15-19 = Moderately severe depression, 20-27 = Severe depression    Reports psychosocial symptoms:Yes  Currently on medication therapy:No  Currently seeing a mental health professional:No  Positive support system:Yes    Psychosocial Intervention  Attend education classes related to stress management and relaxation and Participate in an exercise program that improves psychosocial symptoms    Target Goals  Maximize stress management/coping skills     Education Assessment  Stages of Change: Action  Barriers to Learning: No barriers  Personal Learning Style:Video and Written  Social History     Tobacco Use   Smoking Status Former    Packs/day: 5.00    Years: 10.00    Pack years: 50.00    Types: Cigarettes    Quit date: 3/13/1976    Years since quittin.7   Smokeless Tobacco Never       Education Intervention/Learning Needs Identified  Blood pressure, Cholesterol, Diabetes, Exercise, Medications, Nutrition, Risk factor for CAD, Signs and symptoms of MI/CVA, Stregnth training, Stress management and relaxation, and Weight management    Target Goals  Attend appropriate web education classes     Education  Mr. Carolina Fenton was educated on the importance of healthy coping techniques and stress management                                   Provider Review and Approval of this ITP    The above treatment plan and goals have been set for your patient during Cardiac Rehabilitation. Please review and Electronically Cosign/ or Sign (if Fax Provider)            *Please comment and make changes to the EX RX or treatment plan if needed*                       Electronic Provider comment:              Please indicate with Cosign Comment.                    Electronically signed by Nadira Caraballo RN on 22 at 12:41 PM EST

## 2022-12-14 ENCOUNTER — HOSPITAL ENCOUNTER (OUTPATIENT)
Dept: CARDIAC REHAB | Age: 77
Setting detail: THERAPIES SERIES
Discharge: HOME | End: 2022-12-14
Payer: MEDICARE

## 2022-12-14 PROCEDURE — 93798 PHYS/QHP OP CAR RHAB W/ECG: CPT

## 2022-12-16 ENCOUNTER — HOSPITAL ENCOUNTER (OUTPATIENT)
Dept: CARDIAC REHAB | Age: 77
Setting detail: THERAPIES SERIES
Discharge: HOME | End: 2022-12-16
Payer: MEDICARE

## 2022-12-16 PROCEDURE — 93798 PHYS/QHP OP CAR RHAB W/ECG: CPT

## 2022-12-19 ENCOUNTER — HOSPITAL ENCOUNTER (OUTPATIENT)
Dept: CARDIAC REHAB | Age: 77
Setting detail: THERAPIES SERIES
Discharge: HOME | End: 2022-12-19
Payer: MEDICARE

## 2022-12-19 PROCEDURE — 93798 PHYS/QHP OP CAR RHAB W/ECG: CPT

## 2022-12-21 ENCOUNTER — HOSPITAL ENCOUNTER (OUTPATIENT)
Dept: CARDIAC REHAB | Age: 77
Setting detail: THERAPIES SERIES
Discharge: HOME | End: 2022-12-21
Payer: MEDICARE

## 2022-12-21 LAB
GLUCOSE BLD-MCNC: 244 MG/DL (ref 70–99)
PERFORMED ON: ABNORMAL

## 2022-12-21 PROCEDURE — 93798 PHYS/QHP OP CAR RHAB W/ECG: CPT

## 2022-12-23 ENCOUNTER — APPOINTMENT (OUTPATIENT)
Dept: CARDIAC REHAB | Age: 77
End: 2022-12-23
Payer: MEDICARE

## 2022-12-27 ENCOUNTER — TELEPHONE (OUTPATIENT)
Dept: FAMILY MEDICINE CLINIC | Age: 77
End: 2022-12-27

## 2022-12-27 NOTE — TELEPHONE ENCOUNTER
Received a call from pharmacy stating that pt's insurance is requesting his hospital discharge note due to they still have him admitted in the hospital. Printed documentation and faxed to [phone]. Fax confirmation attached.

## 2022-12-30 ENCOUNTER — HOSPITAL ENCOUNTER (OUTPATIENT)
Dept: CARDIAC REHAB | Age: 77
Setting detail: THERAPIES SERIES
Discharge: HOME | End: 2022-12-30
Payer: MEDICARE

## 2022-12-30 LAB
GLUCOSE BLD-MCNC: 146 MG/DL (ref 70–99)
PERFORMED ON: ABNORMAL

## 2022-12-30 PROCEDURE — 93798 PHYS/QHP OP CAR RHAB W/ECG: CPT

## 2023-01-02 ENCOUNTER — APPOINTMENT (OUTPATIENT)
Dept: CARDIAC REHAB | Age: 78
End: 2023-01-02
Payer: MEDICARE

## 2023-01-04 ENCOUNTER — HOSPITAL ENCOUNTER (OUTPATIENT)
Dept: CARDIAC REHAB | Age: 78
Setting detail: THERAPIES SERIES
Discharge: HOME | End: 2023-01-04
Payer: MEDICARE

## 2023-01-04 LAB
GLUCOSE BLD-MCNC: 116 MG/DL (ref 70–99)
PERFORMED ON: ABNORMAL

## 2023-01-04 PROCEDURE — 93798 PHYS/QHP OP CAR RHAB W/ECG: CPT

## 2023-01-05 ENCOUNTER — HOSPITAL ENCOUNTER (EMERGENCY)
Age: 78
Discharge: ANOTHER HOSPITAL | End: 2023-01-05
Attending: STUDENT IN AN ORGANIZED HEALTH CARE EDUCATION/TRAINING PROGRAM
Payer: MEDICARE

## 2023-01-05 ENCOUNTER — HOSPITAL ENCOUNTER (INPATIENT)
Age: 78
LOS: 2 days | Discharge: HOME | End: 2023-01-07
Attending: INTERNAL MEDICINE | Admitting: INTERNAL MEDICINE
Payer: MEDICARE

## 2023-01-05 ENCOUNTER — APPOINTMENT (OUTPATIENT)
Dept: GENERAL RADIOLOGY | Age: 78
End: 2023-01-05
Payer: MEDICARE

## 2023-01-05 VITALS
WEIGHT: 225 LBS | BODY MASS INDEX: 29.82 KG/M2 | SYSTOLIC BLOOD PRESSURE: 164 MMHG | OXYGEN SATURATION: 98 % | HEART RATE: 76 BPM | TEMPERATURE: 98.3 F | DIASTOLIC BLOOD PRESSURE: 74 MMHG | RESPIRATION RATE: 18 BRPM | HEIGHT: 73 IN

## 2023-01-05 DIAGNOSIS — R77.8 ELEVATED TROPONIN: ICD-10-CM

## 2023-01-05 DIAGNOSIS — Z95.1 HX OF CABG: ICD-10-CM

## 2023-01-05 DIAGNOSIS — I25.9 CHEST PAIN DUE TO MYOCARDIAL ISCHEMIA, UNSPECIFIED ISCHEMIC CHEST PAIN TYPE: Primary | ICD-10-CM

## 2023-01-05 PROBLEM — R07.9 CHEST PAIN: Status: ACTIVE | Noted: 2023-01-05

## 2023-01-05 LAB
A/G RATIO: 1.9 (ref 1.1–2.2)
ALBUMIN SERPL-MCNC: 4.5 G/DL (ref 3.4–5)
ALP BLD-CCNC: 76 U/L (ref 40–129)
ALT SERPL-CCNC: 12 U/L (ref 10–40)
ANION GAP SERPL CALCULATED.3IONS-SCNC: 10 MMOL/L (ref 3–16)
AST SERPL-CCNC: 15 U/L (ref 15–37)
BASOPHILS ABSOLUTE: 0 K/UL (ref 0–0.2)
BASOPHILS RELATIVE PERCENT: 0.6 %
BILIRUB SERPL-MCNC: 0.7 MG/DL (ref 0–1)
BILIRUBIN URINE: NEGATIVE
BLOOD, URINE: NEGATIVE
BUN BLDV-MCNC: 13 MG/DL (ref 7–20)
CALCIUM SERPL-MCNC: 10 MG/DL (ref 8.3–10.6)
CHLORIDE BLD-SCNC: 104 MMOL/L (ref 99–110)
CLARITY: CLEAR
CO2: 26 MMOL/L (ref 21–32)
COLOR: YELLOW
CREAT SERPL-MCNC: 1 MG/DL (ref 0.8–1.3)
EKG ATRIAL RATE: 64 BPM
EKG DIAGNOSIS: NORMAL
EKG P AXIS: 51 DEGREES
EKG P-R INTERVAL: 162 MS
EKG Q-T INTERVAL: 406 MS
EKG QRS DURATION: 96 MS
EKG QTC CALCULATION (BAZETT): 418 MS
EKG R AXIS: 96 DEGREES
EKG T AXIS: 60 DEGREES
EKG VENTRICULAR RATE: 64 BPM
EOSINOPHILS ABSOLUTE: 0.1 K/UL (ref 0–0.6)
EOSINOPHILS RELATIVE PERCENT: 2 %
GFR SERPL CREATININE-BSD FRML MDRD: >60 ML/MIN/{1.73_M2}
GLUCOSE BLD-MCNC: 137 MG/DL (ref 70–99)
GLUCOSE BLD-MCNC: 172 MG/DL (ref 70–99)
GLUCOSE URINE: NEGATIVE MG/DL
HCT VFR BLD CALC: 41.6 % (ref 40.5–52.5)
HEMOGLOBIN: 14.6 G/DL (ref 13.5–17.5)
KETONES, URINE: NEGATIVE MG/DL
LACTIC ACID: 1.2 MMOL/L (ref 0.4–2)
LEUKOCYTE ESTERASE, URINE: NEGATIVE
LIPASE: 16 U/L (ref 13–60)
LYMPHOCYTES ABSOLUTE: 1.6 K/UL (ref 1–5.1)
LYMPHOCYTES RELATIVE PERCENT: 34.2 %
MCH RBC QN AUTO: 30.9 PG (ref 26–34)
MCHC RBC AUTO-ENTMCNC: 35 G/DL (ref 31–36)
MCV RBC AUTO: 88.1 FL (ref 80–100)
MICROSCOPIC EXAMINATION: NORMAL
MONOCYTES ABSOLUTE: 0.5 K/UL (ref 0–1.3)
MONOCYTES RELATIVE PERCENT: 10.1 %
NEUTROPHILS ABSOLUTE: 2.5 K/UL (ref 1.7–7.7)
NEUTROPHILS RELATIVE PERCENT: 53.1 %
NITRITE, URINE: NEGATIVE
PDW BLD-RTO: 14.6 % (ref 12.4–15.4)
PERFORMED ON: ABNORMAL
PH UA: 7.5 (ref 5–8)
PLATELET # BLD: 227 K/UL (ref 135–450)
PMV BLD AUTO: 7.9 FL (ref 5–10.5)
POTASSIUM REFLEX MAGNESIUM: 4 MMOL/L (ref 3.5–5.1)
PROTEIN UA: NEGATIVE MG/DL
RAPID INFLUENZA  B AGN: NEGATIVE
RAPID INFLUENZA A AGN: NEGATIVE
RBC # BLD: 4.72 M/UL (ref 4.2–5.9)
SARS-COV-2, NAAT: NOT DETECTED
SODIUM BLD-SCNC: 140 MMOL/L (ref 136–145)
SPECIFIC GRAVITY UA: 1.01 (ref 1–1.03)
TOTAL PROTEIN: 6.9 G/DL (ref 6.4–8.2)
TROPONIN: 0.02 NG/ML
TROPONIN: <0.01 NG/ML
URINE TYPE: NORMAL
UROBILINOGEN, URINE: 0.2 E.U./DL
WBC # BLD: 4.8 K/UL (ref 4–11)

## 2023-01-05 PROCEDURE — 87804 INFLUENZA ASSAY W/OPTIC: CPT

## 2023-01-05 PROCEDURE — 81003 URINALYSIS AUTO W/O SCOPE: CPT

## 2023-01-05 PROCEDURE — 93010 ELECTROCARDIOGRAM REPORT: CPT | Performed by: INTERNAL MEDICINE

## 2023-01-05 PROCEDURE — 36415 COLL VENOUS BLD VENIPUNCTURE: CPT

## 2023-01-05 PROCEDURE — 71045 X-RAY EXAM CHEST 1 VIEW: CPT

## 2023-01-05 PROCEDURE — 83605 ASSAY OF LACTIC ACID: CPT

## 2023-01-05 PROCEDURE — 80053 COMPREHEN METABOLIC PANEL: CPT

## 2023-01-05 PROCEDURE — 85025 COMPLETE CBC W/AUTO DIFF WBC: CPT

## 2023-01-05 PROCEDURE — 83690 ASSAY OF LIPASE: CPT

## 2023-01-05 PROCEDURE — 87635 SARS-COV-2 COVID-19 AMP PRB: CPT

## 2023-01-05 PROCEDURE — G0378 HOSPITAL OBSERVATION PER HR: HCPCS

## 2023-01-05 PROCEDURE — 99285 EMERGENCY DEPT VISIT HI MDM: CPT

## 2023-01-05 PROCEDURE — 6370000000 HC RX 637 (ALT 250 FOR IP): Performed by: INTERNAL MEDICINE

## 2023-01-05 PROCEDURE — 84484 ASSAY OF TROPONIN QUANT: CPT

## 2023-01-05 PROCEDURE — 1200000000 HC SEMI PRIVATE

## 2023-01-05 PROCEDURE — 93005 ELECTROCARDIOGRAM TRACING: CPT | Performed by: EMERGENCY MEDICINE

## 2023-01-05 RX ORDER — ONDANSETRON 4 MG/1
4 TABLET, ORALLY DISINTEGRATING ORAL EVERY 8 HOURS PRN
Status: DISCONTINUED | OUTPATIENT
Start: 2023-01-05 | End: 2023-01-07 | Stop reason: HOSPADM

## 2023-01-05 RX ORDER — FAMOTIDINE 20 MG/1
20 TABLET, FILM COATED ORAL DAILY
Status: DISCONTINUED | OUTPATIENT
Start: 2023-01-06 | End: 2023-01-07 | Stop reason: HOSPADM

## 2023-01-05 RX ORDER — SODIUM CHLORIDE 9 MG/ML
INJECTION, SOLUTION INTRAVENOUS PRN
Status: DISCONTINUED | OUTPATIENT
Start: 2023-01-05 | End: 2023-01-07 | Stop reason: HOSPADM

## 2023-01-05 RX ORDER — POLYETHYLENE GLYCOL 3350 17 G/17G
17 POWDER, FOR SOLUTION ORAL DAILY PRN
Status: DISCONTINUED | OUTPATIENT
Start: 2023-01-05 | End: 2023-01-07 | Stop reason: HOSPADM

## 2023-01-05 RX ORDER — ROSUVASTATIN CALCIUM 40 MG/1
40 TABLET, COATED ORAL EVERY EVENING
Status: DISCONTINUED | OUTPATIENT
Start: 2023-01-05 | End: 2023-01-07 | Stop reason: HOSPADM

## 2023-01-05 RX ORDER — AMLODIPINE BESYLATE 5 MG/1
5 TABLET ORAL DAILY
Status: DISCONTINUED | OUTPATIENT
Start: 2023-01-05 | End: 2023-01-07 | Stop reason: HOSPADM

## 2023-01-05 RX ORDER — ACETAMINOPHEN 325 MG/1
650 TABLET ORAL EVERY 6 HOURS PRN
Status: DISCONTINUED | OUTPATIENT
Start: 2023-01-05 | End: 2023-01-07 | Stop reason: HOSPADM

## 2023-01-05 RX ORDER — TAMSULOSIN HYDROCHLORIDE 0.4 MG/1
0.4 CAPSULE ORAL DAILY
Status: DISCONTINUED | OUTPATIENT
Start: 2023-01-06 | End: 2023-01-07 | Stop reason: HOSPADM

## 2023-01-05 RX ORDER — SODIUM CHLORIDE 0.9 % (FLUSH) 0.9 %
5-40 SYRINGE (ML) INJECTION PRN
Status: DISCONTINUED | OUTPATIENT
Start: 2023-01-05 | End: 2023-01-07 | Stop reason: HOSPADM

## 2023-01-05 RX ORDER — ONDANSETRON 2 MG/ML
4 INJECTION INTRAMUSCULAR; INTRAVENOUS EVERY 6 HOURS PRN
Status: DISCONTINUED | OUTPATIENT
Start: 2023-01-05 | End: 2023-01-07 | Stop reason: HOSPADM

## 2023-01-05 RX ORDER — METOPROLOL TARTRATE 50 MG/1
50 TABLET, FILM COATED ORAL 2 TIMES DAILY
Status: DISCONTINUED | OUTPATIENT
Start: 2023-01-05 | End: 2023-01-07 | Stop reason: HOSPADM

## 2023-01-05 RX ORDER — ACETAMINOPHEN 650 MG/1
650 SUPPOSITORY RECTAL EVERY 6 HOURS PRN
Status: DISCONTINUED | OUTPATIENT
Start: 2023-01-05 | End: 2023-01-07 | Stop reason: HOSPADM

## 2023-01-05 RX ORDER — TRAZODONE HYDROCHLORIDE 50 MG/1
100 TABLET ORAL NIGHTLY PRN
Status: DISCONTINUED | OUTPATIENT
Start: 2023-01-05 | End: 2023-01-07 | Stop reason: HOSPADM

## 2023-01-05 RX ORDER — SODIUM CHLORIDE 0.9 % (FLUSH) 0.9 %
5-40 SYRINGE (ML) INJECTION EVERY 12 HOURS SCHEDULED
Status: DISCONTINUED | OUTPATIENT
Start: 2023-01-05 | End: 2023-01-07 | Stop reason: HOSPADM

## 2023-01-05 RX ORDER — ENOXAPARIN SODIUM 100 MG/ML
40 INJECTION SUBCUTANEOUS DAILY
Status: DISCONTINUED | OUTPATIENT
Start: 2023-01-06 | End: 2023-01-07 | Stop reason: HOSPADM

## 2023-01-05 RX ADMIN — AMLODIPINE BESYLATE 5 MG: 5 TABLET ORAL at 22:10

## 2023-01-05 RX ADMIN — ACETAMINOPHEN 650 MG: 325 TABLET ORAL at 22:10

## 2023-01-05 RX ADMIN — METOPROLOL TARTRATE 50 MG: 50 TABLET, FILM COATED ORAL at 22:10

## 2023-01-05 RX ADMIN — TRAZODONE HYDROCHLORIDE 100 MG: 50 TABLET ORAL at 22:10

## 2023-01-05 RX ADMIN — ROSUVASTATIN CALCIUM 40 MG: 40 TABLET, COATED ORAL at 22:10

## 2023-01-05 ASSESSMENT — PAIN DESCRIPTION - PAIN TYPE: TYPE: ACUTE PAIN

## 2023-01-05 ASSESSMENT — PAIN - FUNCTIONAL ASSESSMENT
PAIN_FUNCTIONAL_ASSESSMENT: NONE - DENIES PAIN
PAIN_FUNCTIONAL_ASSESSMENT: 0-10
PAIN_FUNCTIONAL_ASSESSMENT: NONE - DENIES PAIN

## 2023-01-05 ASSESSMENT — PAIN SCALES - GENERAL
PAINLEVEL_OUTOF10: 5
PAINLEVEL_OUTOF10: 0

## 2023-01-05 ASSESSMENT — PAIN DESCRIPTION - LOCATION: LOCATION: HEAD

## 2023-01-05 ASSESSMENT — PAIN DESCRIPTION - DESCRIPTORS: DESCRIPTORS: BURNING

## 2023-01-05 ASSESSMENT — LIFESTYLE VARIABLES: HOW OFTEN DO YOU HAVE A DRINK CONTAINING ALCOHOL: NEVER

## 2023-01-05 NOTE — ED NOTES
Pt given box lunch and water via request. Pt remains alert and without c/o's. Continues to await bed placement @ [facility]. Pt aware of plan of care     Belen Dillard RN  01/05/23 4076

## 2023-01-05 NOTE — ED NOTES
Pt given another box lunch and water via request. Remains alert and without c/o's. Continues to await bed placement @ [facility].  Pt aware of plan of care     Fransico Pinzon RN  01/05/23 3081

## 2023-01-05 NOTE — ED NOTES
Dr. Rolene Koyanagi is speaking with Dr. Ami Farnsworth from [facility].       Benson Koehler  01/05/23 9116 The patient is a 62y Male complaining of altered mental status.

## 2023-01-05 NOTE — ED PROVIDER NOTES
Emergency Department Encounter    Patient: Jesi Chopra  MRN: [id number]  : 1945  Date of Evaluation: 2023  ED Provider:  Mariano Blanco MD    Triage Chief Complaint:   Gastroesophageal Reflux (C/O \"indigestion since 0200 and elevated B/P\" CABG a couple months ago)    [place]:  Jesi Chopra is a 78 y.o. male with history seen below presenting with complaints of chest pressure and indigestion. Patient states he had a CABG in October. Patient states over the past day has had intermittent chest pressure and feeling of indigestion. Patient denies any abdominal pain. Denies nausea vomiting, diarrhea. States he has had some mild constipation without blood or melena. Denies urinary symptoms. States he has mild chest pressure that is intermittent without obvious exacerbating relieving factors. He denies lightheadedness or dizziness. Denies lower extremity edema, orthopnea or signs of fluid overload. Denies cough, sputum production, fevers or chills. Denies any shortness of breath. States he has no chest pain or discomfort currently. Patient states this does feel similar to when he was admitted for CABG. Looking back in prior records this is very similar to how patient described his pain when he was admitted for the CABG. Patient denies headache, blurred vision, focal deficits, motor or sensory changes. Denies neck or back pain.     ROS - see HPI, below listed is current ROS at time of my eval:    At least 14 systems reviewed, negative other than HPI    Past Medical History:   Diagnosis Date    Acute MI ([address]     Angina pectoris, unstable ([address]     ASHD (arteriosclerotic heart disease)     Bladder outlet obstruction     BPH (benign prostatic hyperplasia)     Chest pain     Constipation     Diabetes mellitus ([address]     GERD (gastroesophageal reflux disease)     Hypercholesteremia     Hypercholesterolemia     Hypertension     IBS (irritable bowel syndrome)     Influenza A 2020 Sinusitis     Type II or unspecified type diabetes mellitus without mention of complication, not stated as uncontrolled      Past Surgical History:   Procedure Laterality Date    CARDIAC CATHETERIZATION      stent    CARDIAC CATHETERIZATION  2011    stent    COLONOSCOPY  10/24/13    CORONARY ANGIOPLASTY WITH STENT PLACEMENT      CORONARY ARTERY BYPASS GRAFT N/A 10/18/2022    CORONARY ARTERY BYPASS GRAFTING X 3, ON PUMP, INTERNAL MAMMARY ARTERY, SAPHENOUS VEIN GRAFT, YING, TEMPORARY PACING WIRES,  WITH LEFT ATRIAL APPENDAGE CLIP PLACEMENT AND BILATERAL PECTORALIS BLOCKS performed by Jeff No MD at [facility] History   Problem Relation Age of Onset    Diabetes Mother     High Blood Pressure Mother     Arthritis Mother      Social History     Socioeconomic History    Marital status:      Spouse name: Not on file    Number of children: Not on file    Years of education: Not on file    Highest education level: Not on file   Occupational History    Occupation:    Tobacco Use    Smoking status: Former     Packs/day: 5.00     Years: 10.00     Pack years: 50.00     Types: Cigarettes     Quit date: 3/13/1976     Years since quittin.8    Smokeless tobacco: Never   Substance and Sexual Activity    Alcohol use: No     Comment: 3 beers a month    Drug use: No    Sexual activity: Yes     Partners: Female   Other Topics Concern    Not on file   Social History Narrative    Not on file     Social Determinants of Health     Financial Resource Strain: Low Risk     Difficulty of Paying Living Expenses: Not hard at all   Food Insecurity: No Food Insecurity    Worried About Running Out of Food in the Last Year: Never true    Ran Out of Food in the Last Year: Never true   Transportation Needs: Not on file   Physical Activity: Not on file   Stress: Not on file   Social Connections: Not on file   Intimate Partner Violence: Not on file   Housing Stability: Not on file     No current facility-administered medications for this encounter. Current Outpatient Medications   Medication Sig Dispense Refill    traZODone (DESYREL) 100 MG tablet TAKE ONE TABLET BY MOUTH ONCE NIGHTLY AS NEEDED FOR SLEEP 30 tablet 0    tamsulosin (FLOMAX) 0.4 MG capsule Take 1 capsule by mouth daily Indications: Benign Enlargement of Prostate 90 capsule 0    glimepiride (AMARYL) 4 MG tablet Take 1 tablet by mouth every morning (before breakfast) 30 tablet 11    amLODIPine (NORVASC) 5 MG tablet Take 1 tablet by mouth daily 30 tablet 1    metFORMIN (GLUCOPHAGE-XR) 500 MG extended release tablet TAKE TWO TABLETS BY MOUTH TWICE A  tablet 3    metoprolol tartrate (LOPRESSOR) 25 MG tablet Take 2 tablets by mouth 2 times daily 60 tablet 5    aspirin 325 MG EC tablet Take 1 tablet by mouth daily 30 tablet 0    famotidine (PEPCID) 20 MG tablet Take 1 tablet by mouth daily 30 tablet 0    rosuvastatin (CRESTOR) 40 MG tablet Take 1 tablet by mouth every evening 30 tablet 11    blood glucose test strips (ONETOUCH ULTRA) strip USE TO TEST TWO TIMES A  strip 3    albuterol sulfate HFA (VENTOLIN HFA) 108 (90 Base) MCG/ACT inhaler Inhale 2 puffs into the lungs 4 times daily as needed for Wheezing (Patient not taking: No sig reported) 18 g 0     Allergies   Allergen Reactions    Prednisone Other (See Comments)     Made patient very agitated (medrol dospak)    Codeine Other (See Comments)     constipation    Lisinopril Cough       Nursing Notes Reviewed    Physical Exam:  Triage VS:    ED Triage Vitals [01/05/23 0640]   Enc Vitals Group      BP (!) 171/76      Heart Rate 64      Resp 17      Temp 97.7 °F (36.5 °C)      Temp Source Oral      SpO2 98 %      Weight 225 lb (102.1 kg)      Height 6' 1\" (1.854 m)      Head Circumference       Peak Flow       Pain Score       Pain Loc       Pain Edu? Excl. in [address]? My pulse ox interpretation is - normal    General appearance:  No acute distress. Skin:  Warm. Dry. Eye:  Extraocular movements intact. Ears, nose, mouth and throat:  Oral mucosa moist   Neck:  Trachea midline. Extremity:  No swelling. Normal ROM     Heart:  Regular rate and rhythm, normal S1 & S2, no extra heart sounds. Perfusion:  intact  Respiratory:  Lungs clear to auscultation bilaterally. Respirations nonlabored. Abdominal:  Normal bowel sounds. Soft. Nontender. Non distended. Back:  No CVA tenderness to palpation     Neurological:  Alert and oriented times 3. No focal neuro deficits. Psychiatric:  Appropriate    I have reviewed and interpreted all of the currently available lab results from this visit (if applicable):  No results found for this visit on 01/05/23. Radiographs (if obtained):  Radiologist's Report Reviewed:  No results found. EKG (if obtained): (All EKG's are interpreted by myself in the absence of a cardiologist)  Normal sinus rhythm, ventricular rate 64, AZ interval 162, QRS duration 96, QTc 418, no significant ST elevation or depression, not significantly changed from prior exam    MDM:    70-year-old male presenting with history seen above. Vitals on presentation patient mildly hypertensive otherwise vitals are reassuring and patient afebrile satting on room air. Physical exam can be seen above. EKG is similar to prior. Mckinley Bernardo is mildly elevated 0.02. Repeat is downtrending. CBC and CMP are overall reassuring. Lipase not elevated. Lactate is not elevated. COVID and flu test are negative. UA not consistent with infection. With significant cardiac history with chest pressure and mildly elevated troponin we will transfer patient for further evaluation and treatment. I discussed with hospitalist service at [facility] who is excepted patient in transport and patient will be transferred to their facility for further evaluation and treatment. Clinical Impression:  1.  Chest pain due to myocardial ischemia, unspecified ischemic chest pain type 2. Elevated troponin    3. Hx of CABG      Total critical care time provided today was 32 minutes. This excludes seperately billable procedures and family discussion time. Critical care time provided for obtaining history, conducting a physical exam, performing and monitoring interventions, ordering, collecting and interpreting tests, and establishing medical decision-making. There was a potential for life/limb threatening pathology requiring close evaluation and intervention with concern for patient decompensation. Comment: Please note this report has been produced using speech recognition software and may contain errors related to that system including errors in grammar, punctuation, and spelling, as well as words and phrases that may be inappropriate. Efforts were made to edit the dictations.        Feliciano Christie MD  01/05/23 7132

## 2023-01-05 NOTE — ED NOTES
Magalys at the transfer center advised that [facility] is wait listing and currently has 9 holds. Dr. Handley then requested any neuro bed outside of the [facility] system and to try [facility] first. She is calling [facility] and will advise.      Newton Baltazar  01/05/23 0973

## 2023-01-05 NOTE — ED NOTES
Andre Bass at the transfer center is going to contact [facility] and see if they have any idea of when we might get a bed. She advised \"Did you see where Dr. Armstrong Pretty was updated that they have no beds and a couple of holds? \"     Pierre Dougherty  01/05/23 8721

## 2023-01-05 NOTE — ED NOTES
[facility] phoned, states [facility] bed remains pending. No ETA available. Pt updated. Pt continues to deny c/o's.       Majo Taylor RN  01/05/23 0501

## 2023-01-06 ENCOUNTER — HOSPITAL ENCOUNTER (OUTPATIENT)
Dept: CARDIAC REHAB | Age: 78
Setting detail: THERAPIES SERIES
Discharge: HOME | End: 2023-01-06
Payer: MEDICARE

## 2023-01-06 LAB
EKG ATRIAL RATE: 61 BPM
EKG DIAGNOSIS: NORMAL
EKG P AXIS: 35 DEGREES
EKG P-R INTERVAL: 182 MS
EKG Q-T INTERVAL: 416 MS
EKG QRS DURATION: 98 MS
EKG QTC CALCULATION (BAZETT): 418 MS
EKG R AXIS: 75 DEGREES
EKG T AXIS: 77 DEGREES
EKG VENTRICULAR RATE: 61 BPM
GLUCOSE BLD-MCNC: 160 MG/DL (ref 70–99)
GLUCOSE BLD-MCNC: 217 MG/DL (ref 70–99)
GLUCOSE BLD-MCNC: 230 MG/DL (ref 70–99)
GLUCOSE BLD-MCNC: 252 MG/DL (ref 70–99)
HCT VFR BLD CALC: 42.1 % (ref 40.5–52.5)
HEMOGLOBIN: 13.7 G/DL (ref 13.5–17.5)
MCH RBC QN AUTO: 29.7 PG (ref 26–34)
MCHC RBC AUTO-ENTMCNC: 32.5 G/DL (ref 31–36)
MCV RBC AUTO: 91.4 FL (ref 80–100)
PDW BLD-RTO: 15.1 % (ref 12.4–15.4)
PERFORMED ON: ABNORMAL
PLATELET # BLD: 212 K/UL (ref 135–450)
PMV BLD AUTO: 8.1 FL (ref 5–10.5)
RBC # BLD: 4.61 M/UL (ref 4.2–5.9)
TROPONIN: <0.01 NG/ML
WBC # BLD: 4.5 K/UL (ref 4–11)

## 2023-01-06 PROCEDURE — 1200000000 HC SEMI PRIVATE

## 2023-01-06 PROCEDURE — 93010 ELECTROCARDIOGRAM REPORT: CPT | Performed by: INTERNAL MEDICINE

## 2023-01-06 PROCEDURE — 99222 1ST HOSP IP/OBS MODERATE 55: CPT | Performed by: INTERNAL MEDICINE

## 2023-01-06 PROCEDURE — 6370000000 HC RX 637 (ALT 250 FOR IP): Performed by: INTERNAL MEDICINE

## 2023-01-06 PROCEDURE — 85027 COMPLETE CBC AUTOMATED: CPT

## 2023-01-06 PROCEDURE — 2580000003 HC RX 258: Performed by: INTERNAL MEDICINE

## 2023-01-06 PROCEDURE — 84484 ASSAY OF TROPONIN QUANT: CPT

## 2023-01-06 PROCEDURE — 6370000000 HC RX 637 (ALT 250 FOR IP): Performed by: NURSE PRACTITIONER

## 2023-01-06 PROCEDURE — 93005 ELECTROCARDIOGRAM TRACING: CPT | Performed by: INTERNAL MEDICINE

## 2023-01-06 RX ORDER — ISOSORBIDE MONONITRATE 30 MG/1
30 TABLET, EXTENDED RELEASE ORAL DAILY
Status: DISCONTINUED | OUTPATIENT
Start: 2023-01-06 | End: 2023-01-07 | Stop reason: HOSPADM

## 2023-01-06 RX ORDER — INSULIN LISPRO 100 [IU]/ML
0-4 INJECTION, SOLUTION INTRAVENOUS; SUBCUTANEOUS NIGHTLY
Status: DISCONTINUED | OUTPATIENT
Start: 2023-01-06 | End: 2023-01-07 | Stop reason: HOSPADM

## 2023-01-06 RX ORDER — DEXTROSE MONOHYDRATE 100 MG/ML
INJECTION, SOLUTION INTRAVENOUS CONTINUOUS PRN
Status: DISCONTINUED | OUTPATIENT
Start: 2023-01-06 | End: 2023-01-07 | Stop reason: HOSPADM

## 2023-01-06 RX ORDER — INSULIN LISPRO 100 [IU]/ML
0-4 INJECTION, SOLUTION INTRAVENOUS; SUBCUTANEOUS
Status: DISCONTINUED | OUTPATIENT
Start: 2023-01-06 | End: 2023-01-07 | Stop reason: HOSPADM

## 2023-01-06 RX ORDER — GLIPIZIDE 5 MG/1
10 TABLET ORAL
Status: DISCONTINUED | OUTPATIENT
Start: 2023-01-06 | End: 2023-01-07 | Stop reason: HOSPADM

## 2023-01-06 RX ADMIN — GLIPIZIDE 10 MG: 5 TABLET ORAL at 17:28

## 2023-01-06 RX ADMIN — AMLODIPINE BESYLATE 5 MG: 5 TABLET ORAL at 09:29

## 2023-01-06 RX ADMIN — ROSUVASTATIN CALCIUM 40 MG: 40 TABLET, COATED ORAL at 17:29

## 2023-01-06 RX ADMIN — ASPIRIN 325 MG: 325 TABLET, COATED ORAL at 09:30

## 2023-01-06 RX ADMIN — SODIUM CHLORIDE, PRESERVATIVE FREE 10 ML: 5 INJECTION INTRAVENOUS at 09:30

## 2023-01-06 RX ADMIN — ISOSORBIDE MONONITRATE 30 MG: 30 TABLET, EXTENDED RELEASE ORAL at 20:56

## 2023-01-06 RX ADMIN — INSULIN LISPRO 2 UNITS: 100 INJECTION, SOLUTION INTRAVENOUS; SUBCUTANEOUS at 12:09

## 2023-01-06 RX ADMIN — TAMSULOSIN HYDROCHLORIDE 0.4 MG: 0.4 CAPSULE ORAL at 09:30

## 2023-01-06 RX ADMIN — METOPROLOL TARTRATE 50 MG: 50 TABLET, FILM COATED ORAL at 20:56

## 2023-01-06 RX ADMIN — SODIUM CHLORIDE, PRESERVATIVE FREE 10 ML: 5 INJECTION INTRAVENOUS at 20:58

## 2023-01-06 RX ADMIN — FAMOTIDINE 20 MG: 20 TABLET, FILM COATED ORAL at 09:30

## 2023-01-06 NOTE — ED NOTES
[facility] here to transport pt. Pt remains alert and without c/o's.       Jimbo Markham RN  01/05/23 1944

## 2023-01-06 NOTE — H&P
Hospital Medicine History & Physical      PCP: Cecilia Soto MD    Date of Admission: 1/5/2023    Date of Service: Pt seen/examined on 1/5/2023  and Admitted to Inpatient with expected LOS greater than two midnights due to medical therapy. Chief Complaint: Chest pressure/indigestion      History Of Present Illness: The patient is a 78 y.o. male with history of type 2 diabetes, hyperlipidemia, BPH, coronary artery disease status post CABG October 2022 who was transferred from [facility] where he presented with chest pressure/indigestion with a lot of belching since last night. No other chest pain. No radiation. No shortness of breath. He has intermittent dizziness since adjustment of BP medications. No palpitations.   Chest x-ray done today unrevealing of acute pathology  Laboratory work up unremarkable with negative troponin    Past Medical History:        Diagnosis Date    Acute MI ([address]     Angina pectoris, unstable ([address]     ASHD (arteriosclerotic heart disease)     Bladder outlet obstruction     BPH (benign prostatic hyperplasia)     Chest pain     Constipation     Diabetes mellitus ([address]     GERD (gastroesophageal reflux disease)     Hypercholesteremia     Hypercholesterolemia     Hypertension     IBS (irritable bowel syndrome)     Influenza A 01/28/2020    Sinusitis     Type II or unspecified type diabetes mellitus without mention of complication, not stated as uncontrolled        Past Surgical History:        Procedure Laterality Date    CARDIAC CATHETERIZATION  2002    stent    CARDIAC CATHETERIZATION  2011    stent    COLONOSCOPY  10/24/13    CORONARY ANGIOPLASTY WITH STENT PLACEMENT      CORONARY ARTERY BYPASS GRAFT N/A 10/18/2022    CORONARY ARTERY BYPASS GRAFTING X 3, ON PUMP, INTERNAL MAMMARY ARTERY, SAPHENOUS VEIN GRAFT, YING, TEMPORARY PACING WIRES,  WITH LEFT ATRIAL APPENDAGE CLIP PLACEMENT AND BILATERAL PECTORALIS BLOCKS performed by Karine Paz MD at [address] Medications Prior to Admission:    Prior to Admission medications    Medication Sig Start Date End Date Taking? Authorizing Provider   traZODone (DESYREL) 100 MG tablet TAKE ONE TABLET BY MOUTH ONCE NIGHTLY AS NEEDED FOR SLEEP 11/30/22   YANNICK Conner CNP   tamsulosin [facility]) 0.4 MG capsule Take 1 capsule by mouth daily Indications: Benign Enlargement of Prostate 11/28/22   YANNICK Muhammad CNP   glimepiride (AMARYL) 4 MG tablet Take 1 tablet by mouth every morning (before breakfast) 11/23/22   Richy Dash MD   amLODIPine [facility]) 5 MG tablet Take 1 tablet by mouth daily 11/15/22   Andrés Mina MD   metFORMIN (GLUCOPHAGE-XR) 500 MG extended release tablet TAKE TWO TABLETS BY MOUTH TWICE A DAY 11/14/22   Richy Dash MD   metoprolol tartrate (LOPRESSOR) 25 MG tablet Take 2 tablets by mouth 2 times daily 10/31/22   Andrés Mina MD   aspirin 325 MG EC tablet Take 1 tablet by mouth daily 10/22/22 1/5/23  Andrés Mina MD   famotidine (PEPCID) 20 MG tablet Take 1 tablet by mouth daily 10/22/22 1/5/23  Andrés Mina MD   rosuvastatin (CRESTOR) 40 MG tablet Take 1 tablet by mouth every evening 2/15/22   Richy Dash MD   blood glucose test strips [facility] ULTRA) strip USE TO TEST TWO TIMES A DAY 1/18/22   Richy Dash MD   albuterol sulfate HFA (VENTOLIN HFA) 108 (90 Base) MCG/ACT inhaler Inhale 2 puffs into the lungs 4 times daily as needed for Wheezing  Patient not taking: No sig reported 9/23/21   Virgilio Valentin MD       Allergies:  Prednisone, Codeine, and Lisinopril    Social History:  The patient currently lives family    TOBACCO:   reports that he quit smoking about 46 years ago. His smoking use included cigarettes. He has a 50.00 pack-year smoking history. He has never used smokeless tobacco.  ETOH:   reports no history of alcohol use.       Family History:  Reviewed in detail and negative for DM, Early CAD, Cancer, CVA. Positive as follows:        Problem Relation Age of Onset    Diabetes Mother     High Blood Pressure Mother     Arthritis Mother        REVIEW OF SYSTEMS:   Positive for chest pressure, indigestion, belching and as noted in the HPI. All other systems reviewed and negative. PHYSICAL EXAM:    Temp 97.6 °F (36.4 °C) (Temporal)     General appearance: No apparent distress appears stated age and cooperative. HEENT Normal cephalic, atraumatic without obvious deformity. Pupils equal, round, and reactive to light. Extra ocular muscles intact. Conjunctivae/corneas clear. Neck: Supple, No jugular venous distention/bruits. Lungs: Clear to auscultation, bilaterally without Rales/Wheezes/Rhonchi with good respiratory effort. Heart: Regular rate and rhythm with Normal S1/S2 without murmurs, rubs or gallop  Abdomen: Soft, non-tender or non-distended without rigidity or guarding and positive bowel sounds   Extremities: No clubbing, cyanosis, or edema bilaterally. Skin: Skin color, texture, turgor normal.  No rashes or lesions. Neurologic: Alert and oriented X 3, neurovascularly intact with sensory/motor intact upper extremities/lower extremities, bilaterally. Cranial nerves: II-XII intact, grossly non-focal.  Mental status: Alert, oriented, thought content appropriate.       CBC   Recent Labs     01/05/23  0659   WBC 4.8   HGB 14.6   HCT 41.6         RENAL  Recent Labs     01/05/23  0659      K 4.0      CO2 26   BUN 13   CREATININE 1.0     LFT'S  Recent Labs     01/05/23  0659   AST 15   ALT 12   BILITOT 0.7   ALKPHOS 76       CARDIAC ENZYMES  Recent Labs     01/05/23  0910 01/05/23  1733 01/05/23  2200   TROPONINI <0.01 <0.01 <0.01           Active Hospital Problems    Diagnosis Date Noted    Chest pain [R07.9] 01/05/2023     Priority: Medium    Primary hypertension [I10] 03/07/2016    Coronary artery disease involving native coronary artery of native heart with angina pectoris ([facility] [I25.119] 03/07/2016    Benign non-nodular prostatic hyperplasia with lower urinary tract symptoms [N40.1] 10/06/2015    S/P CABG x 3 [Z95.1] 01/28/2013    Mixed hyperlipidemia [E78.2] 01/28/2013    Diabetes mellitus type II, controlled ([facility] [E11.9] 01/23/2011         ASSESSMENT/PLAN:  68-year-old gentleman with history of type 2 diabetes, hyperlipidemia, BPH, CAD status post CABG who presented with chest pressure and indigestion with a lot of belching admitted for chest pain rule out. Plan:  -Trend troponins  -Echocardiogram  -Continue on aspirin and sublingual nitro when necessary  -Cardiology Consult  -Nuclear medicine stress test  - Sliding-scale insulin for glycemic control        DVT Prophylaxis: Subcut enoxaparin  Diet: ADULT DIET; Regular; 5 carb choices (75 gm/meal); No Caffeine  Code Status: Full Code         Vandana Curry MD    Thank you Nathanael Prieto MD for the opportunity to be involved in this patient's care. If you have any questions or concerns please feel free to contact me at [phone].

## 2023-01-06 NOTE — PLAN OF CARE
Problem: Safety - Adult  Goal: Free from fall injury  Outcome: Progressing     Problem: Chronic Conditions and Co-morbidities  Goal: Patient's chronic conditions and co-morbidity symptoms are monitored and maintained or improved  Outcome: Progressing  Flowsheets (Taken 1/5/2023 2100)  Care Plan - Patient's Chronic Conditions and Co-Morbidity Symptoms are Monitored and Maintained or Improved: Monitor and assess patient's chronic conditions and comorbid symptoms for stability, deterioration, or improvement     Problem: Cardiovascular - Adult  Goal: Maintains optimal cardiac output and hemodynamic stability  Outcome: Progressing  Goal: Absence of cardiac dysrhythmias or at baseline  Outcome: Progressing     Problem: Gastrointestinal - Adult  Goal: Minimal or absence of nausea and vomiting  Outcome: Progressing  Goal: Maintains or returns to baseline bowel function  Outcome: Progressing  Goal: Maintains adequate nutritional intake  Outcome: Progressing  Goal: Establish and maintain optimal ostomy function  Outcome: Progressing     Problem: Genitourinary - Adult  Goal: Absence of urinary retention  Outcome: Progressing

## 2023-01-06 NOTE — PROGRESS NOTES
Stress pending d/t positive troponin and history of recent CABG (10/2022), awaiting cardiology consult for further instruction. Cardiology RN and floor RN contacted/updated.

## 2023-01-06 NOTE — FLOWSHEET NOTE
01/06/23 0900   Vitals   Temp 98 °F (36.7 °C)   Temp Source Temporal   Heart Rate 67   Heart Rate Source Monitor   Resp 16   /64   MAP (Calculated) 87   MAP (mmHg) 84   BP Location Right upper arm   BP Upper/Lower Upper   BP Method Automatic   Level of Consciousness 0   MEWS Score 1   Cardiac Rhythm Sinus rhythm   Pain Assessment   Pain Assessment None - Denies Pain   Oxygen Therapy   SpO2 96 %   Pulse Oximetry Type Intermittent   Pulse Oximeter Device Mode Intermittent   Pulse Oximeter Device Location Finger   O2 Device None (Room air)     Shift assessment complete. VSS. Pt. Is alert and oriented resting comfortably in bed. Pt. Denies chest pain at this time. Pt states pain \"felt like indigestion\". POC discussed with patient and patient states understanding and is in agreement with plan. Call light and bedside table within reach. No further needs expressed at this time.  Jorge Hernández RN

## 2023-01-06 NOTE — ED NOTES
Report called to [facility] RN @ [facility] in [address] format     [facility]  01/05/23 2029

## 2023-01-06 NOTE — CONSULTS
[facility]  [phone]        Reason for Consultation/Chief Complaint: \" Chest pain. \"    History of Present Illness:  Nalini Brown is a 78 y.o. patient who presented to the hospital with complaints of chest discomfort. He describes indigestion-like chest discomfort. He states that he does have problems with GERD but these also were similar symptoms to what he experienced with his prior anginal episodes. Currently without discomfort. Actually [place] [facility] [place] despite having had CABG at [facility] October 2022. He lives in the [facility]. Past history includes CAD s/p CABG October 2022, diabetes mellitus type 2, dyslipidemia and BPH. Past Medical History:   has a past medical history of Acute MI ([facility] Angina pectoris, unstable ([address] ASHD (arteriosclerotic heart disease), Bladder outlet obstruction, BPH (benign prostatic hyperplasia), Chest pain, Constipation, Diabetes mellitus ([facility] GERD (gastroesophageal reflux disease), Hypercholesteremia, Hypercholesterolemia, Hypertension, IBS (irritable bowel syndrome), Influenza A, Sinusitis, and Type II or unspecified type diabetes mellitus without mention of complication, not stated as uncontrolled. Surgical History:   has a past surgical history that includes Coronary angioplasty with stent; Cardiac catheterization (2002); Cardiac catheterization (2011); Colonoscopy (10/24/13); and Coronary artery bypass graft (N/A, 10/18/2022). Social History:   reports that he quit smoking about 46 years ago. His smoking use included cigarettes. He has a 50.00 pack-year smoking history. He has never used smokeless tobacco. He reports that he does not drink alcohol and does not use drugs. Family History:  family history includes Arthritis in his mother; Diabetes in his mother; High Blood Pressure in his mother. Home Medications:  Were reviewed and are listed in nursing record.  and/or listed below  Prior to Admission medications    Medication Sig Start Date End Date Taking? Authorizing Provider   traZODone (DESYREL) 100 MG tablet TAKE ONE TABLET BY MOUTH ONCE NIGHTLY AS NEEDED FOR SLEEP 11/30/22   YANNICK Dennis CNP   tamsulosin [facility]) 0.4 MG capsule Take 1 capsule by mouth daily Indications: Benign Enlargement of Prostate 11/28/22   YANNICK Helton CNP   glimepiride (AMARYL) 4 MG tablet Take 1 tablet by mouth every morning (before breakfast) 11/23/22   Alan Wasserman MD   amLODIPine [facility]) 5 MG tablet Take 1 tablet by mouth daily 11/15/22   Rosa Alegre MD   metFORMIN (GLUCOPHAGE-XR) 500 MG extended release tablet TAKE TWO TABLETS BY MOUTH TWICE A DAY 11/14/22   Alan Wasserman MD   metoprolol tartrate (LOPRESSOR) 25 MG tablet Take 2 tablets by mouth 2 times daily 10/31/22   Rosa Alegre MD   aspirin 325 MG EC tablet Take 1 tablet by mouth daily 10/22/22 1/5/23  Rosa Alegre MD   famotidine (PEPCID) 20 MG tablet Take 1 tablet by mouth daily 10/22/22 1/5/23  Rosa Alegre MD   rosuvastatin (CRESTOR) 40 MG tablet Take 1 tablet by mouth every evening 2/15/22   Alan Wasserman MD   blood glucose test strips [facility] ULTRA) strip USE TO TEST TWO TIMES A DAY 1/18/22   Alan Wasserman MD   albuterol sulfate HFA (VENTOLIN HFA) 108 (90 Base) MCG/ACT inhaler Inhale 2 puffs into the lungs 4 times daily as needed for Wheezing  Patient not taking: No sig reported 9/23/21   Amanda Gomez MD        Allergies:  Prednisone, Codeine, and Lisinopril     Review of Systems:   A complete review of systems has been reviewed and updated today and is negative except as noted in the history of present illness.       Physical Examination:    Vitals:    01/06/23 1200   BP: 131/79   Pulse: 65   Resp:    Temp:    SpO2:     Weight: 223 lb (101.2 kg)         General Appearance:  Alert, cooperative, no distress, appears stated age   Head:  Normocephalic, without obvious abnormality, atraumatic   Eyes:  EOMI, conjunctiva/corneas clear       Nose: Nares normal   Throat: Lips normal   Neck: Supple, symmetrical, trachea midline,  no carotid bruit or JVD       Lungs:   Clear to auscultation bilaterally, respirations unlabored   Chest Wall:  No tenderness or deformity   Heart:  Regular rate and rhythm, S1, S2 normal, no significant murmur, rub or gallop   Abdomen:   Soft, non-tender, bowel sounds active all four quadrants,  no masses, no organomegaly           Extremities: Extremities normal, atraumatic, no cyanosis or edema   Pulses: 2+ and symmetric   Skin: Skin color, texture, turgor normal, no rashes or lesions   Pysch: Normal mood and affect   Neurologic: Normal gross motor and sensory exam.         EKG:  I have reviewed EKG with the following interpretation:  Impression:    05-JAN-2023 06:40:44 [facility] ROUTINE RECORD  Normal sinus rhythm  Rightward axis  Borderline ECG  When compared with ECG of 13-OCT-2022 08:56,  No significant change was found    TELEMETRY (Personally reviewed by me): Sinus     Lab Data:  CBC:   Recent Labs     01/05/23  0659 01/06/23  0620   WBC 4.8 4.5   HGB 14.6 13.7   HCT 41.6 42.1   MCV 88.1 91.4    212     BMP:   Recent Labs     01/05/23  0659      K 4.0      CO2 26   BUN 13   CREATININE 1.0     LIVER PROFILE:   Recent Labs     01/05/23  0659   AST 15   ALT 12   LIPASE 16.0   BILITOT 0.7   ALKPHOS 76     PT/INR: No results for input(s): PROTIME, INR in the last 72 hours. APTT: No results for input(s): APTT in the last 72 hours. BNP:  No results for input(s): BNP in the last 72 hours. Imaging/Procedures:   Cardiac surgical procedure 10/18/2022 :  CORONARY ARTERY BYPASS GRAFTING X 3, ON PUMP, INTERNAL MAMMARY ARTERY, SAPHENOUS VEIN GRAFT,   WITH LEFT ATRIAL APPENDAGE CLIP PLACEMENT  BYPASS GRAFTS:  1. LIMA to mid LAD  2. SVG to OM1  3.  SVG to acute marginal    Cardiac cath 10/13/2022 (personally reviewed):  LVGRAM     LVEDP 13   GRADIENT ACROSS AORTIC VALVE None   LV FUNCTION EF 60%   WALL MOTION Normal   MITRAL REGURGITATION Mild            CORONARY ARTERIES     LM Aneurysmal with ulcerated plaque, Prox <10%, mid-distal 30% stenosis. LAD Calcified, proximal-mid 50 to 75% stenosis, mid-distal 80% stenosis. LCX Tortuous, calcified, proximal 50 to 75% stenosis. Mid to distal 80 to 90% stenosis that extends into OM1. There is a mid-distal circumflex stent that has 40% in-stent restenosis         RCA Large vessel, dominant, very high anterior takeoff near the left coronary cusp, it is calcified. There is 100% proximal-mid  with well-developed right to right collaterals and lesser left to right collaterals            PERCUTANEOUS INTERVENTION DESCRIPTION      Heparin was used for anticoagulation, a 6 [place] VL 3.5 guiding catheter was used to intubate the left main. A choice floppy wire was used to cross the left main into the LAD. IVUS was performed to left main and LAD. In the proximal LAD there is mild disease just at the ostium and in the left main there was distal 30% stenosis with moderate plaque, there is no clear unstable plaque or ulceration noted on IVUS. CONCLUSIONS:      Multivessel CAD/ASHD with left main ulcerated plaque  Will refer to CT surgery for consideration of CABG  Start heparin drip without bolus in 3 hours  Transfer to intermediate care, C4    Echo 10/13/2022(personally reviewed):  Summary   Normal left ventricular systolic function with ejection fraction of 55-60%. No regional wall motion abnormalites are seen. Normal left ventricular size with mild-moderate concentric left ventricular hypertrophy. Grade I diastolic dysfunction with normal filling pressure. Normal RV size and systolic function. Trivial mitral regurgitation. No tricuspid regurgitation to estimate systolic pulmonary artery pressure (SPAP).     Assessment/Plan:  Principal Problem:    Chest pain  Plan: Mixed features. Discussed options of further evaluation with cardiac catheterization or stress testing. Patient would like to proceed with stress testing. Add Imdur 30 mg daily. Active Problems:    Coronary artery disease involving native coronary artery of native heart with angina pectoris ([facility]  Plan: S/p CABG. Further ischemic work-up with stress testing. He does have some myocardial areas that may not be optimally revascularized that PCI may be an option. S/P CABG x 3  Plan: As above. Primary hypertension  Plan: Stable. Diabetes mellitus type II, controlled ([facility]  Plan: Per primary service. Mixed hyperlipidemia  Plan: High intensity statin. Thank you for allowing us to participate in the care of Rochelle Enriquez. Further evaluation will be based upon the patient's clinical course and testing results. All questions and concerns were addressed to the patient/family. Alternatives to my treatment were discussed. The note was completed using EMR. Every effort was made to ensure accuracy; however, inadvertent computerized transcription errors may be present.     Bryce Scott M.D.

## 2023-01-06 NOTE — PROGRESS NOTES
[facility] PROGRESS NOTE    1/6/2023 8:25 AM        Name: Stephen Camara . Admitted: 1/5/2023  Primary Care Provider: Dariusz Martínez MD (Tel: [phone])      Subjective:  . Sought care in the Ed due to chest pain which he thinks may be r/t to GI issues  Reports frequent belching and gas pressure    Had CABG in October. Pain free since admission  For stress test in the am     Reviewed interval ancillary notes    Current Medications  amLODIPine (NORVASC) tablet 5 mg, Daily  aspirin EC tablet 325 mg, Daily  famotidine (PEPCID) tablet 20 mg, Daily  metoprolol tartrate (LOPRESSOR) tablet 50 mg, BID  rosuvastatin (CRESTOR) tablet 40 mg, QPM  tamsulosin (FLOMAX) capsule 0.4 mg, Daily  traZODone (DESYREL) tablet 100 mg, Nightly PRN  sodium chloride flush 0.9 % injection 5-40 mL, 2 times per day  sodium chloride flush 0.9 % injection 5-40 mL, PRN  0.9 % sodium chloride infusion, PRN  ondansetron (ZOFRAN-ODT) disintegrating tablet 4 mg, Q8H PRN   Or  ondansetron (ZOFRAN) injection 4 mg, Q6H PRN  acetaminophen (TYLENOL) tablet 650 mg, Q6H PRN   Or  acetaminophen (TYLENOL) suppository 650 mg, Q6H PRN  polyethylene glycol (GLYCOLAX) packet 17 g, Daily PRN  enoxaparin (LOVENOX) injection 40 mg, Daily        Objective:  /70   Pulse 65   Temp 98 °F (36.7 °C) (Temporal)   Ht 6' 0.99\" (1.854 m)   Wt 223 lb (101.2 kg)   SpO2 98%   BMI 29.43 kg/m²   No intake or output data in the 24 hours ending 01/06/23 0825   Wt Readings from Last 3 Encounters:   01/06/23 223 lb (101.2 kg)   01/05/23 225 lb (102.1 kg)   12/12/22 227 lb (103 kg)       General appearance:  Appears comfortable, alert and pleasant   Eyes: Sclera clear. Pupils equal.  ENT: Moist oral mucosa. Trachea midline, no adenopathy. Cardiovascular: Regular rhythm, normal S1, S2. No murmur.  No edema in lower extremities  Respiratory: Not using accessory muscles. Good inspiratory effort. Clear to auscultation bilaterally, no wheeze or crackles. GI: Abdomen soft, no tenderness, not distended, normal bowel sounds  Musculoskeletal: No cyanosis in digits, neck supple  Neurology: CN 2-12 grossly intact. No speech or motor deficits  Psych: Normal affect. Alert and oriented in time, place and person  Skin: Warm, dry, normal turgor    Labs and Tests:  CBC:   Recent Labs     01/05/23  0659 01/06/23  0620   WBC 4.8 4.5   HGB 14.6 13.7    212     BMP:    Recent Labs     01/05/23  0659      K 4.0      CO2 26   BUN 13   CREATININE 1.0   GLUCOSE 137*     Hepatic:   Recent Labs     01/05/23 0659   AST 15   ALT 12   BILITOT 0.7   ALKPHOS 68     AIC 8%   Latest Reference Range & Units 1/5/23 22:12 1/6/23 09:34 1/6/23 12:03   POC Glucose 70 - 99 mg/dl 172 (H) 230 (H) 252 (H)   (H): Data is abnormally high        Problem List  Principal Problem:    Chest pain  Active Problems:    Diabetes mellitus type II, controlled ([facility])    S/P CABG x 3    Mixed hyperlipidemia    Benign non-nodular prostatic hyperplasia with lower urinary tract symptoms    Primary hypertension    Coronary artery disease involving native coronary artery of native heart with angina pectoris ([address]  Resolved Problems:    * No resolved hospital problems. *       Assessment & Plan:   Previous Chest pain with recent CABG in October:  cardiology is following. Anticipate echo today and stress test in the am.   I have added US of GB for the am since he will be NPO  HTN: In range on current therapy   Uncontrolled T2DM: resume low dose SUF and add low dose humalog correction , follow closely       Diet: ADULT DIET; Regular; 5 carb choices (75 gm/meal);  No Caffeine  Code:Full Code  DVT PPX      YANNICK Palacios - CNP   1/6/2023 8:25 AM

## 2023-01-06 NOTE — CARE COORDINATION
Chart reviewed. From home, will have stress test and US of gallbladder. Will monitor for needs.       Marcelo Tenorio RN, BSN  [phone]

## 2023-01-07 ENCOUNTER — APPOINTMENT (OUTPATIENT)
Dept: ULTRASOUND IMAGING | Age: 78
End: 2023-01-07
Attending: INTERNAL MEDICINE
Payer: MEDICARE

## 2023-01-07 VITALS
DIASTOLIC BLOOD PRESSURE: 84 MMHG | TEMPERATURE: 98.5 F | HEART RATE: 67 BPM | WEIGHT: 223 LBS | OXYGEN SATURATION: 98 % | RESPIRATION RATE: 16 BRPM | SYSTOLIC BLOOD PRESSURE: 145 MMHG | BODY MASS INDEX: 29.55 KG/M2 | HEIGHT: 73 IN

## 2023-01-07 LAB
GLUCOSE BLD-MCNC: 126 MG/DL (ref 70–99)
GLUCOSE BLD-MCNC: 182 MG/DL (ref 70–99)
LV EF: 58 %
LV EF: 66 %
LVEF MODALITY: NORMAL
LVEF MODALITY: NORMAL
PERFORMED ON: ABNORMAL
PERFORMED ON: ABNORMAL

## 2023-01-07 PROCEDURE — 93306 TTE W/DOPPLER COMPLETE: CPT

## 2023-01-07 PROCEDURE — 6370000000 HC RX 637 (ALT 250 FOR IP): Performed by: NURSE PRACTITIONER

## 2023-01-07 PROCEDURE — 6360000002 HC RX W HCPCS: Performed by: INTERNAL MEDICINE

## 2023-01-07 PROCEDURE — 6370000000 HC RX 637 (ALT 250 FOR IP): Performed by: INTERNAL MEDICINE

## 2023-01-07 PROCEDURE — 76705 ECHO EXAM OF ABDOMEN: CPT

## 2023-01-07 PROCEDURE — 3430000000 HC RX DIAGNOSTIC RADIOPHARMACEUTICAL: Performed by: INTERNAL MEDICINE

## 2023-01-07 PROCEDURE — 99232 SBSQ HOSP IP/OBS MODERATE 35: CPT | Performed by: NURSE PRACTITIONER

## 2023-01-07 PROCEDURE — 78452 HT MUSCLE IMAGE SPECT MULT: CPT | Performed by: INTERNAL MEDICINE

## 2023-01-07 PROCEDURE — A9502 TC99M TETROFOSMIN: HCPCS | Performed by: INTERNAL MEDICINE

## 2023-01-07 PROCEDURE — 93017 CV STRESS TEST TRACING ONLY: CPT | Performed by: INTERNAL MEDICINE

## 2023-01-07 RX ORDER — PANTOPRAZOLE SODIUM 40 MG/1
40 TABLET, DELAYED RELEASE ORAL
Qty: 30 TABLET | Refills: 2 | Status: SHIPPED | OUTPATIENT
Start: 2023-01-07

## 2023-01-07 RX ORDER — ISOSORBIDE MONONITRATE 30 MG/1
30 TABLET, EXTENDED RELEASE ORAL DAILY
Qty: 30 TABLET | Refills: 3 | Status: SHIPPED | OUTPATIENT
Start: 2023-01-08

## 2023-01-07 RX ADMIN — FAMOTIDINE 20 MG: 20 TABLET, FILM COATED ORAL at 14:26

## 2023-01-07 RX ADMIN — TETROFOSMIN 11.3 MILLICURIE: 1.38 INJECTION, POWDER, LYOPHILIZED, FOR SOLUTION INTRAVENOUS at 11:57

## 2023-01-07 RX ADMIN — GLIPIZIDE 10 MG: 5 TABLET ORAL at 17:22

## 2023-01-07 RX ADMIN — AMLODIPINE BESYLATE 5 MG: 5 TABLET ORAL at 14:26

## 2023-01-07 RX ADMIN — TAMSULOSIN HYDROCHLORIDE 0.4 MG: 0.4 CAPSULE ORAL at 14:29

## 2023-01-07 RX ADMIN — REGADENOSON 0.4 MG: 0.08 INJECTION, SOLUTION INTRAVENOUS at 11:17

## 2023-01-07 RX ADMIN — ASPIRIN 325 MG: 325 TABLET, COATED ORAL at 14:26

## 2023-01-07 RX ADMIN — METOPROLOL TARTRATE 50 MG: 50 TABLET, FILM COATED ORAL at 14:26

## 2023-01-07 RX ADMIN — ROSUVASTATIN CALCIUM 40 MG: 40 TABLET, COATED ORAL at 17:22

## 2023-01-07 RX ADMIN — TETROFOSMIN 32 MILLICURIE: 1.38 INJECTION, POWDER, LYOPHILIZED, FOR SOLUTION INTRAVENOUS at 11:57

## 2023-01-07 RX ADMIN — ISOSORBIDE MONONITRATE 30 MG: 30 TABLET, EXTENDED RELEASE ORAL at 14:26

## 2023-01-07 NOTE — PROGRESS NOTES
Instructed on walking Lexiscan Stress Test Procedure including possible side effects/ adverse reactions. Patient verbalizes  understanding and denies having any questions. See [address] [place] [address] Rd Cardiology.   John Cat RN

## 2023-01-07 NOTE — PROGRESS NOTES
[address]              Progress Note      Admit Date 2023  HPI:   Nelda Bundy is a 78 y.o. patient who presented to the hospital with complaints of chest discomfort. He describes indigestion-like chest discomfort. He states that he does have problems with GERD but these also were similar symptoms to what he experienced with his prior anginal episodes. Actually follows with [facility] cardiology despite having had CABG at [facility] 2022. Past history includes CAD s/p CABG 2022, diabetes mellitus type 2, dyslipidemia and BPH      Interval history:  Troponin 0.02 , < 0.01 (x3)     ~imdur added       NM pending     US GB pending    Mr. Kiki Correia denies chest pain, shortness of breath, palpitations      Subjective: offers no c/o. Scheduled Meds:   insulin lispro  0-4 Units SubCUTAneous TID WC    insulin lispro  0-4 Units SubCUTAneous Nightly    glipiZIDE  10 mg Oral BID AC    isosorbide mononitrate  30 mg Oral Daily    amLODIPine  5 mg Oral Daily    aspirin  325 mg Oral Daily    famotidine  20 mg Oral Daily    metoprolol tartrate  50 mg Oral BID    rosuvastatin  40 mg Oral QPM    tamsulosin  0.4 mg Oral Daily    sodium chloride flush  5-40 mL IntraVENous 2 times per day    enoxaparin  40 mg SubCUTAneous Daily     Continuous Infusions:   dextrose      sodium chloride       PRN Meds:dextrose bolus **OR** dextrose bolus, glucagon (rDNA), dextrose, perflutren lipid microspheres, traZODone, sodium chloride flush, sodium chloride, ondansetron **OR** ondansetron, acetaminophen **OR** acetaminophen, polyethylene glycol       Objective:      Wt Readings from Last 3 Encounters:   23 223 lb (101.2 kg)   23 225 lb (102.1 kg)   22 227 lb (103 kg)   Admit weight: Weight: 223 lb 8 oz (101.4 kg)      Temperature range over 24hrs:   Temp  Av.9 °F (36.6 °C)  Min: 97 °F (36.1 °C)  Max: 98.4 °F (36.9 °C)  Current Respiratory Rate:  Resp: 18  Current Pulse: Heart Rate: 61  Current Blood Pressure:  BP: 110/71  24hr Blood Pressure Range:  Systolic (08VPY), BNE:303 , Min:110 , VY   ; Diastolic (22USN), IDV:98, Min:64, Max:79     Current Pulse Oximetry:  SpO2: 98 % RA      Intake/Output Summary (Last 24 hours) at 2023 0704  Last data filed at 2023 2300  Gross per 24 hour   Intake 840 ml   Output --   Net 840 ml       Telemetry monitor: sinus bradycardia 52    Physical Exam:  General:  Awake, alert, NAD  Psych : calm  Skin:  Warm and dry  Chest:  Clear to auscultation, respiration easy  Cardiovascular:  RRR 68 S1S2 no murmur to auscultation; no JVD  Abdomen: Bowel sounds normal, abd soft, non-tender  Extremities:  No edema  : unremarkable      Imaging    Echo: 10/12/22:   Summary    Normal left ventricular systolic function with ejection fraction of 55-60%. No regional wall motion abnormalites are seen. Normal left ventricular size with mild-moderate concentric left ventricular    hypertrophy. Grade I diastolic dysfunction with normal filling pressure. Normal RV size and systolic function. Trivial mitral regurgitation. No tricuspid regurgitation to estimate systolic pulmonary artery pressure    (SPAP). Cardiac cath: 10/13/22:  CORONARY ARTERIES     LM Aneurysmal with ulcerated plaque, Prox <10%, mid-distal 30% stenosis. LAD Calcified, proximal-mid 50 to 75% stenosis, mid-distal 80% stenosis. LCX Tortuous, calcified, proximal 50 to 75% stenosis. Mid to distal 80 to 90% stenosis that extends into OM1. There is a mid-distal circumflex stent that has 40% in-stent restenosis         RCA Large vessel, dominant, very high anterior takeoff near the left coronary cusp, it is calcified.   There is 100% proximal-mid  with well-developed right to right collaterals and lesser left to right collaterals           10/18/2022  Procedure(s):  CORONARY ARTERY BYPASS GRAFTING X 3, ON PUMP, INTERNAL MAMMARY ARTERY, SAPHENOUS VEIN GRAFT, YING, TEMPORARY PACING WIRES,  WITH LEFT ATRIAL APPENDAGE CLIP PLACEMENT     Lab Review     Renal Profile:   Lab Results   Component Value Date/Time    CREATININE 1.0 01/05/2023 06:59 AM    BUN 13 01/05/2023 06:59 AM     01/05/2023 06:59 AM    K 4.0 01/05/2023 06:59 AM     01/05/2023 06:59 AM    CO2 26 01/05/2023 06:59 AM     CBC:    Lab Results   Component Value Date/Time    WBC 4.5 01/06/2023 06:20 AM    RBC 4.61 01/06/2023 06:20 AM    HGB 13.7 01/06/2023 06:20 AM    HCT 42.1 01/06/2023 06:20 AM    MCV 91.4 01/06/2023 06:20 AM    RDW 15.1 01/06/2023 06:20 AM     01/06/2023 06:20 AM     BNP:    Lab Results   Component Value Date/Time    BNP 19 01/23/2011 07:40 AM     Fasting Lipid Panel:    Lab Results   Component Value Date/Time    CHOL 163 10/18/2022 03:02 AM    HDL 49 10/18/2022 03:02 AM    HDL 41 01/24/2011 03:25 AM    TRIG 93 10/18/2022 03:02 AM     Cardiac Enzymes:    Lab Results   Component Value Date/Time    TROPONINI <0.01 01/06/2023 02:20 AM     PT/ INR   Lab Results   Component Value Date/Time    INR 1.34 10/20/2022 05:35 AM    INR 1.32 10/19/2022 05:10 AM    INR 1.48 10/18/2022 01:30 PM    PROTIME 16.5 10/20/2022 05:35 AM    PROTIME 16.3 10/19/2022 05:10 AM    PROTIME 17.8 10/18/2022 01:30 PM     PTT No results found for: PTT   Lab Results   Component Value Date/Time    MG 2.30 10/20/2022 05:35 AM      Lab Results   Component Value Date/Time    TSH 1.63 10/14/2022 04:28 AM       Assessment/Plan:     Patient Active Problem List   Diagnosis    Angina pectoris, unstable (HCC)    Diabetes mellitus type II, controlled ([address]    Bladder outlet obstruction    S/P CABG x 3    Mixed hyperlipidemia    Constipation    Benign non-nodular prostatic hyperplasia with lower urinary tract symptoms    Anxiety    DM (diabetes mellitus), secondary, uncontrolled, w/neurologic complic    Primary hypertension    Coronary artery disease involving native coronary artery of native heart with angina pectoris ([address]    Pure hypercholesterolemia    Benign non-nodular prostatic hyperplasia without lower urinary tract symptoms    Type 2 diabetes mellitus with diabetic polyneuropathy, without long-term current use of insulin (HCC)    Keloid    Primary insomnia    COVID-19    Chest pain      Principal Problem:    Chest pain   ~offers no c/o CP    ~troponin < 0.01 x3   ~US GB planned    ~pepcid 20 mg daily    ~imdur 30 mg daily   Plan: myoview and GB US recommended     Active Problems:    Coronary artery disease involving native coronary artery of native heart with angina pectoris [facility])   ~s/p CABG Oct '22   ~LVEF 55-60%, no regional wall motion abnl on echo from Oct '22   ~denies angina : metoprolol 50 mg bid / crestor 40 mg daily / ASA / amlodipine 5 mg daily   ~LDL near goal  Plan:  some myocardial areas that may not be optimally revascularized that PCI may be an option. ~myvoiew stress planned for this am         Primary hypertension   ~controlled on current regimen    Plan : continue present management pending results of myoview        The patient was seen for >35 minutes.  I reviewed interval history, physical exam, review of data including labs, imaging, development and implementation of treatment plan and coordination of complex care

## 2023-01-07 NOTE — DISCHARGE SUMMARY
[facility]    Patient Demographics    Patient. Alok Chowdary  Date of Birth. 1945  MRN. [id number]     Primary care provider. Richmond Pitts MD  (Tel: [phone])    Admit date: 1/5/2023    Discharge date 1/7/2023  Note Date: 1/7/2023     Reason for Hospitalization. Chest pain         Significant Findings. Principal Problem:    Chest pain  Active Problems:    Diabetes mellitus type II, controlled ([address]    S/P CABG x 3    Mixed hyperlipidemia    Benign non-nodular prostatic hyperplasia with lower urinary tract symptoms    Primary hypertension    Coronary artery disease involving native coronary artery of native heart with angina pectoris ([address]  Resolved Problems:    * No resolved hospital problems. *       Problems and results from this hospitalization that need follow up. Follow up with PCP   AIC 8 :  needs better BG control     Significant test results and incidental findings. US GB:     Unremarkable right upper quadrant ultrasound. ECHO:    Summary   Moderate to severe concentric left ventricular hypertrophy with a sigmoid   septum. Normal left ventricular cavity size. Normal left ventricular   systolic function with an estimated ejection fraction of 55-60%. Mildly   asynchronous septal motion. Average global longitudinal strain= -14.6% (abnormal). Indeterminate diastolic function. The right ventricle is normal in size and function. Stress Test:     Summary    The overall quality of the study is good. There is no significant artifact. Left ventricular cavity size is normal. Right ventricular cavity size is    normal.        SPECT images demonstrate homogeneous tracer distribution throughout the    myocardium. There is normal isotope uptake at stress and rest. There is no    evidence of myocardial ischemia or scar.  Gated spect reveals normal myocardial thickening and wall motion. SUm stress score of 0. No visual TID. Calculated TID of 0.98. Left ventricular ejection fraction of 66%. Sensitivity of testing for detection of ischemia is reduced on antianginals. **Myocardial perfusion is normal. No reversible ischemia. Low risk scan. **           Invasive procedures and treatments. None     [facility] Course. The patient sought care in the  ED due to chest pain and gas pressures in the abdomin. He was admitted and had negative troponin's. He was followed by cardiology and treated for the following:    Previous Chest pain with recent CABG in October:    stress test was completed and was low risk. Echo noted above      US of GB was performed and was normal   HTN: In range on current therapy   Uncontrolled T2DM:   AIC 8 %,  need improved glycemic control   The patient was feeling well at the time of d/c and was sent home on a trial of PPI therapy        Consults. IP CONSULT TO CARDIOLOGY    Physical examination on discharge day. /71   Pulse 61   Temp 98.4 °F (36.9 °C) (Temporal)   Resp 18   Ht 6' 0.99\" (1.854 m)   Wt 223 lb (101.2 kg)   SpO2 98%   BMI 29.43 kg/m²   General appearance. Alert. Looks comfortable. HEENT. Sclera clear. Moist mucus membranes. Cardiovascular. Regular rate and rhythm, normal S1, S2. No murmur. Respiratory. Not using accessory muscles. Clear to auscultation bilaterally, no wheeze. Gastrointestinal. Abdomen soft, non-tender, not distended, normal bowel sounds  Neurology. Facial symmetry. No speech deficits. Moving all extremities equally. Extremities. No edema in lower extremities. Skin. Warm, dry, normal turgor    Condition at time of discharge stable     Medication instructions provided to patient at discharge.      Medication List        START taking these medications      isosorbide mononitrate 30 MG extended release tablet  Commonly known as: IMDUR  Take 1 tablet by mouth daily  Notes to patient: Use: prevent and treat chest pain, treat heart failure,  Side effects: headache, flushing, and dizziness     pantoprazole 40 MG tablet  Commonly known as: PROTONIX  Take 1 tablet by mouth every morning (before breakfast)  Notes to patient: Use: reduces stomach acid, protect the digestive tract  Side effects: rare but can cause dizziness, headache, or diarrhea            CONTINUE taking these medications      amLODIPine 5 MG tablet  Commonly known as: NORVASC  Take 1 tablet by mouth daily     aspirin 325 MG EC tablet  Take 1 tablet by mouth daily     famotidine 20 MG tablet  Commonly known as: PEPCID  Take 1 tablet by mouth daily     glimepiride 4 MG tablet  Commonly known as: AMARYL  Take 1 tablet by mouth every morning (before breakfast)     metFORMIN 500 MG extended release tablet  Commonly known as: GLUCOPHAGE-XR  TAKE TWO TABLETS BY MOUTH TWICE A DAY     metoprolol tartrate 25 MG tablet  Commonly known as: LOPRESSOR  Take 2 tablets by mouth 2 times daily     OneTouch Ultra strip  Generic drug: blood glucose test strips  USE TO TEST TWO TIMES A DAY     rosuvastatin 40 MG tablet  Commonly known as: CRESTOR  Take 1 tablet by mouth every evening     tamsulosin 0.4 MG capsule  Commonly known as: FLOMAX  Take 1 capsule by mouth daily Indications: Benign Enlargement of Prostate     traZODone 100 MG tablet  Commonly known as: DESYREL  TAKE ONE TABLET BY MOUTH ONCE NIGHTLY AS NEEDED FOR SLEEP            ASK your doctor about these medications      albuterol sulfate  (90 Base) MCG/ACT inhaler  Commonly known as: Ventolin HFA  Inhale 2 puffs into the lungs 4 times daily as needed for Wheezing               Where to Get Your Medications        These medications were sent to [facility] [id number] [address] [facility] [phone] [facility] [phone] [address] [facility] [address]      Phone: [phone]   isosorbide mononitrate 30 MG extended release tablet  pantoprazole 40 MG tablet         Discharge recommendations given to patient. Follow Up. in 1 week   Disposition. home  Activity. activity as tolerated  Diet: Diet NPO      Spent  35  minutes in discharge process.     Signed:  YANNICK Mcgrath CNP     1/7/2023 11:39 AM

## 2023-01-09 ENCOUNTER — TELEPHONE (OUTPATIENT)
Dept: FAMILY MEDICINE CLINIC | Age: 78
End: 2023-01-09

## 2023-01-09 ENCOUNTER — CARE COORDINATION (OUTPATIENT)
Dept: CASE MANAGEMENT | Age: 78
End: 2023-01-09

## 2023-01-09 ENCOUNTER — HOSPITAL ENCOUNTER (OUTPATIENT)
Dept: CARDIAC REHAB | Age: 78
Setting detail: THERAPIES SERIES
Discharge: HOME | End: 2023-01-09
Payer: MEDICARE

## 2023-01-09 LAB
GLUCOSE BLD-MCNC: 194 MG/DL (ref 70–99)
PERFORMED ON: ABNORMAL

## 2023-01-09 PROCEDURE — 93798 PHYS/QHP OP CAR RHAB W/ECG: CPT

## 2023-01-09 NOTE — PLAN OF CARE
Individual Treatment Plan  Cardiac Rehabilitation    Reassessment  Name: Claudene Crystal Reassessment: 30 days   : 1945 Primary Diagnosis:   Z95.1 (ICD-10-CM) - Presence of aortocoronary bypass graft   Age: 78 y.o. Referring Physician:   Rosie Kovacs MD   MRN: [id number] Primary Care Physician: Sun Cornelius MD     Allergies   Allergen Reactions    Prednisone Other (See Comments)     Made patient very agitated (medrol dospak)    Codeine Other (See Comments)     constipation    Lisinopril Cough     Per Epic, patient went to ER on 23 for indigestion and elevated BP. Patient discharged on 2023. Exercise Assessment  Stages of Change: Action   Risk Stratification: High  Fall Risk: low  Amb Fall Risk Assessment and TUG Test 2021   2 or more falls in past year? no   Fall with injury in past year? no      Assistive Devices:  none  Sessions completed:   Key Anti-Hypertensive Meds            amLODIPine (NORVASC) 5 MG tablet    Sig - Route: Take 1 tablet by mouth daily - Oral    Cosign for Ordering: Accepted by Harley Castellanos MD on 11/15/2022  3:01 PM    metoprolol tartrate (LOPRESSOR) 25 MG tablet    Sig - Route: Take 2 tablets by mouth 2 times daily - Oral           Exercise Prescription        Mode: . Treadmill, Bike (Upright or recumbent), NuStep, Rower, SciFit, Arm Ergometer, Recumbent Eliptical, and Walking      Frequency: 2-3 days/week supervised      Intensity:RPE 11-14, patient taking Beta Blocker      Duration: 30+ minutes/day      Resistance Training: Yes, 3 days per week      Progression: Increase exercise by 5 minutes every week to goal of 30 to 50 minutes      Supplemental oxygen: is not on home oxygen therapy.     Plan  Home Exercise:Yes  Mode:Treadmill and Walking  Resistance Training: yes   Target Goals  Adhere to cardiac exercise guidelines  and Participate in strength training   Previous goals Achieve exercise to 3-4.9 METs   Progress toward goal:patient max MET's level: 3.3. Education  Mr. Judy Cotto was educated on the importance of warm up and cool down, signs and symptoms to report, exercise safety, Jason Scale of Perceived Exertion use, Importance of physical activity and exercise progression, equipment orientation, home exercise program, low sodium diet, blood pressure education, and blood pressure medication  Nutrition Assessment  Stages of Change: Pre-Contemplation   Initial Weight:     227 lbs  Goal Weight: 215 lbs       Rate Your Plate Score:     Patient's weight goal/Progress: Current weight: 223 lbs. Weight reduced. Recommended Diet:Increase consumption of fruit and vegetable to 8-10 servings a day, increase whole grains and low sodium and lowfat proteins   Diabetic:Yes; Monitors blood sugars as prescribed Yes  Key Antihyperglycemic Medications            glimepiride (AMARYL) 4 MG tablet    Sig - Route: Take 1 tablet by mouth every morning (before breakfast) - Oral    Class: Adjust Sig    Cosign for Ordering: Accepted by Misael Suarez MD on 11/30/2022  1:39 PM    metFORMIN (GLUCOPHAGE-XR) 500 MG extended release tablet    Sig: TAKE TWO TABLETS BY MOUTH TWICE A DAY           Lab Results   Component Value Date    LABA1C 8.0 10/18/2022       GLUCOSE 209 (H) 10/22/2022      LABA1C 8.0 10/18/2022     CHOL 163 10/18/2022     HDL 49 10/18/2022     [address] 10/18/2022     TRIG 93 10/18/2022                  Nutrition Intervention  Attend education classes related to nutrition and Participate in an exercise program that promotes weight loss  Target Goals  Achieve A1C < 7%, Complete cardiac diet classes , Control blood pressure , Control cholesterol values , and Reduce weight   Previous goals Articulate heart healthy diet , Control cholesterol values , Control triglyceride values , and Reduce weight   Progress toward goal: patient attends education on nutrition, cholesterol and weight management. Education  Mr. Judy Cotto was educated on the importance of maintaining optimal weight/BMI, nutrition guidelines, cholesterol reduction, portion control, signs and symptoms of hypoglycemia, and relationship of diabetes to cardiac disease. Psychosocial Assessment  Stages of Change: Preparation    Psychosocial Test  PHQ-2/9 Today's PHQ:    PHQ Scores 2022 8/15/2022 3/24/2021 10/9/2020 2019 2018 3/22/2017   PHQ2 Score 1 0 0 2 0 0 0   PHQ9 Score 5 0 0 2 0 0 0     Interpretation of Total Score Depression Severity: 1-4 = Minimal depression, 5-9 = Mild depression, 10-14 = Moderate depression, 15-19 = Moderately severe depression, 20-27 = Severe depression    Psychosocial Intervention  Attend education classes related to stress management and relaxation and Participate in an exercise program that improves psychosocial symptoms  Target Goals  Previous goals: Maximize stress management/coping skills   Progress toward goal:demonstrates relaxation and coping techniques during cool down. Attends exercise regularly. New goal: Identify a positive support system   Education Assessment  Stages of Change: Action  Barriers to Learning: No barriers  Personal Learning Style:Video and Written   Social History     Tobacco Use   Smoking Status Former    Packs/day: 5.00    Years: 10.00    Pack years: 50.00    Types: Cigarettes    Quit date: 3/13/1976    Years since quittin.8   Smokeless Tobacco Never     Education Intervention/Learning Needs Identified  Blood pressure, Cholesterol, Diabetes, Exercise, Medications, Nutrition, Risk factor for CAD, Signs and symptoms of MI/CVA, Stregnth training, Stress management and relaxation, and Weight management  Target Goals  Previous goals: Attend appropriate web education classes   Progress toward goals: attends education on patient risk factors. Attends rehab on a regular basis. New goals: Attend appropriate web education classes   Education  Mr. Geovanna Jones was educated on the importance of healthy coping techniques and stress management, signs and symptoms of depression, relaxation techniques, and creating positive support systems                     Provider Review and Approval of this ITP    The above treatment plan and goals have been set for your patient during Cardiac Rehabilitation.  Please review and Electronically Cosign      Electronically signed by Jennyfer Reilly RN on 1/9/23 at 9:34 AM EST

## 2023-01-09 NOTE — CARE COORDINATION
[facility] Care Transitions Initial Follow Up Call    Call within 2 business days of discharge: Yes    Patient: Marcella Washington Patient : 1945   MRN: [id number]  Reason for Admission:   Discharge Date: 23 RARS: Readmission Risk Score: 15.5      Last Discharge  Street       Date Complaint Diagnosis Description Type Department Provider    23   Admission (Discharged) [facility] Brenda Arana MD    23 Gastroesophageal Reflux Chest pain due to myocardial ischemia, unspecified ischemic chest pain type . .. ED (TRANSFER) [facility] ED Kelley Strickland MD          Pt's wife stated he was at cardiac rehab this morning, she is not checked off on pt's HIPAA form, this nurse will reach out at another time/date.     Follow Up  Future Appointments   Date Time Provider Michael Marti   1/10/2023  2:40 PM Elizabet Noe MD [place] - D[facility]   2023 10:30 AM SCHEDULE, [facility] EVALUATION [facility] CP [address] [facility]   2023 10:30 AM SCHEDULE, [facility] EVALUATION RM [facility] CP [address] [facility]   2023 10:30 AM SCHEDULE, [facility] EVALUATION RM [facility] CP [address] [facility]   2023 10:30 AM SCHEDULE, [facility] EVALUATION RM [facility] CP [address] [facility]   2023 10:30 AM SCHEDULE, [facility] EVALUATION RM [facility] CP [address] [facility]   2023 10:30 AM SCHEDULE, [facility] EVALUATION RM [facility] CP [address] [facility]   2023 10:30 AM SCHEDULE, [facility] EVALUATION RM [facility] CP [address] [facility]   2023 10:30 AM SCHEDULE, [facility] EVALUATION RM [facility] CP [address] [facility]   2023 10:30 AM SCHEDULE, [facility] EVALUATION RM [facility] CP [address] [facility]   2023 10:30 AM SCHEDULE, [facility] EVALUATION RM [facility] CP [address] [facility]   2/3/2023 10:30 AM SCHEDULE, [facility] EVALUATION RM [facility] CP [address] [facility]   2023 10:30 AM SCHEDULE, [facility] EVALUATION RM [facility] CP [address] [facility]   2023 10:30 AM SCHEDULE, [facility] EVALUATION RM [facility] CP [address] [facility]   2/10/2023 10:30 AM SCHEDULE, [facility] EVALUATION RM [facility] CP [address] [facility]   2023 10:30 AM SCHEDULE, [facility] EVALUATION RM [facility] CP [address] [facility]   2/15/2023 10:30 AM SCHEDULE, [facility] EVALUATION RM [facility] CP [address] [place]   2/17/2023 10:30 AM SCHEDULE, [facility] EVALUATION [facility] [place] [facility]   2/20/2023 10:30 AM SCHEDULE, [facility] EVALUATION [facility] [place]   2/22/2023 10:30 AM SCHEDULE, [facility] EVALUATION [facility] [place] [facility]   2/24/2023 10:30 AM SCHEDULE, [facility] EVALUATION [facility]   2/27/2023 10:30 AM SCHEDULE, [facility] EVALUATION [facility] [address] [place] [facility]   3/1/2023 10:30 AM SCHEDULE, [facility] EVALUATION [facility] [place] [facility]   3/3/2023 10:30 AM SCHEDULE, [facility] EVALUATION [facility]   3/6/2023 10:30 AM SCHEDULE, [facility] EVALUATION [facility] [place] H[facility]   3/8/2023 10:30 AM SCHEDULE, [facility] EVALUATION RM [facility] [place] [facility]   3/10/2023 10:30 AM SCHEDULE, [facility] EVALUATION [facility] HOD   3/13/2023 10:30 AM SCHEDULE, [facility] EVALUATION RM [facility] [place]   3/15/2023 10:30 AM SCHEDULE, [facility] EVALUATION [facility] [place] [facility]   3/17/2023 10:30 AM SCHEDULE, [facility] EVALUATION [facility]   3/20/2023 10:30 AM SCHEDULE, [facility] EVALUATION [facility] [place] [facility]   3/22/2023 10:30 AM SCHEDULE, [facility] EVALUATION [facility] [place]   3/24/2023 10:30 AM SCHEDULE, [facility] EVALUATION [facility] [place]   3/27/2023 10:30 AM SCHEDULE, [facility] EVALUATION [facility] HOD   3/29/2023 10:30 AM SCHEDULE, [facility] EVALUATION [facility] H[facility]   3/31/2023 10:30 AM SCHEDULE, [facility] EVALUATION [facility] [place] HOD   4/3/2023 10:30 AM [facility]LE[facility]   4/5/2023 10:30 AM SCHEDULE, [facility] EVALUATION [facility]   4/7/2023 10:30 AM SCHEDULE, [facility] EVALUATION  [facility]     Diya Navarro RN

## 2023-01-09 NOTE — PROGRESS NOTES
Pt recently was admitted to [place] for CP/GERD. Troponin negative. Pt started on Imdur, see MAR.

## 2023-01-09 NOTE — TELEPHONE ENCOUNTER
[facility] Transitions Initial Follow Up Call    Outreach made within 2 business days of discharge: Yes    Patient: Eb Guzman Patient : 1945   MRN: [id number]  Reason for Admission: There are no discharge diagnoses documented for the most recent discharge. Discharge Date: 23       Spoke with: Marv Castorena    Discharge department/facility: [facility]    Non-face-to-face services provided:  Scheduled appointment with PCP-     TCM Interactive Patient Contact:  Was patient able to fill all prescriptions: Yes  Was patient instructed to bring all medications to the follow-up visit: Yes  Is patient taking all medications as directed in the discharge summary?  Yes  Does patient understand their discharge instructions: Yes  Does patient have questions or concerns that need addressed prior to 7-14 day follow up office visit: no    Scheduled appointment with PCP within 7-14 days    Follow Up  Future Appointments   Date Time Provider Michael Marti   1/10/2023  2:40 PM MD Laron Borjas  [place] - DYD   2023 10:30 AM SCHEDULE, MHCZ EVALUATION RM MHCZ CP [address] [facility] HOD   2023 10:30 AM SCHEDULE, MHCZ EVALUATION RM MHCZ CP [facility] [place] HOD   2023 10:30 AM SCHEDULE, MHCZ EVALUATION RM MHCZ CP [facility] [place] HOD   2023 10:30 AM SCHEDULE, MHCZ EVALUATION RM MHCZ CP [facility] [place] HOD   2023 10:30 AM SCHEDULE, MHCZ EVALUATION RM MHCZ CP [facility] [place] HOD   2023 10:30 AM SCHEDULE, MHCZ EVALUATION RM MHCZ CP [facility] [place] HOD   2023 10:30 AM SCHEDULE, MHCZ EVALUATION RM MHCZ CP [facility] [place] HOD   2023 10:30 AM SCHEDULE, MHCZ EVALUATION RM MHC[facility] CP [facility] [place] HOD   2023 10:30 AM SCHEDULE, MHCZ EVALUATION RM MHC[facility] CP [facility] HOD   2023 10:30 AM SCHEDULE, MHCZ EVALUATION RM MHCZ CP [address] [facility]   2/3/2023 10:30 AM SCHEDULE, MHCZ EVALUATION RM MHCZ CP [facility] HOD   2023 10:30 AM SCHEDULE, MHCZ EVALUATION RM MHCZ CP [address] [facility] 2/8/2023 10:30 AM SCHEDULE, MHCZ EVALUATION RM MHCZ CP [place] HOD   2/10/2023 10:30 AM SCHEDULE, MHCZ EVALUATION RM MHCZ CP [facility] [place] HOD   2/13/2023 10:30 AM SCHEDULE, MHCZ EVALUATION RM MHCZ CP [facility] [place] HOD   2/15/2023 10:30 AM SCHEDULE, MHCZ EVALUATION RM MHCZ CP [facility] [place] HOD   2/17/2023 10:30 AM SCHEDULE, MHCZ EVALUATION RM MHC[facility] CP [place] HOD   2/20/2023 10:30 AM SCHEDULE, MHCZ EVALUATION RM MHC[facility] CP [place] HOD   2/22/2023 10:30 AM SCHEDULE, MHCZ EVALUATION RM MHCZ CP [facility] [place] HOD   2/24/2023 10:30 AM SCHEDULE, MHCZ EVALUATION RM MHCZ CP [facility] [place] HOD   2/27/2023 10:30 AM SCHEDULE, MHCZ EVALUATION RM MHC[facility] CP [place] HOD   3/1/2023 10:30 AM SCHEDULE, MHCZ EVALUATION RM MHC[facility] CP [place] HOD   3/3/2023 10:30 AM SCHEDULE, MHCZ EVALUATION RM MHC[facility] CP [place] HOD   3/6/2023 10:30 AM SCHEDULE, MHCZ EVALUATION RM MHCZ CP [place] HOD   3/8/2023 10:30 AM SCHEDULE, MHCZ EVALUATION RM MHCZ CP [facility] [place] HOD   3/10/2023 10:30 AM SCHEDULE, MHCZ EVALUATION RM MHC[facility] CP [place] HOD   3/13/2023 10:30 AM SCHEDULE, MHCZ EVALUATION RM MHCZ CP [facility] [place] HOD   3/15/2023 10:30 AM SCHEDULE, MHCZ EVALUATION RM MHC[facility] CP [place] HOD   3/17/2023 10:30 AM SCHEDULE, MHCZ EVALUATION RM MHCZ CP [facility] [place] HOD   3/20/2023 10:30 AM SCHEDULE, MHCZ EVALUATION RM MHCZ CP [facility] [place] HOD   3/22/2023 10:30 AM SCHEDULE, MHCZ EVALUATION RM MHCZ CP [facility] [place] HOD   3/24/2023 10:30 AM SCHEDULE, MHCZ EVALUATION RM MHC[facility] CP [place] HOD   3/27/2023 10:30 AM SCHEDULE, MHCZ EVALUATION RM MHCZ CP [facility] [place] HOD   3/29/2023 10:30 AM SCHEDULE, MHCZ EVALUATION RM MHCZ CP [facility]   3/31/2023 10:30 AM SCHEDULE, MHCZ EVALUATION RM MHCZ CP [facility]   4/3/2023 10:30 AM SCHEDULE, MHCZ EVALUATION RM MHCZ CP [facility]   4/5/2023 10:30 AM SCHEDULE, MHCZ EVALUATION RM MHCZ CP [facility]   4/7/2023 10:30 AM SCHEDULE, MHCZ EVALUATION RM MHCZ CP [address] [facility] Edson Garcia RN

## 2023-01-09 NOTE — TELEPHONE ENCOUNTER
[facility] Transitions Initial Follow Up Call    Outreach made within 2 business days of discharge: Yes    Patient: Akua Ochoa Patient : 1945   MRN: [id number]  Reason for Admission: There are no discharge diagnoses documented for the most recent discharge. Discharge Date: 23       Spoke with: Spouse  / cj    Discharge department/facility: [facility]  Non-face-to-face services provided:  Scheduled appointment with PCP-     TCM Interactive Patient Contact:  Was patient able to fill all prescriptions: Yes  Was patient instructed to bring all medications to the follow-up visit: Yes  Is patient taking all medications as directed in the discharge summary?  Yes  Does patient understand their discharge instructions: Yes  Does patient have questions or concerns that need addressed prior to 7-14 day follow up office visit: no    Scheduled appointment with PCP within 7-14 days    Follow Up  Future Appointments   Date Time Provider Michael Marti   1/10/2023  2:40 PM Nick Gonzalez MD [address] [place] [address]   2023 10:30 AM SCHEDULE, MHCZ EVALUATION RM MHCZ CP [address] [facility] HOD   2023 10:30 AM SCHEDULE, MHCZ EVALUATION RM MHCZ CP [facility] [place] HOD   2023 10:30 AM SCHEDULE, MHCZ EVALUATION RM MHCZ CP [facility] [place] HOD   2023 10:30 AM SCHEDULE, MHCZ EVALUATION RM MHC[facility] CP [facility] [place] HOD   2023 10:30 AM SCHEDULE, MHCZ EVALUATION RM MHC[facility] CP [place] HOD   2023 10:30 AM SCHEDULE, MHCZ EVALUATION RM MHCZ CP [facility] [place] HOD   2023 10:30 AM SCHEDULE, MHCZ EVALUATION RM MHC[facility] CP [facility] [place] HOD   2023 10:30 AM SCHEDULE, MHCZ EVALUATION RM MHC[facility] CP [facility] [place] HOD   2023 10:30 AM SCHEDULE, MHCZ EVALUATION RM MHC[facility] CP [place] HOD   2023 10:30 AM SCHEDULE, MHCZ EVALUATION RM MHCZ CP [facility] [place] HOD   2/3/2023 10:30 AM SCHEDULE, MHCZ EVALUATION RM MHCZ CP [address] [facility] [place] HOD   2023 10:30 AM SCHEDULE, MHCZ EVALUATION RM MHCZ CP [address] [facility] HOD   2/8/2023 10:30 AM SCHEDULE, MHCZ EVALUATION RM MHCZ CP [address] [place] HOD   2/10/2023 10:30 AM SCHEDULE, MHCZ EVALUATION RM MHCZ CP [facility] [place] HOD   2/13/2023 10:30 AM SCHEDULE, MHCZ EVALUATION RM MHC[facility] CP [facility] HOD   2/15/2023 10:30 AM SCHEDULE, MHCZ EVALUATION RM MHC[facility] CP [facility] [place] HOD   2/17/2023 10:30 AM SCHEDULE, MHCZ EVALUATION RM MHC[facility] CP [facility] [place] HOD   2/20/2023 10:30 AM SCHEDULE, MHCZ EVALUATION RM [facility] CP [facility] [place] HOD   2/22/2023 10:30 AM SCHEDULE, MHCZ EVALUATION RM [facility] CP [facility] [place] HOD   2/24/2023 10:30 AM SCHEDULE, MHCZ EVALUATION RM MHC[facility] CP [facility] HOD   2/27/2023 10:30 AM SCHEDULE, MHCZ EVALUATION RM MHC[facility] CP [facility] [place] HOD   3/1/2023 10:30 AM SCHEDULE, MHCZ EVALUATION RM MHC[facility] CP [facility] HOD   3/3/2023 10:30 AM SCHEDULE, MHCZ EVALUATION RM MHCZ CP [facility] [place] HOD   3/6/2023 10:30 AM SCHEDULE, MHCZ EVALUATION RM MHC[facility] CP [facility] HOD   3/8/2023 10:30 AM SCHEDULE, MHCZ EVALUATION RM MHCZ CP [facility] [place] HOD   3/10/2023 10:30 AM SCHEDULE, MHCZ EVALUATION RM MHC[facility] CP [facility] [place] HOD   3/13/2023 10:30 AM SCHEDULE, MHCZ EVALUATION RM MHC[facility] CP [facility] [place] HOD   3/15/2023 10:30 AM SCHEDULE, MHCZ EVALUATION RM MHCZ CP [facility] [place] HOD   3/17/2023 10:30 AM SCHEDULE, MHCZ EVALUATION RM MHC[facility] CP [place] HOD   3/20/2023 10:30 AM SCHEDULE, MHCZ EVALUATION RM MHC[facility] CP [facility] [place] HOD   3/22/2023 10:30 AM SCHEDULE, MHCZ EVALUATION RM MHC[facility] CP [facility] HOD   3/24/2023 10:30 AM SCHEDULE, MHCZ EVALUATION RM MHCZ CP [facility] HOD   3/27/2023 10:30 AM SCHEDULE, MHCZ EVALUATION RM MHC[facility] CP [place] HOD   3/29/2023 10:30 AM SCHEDULE, MHCZ EVALUATION RM MHCZ CP [facility]   3/31/2023 10:30 AM SCHEDULE, MHCZ EVALUATION RM MHCZ CP [facility]   4/3/2023 10:30 AM SCHEDULE, MHCZ EVALUATION RM MHCZ CP [facility]   4/5/2023 10:30 AM SCHEDULE, MHCZ EVALUATION RM MHCZ CP [facility]   4/7/2023 10:30 AM SCHEDULE, MHCZ EVALUATION RM MHCZ CP [address] [facility] Feli Chu, RN

## 2023-01-10 ENCOUNTER — CARE COORDINATION (OUTPATIENT)
Dept: CASE MANAGEMENT | Age: 78
End: 2023-01-10

## 2023-01-10 ENCOUNTER — OFFICE VISIT (OUTPATIENT)
Dept: FAMILY MEDICINE CLINIC | Age: 78
End: 2023-01-10

## 2023-01-10 VITALS
SYSTOLIC BLOOD PRESSURE: 137 MMHG | DIASTOLIC BLOOD PRESSURE: 73 MMHG | HEART RATE: 78 BPM | BODY MASS INDEX: 30.88 KG/M2 | WEIGHT: 234 LBS | OXYGEN SATURATION: 95 %

## 2023-01-10 DIAGNOSIS — E11.41 CONTROLLED TYPE 2 DIABETES MELLITUS WITH DIABETIC MONONEUROPATHY, WITHOUT LONG-TERM CURRENT USE OF INSULIN (HCC): ICD-10-CM

## 2023-01-10 DIAGNOSIS — I25.119 CORONARY ARTERY DISEASE INVOLVING NATIVE CORONARY ARTERY OF NATIVE HEART WITH ANGINA PECTORIS (HCC): ICD-10-CM

## 2023-01-10 DIAGNOSIS — Z95.1 S/P CABG X 3: ICD-10-CM

## 2023-01-10 DIAGNOSIS — E11.42 TYPE 2 DIABETES MELLITUS WITH DIABETIC POLYNEUROPATHY, WITHOUT LONG-TERM CURRENT USE OF INSULIN (HCC): ICD-10-CM

## 2023-01-10 DIAGNOSIS — Z00.00 MEDICARE ANNUAL WELLNESS VISIT, SUBSEQUENT: Primary | ICD-10-CM

## 2023-01-10 DIAGNOSIS — R07.9 CHEST PAIN, UNSPECIFIED TYPE: Primary | ICD-10-CM

## 2023-01-10 DIAGNOSIS — Z23 NEEDS FLU SHOT: ICD-10-CM

## 2023-01-10 DIAGNOSIS — E78.2 MIXED HYPERLIPIDEMIA: ICD-10-CM

## 2023-01-10 DIAGNOSIS — I20.0 ANGINA PECTORIS, UNSTABLE (HCC): ICD-10-CM

## 2023-01-10 ASSESSMENT — PATIENT HEALTH QUESTIONNAIRE - PHQ9
1. LITTLE INTEREST OR PLEASURE IN DOING THINGS: 0
SUM OF ALL RESPONSES TO PHQ QUESTIONS 1-9: 0
SUM OF ALL RESPONSES TO PHQ9 QUESTIONS 1 & 2: 0
SUM OF ALL RESPONSES TO PHQ QUESTIONS 1-9: 0
2. FEELING DOWN, DEPRESSED OR HOPELESS: 0

## 2023-01-10 ASSESSMENT — LIFESTYLE VARIABLES
HOW OFTEN DO YOU HAVE A DRINK CONTAINING ALCOHOL: NEVER
HOW MANY STANDARD DRINKS CONTAINING ALCOHOL DO YOU HAVE ON A TYPICAL DAY: PATIENT DOES NOT DRINK

## 2023-01-10 NOTE — PROGRESS NOTES
Post-Discharge Transitional Care  Follow Up      Linda Pena   YOB: 1945    Date of Office Visit:  1/10/2023  Date of Hospital Admission: 1/5/23  Date of Hospital Discharge: 1/7/23  Risk of hospital readmission (high >=14%. Medium >=10%) :Readmission Risk Score: 15.5      Care management risk score Rising risk (score 2-5) and Complex Care (Scores >=6): No Risk Score On File     Non face to face  following discharge, date last encounter closed (first attempt may have been earlier): 01/09/2023    Call initiated 2 business days of discharge: Yes    ASSESSMENT/PLAN:   Medicare annual wellness visit, subsequent  Controlled type 2 diabetes mellitus with diabetic mononeuropathy, without long-term current use of insulin (HCC)  -     Hemoglobin A1C; Future  Needs flu shot  -     Influenza, FLUAD, (age 72 y+), IM, Preservative Free, 0.5 mL; Future  Angina pectoris, unstable (HCC)  Coronary artery disease involving native coronary artery of native heart with angina pectoris (HCC)  Type 2 diabetes mellitus with diabetic polyneuropathy, without long-term current use of insulin ([facility])  S/P CABG x 3  Mixed hyperlipidemia  In addition to the AWV patient follows up for chest pain. Known coronary artery disease, status post 3-way bypass 3 months ago. Stress test and echo not suggesting new disease. Patient does have significant LVH. Recommendation was to get A1c under 8. States he has been having some low sugars at night. Last A1c was 8, recheck today. Also patient given PPI and does feel like he is feeling better. Follow-up in about 6 weeks  Medical Decision Making: moderate complexity  No follow-ups on file. Subjective:   HPI:  Follow up of Hospital problems/diagnosis(es): chest pain/wasn't feeling right    Inpatient course: Discharge summary reviewed- see chart.     Interval history/Current status: he had a CABG x3 in October, but this time his echo was excellent and the stress test didn't really show anything either. It was determined that likely not cardiac related and rather GERD related and started him on a PPI. He states that since starting the PPI he has been feeling better. Discussed his A1C of 8.0. he is taking his his medications as prescribed. He states that he does feel he gets hypoglycemic at night. Will check his a1c at the end of the month.     Patient Active Problem List   Diagnosis    Angina pectoris, unstable ([facility])    Diabetes mellitus type II, controlled ([facility])    Bladder outlet obstruction    S/P CABG x 3    Mixed hyperlipidemia    Constipation    Benign non-nodular prostatic hyperplasia with lower urinary tract symptoms    Anxiety    DM (diabetes mellitus), secondary, uncontrolled, w/neurologic complic    Primary hypertension    Coronary artery disease involving native coronary artery of native heart with angina pectoris ([facility])    Pure hypercholesterolemia    Benign non-nodular prostatic hyperplasia without lower urinary tract symptoms    Type 2 diabetes mellitus with diabetic polyneuropathy, without long-term current use of insulin ([facility])    Keloid    Primary insomnia    COVID-19    Chest pain       Medications listed as ordered at the time of discharge from hospital     Medication List            Accurate as of January 10, 2023  2:59 PM. If you have any questions, ask your nurse or doctor.                CONTINUE taking these medications      albuterol sulfate  (90 Base) MCG/ACT inhaler  Commonly known as: Ventolin HFA  Inhale 2 puffs into the lungs 4 times daily as needed for Wheezing     amLODIPine 5 MG tablet  Commonly known as: NORVASC  Take 1 tablet by mouth daily     aspirin 325 MG EC tablet  Take 1 tablet by mouth daily     famotidine 20 MG tablet  Commonly known as: PEPCID  Take 1 tablet by mouth daily     glimepiride 4 MG tablet  Commonly known as: AMARYL  Take 1 tablet by mouth every morning (before breakfast)     isosorbide mononitrate 30 MG extended release  tablet  Commonly known as: IMDUR  Take 1 tablet by mouth daily     metFORMIN 500 MG extended release tablet  Commonly known as: GLUCOPHAGE-XR  TAKE TWO TABLETS BY MOUTH TWICE A DAY     metoprolol tartrate 25 MG tablet  Commonly known as: LOPRESSOR  Take 2 tablets by mouth 2 times daily     OneTouch Ultra strip  Generic drug: blood glucose test strips  USE TO TEST TWO TIMES A DAY     pantoprazole 40 MG tablet  Commonly known as: PROTONIX  Take 1 tablet by mouth every morning (before breakfast)     rosuvastatin 40 MG tablet  Commonly known as: CRESTOR  Take 1 tablet by mouth every evening     tamsulosin 0.4 MG capsule  Commonly known as: FLOMAX  Take 1 capsule by mouth daily Indications: Benign Enlargement of Prostate     traZODone 100 MG tablet  Commonly known as: DESYREL  TAKE ONE TABLET BY MOUTH ONCE NIGHTLY AS NEEDED FOR SLEEP                Medications marked \"taking\" at this time  Outpatient Medications Marked as Taking for the 1/10/23 encounter (Office Visit) with Ni Mercado MD   Medication Sig Dispense Refill    pantoprazole (PROTONIX) 40 MG tablet Take 1 tablet by mouth every morning (before breakfast) 30 tablet 2    isosorbide mononitrate (IMDUR) 30 MG extended release tablet Take 1 tablet by mouth daily 30 tablet 3    traZODone (DESYREL) 100 MG tablet TAKE ONE TABLET BY MOUTH ONCE NIGHTLY AS NEEDED FOR SLEEP 30 tablet 0    tamsulosin (FLOMAX) 0.4 MG capsule Take 1 capsule by mouth daily Indications: Benign Enlargement of Prostate 90 capsule 0    glimepiride (AMARYL) 4 MG tablet Take 1 tablet by mouth every morning (before breakfast) 30 tablet 11    amLODIPine (NORVASC) 5 MG tablet Take 1 tablet by mouth daily 30 tablet 1    metFORMIN (GLUCOPHAGE-XR) 500 MG extended release tablet TAKE TWO TABLETS BY MOUTH TWICE A  tablet 3    metoprolol tartrate (LOPRESSOR) 25 MG tablet Take 2 tablets by mouth 2 times daily 60 tablet 5    aspirin 325 MG EC tablet Take 1 tablet by mouth daily 30 tablet 0    famotidine (PEPCID) 20 MG tablet Take 1 tablet by mouth daily 30 tablet 0    rosuvastatin (CRESTOR) 40 MG tablet Take 1 tablet by mouth every evening 30 tablet 11    blood glucose test strips (ONETOUCH ULTRA) strip USE TO TEST TWO TIMES A  strip 3    albuterol sulfate HFA (VENTOLIN HFA) 108 (90 Base) MCG/ACT inhaler Inhale 2 puffs into the lungs 4 times daily as needed for Wheezing 18 g 0        Medications patient taking as of now reconciled against medications ordered at time of hospital discharge: Yes    A comprehensive review of systems was negative except for what was noted in the HPI. Objective:    /73   Pulse 78   Wt 234 lb (106.1 kg)   SpO2 95%   BMI 30.88 kg/m²   General Appearance: alert and oriented to person, place and time, well developed and well- nourished, in no acute distress  Skin: warm and dry, no rash or erythema  Head: normocephalic and atraumatic  Eyes: pupils equal, round, and reactive to light, extraocular eye movements intact, conjunctivae normal  ENT: tympanic membrane, external ear and ear canal normal bilaterally, nose without deformity, nasal mucosa and turbinates normal without polyps  Neck: supple and non-tender without mass, no thyromegaly or thyroid nodules, no cervical lymphadenopathy  Pulmonary/Chest: clear to auscultation bilaterally- no wheezes, rales or rhonchi, normal air movement, no respiratory distress  Cardiovascular: normal rate, regular rhythm, normal S1 and S2, no murmurs, rubs, clicks, or gallops, distal pulses intact, no carotid bruits  Abdomen: soft, non-tender, non-distended, normal bowel sounds, no masses or organomegaly  Extremities: no cyanosis, clubbing or edema  Musculoskeletal: normal range of motion, no joint swelling, deformity or tenderness  Neurologic: reflexes normal and symmetric, no cranial nerve deficit, gait, coordination and speech normal      An electronic signature was used to authenticate this note.   --Festus Cottrell, RN        I, Dr. Brent Hudson, personally performed the services described in this documentation, as scribed by the above signed scribe in my presence, and it is both accurate and complete. I agree with the ROS and Past Histories independently gathered by the clinical support staff and the remaining scribed note accurately describes my personal service to the patient.       1/10/2023    4:57 PM

## 2023-01-10 NOTE — PROGRESS NOTES
Medicare Annual Wellness Visit    Azul Adame is here for Follow-Up from Hospital (1/5-1/7 [facility] chest pain) and Medicare AWV    Assessment & Plan   Medicare annual wellness visit, subsequent  Controlled type 2 diabetes mellitus with diabetic mononeuropathy, without long-term current use of insulin ([address]  -     Hemoglobin A1C; Future  Needs flu shot  -     Influenza, FLUAD, (age 72 y+), IM, Preservative Free, 0.5 mL  Angina pectoris, unstable ([address]  Coronary artery disease involving native coronary artery of native heart with angina pectoris (HCC)  Type 2 diabetes mellitus with diabetic polyneuropathy, without long-term current use of insulin (HCC)  S/P CABG x 3  Mixed hyperlipidemia      Recommendations for Preventive Services Due: see orders and patient instructions/AVS.  Recommended screening schedule for the next 5-10 years is provided to the patient in written form: see Patient Instructions/AVS.     No follow-ups on file. Subjective       Patient's complete Health Risk Assessment and screening values have been reviewed and are found in Flowsheets. The following problems were reviewed today and where indicated follow up appointments were made and/or referrals ordered. Positive Risk Factor Screenings with Interventions:                 Weight and Activity:  Physical Activity: Insufficiently Active    Days of Exercise per Week: 2 days    Minutes of Exercise per Session: 30 min     On average, how many days per week do you engage in moderate to strenuous exercise (like a brisk walk)?: 2 days  Have you lost any weight without trying in the past 3 months?: (!) Yes (when in the hospital)       Obesity Interventions:                                 Objective   Vitals:    01/10/23 1446   BP: 137/73   Pulse: 78   SpO2: 95%   Weight: 234 lb (106.1 kg)      Body mass index is 30.88 kg/m².               Allergies   Allergen Reactions    Prednisone Other (See Comments)     Made patient very agitated (medrol dospak)    Codeine Other (See Comments)     constipation    Lisinopril Cough     Prior to Visit Medications    Medication Sig Taking?  Authorizing Provider   pantoprazole (PROTONIX) 40 MG tablet Take 1 tablet by mouth every morning (before breakfast) Yes YANNICK Castro - CNP   isosorbide mononitrate (IMDUR) 30 MG extended release tablet Take 1 tablet by mouth daily Yes YANNICK Castro - CNP   traZODone (DESYREL) 100 MG tablet TAKE ONE TABLET BY MOUTH ONCE NIGHTLY AS NEEDED FOR SLEEP Yes YANNICK Davidson - CNP   tamsulosin (FLOMAX) 0.4 MG capsule Take 1 capsule by mouth daily Indications: Benign Enlargement of Prostate Yes YANNICK Quiroz - CNP   glimepiride (AMARYL) 4 MG tablet Take 1 tablet by mouth every morning (before breakfast) Yes Dorcus Cogan, MD   amLODIPine (NORVASC) 5 MG tablet Take 1 tablet by mouth daily Yes Suraj Enriquez MD   metFORMIN (GLUCOPHAGE-XR) 500 MG extended release tablet TAKE TWO TABLETS BY MOUTH TWICE A DAY Yes Dorcus Cogan, MD   metoprolol tartrate (LOPRESSOR) 25 MG tablet Take 2 tablets by mouth 2 times daily Yes Suraj Enriquez MD   aspirin 325 MG EC tablet Take 1 tablet by mouth daily Yes Suraj Enriquez MD   famotidine (PEPCID) 20 MG tablet Take 1 tablet by mouth daily Yes Suraj Enriquez MD   rosuvastatin (CRESTOR) 40 MG tablet Take 1 tablet by mouth every evening Yes Dorcus Cogan, MD   blood glucose test strips (ONETOUCH ULTRA) strip USE TO TEST TWO TIMES A DAY Yes Dorcus Cogan, MD   albuterol sulfate HFA (VENTOLIN HFA) 108 (90 Base) MCG/ACT inhaler Inhale 2 puffs into the lungs 4 times daily as needed for Wheezing Yes Edwin Talamantes MD       [facility] (Including outside providers/suppliers regularly involved in providing care):   Patient Care Team:  Dorcus Cogan, MD as PCP - General  Dorcus Cogan, MD as PCP - REHABILITATION [facility] Empaneled Provider  Brady QUISPE Zoraida Gil MD as Surgeon (Orthopedic Surgery)  Fabrice Navarro RN as Care Transitions Nurse     Reviewed and updated this visit:  Tobacco  Allergies  Meds  Problems  Med Hx  Surg Hx  Soc Hx  Fam Hx          I, Myron Gayle LPN, 3/80/0629, performed the documented evaluation under the direct supervision of the attending physician. This encounter was performed under my, Unice Dakin, MDs, direct supervision, 1/10/2023.

## 2023-01-10 NOTE — CARE COORDINATION
[facility] Care Transitions Initial Follow Up Call    Call within 2 business days of discharge: Yes    Care Transition Nurse contacted the patient by telephone to perform post hospital discharge assessment. Verified name and  with patient as identifiers. Provided introduction to self, and explanation of the Care Transition Nurse role. Patient: Azul Adame Patient : 1945   MRN: [id number]  Reason for Admission:   Discharge Date: 23 RARS: Readmission Risk Score: 15.5      Last Discharge  Street       Date Complaint Diagnosis Description Type Department Provider    23   Admission (Discharged) DEMETRIO CVREHANAU Sharon Magana MD    23 Gastroesophageal Reflux Chest pain due to myocardial ischemia, unspecified ischemic chest pain type . .. ED (TRANSFER) Dexter Jarvis MD            Was this an external facility discharge? No Discharge Facility:     Challenges to be reviewed by the provider   Additional needs identified to be addressed with provider: No  none               Method of communication with provider: none. Denies CP. Went to cardiac rehab yesterday. Pt states doing well, no issues or concerns. Has f/u appt with PCP today. Agreed to more CTC f/u calls. Care Transition Nurse reviewed discharge instructions with patient who verbalized understanding. The patient was given an opportunity to ask questions and does not have any further questions or concerns at this time. Were discharge instructions available to patient? Yes. Reviewed appropriate site of care based on symptoms and resources available to patient including: When to call 911. The patient agrees to contact the PCP office for questions related to their healthcare. Advance Care Planning:   Does patient have an Advance Directive: not on file; education provided. Medication reconciliation was performed with patient, who verbalizes understanding of administration of home medications.  Medications reviewed, 1111F entered: yes    Was patient discharged with a pulse oximeter? no    Non-face-to-face services provided:  Obtained and reviewed discharge summary and/or continuity of care documents    Offered patient enrollment in the Remote Patient Monitoring (RPM) program for in-home monitoring:  next call .     Care Transitions 24 Hour Call    Do you have a copy of your discharge instructions?: Yes  Do you have all of your prescriptions and are they filled?: Yes  Have you been contacted by a [facility] [address]?: No  Have you scheduled your follow up appointment?: Yes  How are you going to get to your appointment?: Car - family or friend to transport  Do you have support at home?: Partner/Spouse/SO  Do you feel like you have everything you need to keep you well at home?: Yes  Care Transitions Interventions  No Identified Needs     Other Services: Completed (Comment: RPM enrollement)     Social Work: Completed             Follow Up  Future Appointments   Date Time Provider Michael Marti   1/10/2023  2:40 PM Kike Cat MD [address] [place] [address]   1/11/2023 10:30 AM SCHEDULE, [address] EVALUATION RM MHCZ CP [address] [facility]   1/13/2023 10:30 AM SCHEDULE, MHCZ EVALUATION RM MHCZ CP [address] [facility] HOD   1/16/2023 10:30 AM SCHEDULE, MHCZ EVALUATION RM MHCZ CP [facility] [place] HOD   1/18/2023 10:30 AM SCHEDULE, MHCZ EVALUATION RM MHCZ CP [facility] [place] HOD   1/20/2023 10:30 AM SCHEDULE, MHCZ EVALUATION RM MHCZ CP DAVID [place] HOD   1/23/2023 10:30 AM SCHEDULE, MHCZ EVALUATION RM MHCZ CP [facility] [place] HOD   1/25/2023 10:30 AM SCHEDULE, MHCZ EVALUATION RM MHCZ CP [facility] [place] HOD   1/27/2023 10:30 AM SCHEDULE, MHCZ EVALUATION RM MHCZ CP [place] H[facility]   1/30/2023 10:30 AM SCHEDULE, MHCZ EVALUATION RM MHCZ CP [facility] [place] HOD   2/1/2023 10:30 AM SCHEDULE, MHCZ EVALUATION RM MHCZ CP [facility] [place] HOD   2/3/2023 10:30 AM SCHEDULE, MHCZ EVALUATION RM MHCZ CP [address] [facility]   2/6/2023 10:30 AM SCHEDULE, MHCZ EVALUATION RM MHCZ CP [address] [facility] HOD   2/8/2023 10:30 AM SCHEDULE, MHCZ EVALUATION RM MHCZ CP [place] H[facility]   2/10/2023 10:30 AM SCHEDULE, MHCZ EVALUATION RM MHCZ CP DAVID [place] HOD   2/13/2023 10:30 AM SCHEDULE, MHCZ EVALUATION RM MHCZ CP [place] [facility]   2/15/2023 10:30 AM SCHEDULE, MHCZ EVALUATION RM MHCZ CP [facility] [place] HOD   2/17/2023 10:30 AM SCHEDULE, MHCZ EVALUATION RM MHCZ CP [place] [facility]   2/20/2023 10:30 AM SCHEDULE, MHCZ EVALUATION RM MHCZ CP [facility] [place] HOD   2/22/2023 10:30 AM SCHEDULE, MHCZ EVALUATION RM MHCZ CP [facility] [place] HOD   2/24/2023 10:30 AM SCHEDULE, MHCZ EVALUATION RM MHCZ CP [facility] [place] HOD   2/27/2023 10:30 AM SCHEDULE, MHCZ EVALUATION RM MHCZ CP [place] [facility]   3/1/2023 10:30 AM SCHEDULE, MHCZ EVALUATION RM MHCZ CP [facility] [place] HOD   3/3/2023 10:30 AM SCHEDULE, MHCZ EVALUATION RM MHCZ CP [facility] [facility]   3/6/2023 10:30 AM SCHEDULE, MHCZ EVALUATION RM MHCZ CP [facility] [place] HOD   3/8/2023 10:30 AM SCHEDULE, MHCZ EVALUATION RM MHCZ CP [facility] [place] HOD   3/10/2023 10:30 AM SCHEDULE, MHCZ EVALUATION RM MHCZ CP [facility] [place] HOD   3/13/2023 10:30 AM SCHEDULE, MHCZ EVALUATION RM MHCZ CP [facility] [place] HOD   3/15/2023 10:30 AM SCHEDULE, MHC[facility] EVALUATION RM MHCZ CP [place] [facility]   3/17/2023 10:30 AM SCHEDULE, MHCZ EVALUATION RM MHCZ CP [facility] [place] [facility]   3/20/2023 10:30 AM SCHEDULE, MHCZ EVALUATION RM MHCZ CP [facility] [place] HOD   3/22/2023 10:30 AM SCHEDULE, MHCZ EVALUATION RM MHCZ CP [facility] [place] HOD   3/24/2023 10:30 AM SCHEDULE, MHCZ EVALUATION RM MHCZ CP [facility] [place] HOD   3/27/2023 10:30 AM SCHEDULE, MHCZ EVALUATION RM MHCZ CP [place] [facility]   3/29/2023 10:30 AM SCHEDULE, MHCZ EVALUATION RM MHCZ CP [facility] [place] HOD   3/31/2023 10:30 AM SCHEDULE, MHCZ EVALUATION RM MHCZ CP [facility] [place] HOD   4/3/2023 10:30 AM SCHEDULE, MHCZ EVALUATION RM MHCZ CP [facility] [place] HOD   4/5/2023 10:30 AM SCHEDULE, MHCZ EVALUATION RM MHCZ CP [facility] [place] [facility]   4/7/2023 10:30 AM SCHEDULE, MHCZ EVALUATION RM MHCZ CP [address] Transition Nurse provided contact information. Plan for follow-up call in 5-7 days based on severity of symptoms and risk factors.   Plan for next call: self management-CP, f/u appts, discuss RPM    Edu Wade RN

## 2023-01-10 NOTE — PATIENT INSTRUCTIONS
Patient should call the office immediately with new or ongoing signs or symptoms or worsening, or proceed to the emergency room. If you are on medications which could impair your senses, you are at risk of weakness, falls, dizziness, or drowsiness. You should be careful during activities which could place you at risk of harm, such as climbing, using stairs, operating machinery, or driving vehicles. If you feel you cannot safely do these activities, you should request others to help you, or avoid the activities altogether. If you are drowsy for any other reason, you should use the same precautions as listed above. Web Address for Advance Directive:    August     Personalized Preventive Plan for Ramses Mcdaniel - 1/10/2023  Medicare offers a range of preventive health benefits. Some of the tests and screenings are paid in full while other may be subject to a deductible, co-insurance, and/or copay. Some of these benefits include a comprehensive review of your medical history including lifestyle, illnesses that may run in your family, and various assessments and screenings as appropriate. After reviewing your medical record and screening and assessments performed today your provider may have ordered immunizations, labs, imaging, and/or referrals for you. A list of these orders (if applicable) as well as your Preventive Care list are included within your After Visit Summary for your review. Other Preventive Recommendations:    A preventive eye exam performed by an eye specialist is recommended every 1-2 years to screen for glaucoma; cataracts, macular degeneration, and other eye disorders. A preventive dental visit is recommended every 6 months. Try to get at least 150 minutes of exercise per week or 10,000 steps per day on a pedometer . Order or download the FREE \"Exercise & Physical Activity: Your Everyday Guide\" from The [employer]. Call 3-443.151.4002 or search The [employer] online. You need 3958-6664 mg of calcium and 5738-8564 IU of vitamin D per day. It is possible to meet your calcium requirement with diet alone, but a vitamin D supplement is usually necessary to meet this goal.  When exposed to the sun, use a sunscreen that protects against both UVA and UVB radiation with an SPF of 30 or greater. Reapply every 2 to 3 hours or after sweating, drying off with a towel, or swimming. Always wear a seat belt when traveling in a car. Always wear a helmet when riding a bicycle or motorcycle.

## 2023-01-11 ENCOUNTER — HOSPITAL ENCOUNTER (OUTPATIENT)
Dept: CARDIAC REHAB | Age: 78
Setting detail: THERAPIES SERIES
Discharge: HOME | End: 2023-01-11
Payer: MEDICARE

## 2023-01-11 PROCEDURE — 93798 PHYS/QHP OP CAR RHAB W/ECG: CPT

## 2023-01-13 ENCOUNTER — HOSPITAL ENCOUNTER (OUTPATIENT)
Dept: CARDIAC REHAB | Age: 78
Setting detail: THERAPIES SERIES
Discharge: HOME | End: 2023-01-13
Payer: MEDICARE

## 2023-01-13 LAB
GLUCOSE BLD-MCNC: 107 MG/DL (ref 70–99)
PERFORMED ON: ABNORMAL

## 2023-01-13 PROCEDURE — 93798 PHYS/QHP OP CAR RHAB W/ECG: CPT

## 2023-01-16 ENCOUNTER — HOSPITAL ENCOUNTER (OUTPATIENT)
Dept: CARDIAC REHAB | Age: 78
Setting detail: THERAPIES SERIES
Discharge: HOME | End: 2023-01-16
Payer: MEDICARE

## 2023-01-16 PROCEDURE — 93798 PHYS/QHP OP CAR RHAB W/ECG: CPT

## 2023-01-17 ENCOUNTER — CARE COORDINATION (OUTPATIENT)
Dept: CASE MANAGEMENT | Age: 78
End: 2023-01-17

## 2023-01-17 NOTE — CARE COORDINATION
[facility] Care Transitions Follow Up Call    LPN Care Coordinator contacted the patient by telephone to follow up after admission on -. Verified name and  with patient as identifiers. Patient: Sabrina Adair  Patient : 1945   MRN: [id number]  Reason for Admission: CP  Discharge Date: 23 RARS: Readmission Risk Score: 15.5      Needs to be reviewed by the provider   Additional needs identified to be addressed with provider: No  none             Method of communication with provider: none. LPN CC spoke with patient. States he is ok. Denies CP, palpitations, SOB, cough, N/V/D. Reports some reflux, takes pantoprazole & famotidine. States it's been an ongoing issue for him for a long time. Appetite good. Denies problems with bowels or bladder. Had f/u with PCP. Denies medication changes. Denies needs. Amelia Fung  [facility]  Care Transitions  [phone]    Addressed changes since last contact:  none  Discussed follow-up appointments. If no appointment was previously scheduled, appointment scheduling offered: Yes. Is follow up appointment scheduled within 7 days of discharge? Yes.     Follow Up  Future Appointments   Date Time Provider Michael Marti   2023 10:30 AM SCHEDULE, MHCZ EVALUATION RM MHCZ CP [facility]   2023  9:00 AM SCHEDULE, [facility] [place] - DYD   2023 10:30 AM SCHEDULE, MHCZ EVALUATION RM MHCZ CP [facility]   2023 10:30 AM SCHEDULE, MHCZ EVALUATION RM MHCZ CP [address] [facility]   2023 10:30 AM SCHEDULE, [facility] EVALUATION RM MHCZ CP [address] [facility]   2023 10:30 AM SCHEDULE, [facility] EVALUATION RM MHCZ CP [address] [facility]   2023 10:30 AM SCHEDULE, [facility] EVALUATION RM MHCZ CP [address] [facility]   2023 10:30 AM SCHEDULE, [facility] EVALUATION RM MHCZ CP [address] [facility]   2/3/2023 10:30 AM SCHEDULE, [facility]Z EVALUATION RM MHCZ CP [address] [facility]   2023 10:30 AM SCHEDULE, MHCZ EVALUATION  MHCZ CP [address] [facility]   2023 10:30 AM SCHEDULE, [facility] EVALUATION RM MHCZ CP [place] [facility]   2/10/2023 10:30 AM SCHEDULE, MHCZ EVALUATION RM MHCZ CP [place] [facility]   2/13/2023 10:30 AM SCHEDULE, MHCZ EVALUATION RM MHCZ CP [place] HOD   2/15/2023 10:30 AM SCHEDULE, MHCZ EVALUATION RM MHCZ CP [facility] [place] HOD   2/17/2023 10:30 AM SCHEDULE, MHCZ EVALUATION RM MHCZ CP [place] [facility]   2/20/2023 10:30 AM SCHEDULE, MHCZ EVALUATION RM MHCZ CP [facility] [place] HOD   2/22/2023 10:30 AM SCHEDULE, MHC[facility] EVALUATION RM MHCZ CP [place] [facility]   2/24/2023 10:30 AM SCHEDULE, MHCZ EVALUATION RM MHCZ CP [facility] [place] HOD   2/27/2023 10:30 AM SCHEDULE, MHC[facility] EVALUATION RM MHCZ CP [place] [facility]   3/1/2023 10:30 AM SCHEDULE, MHCZ EVALUATION RM MHCZ CP [facility] [place] HOD   3/3/2023 10:30 AM SCHEDULE, MHCZ EVALUATION RM MHCZ CP [facility] [place] HOD   3/6/2023 10:30 AM SCHEDULE, MHCZ EVALUATION RM MHCZ CP [place] [facility]   3/8/2023 10:30 AM SCHEDULE, MHCZ EVALUATION RM MHCZ CP [facility] [place] HOD   3/10/2023 10:30 AM SCHEDULE, MHCZ EVALUATION RM MHCZ CP [facility] [place] HOD   3/13/2023 10:30 AM SCHEDULE, MHCZ EVALUATION RM MHCZ CP [facility] [place] HOD   3/15/2023 10:30 AM SCHEDULE, MHCZ EVALUATION RM MHCZ CP [place] [facility]   3/17/2023 10:30 AM SCHEDULE, MHCZ EVALUATION RM MHCZ CP [place] [facility]   3/20/2023 10:30 AM SCHEDULE, MHCZ EVALUATION RM MHCZ CP [facility] [place] HOD   3/22/2023 10:30 AM SCHEDULE, MHCZ EVALUATION RM MHCZ CP [facility] [place] HOD   3/24/2023 10:30 AM SCHEDULE, MHCZ EVALUATION RM MHCZ CP [address] [facility] [place] H[place]   3/27/2023 10:30 AM SCHEDULE, MHCZ EVALUATION RM MHCZ CP [facility] [place] HOD   3/29/2023 10:30 AM SCHEDULE, MHCZ EVALUATION RM MHCZ CP [facility]   3/31/2023 10:30 AM SCHEDULE, MHCZ EVALUATION RM MHCZ CP [facility]   4/3/2023 10:30 AM SCHEDULE, MHCZ EVALUATION RM MHCZ CP [facility]   4/5/2023 10:30 AM SCHEDULE, MHCZ EVALUATION RM MHCZ CP [facility]   4/7/2023 10:30 AM SCHEDULE, MHCZ EVALUATION RM MHCZ CP [address] [facility]   4/11/2023  9:20 AM Leilani Mcpherson MD [facility] [place] [facility]     Non-[facility] follow up appointment(s): NA    LPN Care Coordinator reviewed medical action plan and red flags with patient and discussed any barriers to care and/or understanding of plan of care after discharge. Discussed appropriate site of care based on symptoms and resources available to patient including: PCP  Specialist  Urgent care clinics  When to call 911. The patient agrees to contact the PCP office for questions related to their healthcare. Advance Care Planning:   not on file. Patients top risk factors for readmission: medical condition-CP  Interventions to address risk factors: Assessment and support for treatment adherence and medication management-denied new or changed meds    Offered patient enrollment in the Remote Patient Monitoring (RPM) program for in-home monitoring: NA.     Care Transitions Subsequent and Final Call    Subsequent and Final Calls  Care Transitions Interventions     Other Services: Completed (Comment: RPM enrollement)     Social Work: Completed    Other Interventions:             LPN Care Coordinator provided contact information for future needs. Plan for follow-up call in 7-10 days based on severity of symptoms and risk factors. Plan for next call: symptom management-CP, SOB  self management-cardiac rehab  medication management-new or changed meds? RPM, ACP?     Og Frazier LPN

## 2023-01-17 NOTE — PROGRESS NOTES
Physician Progress Note      PATIENT:               Bethany Reyes  [facility] #:                  [id number]  :                       1945  ADMIT DATE:       2023 8:50 PM  [address] DATE:        2023 7:34 PM  RESPONDING  PROVIDER #:        Francine Pinzon CNP          QUERY TEXT:    Pt admitted with chest pain. Pt noted to have CAD and GERD. If possible,   please document in progress notes and discharge summary if you are evaluating   and/or treating any of the following: The medical record reflects the following:  Risk Factors: CAD with recent CABG and GERD  Clinical Indicators: Per DCS- Previous Chest pain with recent CABG in October:      stress test was completed and was low risk.   Echo noted above  US of GB was performed and was normal  The patient was feeling well at the time of d/c and was sent home on a trial   of PPI therapy  Treatment: Labs, Stress test, Echo, Cardio consult, USGB, added Imdur and   Protonix  Options provided:  -- Chest pain due to CAD with unstable angina  -- Chest pain due to GERD  -- Other - I will add my own diagnosis  -- Disagree - Not applicable / Not valid  -- Disagree - Clinically unable to determine / Unknown  -- Refer to Clinical Documentation Reviewer    PROVIDER RESPONSE TEXT:    Chest pain likely due to gerd    Query created by: Celeste Echavarria on 2023 3:08 PM      Electronically signed by:  Sarah Pinzon CNP 2023 12:48   PM

## 2023-01-18 ENCOUNTER — HOSPITAL ENCOUNTER (OUTPATIENT)
Dept: CARDIAC REHAB | Age: 78
Setting detail: THERAPIES SERIES
Discharge: HOME | End: 2023-01-18
Payer: MEDICARE

## 2023-01-18 PROCEDURE — 93798 PHYS/QHP OP CAR RHAB W/ECG: CPT

## 2023-01-19 ENCOUNTER — NURSE ONLY (OUTPATIENT)
Dept: FAMILY MEDICINE CLINIC | Age: 78
End: 2023-01-19

## 2023-01-19 DIAGNOSIS — E11.41 CONTROLLED TYPE 2 DIABETES MELLITUS WITH DIABETIC MONONEUROPATHY, WITHOUT LONG-TERM CURRENT USE OF INSULIN (HCC): ICD-10-CM

## 2023-01-19 DIAGNOSIS — Z23 NEEDS FLU SHOT: ICD-10-CM

## 2023-01-20 ENCOUNTER — HOSPITAL ENCOUNTER (OUTPATIENT)
Dept: CARDIAC REHAB | Age: 78
Setting detail: THERAPIES SERIES
Discharge: HOME | End: 2023-01-20
Payer: MEDICARE

## 2023-01-20 LAB
ESTIMATED AVERAGE GLUCOSE: 162.8 MG/DL
HBA1C MFR BLD: 7.3 %

## 2023-01-20 PROCEDURE — 93798 PHYS/QHP OP CAR RHAB W/ECG: CPT

## 2023-01-24 ENCOUNTER — CARE COORDINATION (OUTPATIENT)
Dept: CASE MANAGEMENT | Age: 78
End: 2023-01-24

## 2023-01-24 NOTE — CARE COORDINATION
[address] Care Transitions Follow Up Call    Patient: Rosa Maria Meyers  Patient : 1945   MRN: [id number]  Reason for Admission:   Discharge Date: 23 RARS: Readmission Risk Score: 15.5    Follow up call attempted, left contact info on vm.       Follow Up  Future Appointments   Date Time Provider Michael Marti   2023 10:30 AM SCHEDULE, [facility] EVALUATION RM MHCZ CP [address] [facility] HOD   2023 10:30 AM SCHEDULE, MHCZ EVALUATION RM MHCZ CP DAVID [place] HOD   2023 10:30 AM SCHEDULE, [facility] EVALUATION RM MHCZ CP [place] [facility]   2023 10:30 AM SCHEDULE, MHCZ EVALUATION RM MHCZ CP [place] [place] [facility]   2/3/2023 10:30 AM SCHEDULE, MHCZ EVALUATION RM MHCZ CP [facility] [place] HOD   2023 10:30 AM SCHEDULE, MHCZ EVALUATION RM MHCZ CP [facility] HOD   2023 10:30 AM SCHEDULE, MHC[facility] EVALUATION RM MHCZ CP [place] [facility]   2/10/2023 10:30 AM SCHEDULE, MHCZ EVALUATION RM MHCZ CP [facility] [place] HOD   2023 10:30 AM SCHEDULE, MHCZ EVALUATION RM MHCZ CP [place] [facility]   2/15/2023 10:30 AM SCHEDULE, MHCZ EVALUATION RM MHCZ CP [facility] [place] [facility] HOD   2023 10:30 AM SCHEDULE, MHCZ EVALUATION RM MHCZ CP [facility] [place] HOD   2023 10:30 AM SCHEDULE, MHCZ EVALUATION RM MHCZ CP [facility] [place] HOD   2023 10:30 AM SCHEDULE, MHCZ EVALUATION RM MHCZ CP [facility] [place] HOD   2023 10:30 AM SCHEDULE, MHCZ EVALUATION RM MHCZ CP [facility] [place] HOD   2023 10:30 AM SCHEDULE, MHCZ EVALUATION RM MHCZ CP [facility] HOD   3/1/2023 10:30 AM SCHEDULE, MHCZ EVALUATION RM MHCZ CP [facility] [place] HOD   3/3/2023 10:30 AM SCHEDULE, MHCZ EVALUATION RM MHCZ CP [facility] [facility]   3/6/2023 10:30 AM SCHEDULE, MHCZ EVALUATION RM MHCZ CP [facility] [place] [facility]   3/8/2023 10:30 AM SCHEDULE, MHCZ EVALUATION RM MHCZ CP [facility] [place] HOD   3/10/2023 10:30 AM SCHEDULE, MHCZ EVALUATION RM MHCZ CP [facility] [place] HOD   3/13/2023 10:30 AM SCHEDULE, MHCZ EVALUATION RM MHCZ CP [facility] [place] HOD   3/15/2023 10:30 AM SCHEDULE, MHCZ EVALUATION RM MHCZ CP DAVID [place] HOD   3/17/2023 10:30 AM SCHEDULE, MHCZ EVALUATION RM MHCZ CP [facility] [place] HOD   3/20/2023 10:30 AM SCHEDULE, MHCZ EVALUATION RM MHCZ CP [facility] [place] HOD   3/22/2023 10:30 AM SCHEDULE, MHCZ EVALUATION RM MHCZ CP [place] [facility]   3/24/2023 10:30 AM SCHEDULE, MHCZ EVALUATION RM MHCZ CP [place] [facility]   3/27/2023 10:30 AM SCHEDULE, MHCZ EVALUATION RM MHCZ CP [facility] [place] [facility]   3/29/2023 10:30 AM SCHEDULE, MHCZ EVALUATION RM MHCZ CP [facility] [place] [facility]   3/31/2023 10:30 AM SCHEDULE, MHCZ EVALUATION RM MHCZ CP [facility] [place] HOD   4/3/2023 10:30 AM SCHEDULE, MHCZ EVALUATION RM MHCZ CP [facility] [place] [facility]   4/5/2023 10:30 AM SCHEDULE, MHCZ EVALUATION RM MHCZ CP [facility] [place] HOD   4/7/2023 10:30 AM SCHEDULE, MHCZ EVALUATION RM MHCZ CP [facility] HOD   4/11/2023  9:20 AM Kike Milner MD [facility] Wendy Carlson RN

## 2023-01-25 ENCOUNTER — CARE COORDINATION (OUTPATIENT)
Dept: CASE MANAGEMENT | Age: 78
End: 2023-01-25

## 2023-01-25 ENCOUNTER — HOSPITAL ENCOUNTER (OUTPATIENT)
Dept: CARDIAC REHAB | Age: 78
Setting detail: THERAPIES SERIES
Discharge: HOME | End: 2023-01-25
Payer: MEDICARE

## 2023-01-25 PROCEDURE — 93798 PHYS/QHP OP CAR RHAB W/ECG: CPT

## 2023-01-25 NOTE — CARE COORDINATION
[facility] Care Transitions Follow Up Call    Care Transition Nurse contacted the patient by telephone to follow up after admission. Verified name and  with patient as identifiers. Patient: Kalie Carr  Patient : 1945   MRN: [id number]  Reason for Admission:   Discharge Date: 23 RARS: Readmission Risk Score: 15.5      Needs to be reviewed by the provider   Additional needs identified to be addressed with provider: No  none             Method of communication with provider: none. Pt states doing well, no issues or concerns. No need for further f/u CTC calls. Addressed changes since last contact:  none  Discussed follow-up appointments. If no appointment was previously scheduled, appointment scheduling offered: No.   Is follow up appointment scheduled within 7 days of discharge? Yes.     Follow Up  Future Appointments   Date Time Provider Michael Marti   2023 10:30 AM SCHEDULE, [facility] EVALUATION RM [facility] CP [facility] [facility]   2023 10:30 AM SCHEDULE, MHCZ EVALUATION RM MHCZ CP [facility]   2023 10:30 AM SCHEDULE, MHCZ EVALUATION RM MHCZ CP [facility]   2/3/2023 10:30 AM SCHEDULE, MHCZ EVALUATION RM MHCZ CP [address] [facility]   2023 10:30 AM SCHEDULE, MHCZ EVALUATION RM MHCZ CP [address] [facility]   2023 10:30 AM SCHEDULE, MHCZ EVALUATION RM MHCZ CP [address] [facility]   2/10/2023 10:30 AM SCHEDULE, MHCZ EVALUATION RM MHCZ CP [address] [facility]   2023 10:30 AM SCHEDULE, MHCZ EVALUATION RM MHCZ CP [address] [facility]   2/15/2023 10:30 AM SCHEDULE, MHCZ EVALUATION RM MHCZ CP [address] [facility]   2023 10:30 AM SCHEDULE, MHCZ EVALUATION RM MHCZ CP [address] [facility]   2023 10:30 AM SCHEDULE, MHCZ EVALUATION RM MHCZ CP [address] [facility]   2023 10:30 AM SCHEDULE, MHCZ EVALUATION RM MHCZ CP [address] [facility]   2023 10:30 AM SCHEDULE, MHCZ EVALUATION RM MHCZ CP [address] [facility]   2023 10:30 AM SCHEDULE, MHCZ EVALUATION RM MHCZ CP [address] [facility]   3/1/2023 10:30 AM SCHEDULE, MHC[facility] EVALUATION RM MHCZ CP [facility] [place] [facility]   3/3/2023 10:30 AM SCHEDULE, MHC[facility] EVALUATION RM MHCZ CP [place] [facility]   3/6/2023 10:30 AM SCHEDULE, MHCZ EVALUATION RM MHCZ CP [facility] [facility]   3/8/2023 10:30 AM SCHEDULE, MHC[facility] EVALUATION RM MHCZ CP [place] [facility]   3/10/2023 10:30 AM SCHEDULE, MHCZ EVALUATION RM MHCZ CP [facility] H[facility]   3/13/2023 10:30 AM SCHEDULE, MHCZ EVALUATION RM MHCZ CP [facility] [facility]   3/15/2023 10:30 AM SCHEDULE, MHCZ EVALUATION RM MHCZ CP [facility] H[facility]   3/17/2023 10:30 AM SCHEDULE, MHCZ EVALUATION RM MHCZ CP [place] [facility]   3/20/2023 10:30 AM SCHEDULE, MHCZ EVALUATION RM MHCZ CP [address] [facility]   3/22/2023 10:30 AM SCHEDULE, MHCZ EVALUATION RM MHCZ CP [address] [facility] HOD   3/24/2023 10:30 AM SCHEDULE, MHCZ EVALUATION RM MHCZ CP [address] [facility]   3/27/2023 10:30 AM SCHEDULE, MHCZ EVALUATION RM MHCZ CP [address] [facility] HOD   3/29/2023 10:30 AM SCHEDULE, MHCZ EVALUATION RM MHCZ CP [address] [facility]   3/31/2023 10:30 AM SCHEDULE, MHCZ EVALUATION RM MHCZ CP [address] [facility]   4/3/2023 10:30 AM SCHEDULE, MHCZ EVALUATION RM MHCZ CP [address] [facility]   4/5/2023 10:30 AM SCHEDULE, MHCZ EVALUATION RM MHCZ CP [address] [facility]   4/7/2023 10:30 AM SCHEDULE, MHCZ EVALUATION RM MHCZ CP [address] [facility]   4/11/2023  9:20 AM MD Laron Bass [place] - DYD     Non-[facility] follow up appointment(s):     Care Transition Nurse reviewed medical action plan with patient and discussed any barriers to care and/or understanding of plan of care after discharge. Discussed appropriate site of care based on symptoms and resources available to patient including: When to call 911. The patient agrees to contact the PCP office for questions related to their healthcare. Advance Care Planning:   not on file; education provided.      Patients top risk factors for readmission: medical condition-CP  Interventions to address risk factors: Assessment and support for treatment adherence and medication management-CP    Offered patient enrollment in the Remote Patient Monitoring (RPM) program for in-home monitoring: Yes, but did not enroll at this time.     Care Transitions Subsequent and Final Call    Subsequent and Final Calls  Do you have any ongoing symptoms?: No  Have your medications changed?: No  Do you have any questions related to your medications?: No  Do you currently have any active services?: No  Do you have any needs or concerns that I can assist you with?: No  Identified Barriers: None  Care Transitions Interventions  No Identified Needs     Other Services: Completed (Comment: RPM enrollement)     Social Work: Completed    Other Interventions:             Care Transition Nurse provided contact information for future needs. No further follow-up call indicated based on severity of symptoms and risk factors.  Plan for next call:  n/a    Vito Carlson RN

## 2023-01-27 ENCOUNTER — HOSPITAL ENCOUNTER (OUTPATIENT)
Dept: CARDIAC REHAB | Age: 78
Setting detail: THERAPIES SERIES
Discharge: HOME | End: 2023-01-27
Payer: MEDICARE

## 2023-01-27 PROCEDURE — 93798 PHYS/QHP OP CAR RHAB W/ECG: CPT

## 2023-01-30 ENCOUNTER — HOSPITAL ENCOUNTER (OUTPATIENT)
Dept: CARDIAC REHAB | Age: 78
Setting detail: THERAPIES SERIES
Discharge: HOME | End: 2023-01-30
Payer: MEDICARE

## 2023-01-30 PROCEDURE — 93798 PHYS/QHP OP CAR RHAB W/ECG: CPT

## 2023-02-01 ENCOUNTER — HOSPITAL ENCOUNTER (OUTPATIENT)
Dept: CARDIAC REHAB | Age: 78
Setting detail: THERAPIES SERIES
Discharge: HOME | End: 2023-02-01
Payer: MEDICARE

## 2023-02-01 NOTE — PLAN OF CARE
Individual Treatment Plan  Cardiac Rehabilitation    60 day Re assessment  Name: Jarad Weldon Reassessment: 61 days   : 1945 Primary Diagnosis:   Z95.1 (ICD-10-CM) - Presence of aortocoronary bypass graft   Age: 78 y.o. Referring Physician:   Aster Sanabria MD   MRN: [id number] Primary Care Physician: Florentino De Guzman MD     Allergies   Allergen Reactions    Prednisone Other (See Comments)     Made patient very agitated (medrol dospak)    Codeine Other (See Comments)     constipation    Lisinopril Cough     Exercise Assessment  Stages of Change: Action   Risk Stratification: High  Fall Risk: low  Amb Fall Risk Assessment and TUG Test 1/10/2023   Do you feel unsteady or are you worried about falling?  no   2 or more falls in past year? no   Fall with injury in past year? no      Assistive Devices:  none  Sessions completed: 15/36  Key Anti-Hypertensive Meds            amLODIPine (NORVASC) 5 MG tablet    Sig - Route: Take 1 tablet by mouth daily - Oral    Cosign for Ordering: Accepted by Dolly Ingram MD on 11/15/2022  3:01 PM    metoprolol tartrate (LOPRESSOR) 25 MG tablet    Sig - Route: Take 2 tablets by mouth 2 times daily - Oral           Exercise Prescription        Mode: . Treadmill, Bike (Upright or recumbent), NuStep, Rower, SciFit, Arm Ergometer, Elliptical, and Walking      Frequency: 2-3 days/week supervised      Intensity:RPE 11-14      Max Heart Rate: 116-taking beta blocker. Duration: 30+ minutes/day      Resistance Training: Yes, 3 days per week      Progression: Increase exercise by 5 minutes every week to goal of 30 to 50 minutes      Supplemental oxygen: is not on home oxygen therapy.     Plan  Home Exercise:Yes  Mode:Walking  Resistance Training: no   Target Goals  Adhere to cardiac exercise guidelines , Adhere to independent aerobic home exercise program, CRPII, fitness center , Increase exercise endurance and stamina , and Participate in strength training Progress toward goal:demonstrates cardiac exercise guidelines, and doing weights during strength training. Education  Mr. Seamus Noguera was educated on the importance of warm up and cool down, signs and symptoms to report, exercise safety, RPE scale, Jason Scale of Perceived Exertion use, Importance of physical activity and exercise progression, equipment orientation, home exercise program, low sodium diet, blood pressure education, and blood pressure medication  Tobacco Assessment  Social History     Tobacco Use   Smoking Status Former    Packs/day: 5.00    Years: 10.00    Pack years: 50.00    Types: Cigarettes    Quit date: 3/13/1976    Years since quittin.9   Smokeless Tobacco Never     Education Assessment  Nutrition Assessment  Stages of Change: Pre-Contemplation            Rate Your Plate Score:     Patient's weight goal/Progress: 215 lb  Recommended Diet:Increase consumption of fruit and vegetable to 8-10 servings a day, increase whole grains and low sodium and lowfat proteins, Decrease intake of pop/soda, Decrease intake of processed foods, and Decrease intake of refined sugars   Diabetic:Yes; Monitors blood sugars as prescribed Yes  Key Antihyperglycemic Medications            glimepiride (AMARYL) 4 MG tablet    Sig - Route:  Take 1 tablet by mouth every morning (before breakfast) - Oral    Class: Adjust Sig    Cosign for Ordering: Accepted by Yovana Dean MD on 2022  1:39 PM    metFORMIN (GLUCOPHAGE-XR) 500 MG extended release tablet    Sig: TAKE TWO TABLETS BY MOUTH TWICE A DAY           Lab Results   Component Value Date    LABA1C 7.3 2023       GLUCOSE 209 (H) 10/22/2022       LABA1C 8.0 10/18/2022     CHOL 163 10/18/2022     HDL 49 10/18/2022     [address] 95 10/18/2022     TRIG 93 10/18/2022                Nutrition Intervention  Attend education classes related to nutrition and Participate in an exercise program that promotes weight loss  Target Goals  Complete cardiac diet classes , Control blood pressure , Control cholesterol values , and Reduce weight   Progress toward goal: attends classes on diet, blood pressure, cholesterol and weight management. Education  Mr. Alena Soria was educated on the importance of proper hydration, maintaining optimal weight/BMI, nutrition guidelines, cholesterol reduction, nutrition label literacy, and portion control. Psychosocial Assessment  Stages of Change: Action    Psychosocial Test  PHQ-2/9 Today's PHQ:    PHQ Scores 1/10/2023 12/12/2022 8/15/2022 3/24/2021 10/9/2020 2/11/2019 4/23/2018   PHQ2 Score 0 1 0 0 2 0 0   PHQ9 Score 0 5 0 0 2 0 0     Interpretation of Total Score Depression Severity: 1-4 = Minimal depression, 5-9 = Mild depression, 10-14 = Moderate depression, 15-19 = Moderately severe depression, 20-27 = Severe depression    Psychosocial Intervention  Attend education classes related to stress management and relaxation and Participate in an exercise program that improves psychosocial symptoms  Target Goals  Progress toward goal:attends classes on stress management and demonstrates relaxation and stress management techniques in cool down. New goal: Maximize stress management/coping skills   Stages of Change: Action  Barriers to Learning: No barriers  Personal Learning Style:Video and Written     Education Intervention/Learning Needs Identified  Blood pressure, Cholesterol, Diabetes, Exercise, Infection prevention, Medications, Nutrition, Risk factor for CAD, Stregnth training, Stress management and relaxation, and Weight management  Target Goals  Attend appropriate web education classes   Progress toward goals: patient attends education while at rehab on appropriate risk factors above. New goals:  continue risk factor education. Education  Mr. Alena Soria was educated on the importance of healthy coping techniques and stress management, signs and symptoms of depression, relaxation techniques, psychosocial adjustments, and creating positive support systems                     Provider Review and Approval of this ITP    The above treatment plan and goals have been set for your patient during Cardiac Rehabilitation.  Please review and Electronically Cosign      Electronically signed by Wing Thierry RN on 2/1/23 at 8:02 AM EST

## 2023-02-01 NOTE — PROGRESS NOTES
Patient missed today\"s appointment. Called patient and he had the Covid-19 booster yesterday and is not feeling the greatest today.

## 2023-02-03 ENCOUNTER — HOSPITAL ENCOUNTER (OUTPATIENT)
Dept: CARDIAC REHAB | Age: 78
Setting detail: THERAPIES SERIES
Discharge: HOME | End: 2023-02-03
Payer: MEDICARE

## 2023-02-03 PROCEDURE — 93798 PHYS/QHP OP CAR RHAB W/ECG: CPT

## 2023-02-06 ENCOUNTER — HOSPITAL ENCOUNTER (OUTPATIENT)
Dept: CARDIAC REHAB | Age: 78
Setting detail: THERAPIES SERIES
Discharge: HOME | End: 2023-02-06
Payer: MEDICARE

## 2023-02-06 PROCEDURE — 93798 PHYS/QHP OP CAR RHAB W/ECG: CPT

## 2023-02-08 ENCOUNTER — HOSPITAL ENCOUNTER (OUTPATIENT)
Dept: CARDIAC REHAB | Age: 78
Setting detail: THERAPIES SERIES
Discharge: HOME | End: 2023-02-08
Payer: MEDICARE

## 2023-02-08 PROCEDURE — 93798 PHYS/QHP OP CAR RHAB W/ECG: CPT

## 2023-02-10 ENCOUNTER — HOSPITAL ENCOUNTER (OUTPATIENT)
Dept: CARDIAC REHAB | Age: 78
Setting detail: THERAPIES SERIES
Discharge: HOME | End: 2023-02-10
Payer: MEDICARE

## 2023-02-10 PROCEDURE — 93798 PHYS/QHP OP CAR RHAB W/ECG: CPT

## 2023-02-13 ENCOUNTER — HOSPITAL ENCOUNTER (OUTPATIENT)
Dept: CARDIAC REHAB | Age: 78
Setting detail: THERAPIES SERIES
Discharge: HOME | End: 2023-02-13
Payer: MEDICARE

## 2023-02-13 PROCEDURE — 93798 PHYS/QHP OP CAR RHAB W/ECG: CPT

## 2023-02-15 ENCOUNTER — HOSPITAL ENCOUNTER (OUTPATIENT)
Dept: CARDIAC REHAB | Age: 78
Setting detail: THERAPIES SERIES
Discharge: HOME | End: 2023-02-15
Payer: MEDICARE

## 2023-02-15 PROCEDURE — 93798 PHYS/QHP OP CAR RHAB W/ECG: CPT

## 2023-02-17 ENCOUNTER — HOSPITAL ENCOUNTER (OUTPATIENT)
Dept: CARDIAC REHAB | Age: 78
Setting detail: THERAPIES SERIES
Discharge: HOME | End: 2023-02-17
Payer: MEDICARE

## 2023-02-17 PROCEDURE — 93798 PHYS/QHP OP CAR RHAB W/ECG: CPT

## 2023-02-20 ENCOUNTER — HOSPITAL ENCOUNTER (OUTPATIENT)
Dept: CARDIAC REHAB | Age: 78
Setting detail: THERAPIES SERIES
Discharge: HOME | End: 2023-02-20
Payer: MEDICARE

## 2023-02-20 PROCEDURE — 93798 PHYS/QHP OP CAR RHAB W/ECG: CPT

## 2023-02-21 DIAGNOSIS — E11.42 TYPE 2 DIABETES MELLITUS WITH DIABETIC POLYNEUROPATHY, WITHOUT LONG-TERM CURRENT USE OF INSULIN (HCC): ICD-10-CM

## 2023-02-21 DIAGNOSIS — E11.41 CONTROLLED TYPE 2 DIABETES MELLITUS WITH DIABETIC MONONEUROPATHY, WITHOUT LONG-TERM CURRENT USE OF INSULIN (HCC): ICD-10-CM

## 2023-02-21 RX ORDER — BLOOD SUGAR DIAGNOSTIC
STRIP MISCELLANEOUS
Qty: 150 STRIP | Refills: 5 | Status: SHIPPED | OUTPATIENT
Start: 2023-02-21

## 2023-02-22 ENCOUNTER — HOSPITAL ENCOUNTER (OUTPATIENT)
Dept: CARDIAC REHAB | Age: 78
Setting detail: THERAPIES SERIES
Discharge: HOME | End: 2023-02-22
Payer: MEDICARE

## 2023-02-22 PROCEDURE — 93798 PHYS/QHP OP CAR RHAB W/ECG: CPT

## 2023-02-24 ENCOUNTER — HOSPITAL ENCOUNTER (OUTPATIENT)
Dept: CARDIAC REHAB | Age: 78
Setting detail: THERAPIES SERIES
Discharge: HOME | End: 2023-02-24
Payer: MEDICARE

## 2023-02-24 NOTE — PLAN OF CARE
Individual Treatment Plan  Cardiac Rehabilitation    90 day Re assessment  Name: Delphine Isidro Reassessment: 80 days   : 1945 Primary Diagnosis: CABG    Age: 78 y.o. Referring Physician:   Dr. Nevin Estevez   MRN: [id number] Primary Care Physician: Flor Harrison MD     Allergies   Allergen Reactions    Prednisone Other (See Comments)     Made patient very agitated (medrol dospak)    Codeine Other (See Comments)     constipation    Lisinopril Cough     Exercise Assessment  Stages of Change: Action   Risk Stratification: High  Fall Risk: low  Amb Fall Risk Assessment and TUG Test 1/10/2023   Do you feel unsteady or are you worried about falling?  no   2 or more falls in past year? no   Fall with injury in past year? no      Assistive Devices:  none  Sessions completed:   Key Anti-Hypertensive Meds            amLODIPine (NORVASC) 5 MG tablet    Sig - Route: Take 1 tablet by mouth daily - Oral    Cosign for Ordering: Accepted by Alessandro Stephen MD on 11/15/2022  3:01 PM    metoprolol tartrate (LOPRESSOR) 25 MG tablet    Sig - Route: Take 2 tablets by mouth 2 times daily - Oral           Exercise Prescription        Mode: . Treadmill, Bike (Upright or recumbent), NuStep, Rower, SciFit, Arm Ergometer, Elliptical, and Walking      Frequency: 2-3 days/week supervised      Intensity:RPE 11-14      Target Heart Rate zone:       Duration: 30+ minutes/day      Resistance Training: Yes, 3 days per week      Progression: Increase exercise by 5 minutes every week to goal of 30 to 50 minutes      Supplemental oxygen: is not on home oxygen therapy. Plan  Home Exercise:Yes  Mode:Walking  Resistance Training: no   Target Goals  Adhere to cardiac exercise guidelines , Adhere to independent aerobic home exercise program, CRPII, fitness center , and Participate in strength training     Progress toward goal:demonstrates cardiac exercise guidelines during workout. 5 lb weights for strength training. Reinforce home exercise program.  Education  Mr. Hartley was educated on the importance of warm up and cool down, signs and symptoms to report, exercise safety, RPE scale, Jason Scale of Perceived Exertion use, Importance of physical activity and exercise progression, home exercise program, low sodium diet, blood pressure education, and blood pressure medication    Nutrition Assessment  Stages of Change: Pre-Contemplation   Current Weight: 231 lb       Initial Weight: 227 lb  Rate Your Plate Score:     Patient's weight goal/Progress: Patient's goal: 215 lb. Patient has gained weight. Reinforce weight loss.  Recommended Diet:Increase consumption of fruit and vegetable to 8-10 servings a day, increase whole grains and low sodium and lowfat proteins, Decrease intake of pop/soda, Decrease intake of processed foods, and Decrease intake of refined sugars   Diabetic:Yes; Monitors blood sugars as prescribed No  Key Antihyperglycemic Medications            glimepiride (AMARYL) 4 MG tablet    Sig - Route: Take 1 tablet by mouth every morning (before breakfast) - Oral    Class: Adjust Sig    Cosign for Ordering: Accepted by Kike Milner MD on 11/30/2022  1:39 PM    metFORMIN (GLUCOPHAGE-XR) 500 MG extended release tablet    Sig: TAKE TWO TABLETS BY MOUTH TWICE A DAY           Lab Results   Component Value Date    LABA1C 7.3 01/19/2023        LABA1C 8.0 10/18/2022     CHOL 163 10/18/2022     HDL 49 10/18/2022     LDLCALC 95 10/18/2022     TRIG 93 10/18/2022                Nutrition Intervention  Attend education classes related to nutrition and Participate in an exercise program that promotes weight loss  Target Goals  Complete cardiac diet classes , Control blood pressure , Control cholesterol values , and Reduce weight   Progress toward goal: attending education on diet, blood pressure, cholesterol, and weight management. Needs reinforced.   Education  Mr. Hartley was educated on the importance of proper  hydration, maintaining optimal weight/BMI, nutrition guidelines, cholesterol reduction, portion control, adequate protein intake, signs and symptoms of hypoglycemia, and relationship of diabetes to cardiac disease. Psychosocial Assessment  Stages of Change: Action  Currently working: no  Psychosocial Test  PHQ-2/9 Today's PHQ:    PHQ Scores 1/10/2023 2022 8/15/2022 3/24/2021 10/9/2020 2019 2018   PHQ2 Score 0 1 0 0 2 0 0   PHQ9 Score 0 5 0 0 2 0 0     Interpretation of Total Score Depression Severity: 1-4 = Minimal depression, 5-9 = Mild depression, 10-14 = Moderate depression, 15-19 = Moderately severe depression, 20-27 = Severe depression    Psychosocial Intervention  Attend education classes related to stress management and relaxation and Participate in an exercise program that improves psychosocial symptoms  Target Goals  Previous goals: Maximize stress management/coping skills   Progress toward goal:demonstrates stress management techniques during cool down and attends education on stress management. Continue reinforcement. New goal: Identify a positive support system   Stages of Change: Action  Barriers to Learning: No barriers  Personal Learning Style:Video and Written     Education Intervention/Learning Needs Identified  Blood pressure, Cholesterol, Diabetes, Exercise, Infection prevention, Medications, Nutrition, Risk factor for CAD, Signs and symptoms of MI/CVA, Stregnth training, Stress management and relaxation, and Weight management  Tobacco Assessment  Social History     Tobacco Use   Smoking Status Former    Packs/day: 5.00    Years: 10.00    Pack years: 50.00    Types: Cigarettes    Quit date: 3/13/1976    Years since quittin.9   Smokeless Tobacco Never   Previous goals: Attend appropriate web education classes   Progress toward goals: attends education on risk factors. Patient needs to continue risk factor education at rehab.    New goals: Attend appropriate web education classes   Education  Mr. Elzbieta Arciniega was educated on the importance of healthy coping techniques and stress management, signs and symptoms of depression, relaxation techniques, psychosocial adjustments, and creating positive support systems                     Provider Review and Approval of this ITP    The above treatment plan and goals have been set for your patient during Cardiac Rehabilitation.  Please review and Electronically Cosign      Electronically signed by Debra Rodriguez RN on 2/24/23 at 12:22 PM EST

## 2023-02-26 DIAGNOSIS — F51.01 PRIMARY INSOMNIA: ICD-10-CM

## 2023-02-27 ENCOUNTER — HOSPITAL ENCOUNTER (OUTPATIENT)
Dept: CARDIAC REHAB | Age: 78
Setting detail: THERAPIES SERIES
Discharge: HOME | End: 2023-02-27
Payer: MEDICARE

## 2023-02-27 RX ORDER — TRAZODONE HYDROCHLORIDE 100 MG/1
TABLET ORAL
Qty: 90 TABLET | Refills: 3 | Status: SHIPPED | OUTPATIENT
Start: 2023-02-27

## 2023-02-27 NOTE — TELEPHONE ENCOUNTER
Future Appointments   Date Time Provider Michael Marti   2/27/2023 10:30 AM SCHEDULE, [facility] [id number] [place] HOD   3/1/2023 10:30 AM SCHEDULE, [facility] [place] HOD   3/3/2023 10:30 AM SCHEDULE, [facility] [place] HOD   3/6/2023 10:30 AM SCHEDULE, [facility] [address] [place] HOD   3/8/2023 10:30 AM SCHEDULE, [facility] [place] HOD   3/10/2023 10:30 AM SCHEDULE, [facility] [place] HOD   3/13/2023 10:30 AM SCHEDULE, [facility] [address] [facility]   3/15/2023 10:30 AM SCHEDULE, [facility] [address] [facility] HOD   3/17/2023 10:30 AM SCHEDULE, [facility] [address] [facility]   3/20/2023 10:30 AM SCHEDULE, [facility] [address] [facility] HOD   3/22/2023 10:30 AM SCHEDULE, [facility] [address] [facility] HOD   3/24/2023 10:30 AM SCHEDULE, [facility] [address] [facility] HOD   3/27/2023 10:30 AM SCHEDULE, [facility] [address] [facility] HOD   3/29/2023 10:30 AM SCHEDULE, [facility] [address] [facility] HOD   3/31/2023 10:30 AM SCHEDULE, [facility] [address] [facility] HOD   4/3/2023 10:30 AM SCHEDULE, [facility] [address] [facility] HOD   4/5/2023 10:30 AM SCHEDULE, [facility] [address] [facility] HOD   4/7/2023 10:30 AM SCHEDULE, [facility]AL[facility]ATION [facility]   4/11/2023  9:20 AM MD Laron Celis

## 2023-03-01 ENCOUNTER — APPOINTMENT (OUTPATIENT)
Dept: CARDIAC REHAB | Age: 78
End: 2023-03-01
Payer: MEDICARE

## 2023-03-02 ENCOUNTER — TELEMEDICINE (OUTPATIENT)
Dept: FAMILY MEDICINE CLINIC | Age: 78
End: 2023-03-02

## 2023-03-02 DIAGNOSIS — R05.1 ACUTE COUGH: Primary | ICD-10-CM

## 2023-03-02 DIAGNOSIS — J98.8 CONGESTION OF UPPER AIRWAY: ICD-10-CM

## 2023-03-02 LAB
Lab: NORMAL
QC PASS/FAIL: NORMAL
SARS-COV-2 RDRP RESP QL NAA+PROBE: NEGATIVE

## 2023-03-02 RX ORDER — BENZONATATE 200 MG/1
200 CAPSULE ORAL 3 TIMES DAILY PRN
Qty: 30 CAPSULE | Refills: 0 | Status: SHIPPED | OUTPATIENT
Start: 2023-03-02 | End: 2023-03-09

## 2023-03-02 RX ORDER — GUAIFENESIN 600 MG/1
600 TABLET, EXTENDED RELEASE ORAL 2 TIMES DAILY
Qty: 30 TABLET | Refills: 0 | Status: SHIPPED | OUTPATIENT
Start: 2023-03-02 | End: 2023-03-17

## 2023-03-02 SDOH — ECONOMIC STABILITY: HOUSING INSECURITY
IN THE LAST 12 MONTHS, WAS THERE A TIME WHEN YOU DID NOT HAVE A STEADY PLACE TO SLEEP OR SLEPT IN A SHELTER (INCLUDING NOW)?: NO

## 2023-03-02 SDOH — ECONOMIC STABILITY: INCOME INSECURITY: HOW HARD IS IT FOR YOU TO PAY FOR THE VERY BASICS LIKE FOOD, HOUSING, MEDICAL CARE, AND HEATING?: NOT HARD AT ALL

## 2023-03-02 SDOH — ECONOMIC STABILITY: FOOD INSECURITY: WITHIN THE PAST 12 MONTHS, YOU WORRIED THAT YOUR FOOD WOULD RUN OUT BEFORE YOU GOT MONEY TO BUY MORE.: NEVER TRUE

## 2023-03-02 SDOH — ECONOMIC STABILITY: FOOD INSECURITY: WITHIN THE PAST 12 MONTHS, THE FOOD YOU BOUGHT JUST DIDN'T LAST AND YOU DIDN'T HAVE MONEY TO GET MORE.: NEVER TRUE

## 2023-03-02 ASSESSMENT — ENCOUNTER SYMPTOMS
EYES NEGATIVE: 1
COUGH: 1
BLOOD IN STOOL: 0
SINUS PAIN: 0
CHEST TIGHTNESS: 0
SORE THROAT: 1
NAUSEA: 0
SWOLLEN GLANDS: 0
ALLERGIC/IMMUNOLOGIC NEGATIVE: 1
ABDOMINAL PAIN: 0
RHINORRHEA: 0
WHEEZING: 0
VOMITING: 0
ANAL BLEEDING: 0
DIARRHEA: 0
SHORTNESS OF BREATH: 0
GASTROINTESTINAL NEGATIVE: 1

## 2023-03-02 NOTE — PROGRESS NOTES
[address] [facility]  [address] [place] [address]  Dept: [phone]  Dept Fax: [phone]  Loc: [phone]    Max Carcamo is a 78 y.o. male evaluated via telephone on 3/2/2023. Consent:  He and/or health care decision maker is aware that that he may receive a bill for this telephone service, which includes applicable co-pays, depending on his insurance coverage, and has provided verbal consent to proceed. [address] [facility]  [address] [facility] [address]  Dept: [phone]  Dept Fax: [phone]  Loc: [phone]    Max Carcamo is a 78 y.o. male who presents today for his medical conditions/complaints as noted below. Max Carcamo is c/o of Head Congestion, Pharyngitis, and Cough (X4-5 days)       Subjective:     Chief Complaint   Patient presents with    Head Congestion    Pharyngitis    Cough     X4-5 days       URI   This is a new problem. The current episode started in the past 7 days (4-5 days). The problem has been unchanged. There has been no fever. Associated symptoms include congestion (head and chest), coughing (MPC), ear pain, headaches (resolved) and a sore throat (resolved now). Pertinent negatives include no abdominal pain, chest pain, diarrhea, dysuria, joint pain, joint swelling, nausea, neck pain, plugged ear sensation, rash, rhinorrhea, sinus pain, swollen glands, vomiting or wheezing. He has tried increased fluids (veena seltzer cold medication and nasal spray) for the symptoms. The treatment provided no relief.      Past Medical History:   Diagnosis Date    Acute MI ([address]     Angina pectoris, unstable ([address]     ASHD (arteriosclerotic heart disease)     Bladder outlet obstruction     BPH (benign prostatic hyperplasia)     Chest pain     Constipation     Diabetes mellitus ([address]     GERD (gastroesophageal reflux disease)     Hypercholesteremia     Hypercholesterolemia Hypertension     IBS (irritable bowel syndrome)     Influenza A 01/28/2020    Sinusitis     Type II or unspecified type diabetes mellitus without mention of complication, not stated as uncontrolled          Review of Systems   Constitutional: Negative. Negative for appetite change, chills, fatigue, fever and unexpected weight change. HENT:  Positive for congestion (head and chest), ear pain and sore throat (resolved now). Negative for rhinorrhea and sinus pain. Eyes: Negative. Respiratory:  Positive for cough (MPC). Negative for chest tightness, shortness of breath and wheezing. Cardiovascular: Negative. Negative for chest pain, palpitations and leg swelling. Gastrointestinal: Negative. Negative for abdominal pain, anal bleeding, blood in stool, diarrhea, nausea and vomiting. Endocrine: Negative. Genitourinary: Negative. Negative for dysuria and hematuria. Musculoskeletal: Negative. Negative for joint pain and neck pain. Skin: Negative. Negative for rash. Allergic/Immunologic: Negative. Neurological:  Positive for headaches (resolved). Negative for dizziness, syncope, light-headedness and numbness. Hematological: Negative. Does not bruise/bleed easily. Psychiatric/Behavioral: Negative. All other systems reviewed and are negative.      Current Outpatient Medications   Medication Sig Dispense Refill    traZODone (DESYREL) 100 MG tablet TAKE ONE TABLET BY MOUTH ONCE NIGHTLY AS NEEDED FOR SLEEP 90 tablet 3    blood glucose test strips (ONETOUCH ULTRA) strip TEST TWO TIMES A  strip 5    isosorbide mononitrate (IMDUR) 30 MG extended release tablet Take 1 tablet by mouth daily 30 tablet 3    tamsulosin (FLOMAX) 0.4 MG capsule Take 1 capsule by mouth daily Indications: Benign Enlargement of Prostate 90 capsule 0    glimepiride (AMARYL) 4 MG tablet Take 1 tablet by mouth every morning (before breakfast) 30 tablet 11    amLODIPine (NORVASC) 5 MG tablet Take 1 tablet by mouth daily 30 tablet 1    metFORMIN (GLUCOPHAGE-XR) 500 MG extended release tablet TAKE TWO TABLETS BY MOUTH TWICE A  tablet 3    metoprolol tartrate (LOPRESSOR) 25 MG tablet Take 2 tablets by mouth 2 times daily 60 tablet 5    rosuvastatin (CRESTOR) 40 MG tablet Take 1 tablet by mouth every evening 30 tablet 11    aspirin 325 MG EC tablet Take 1 tablet by mouth daily 30 tablet 0    famotidine (PEPCID) 20 MG tablet Take 1 tablet by mouth daily 30 tablet 0     No current facility-administered medications for this visit. No changes in past medical history, past surgical history, social history, orfamily history were noted during the patient encounter unless specifically listed above. All updates of past medical history, past surgical history, social history, or family history were reviewed personally by me duringthe office visit. All problems listed in the assessment are stable unless noted otherwise. Medication profile reviewed personally by me during the office visit. Medication side effects and possible impairments frommedications were discussed as applicable. Objective:     Physical Exam  Not applicable due to telephone visit  There were no vitals taken for this visit. There is no height or weight on file to calculate BMI.     BP Readings from Last 2 Encounters:   01/10/23 137/73   01/07/23 (!) 145/84       Wt Readings from Last 3 Encounters:   01/10/23 234 lb (106.1 kg)   01/07/23 223 lb (101.2 kg)   01/05/23 225 lb (102.1 kg)       Lab Review   Nurse Only on 01/19/2023   Component Date Value    Hemoglobin A1C 01/19/2023 7.3     eAG 01/19/2023 162.8    Hospital Outpatient Visit on 01/11/2023   Component Date Value    POC Glucose 01/11/2023 107 (A)     Performed on 01/11/2023 [facility] Outpatient Visit on 01/09/2023   Component Date Value    POC Glucose 01/09/2023 194 (A)     Performed on 01/09/2023 ACCU-CHEK    Admission on 01/05/2023, Discharged on 01/07/2023   Component Date Value Troponin 01/05/2023 <0.01     Troponin 01/06/2023 <0.01     WBC 01/06/2023 4.5     RBC 01/06/2023 4.61     Hemoglobin 01/06/2023 13.7     Hematocrit 01/06/2023 42.1     MCV 01/06/2023 91.4     MCH 01/06/2023 29.7     MCHC 01/06/2023 32.5     RDW 01/06/2023 15.1     Platelets 84/93/0025 212     MPV 01/06/2023 8.1     Left Ventricular Ejectio* 01/07/2023 66     LVEF MODALITY 01/07/2023 Nuclear     POC Glucose 01/05/2023 172 (A)     Performed on 01/05/2023 ACCU-CHEK     Ventricular Rate 01/06/2023 61     Atrial Rate 01/06/2023 61     P-R Interval 01/06/2023 182     QRS Duration 01/06/2023 98     Q-T Interval 01/06/2023 416     QTc Calculation (Bazett) 01/06/2023 418     P Axis 01/06/2023 35     R Danforth 01/06/2023 75     T Axis 01/06/2023 77     Diagnosis 01/06/2023 Normal sinus rhythm with sinus arrhythmiaNonspecific ST and T wave abnormalityConfirmed by Harshad Suarez ([id number]) on 1/6/2023 6:08:52 PM     POC Glucose 01/06/2023 230 (A)     Performed on 01/06/2023 ACCU-CHEK     POC Glucose 01/06/2023 252 (A)     Performed on 01/06/2023 ACCU-CHEK     Left Ventricular Ejectio* 01/07/2023 58     LVEF MODALITY 01/07/2023 ECHO     POC Glucose 01/06/2023 160 (A)     Performed on 01/06/2023 ACCU-CHEK     POC Glucose 01/06/2023 217 (A)     Performed on 01/06/2023 ACCU-CHEK     POC Glucose 01/07/2023 126 (A)     Performed on 01/07/2023 ACCU-CHEK     POC Glucose 01/07/2023 182 (A)     Performed on 01/07/2023 ACCU-CHEK    Admission on 01/05/2023, Discharged on 01/05/2023   Component Date Value    Ventricular Rate 01/05/2023 64     Atrial Rate 01/05/2023 64     P-R Interval 01/05/2023 162     QRS Duration 01/05/2023 96     Q-T Interval 01/05/2023 406     QTc Calculation (Bazett) 01/05/2023 418     P Axis 01/05/2023 51     R Danforth 01/05/2023 96     T Danforth 01/05/2023 60     Diagnosis 01/05/2023 Normal sinus rhythmRightward axisBorderline ECGWhen compared with ECG of 13-OCT-2022 08:56,No significant change was foundConfirmed by Darshan De Leon MD, SUZETTE ([id number]) on 1/5/2023 7:37:54 AM     Troponin 01/05/2023 0.02 (A)     WBC 01/05/2023 4.8     RBC 01/05/2023 4.72     Hemoglobin 01/05/2023 14.6     Hematocrit 01/05/2023 41.6     MCV 01/05/2023 88.1     MCH 01/05/2023 30.9     MCHC 01/05/2023 35.0     RDW 01/05/2023 14.6     Platelets 34/53/0017 227     MPV 01/05/2023 7.9     Neutrophils % 01/05/2023 53.1     Lymphocytes % 01/05/2023 34.2     Monocytes % 01/05/2023 10.1     Eosinophils % 01/05/2023 2.0     Basophils % 01/05/2023 0.6     Neutrophils Absolute 01/05/2023 2.5     Lymphocytes Absolute 01/05/2023 1.6     Monocytes Absolute 01/05/2023 0.5     Eosinophils Absolute 01/05/2023 0.1     Basophils Absolute 01/05/2023 0.0     Sodium 01/05/2023 140     Potassium reflex Magnesi* 01/05/2023 4.0     Chloride 01/05/2023 104     CO2 01/05/2023 26     Anion Gap 01/05/2023 10     Glucose 01/05/2023 137 (A)     BUN 01/05/2023 13     Creatinine 01/05/2023 1.0     Est, Glom Filt Rate 01/05/2023 >60     Calcium 01/05/2023 10.0     Total Protein 01/05/2023 6.9     Albumin 01/05/2023 4.5     Albumin/Globulin Ratio 01/05/2023 1.9     Total Bilirubin 01/05/2023 0.7     Alkaline Phosphatase 01/05/2023 76     ALT 01/05/2023 12     AST 01/05/2023 15     Color, UA 01/05/2023 Yellow     Clarity, UA 01/05/2023 Clear     Glucose, Ur 01/05/2023 Negative     Bilirubin Urine 01/05/2023 Negative     Ketones, Urine 01/05/2023 Negative     Specific Gravity, UA 01/05/2023 1.015     Blood, Urine 01/05/2023 Negative     pH, UA 01/05/2023 7.5     Protein, UA 01/05/2023 Negative     Urobilinogen, Urine 01/05/2023 0.2     Nitrite, Urine 01/05/2023 Negative     Leukocyte Esterase, Urine 01/05/2023 Negative     Microscopic Examination 01/05/2023 Not Indicated     Urine Type 01/05/2023 NotGiven     Lactic Acid 01/05/2023 1.2     SARS-CoV-2, NAAT 01/05/2023 Not Detected     Rapid Influenza A Ag 01/05/2023 Negative     Rapid Influenza B Ag 01/05/2023 Negative     Lipase 01/05/2023 16.0 Troponin 01/05/2023 <0.01     Troponin 01/05/2023 <0.01    Hospital Outpatient Visit on 01/04/2023   Component Date Value    POC Glucose 01/04/2023 116 (A)     Performed on 01/04/2023 ACCU-CHEK        No results found for this visit on 03/02/23. Assessment:       1. Acute cough    2. Congestion of upper airway        No results found for this visit on 03/02/23. Plan:       Panfilo Morris was seen today for head congestion, pharyngitis and cough. Diagnoses and all orders for this visit:    Acute cough  -     POCT COVID-19 Rapid, NAAT    Congestion of upper airway  -     POCT COVID-19 Rapid, NAAT--negative  start  -     guaiFENesin (MUCINEX) 600 MG extended release tablet; Take 1 tablet by mouth 2 times daily for 15 days  -     benzonatate (TESSALON) 200 MG capsule; Take 1 capsule by mouth 3 times daily as needed for Cough      Patient has been instructed call the office immediately with new symptoms, change in symptoms or worseningof symptoms. If this is not feasible, patient is instructed to report to the emergency room. Medication profile reviewed. Medication side effects and possible impairments from medications were discussed as applicable. Allergies were reviewed. Health maintenance was reviewed and updated as appropriate. No follow-ups on file. (Comment: Please note this report has been produced using a combination of typing and speech recognition software and may contain errors related to that system including errors in grammar, punctuation, and spelling, as well as words and phrases that may be inappropriate. If there are any questions or concerns please feel free to contact the dictating provider for clarification.)        Patient has been instructed call the office immediately with new symptoms, change in symptoms or worseningof symptoms. If this is not feasible, patient is instructed to report to the emergency room. Medication profile reviewed.  Medication side effects and possible impairments from medications were discussed as applicable. Allergies were reviewed. Health maintenance was reviewed and updated as appropriate. Documentation:  I communicated with the patient and/or health care decision maker about above conditions. Details of this discussion including any medical advice provided: as noted above      I affirm this is a Patient Initiated Episode with a Patient who has not had a related appointment within my department in the past 7 days or scheduled within the next 24 hours. Patient identification was verified at the start of the visit: Yes    Total Time: minutes: 11-20 minutes    Radha Muñoz, was evaluated through a synchronous (real-time) audio-video encounter. The patient (or guardian if applicable) is aware that this is a billable service, which includes applicable co-pays. This Virtual Visit was conducted with patient's (and/or legal guardian's) consent. The visit was conducted pursuant to the emergency declaration under the [address] [facility], [address] [place] [address] authority and the [employer] General Act. Patient identification was verified, and a caregiver was present when appropriate.    The patient was located at Home: [address] [facility]  Provider was located at [facility] (Appt Dept): [address],  [address] [facility]         Note: not billable if this call serves to triage the patient into an appointment for the relevant concern      YANNICK Medley - CNP

## 2023-03-03 ENCOUNTER — APPOINTMENT (OUTPATIENT)
Dept: CARDIAC REHAB | Age: 78
End: 2023-03-03
Payer: MEDICARE

## 2023-03-06 ENCOUNTER — HOSPITAL ENCOUNTER (OUTPATIENT)
Dept: CARDIAC REHAB | Age: 78
Setting detail: THERAPIES SERIES
Discharge: HOME | End: 2023-03-06
Payer: MEDICARE

## 2023-03-06 PROCEDURE — 93798 PHYS/QHP OP CAR RHAB W/ECG: CPT

## 2023-03-07 LAB
GLUCOSE BLD-MCNC: 247 MG/DL (ref 70–99)
PERFORMED ON: ABNORMAL

## 2023-03-08 ENCOUNTER — HOSPITAL ENCOUNTER (OUTPATIENT)
Dept: CARDIAC REHAB | Age: 78
Setting detail: THERAPIES SERIES
Discharge: HOME | End: 2023-03-08
Payer: MEDICARE

## 2023-03-08 PROCEDURE — 93798 PHYS/QHP OP CAR RHAB W/ECG: CPT

## 2023-03-10 ENCOUNTER — HOSPITAL ENCOUNTER (OUTPATIENT)
Dept: CARDIAC REHAB | Age: 78
Setting detail: THERAPIES SERIES
Discharge: HOME | End: 2023-03-10
Payer: MEDICARE

## 2023-03-13 ENCOUNTER — HOSPITAL ENCOUNTER (OUTPATIENT)
Dept: CARDIAC REHAB | Age: 78
Setting detail: THERAPIES SERIES
Discharge: HOME | End: 2023-03-13
Payer: MEDICARE

## 2023-03-13 PROCEDURE — 93798 PHYS/QHP OP CAR RHAB W/ECG: CPT

## 2023-03-15 ENCOUNTER — HOSPITAL ENCOUNTER (OUTPATIENT)
Dept: CARDIAC REHAB | Age: 78
Setting detail: THERAPIES SERIES
Discharge: HOME | End: 2023-03-15
Payer: MEDICARE

## 2023-03-15 PROCEDURE — 93798 PHYS/QHP OP CAR RHAB W/ECG: CPT

## 2023-03-17 ENCOUNTER — HOSPITAL ENCOUNTER (OUTPATIENT)
Dept: CARDIAC REHAB | Age: 78
Setting detail: THERAPIES SERIES
Discharge: HOME | End: 2023-03-17
Payer: MEDICARE

## 2023-03-17 PROCEDURE — 93798 PHYS/QHP OP CAR RHAB W/ECG: CPT

## 2023-03-20 ENCOUNTER — HOSPITAL ENCOUNTER (OUTPATIENT)
Dept: CARDIAC REHAB | Age: 78
Setting detail: THERAPIES SERIES
Discharge: HOME | End: 2023-03-20
Payer: MEDICARE

## 2023-03-20 PROCEDURE — 93798 PHYS/QHP OP CAR RHAB W/ECG: CPT

## 2023-03-20 NOTE — PLAN OF CARE
Individual Treatment Plan  Cardiac Rehabilitation    120 Reassessment  Name: Jenny Guzman Reassessment: 80   : 1945 Primary Diagnosis:      Age: 78 y.o. Referring Physician:      MRN: [id number] Primary Care Physician: Hodan Hernandez MD     Allergies   Allergen Reactions    Prednisone Other (See Comments)     Made patient very agitated (medrol dospak)    Codeine Other (See Comments)     constipation    Lisinopril Cough     Exercise Assessment  Stages of Change: Action   Risk Stratification: High  Fall Risk:   Amb Fall Risk Assessment and TUG Test 1/10/2023   Do you feel unsteady or are you worried about falling?  no   2 or more falls in past year? no   Fall with injury in past year? no      Assistive Devices:  none  Sessions completed:   Key Anti-Hypertensive Meds            amLODIPine (NORVASC) 5 MG tablet    Sig - Route: Take 1 tablet by mouth daily - Oral    Cosign for Ordering: Accepted by Tanika Baxter MD on 11/15/2022  3:01 PM    metoprolol tartrate (LOPRESSOR) 25 MG tablet    Sig - Route: Take 2 tablets by mouth 2 times daily - Oral           Exercise Prescription        Mode: . Treadmill, Bike (Upright or recumbent), NuStep, Rower, SciFit, Airdyne, Recumbent Eliptical, and Walking      Frequency: 2-3 days/week supervised      Intensity:RPE 11-14      Current Target Heart Rate: Resting HR + 20-30      New Target Heart Rate zone:      Duration: 30+ minutes/day      Resistance Training: Yes, 3 days per week      Progression: Increase exercise by 5 minutes every week to goal of 30 to 50 minutes and Increase resistance by 1 to 3 pounds every 1 to 2 weeks to goal of returned baseline strength level      Supplemental oxygen: is not on home oxygen therapy.     Plan  Home Exercise:Yes  Mode:Walking  Resistance Training: no   Target Goals  Adhere to 5 days per week exercise program , Adhere to independent aerobic home exercise program, [facility], fitness center , and Participate in

## 2023-03-22 ENCOUNTER — HOSPITAL ENCOUNTER (OUTPATIENT)
Dept: CARDIAC REHAB | Age: 78
Setting detail: THERAPIES SERIES
Discharge: HOME | End: 2023-03-22
Payer: MEDICARE

## 2023-03-22 PROCEDURE — 93798 PHYS/QHP OP CAR RHAB W/ECG: CPT

## 2023-03-24 ENCOUNTER — HOSPITAL ENCOUNTER (OUTPATIENT)
Dept: CARDIAC REHAB | Age: 78
Setting detail: THERAPIES SERIES
Discharge: HOME | End: 2023-03-24
Payer: MEDICARE

## 2023-03-24 PROCEDURE — 93798 PHYS/QHP OP CAR RHAB W/ECG: CPT

## 2023-03-27 ENCOUNTER — HOSPITAL ENCOUNTER (OUTPATIENT)
Dept: CARDIAC REHAB | Age: 78
Setting detail: THERAPIES SERIES
Discharge: HOME | End: 2023-03-27
Payer: MEDICARE

## 2023-03-27 PROCEDURE — 93798 PHYS/QHP OP CAR RHAB W/ECG: CPT

## 2023-03-29 ENCOUNTER — HOSPITAL ENCOUNTER (OUTPATIENT)
Dept: CARDIAC REHAB | Age: 78
Setting detail: THERAPIES SERIES
Discharge: HOME | End: 2023-03-29
Payer: MEDICARE

## 2023-03-29 PROCEDURE — 93798 PHYS/QHP OP CAR RHAB W/ECG: CPT

## 2023-03-31 ENCOUNTER — APPOINTMENT (OUTPATIENT)
Dept: CARDIAC REHAB | Age: 78
End: 2023-03-31
Payer: MEDICARE

## 2023-04-03 ENCOUNTER — HOSPITAL ENCOUNTER (OUTPATIENT)
Dept: CARDIAC REHAB | Age: 78
Setting detail: THERAPIES SERIES
Discharge: HOME | End: 2023-04-03
Payer: MEDICARE

## 2023-04-03 PROCEDURE — 93798 PHYS/QHP OP CAR RHAB W/ECG: CPT

## 2023-04-05 ENCOUNTER — HOSPITAL ENCOUNTER (OUTPATIENT)
Dept: CARDIAC REHAB | Age: 78
Setting detail: THERAPIES SERIES
Discharge: HOME | End: 2023-04-05
Payer: MEDICARE

## 2023-04-05 VITALS — WEIGHT: 235 LBS | HEIGHT: 72 IN | BODY MASS INDEX: 31.83 KG/M2

## 2023-04-05 PROCEDURE — 93798 PHYS/QHP OP CAR RHAB W/ECG: CPT

## 2023-04-05 ASSESSMENT — PATIENT HEALTH QUESTIONNAIRE - PHQ9
9. THOUGHTS THAT YOU WOULD BE BETTER OFF DEAD, OR OF HURTING YOURSELF: 0
6. FEELING BAD ABOUT YOURSELF - OR THAT YOU ARE A FAILURE OR HAVE LET YOURSELF OR YOUR FAMILY DOWN: 1
7. TROUBLE CONCENTRATING ON THINGS, SUCH AS READING THE NEWSPAPER OR WATCHING TELEVISION: 0
2. FEELING DOWN, DEPRESSED OR HOPELESS: 1
1. LITTLE INTEREST OR PLEASURE IN DOING THINGS: 1
SUM OF ALL RESPONSES TO PHQ QUESTIONS 1-9: 6
4. FEELING TIRED OR HAVING LITTLE ENERGY: 1
5. POOR APPETITE OR OVEREATING: 1
SUM OF ALL RESPONSES TO PHQ9 QUESTIONS 1 & 2: 2
10. IF YOU CHECKED OFF ANY PROBLEMS, HOW DIFFICULT HAVE THESE PROBLEMS MADE IT FOR YOU TO DO YOUR WORK, TAKE CARE OF THINGS AT HOME, OR GET ALONG WITH OTHER PEOPLE: 1
8. MOVING OR SPEAKING SO SLOWLY THAT OTHER PEOPLE COULD HAVE NOTICED. OR THE OPPOSITE, BEING SO FIGETY OR RESTLESS THAT YOU HAVE BEEN MOVING AROUND A LOT MORE THAN USUAL: 0
SUM OF ALL RESPONSES TO PHQ QUESTIONS 1-9: 6
3. TROUBLE FALLING OR STAYING ASLEEP: 1

## 2023-04-05 ASSESSMENT — EXERCISE STRESS TEST
PEAK_HR: 96
PEAK_BP: 196/74

## 2023-04-05 NOTE — PLAN OF CARE
Learning: No barriers  Personal Learning Style:Video and Written  Social History     Tobacco Use   Smoking Status Former    Packs/day: 5.00    Years: 10.00    Pack years: 50.00    Types: Cigarettes    Quit date: 3/13/1976    Years since quittin.0   Smokeless Tobacco Never     Education Intervention/Learning Needs Identified  Blood pressure, Cholesterol, Diabetes, Exercise, Medications, Nutrition, Risk factor for CAD, Signs and symptoms of MI/CVA, Smoking cessation, Stregnth training, Stress management and relaxation, and Weight management  Target Goals  Restore to highest level of independent functionality   Education  Patient educated on blood pressure, cholesterol, diabetes, exercise, fall prevention, infection prevention, medications, nutrition, strength training, weight management, stress management and relaxation. Provider Review and Approval of this ITP    The above treatment plan and goals have been set for your patient during Cardiac Rehabilitation.  Please review and Electronically Cosign          Electronically signed by Janice Flynn RN on 23 at 1:42 PM EDT

## 2023-04-07 ENCOUNTER — APPOINTMENT (OUTPATIENT)
Dept: CARDIAC REHAB | Age: 78
End: 2023-04-07
Payer: MEDICARE

## 2023-05-16 ENCOUNTER — TELEMEDICINE (OUTPATIENT)
Dept: FAMILY MEDICINE CLINIC | Age: 78
End: 2023-05-16
Payer: MEDICARE

## 2023-05-16 DIAGNOSIS — I10 PRIMARY HYPERTENSION: Primary | ICD-10-CM

## 2023-05-16 DIAGNOSIS — E11.41 CONTROLLED TYPE 2 DIABETES MELLITUS WITH DIABETIC MONONEUROPATHY, WITHOUT LONG-TERM CURRENT USE OF INSULIN (HCC): ICD-10-CM

## 2023-05-16 PROCEDURE — 99441 PR PHYS/QHP TELEPHONE EVALUATION 5-10 MIN: CPT | Performed by: FAMILY MEDICINE

## 2023-05-16 RX ORDER — VALSARTAN 40 MG/1
40 TABLET ORAL DAILY
COMMUNITY
Start: 2023-04-24

## 2023-05-16 NOTE — PROGRESS NOTES
Rosalia Ellis is a 78 y.o. male evaluated via telephone on 5/16/2023. Follow up on HTN. Patient states his BP has improved since starting on the Valsartan his cardiologist prescribed for him. States his hightest BP reading has been 170/85, lowest has been 130/70, and average reading is around 145/80     Internal Administration   First Dose COVID-19, MODERNA BLUE border, Primary or Immunocompromised, (age 12y+), IM, 100 mcg/0.5mL  02/16/2021   Second Dose COVID-19, MODERNA BLUE border, Primary or Immunocompromised, (age 12y+), IM, 100 mcg/0.5mL   03/27/2021       Last COVID Lab SARS-CoV-2 (no units)   Date Value   09/11/2021 Not Detected     SARS-CoV-2, NAAT (no units)   Date Value   01/05/2023 Not Detected     SARS-COV-2, RdRp gene (no units)   Date Value   03/02/2023 Negative            Wt Readings from Last 3 Encounters:   04/11/23 236 lb 12.8 oz (107.4 kg)   04/05/23 235 lb (106.6 kg)   01/10/23 234 lb (106.1 kg)     BP Readings from Last 3 Encounters:   04/11/23 (!) 146/86   01/10/23 137/73   01/07/23 (!) 145/84     Lab Results   Component Value Date    LABA1C 7.3 01/19/2023    LABA1C 8.0 10/18/2022    LABA1C 8.2 10/12/2022        ASSESSMENT PLAN      Diagnosis Orders   1. Primary hypertension  Basic Metabolic Panel      2. Controlled type 2 diabetes mellitus with diabetic mononeuropathy, without long-term current use of insulin (HCC)  Hemoglobin A1C      Patient's blood pressures averaging 145/70 since the physician assistant with [facility] cardiology added valsartan 40 mg daily. Patient has yet to call them with an update as he was instructed. We will set up an appointment lab for BMP and A1c and share with Mattie Brambila PA-C and also Dr. Michell Mcdermott. Already set up with me for October. Patient should call the office immediately with new or ongoing signs or symptoms or worsening, or proceed to the emergency room.   No changes in past medical history, past surgical history, social history, or

## 2023-05-17 ENCOUNTER — TELEPHONE (OUTPATIENT)
Dept: FAMILY MEDICINE CLINIC | Age: 78
End: 2023-05-17

## 2023-05-17 ENCOUNTER — NURSE ONLY (OUTPATIENT)
Dept: FAMILY MEDICINE CLINIC | Age: 78
End: 2023-05-17
Payer: MEDICARE

## 2023-05-17 VITALS — SYSTOLIC BLOOD PRESSURE: 132 MMHG | DIASTOLIC BLOOD PRESSURE: 75 MMHG | HEART RATE: 61 BPM | OXYGEN SATURATION: 94 %

## 2023-05-17 DIAGNOSIS — I10 PRIMARY HYPERTENSION: ICD-10-CM

## 2023-05-17 DIAGNOSIS — E11.41 CONTROLLED TYPE 2 DIABETES MELLITUS WITH DIABETIC MONONEUROPATHY, WITHOUT LONG-TERM CURRENT USE OF INSULIN (HCC): ICD-10-CM

## 2023-05-17 LAB
ANION GAP SERPL CALCULATED.3IONS-SCNC: 13 MMOL/L (ref 3–16)
BUN SERPL-MCNC: 15 MG/DL (ref 7–20)
CALCIUM SERPL-MCNC: 9.4 MG/DL (ref 8.3–10.6)
CHLORIDE SERPL-SCNC: 103 MMOL/L (ref 99–110)
CO2 SERPL-SCNC: 24 MMOL/L (ref 21–32)
CREAT SERPL-MCNC: 1.2 MG/DL (ref 0.8–1.3)
GFR SERPLBLD CREATININE-BSD FMLA CKD-EPI: >60 ML/MIN/{1.73_M2}
GLUCOSE SERPL-MCNC: 238 MG/DL (ref 70–99)
POTASSIUM SERPL-SCNC: 4.1 MMOL/L (ref 3.5–5.1)
SODIUM SERPL-SCNC: 140 MMOL/L (ref 136–145)

## 2023-05-17 PROCEDURE — 36415 COLL VENOUS BLD VENIPUNCTURE: CPT | Performed by: FAMILY MEDICINE

## 2023-05-17 NOTE — TELEPHONE ENCOUNTER
Recommend Coricidin HBP over-the-counter, may cause drowsiness. It will not raise the blood pressure.   Very slight risk of slowing down urination if this occurs let us know

## 2023-05-17 NOTE — TELEPHONE ENCOUNTER
Patient called and informed blood pressure acceptable and to stay on same medications he was started on. Informed for his head cold symptoms he recommends taking coricidin HBP oTC and he states that he will pick some up.

## 2023-05-17 NOTE — TELEPHONE ENCOUNTER
Pt came in for blood work and bp check. Pt also was complaining about a head cold symptoms felt like allergies is what he stated. Was wondering if he could get a call back about setting up some kind of appointment to get some medications for it.      147/74  61HR  94%  132/75

## 2023-05-17 NOTE — TELEPHONE ENCOUNTER
Blood pressure is acceptable with the changes that have already been made, please call patient to find out more specifics about his symptoms so we know what medication to prescribe and to find out what he is already tried

## 2023-05-18 LAB
EST. AVERAGE GLUCOSE BLD GHB EST-MCNC: 177.2 MG/DL
HBA1C MFR BLD: 7.8 %

## 2023-06-22 RX ORDER — FAMOTIDINE 20 MG/1
20 TABLET, FILM COATED ORAL DAILY
Qty: 30 TABLET | Refills: 0 | Status: SHIPPED | OUTPATIENT
Start: 2023-06-22 | End: 2023-07-22

## 2023-07-05 RX ORDER — FAMOTIDINE 20 MG/1
20 TABLET, FILM COATED ORAL DAILY
Qty: 90 TABLET | Refills: 0 | Status: SHIPPED | OUTPATIENT
Start: 2023-07-05 | End: 2023-10-03

## 2023-07-05 NOTE — TELEPHONE ENCOUNTER
Future Appointments   Date Time Provider [address]   10/17/2023  9:00 AM Shital Goldman MD [facility] [address]

## 2023-07-09 ENCOUNTER — HOSPITAL ENCOUNTER (EMERGENCY)
Age: 78
Discharge: HOME | End: 2023-07-09
Attending: STUDENT IN AN ORGANIZED HEALTH CARE EDUCATION/TRAINING PROGRAM
Payer: MEDICARE

## 2023-07-09 ENCOUNTER — APPOINTMENT (OUTPATIENT)
Dept: GENERAL RADIOLOGY | Age: 78
End: 2023-07-09
Payer: MEDICARE

## 2023-07-09 VITALS
OXYGEN SATURATION: 95 % | HEIGHT: 73 IN | TEMPERATURE: 97.1 F | RESPIRATION RATE: 16 BRPM | DIASTOLIC BLOOD PRESSURE: 78 MMHG | BODY MASS INDEX: 30.99 KG/M2 | HEART RATE: 61 BPM | SYSTOLIC BLOOD PRESSURE: 153 MMHG | WEIGHT: 233.8 LBS

## 2023-07-09 DIAGNOSIS — R42 LIGHTHEADEDNESS: ICD-10-CM

## 2023-07-09 DIAGNOSIS — R07.9 CHEST PAIN, UNSPECIFIED TYPE: Primary | ICD-10-CM

## 2023-07-09 PROBLEM — R10.13 EPIGASTRIC PAIN: Status: ACTIVE | Noted: 2023-07-09

## 2023-07-09 LAB
ALBUMIN SERPL-MCNC: 4.3 G/DL (ref 3.4–5)
ALBUMIN/GLOB SERPL: 1.4 {RATIO} (ref 1.1–2.2)
ALP SERPL-CCNC: 56 U/L (ref 40–129)
ALT SERPL-CCNC: 23 U/L (ref 10–40)
ANION GAP SERPL CALCULATED.3IONS-SCNC: 13 MMOL/L (ref 3–16)
AST SERPL-CCNC: 28 U/L (ref 15–37)
BASOPHILS # BLD: 0 K/UL (ref 0–0.2)
BASOPHILS NFR BLD: 0.8 %
BILIRUB SERPL-MCNC: 1 MG/DL (ref 0–1)
BUN SERPL-MCNC: 13 MG/DL (ref 7–20)
CALCIUM SERPL-MCNC: 9.7 MG/DL (ref 8.3–10.6)
CHLORIDE SERPL-SCNC: 99 MMOL/L (ref 99–110)
CO2 SERPL-SCNC: 22 MMOL/L (ref 21–32)
CREAT SERPL-MCNC: 1 MG/DL (ref 0.8–1.3)
D DIMER: 0.58 UG/ML FEU (ref 0–0.6)
DEPRECATED RDW RBC AUTO: 13.2 % (ref 12.4–15.4)
EKG ATRIAL RATE: 71 BPM
EKG DIAGNOSIS: NORMAL
EKG P AXIS: 24 DEGREES
EKG P-R INTERVAL: 158 MS
EKG Q-T INTERVAL: 414 MS
EKG QRS DURATION: 96 MS
EKG QTC CALCULATION (BAZETT): 449 MS
EKG R AXIS: 67 DEGREES
EKG T AXIS: 52 DEGREES
EKG VENTRICULAR RATE: 71 BPM
EOSINOPHIL # BLD: 0.1 K/UL (ref 0–0.6)
EOSINOPHIL NFR BLD: 2.5 %
FLUAV RNA RESP QL NAA+PROBE: NOT DETECTED
FLUBV RNA RESP QL NAA+PROBE: NOT DETECTED
GFR SERPLBLD CREATININE-BSD FMLA CKD-EPI: >60 ML/MIN/{1.73_M2}
GLUCOSE SERPL-MCNC: 107 MG/DL (ref 70–99)
HCT VFR BLD AUTO: 45.5 % (ref 40.5–52.5)
HGB BLD-MCNC: 15.7 G/DL (ref 13.5–17.5)
LIPASE SERPL-CCNC: 15 U/L (ref 13–60)
LYMPHOCYTES # BLD: 1.6 K/UL (ref 1–5.1)
LYMPHOCYTES NFR BLD: 30.9 %
MCH RBC QN AUTO: 31.4 PG (ref 26–34)
MCHC RBC AUTO-ENTMCNC: 34.5 G/DL (ref 31–36)
MCV RBC AUTO: 90.9 FL (ref 80–100)
MONOCYTES # BLD: 0.6 K/UL (ref 0–1.3)
MONOCYTES NFR BLD: 12 %
NEUTROPHILS # BLD: 2.9 K/UL (ref 1.7–7.7)
NEUTROPHILS NFR BLD: 53.8 %
PLATELET # BLD AUTO: 219 K/UL (ref 135–450)
PMV BLD AUTO: 8.6 FL (ref 5–10.5)
POTASSIUM SERPL-SCNC: 4.3 MMOL/L (ref 3.5–5.1)
PROT SERPL-MCNC: 7.3 G/DL (ref 6.4–8.2)
RBC # BLD AUTO: 5 M/UL (ref 4.2–5.9)
SARS-COV-2 RNA RESP QL NAA+PROBE: NOT DETECTED
SODIUM SERPL-SCNC: 134 MMOL/L (ref 136–145)
TROPONIN, HIGH SENSITIVITY: 17 NG/L (ref 0–22)
TROPONIN, HIGH SENSITIVITY: 17 NG/L (ref 0–22)
WBC # BLD AUTO: 5.3 K/UL (ref 4–11)

## 2023-07-09 PROCEDURE — 87636 SARSCOV2 & INF A&B AMP PRB: CPT

## 2023-07-09 PROCEDURE — 83690 ASSAY OF LIPASE: CPT

## 2023-07-09 PROCEDURE — 84484 ASSAY OF TROPONIN QUANT: CPT

## 2023-07-09 PROCEDURE — 99285 EMERGENCY DEPT VISIT HI MDM: CPT

## 2023-07-09 PROCEDURE — 71045 X-RAY EXAM CHEST 1 VIEW: CPT

## 2023-07-09 PROCEDURE — 99285 EMERGENCY DEPT VISIT HI MDM: CPT | Performed by: INTERNAL MEDICINE

## 2023-07-09 PROCEDURE — 93005 ELECTROCARDIOGRAM TRACING: CPT | Performed by: STUDENT IN AN ORGANIZED HEALTH CARE EDUCATION/TRAINING PROGRAM

## 2023-07-09 PROCEDURE — 36415 COLL VENOUS BLD VENIPUNCTURE: CPT

## 2023-07-09 PROCEDURE — 80053 COMPREHEN METABOLIC PANEL: CPT

## 2023-07-09 PROCEDURE — 93010 ELECTROCARDIOGRAM REPORT: CPT | Performed by: INTERNAL MEDICINE

## 2023-07-09 PROCEDURE — 85025 COMPLETE CBC W/AUTO DIFF WBC: CPT

## 2023-07-09 PROCEDURE — 85379 FIBRIN DEGRADATION QUANT: CPT

## 2023-07-09 RX ORDER — METFORMIN HYDROCHLORIDE 500 MG/1
TABLET, EXTENDED RELEASE ORAL
COMMUNITY
Start: 2023-06-03

## 2023-07-09 ASSESSMENT — PAIN - FUNCTIONAL ASSESSMENT: PAIN_FUNCTIONAL_ASSESSMENT: NONE - DENIES PAIN

## 2023-07-09 ASSESSMENT — HEART SCORE: ECG: 0

## 2023-07-28 DIAGNOSIS — E11.41 CONTROLLED TYPE 2 DIABETES MELLITUS WITH DIABETIC MONONEUROPATHY, WITHOUT LONG-TERM CURRENT USE OF INSULIN (HCC): ICD-10-CM

## 2023-07-28 RX ORDER — GLIMEPIRIDE 4 MG/1
TABLET ORAL
Qty: 60 TABLET | Refills: 2 | Status: SHIPPED | OUTPATIENT
Start: 2023-07-28

## 2023-07-28 NOTE — TELEPHONE ENCOUNTER
Refill Request     CONFIRM preferrred pharmacy with the patient. If Mail Order Rx - Pend for 90 day refill. Last Seen: Last Seen Department: 5/16/2023  Last Seen by PCP: 5/16/2023    Last Written: 11/23/22    If no future appointment scheduled, route STAFF MESSAGE with patient name to the [facility] for scheduling. Next Appointment:   Future Appointments   Date Time Provider [address] [place] [address]   10/17/2023  9:00 AM Arianne Day MD [address]       Message sent to  to schedule appt with patient?   YES      Requested Prescriptions     Pending Prescriptions Disp Refills    glimepiride (AMARYL) 4 MG tablet [Pharmacy Med Name: GLIMEPIRIDE 4 MG TABLET] 60 tablet 2     Sig: TAKE ONE TABLET BY MOUTH EVERY MORNING AND EVERY NIGHT AT BEDTIME

## 2023-08-14 ENCOUNTER — HOSPITAL ENCOUNTER (EMERGENCY)
Age: 78
Discharge: HOME | End: 2023-08-15
Attending: EMERGENCY MEDICINE
Payer: MEDICARE

## 2023-08-14 ENCOUNTER — APPOINTMENT (OUTPATIENT)
Dept: GENERAL RADIOLOGY | Age: 78
End: 2023-08-14
Payer: MEDICARE

## 2023-08-14 DIAGNOSIS — I16.0 HYPERTENSIVE URGENCY: Primary | ICD-10-CM

## 2023-08-14 DIAGNOSIS — R51.9 ACUTE NONINTRACTABLE HEADACHE, UNSPECIFIED HEADACHE TYPE: ICD-10-CM

## 2023-08-14 DIAGNOSIS — K30 INDIGESTION: ICD-10-CM

## 2023-08-14 DIAGNOSIS — R42 LIGHTHEADED: ICD-10-CM

## 2023-08-14 LAB
ALBUMIN SERPL-MCNC: 4.3 G/DL (ref 3.4–5)
ALBUMIN/GLOB SERPL: 1.6 {RATIO} (ref 1.1–2.2)
ALP SERPL-CCNC: 58 U/L (ref 40–129)
ALT SERPL-CCNC: 18 U/L (ref 10–40)
ANION GAP SERPL CALCULATED.3IONS-SCNC: 14 MMOL/L (ref 3–16)
AST SERPL-CCNC: 23 U/L (ref 15–37)
BASE EXCESS BLDV CALC-SCNC: -0.9 MMOL/L (ref -3–3)
BASOPHILS # BLD: 0 K/UL (ref 0–0.2)
BASOPHILS NFR BLD: 0.7 %
BILIRUB SERPL-MCNC: 0.7 MG/DL (ref 0–1)
BILIRUB UR QL STRIP.AUTO: NEGATIVE
BUN SERPL-MCNC: 16 MG/DL (ref 7–20)
CALCIUM SERPL-MCNC: 9.7 MG/DL (ref 8.3–10.6)
CHLORIDE SERPL-SCNC: 101 MMOL/L (ref 99–110)
CLARITY UR: CLEAR
CO2 BLDV-SCNC: 24 MMOL/L
CO2 SERPL-SCNC: 22 MMOL/L (ref 21–32)
COHGB MFR BLDV: 1.4 % (ref 0–1.5)
COLOR UR: YELLOW
CREAT SERPL-MCNC: 1 MG/DL (ref 0.8–1.3)
DEPRECATED RDW RBC AUTO: 13.2 % (ref 12.4–15.4)
EKG ATRIAL RATE: 63 BPM
EKG DIAGNOSIS: NORMAL
EKG P AXIS: 15 DEGREES
EKG P-R INTERVAL: 164 MS
EKG Q-T INTERVAL: 414 MS
EKG QRS DURATION: 100 MS
EKG QTC CALCULATION (BAZETT): 423 MS
EKG R AXIS: 47 DEGREES
EKG T AXIS: 24 DEGREES
EKG VENTRICULAR RATE: 63 BPM
EOSINOPHIL # BLD: 0.1 K/UL (ref 0–0.6)
EOSINOPHIL NFR BLD: 1.7 %
GFR SERPLBLD CREATININE-BSD FMLA CKD-EPI: >60 ML/MIN/{1.73_M2}
GLUCOSE BLD-MCNC: 73 MG/DL (ref 70–99)
GLUCOSE SERPL-MCNC: 151 MG/DL (ref 70–99)
GLUCOSE UR STRIP.AUTO-MCNC: NEGATIVE MG/DL
HCO3 BLDV-SCNC: 22.9 MMOL/L (ref 23–29)
HCT VFR BLD AUTO: 44.7 % (ref 40.5–52.5)
HGB BLD-MCNC: 15.3 G/DL (ref 13.5–17.5)
HGB UR QL STRIP.AUTO: NEGATIVE
KETONES UR STRIP.AUTO-MCNC: NEGATIVE MG/DL
LEUKOCYTE ESTERASE UR QL STRIP.AUTO: NEGATIVE
LYMPHOCYTES # BLD: 1.6 K/UL (ref 1–5.1)
LYMPHOCYTES NFR BLD: 33.3 %
MCH RBC QN AUTO: 31.6 PG (ref 26–34)
MCHC RBC AUTO-ENTMCNC: 34.3 G/DL (ref 31–36)
MCV RBC AUTO: 92.1 FL (ref 80–100)
METHGB MFR BLDV: 0.3 %
MONOCYTES # BLD: 0.6 K/UL (ref 0–1.3)
MONOCYTES NFR BLD: 12.3 %
NEUTROPHILS # BLD: 2.6 K/UL (ref 1.7–7.7)
NEUTROPHILS NFR BLD: 52 %
NITRITE UR QL STRIP.AUTO: NEGATIVE
NT-PROBNP SERPL-MCNC: 127 PG/ML (ref 0–449)
O2 THERAPY: ABNORMAL
PCO2 BLDV: 35.9 MMHG (ref 40–50)
PERFORMED ON: NORMAL
PH BLDV: 7.42 [PH] (ref 7.35–7.45)
PH UR STRIP.AUTO: 7.5 [PH] (ref 5–8)
PLATELET # BLD AUTO: 224 K/UL (ref 135–450)
PMV BLD AUTO: 8.1 FL (ref 5–10.5)
PO2 BLDV: 143.6 MMHG (ref 25–40)
POTASSIUM SERPL-SCNC: 4.5 MMOL/L (ref 3.5–5.1)
PROT SERPL-MCNC: 7 G/DL (ref 6.4–8.2)
PROT UR STRIP.AUTO-MCNC: NEGATIVE MG/DL
RBC # BLD AUTO: 4.85 M/UL (ref 4.2–5.9)
SAO2 % BLDV: 99 %
SODIUM SERPL-SCNC: 137 MMOL/L (ref 136–145)
SP GR UR STRIP.AUTO: 1.01 (ref 1–1.03)
TROPONIN, HIGH SENSITIVITY: 15 NG/L (ref 0–22)
TROPONIN, HIGH SENSITIVITY: 15 NG/L (ref 0–22)
UA COMPLETE W REFLEX CULTURE PNL UR: ABNORMAL
UA DIPSTICK W REFLEX MICRO PNL UR: ABNORMAL
URN SPEC COLLECT METH UR: ABNORMAL
UROBILINOGEN UR STRIP-ACNC: 2 E.U./DL
WBC # BLD AUTO: 4.9 K/UL (ref 4–11)

## 2023-08-14 PROCEDURE — 99285 EMERGENCY DEPT VISIT HI MDM: CPT

## 2023-08-14 PROCEDURE — 6370000000 HC RX 637 (ALT 250 FOR IP): Performed by: EMERGENCY MEDICINE

## 2023-08-14 PROCEDURE — 96375 TX/PRO/DX INJ NEW DRUG ADDON: CPT

## 2023-08-14 PROCEDURE — 93005 ELECTROCARDIOGRAM TRACING: CPT | Performed by: PHYSICIAN ASSISTANT

## 2023-08-14 PROCEDURE — 83880 ASSAY OF NATRIURETIC PEPTIDE: CPT

## 2023-08-14 PROCEDURE — 6370000000 HC RX 637 (ALT 250 FOR IP): Performed by: PHYSICIAN ASSISTANT

## 2023-08-14 PROCEDURE — 80053 COMPREHEN METABOLIC PANEL: CPT

## 2023-08-14 PROCEDURE — 71046 X-RAY EXAM CHEST 2 VIEWS: CPT

## 2023-08-14 PROCEDURE — 84484 ASSAY OF TROPONIN QUANT: CPT

## 2023-08-14 PROCEDURE — 96374 THER/PROPH/DIAG INJ IV PUSH: CPT

## 2023-08-14 PROCEDURE — 81003 URINALYSIS AUTO W/O SCOPE: CPT

## 2023-08-14 PROCEDURE — 82803 BLOOD GASES ANY COMBINATION: CPT

## 2023-08-14 PROCEDURE — 93010 ELECTROCARDIOGRAM REPORT: CPT | Performed by: INTERNAL MEDICINE

## 2023-08-14 PROCEDURE — 36415 COLL VENOUS BLD VENIPUNCTURE: CPT

## 2023-08-14 PROCEDURE — 85025 COMPLETE CBC W/AUTO DIFF WBC: CPT

## 2023-08-14 PROCEDURE — 6360000002 HC RX W HCPCS: Performed by: EMERGENCY MEDICINE

## 2023-08-14 RX ORDER — VALSARTAN 80 MG/1
40 TABLET ORAL ONCE
Status: COMPLETED | OUTPATIENT
Start: 2023-08-14 | End: 2023-08-14

## 2023-08-14 RX ORDER — DIPHENHYDRAMINE HYDROCHLORIDE 50 MG/ML
12.5 INJECTION INTRAMUSCULAR; INTRAVENOUS ONCE
Status: COMPLETED | OUTPATIENT
Start: 2023-08-14 | End: 2023-08-14

## 2023-08-14 RX ORDER — AMLODIPINE BESYLATE 5 MG/1
5 TABLET ORAL ONCE
Status: COMPLETED | OUTPATIENT
Start: 2023-08-14 | End: 2023-08-14

## 2023-08-14 RX ORDER — METOCLOPRAMIDE HYDROCHLORIDE 5 MG/ML
10 INJECTION INTRAMUSCULAR; INTRAVENOUS ONCE
Status: COMPLETED | OUTPATIENT
Start: 2023-08-14 | End: 2023-08-14

## 2023-08-14 RX ORDER — LABETALOL HYDROCHLORIDE 5 MG/ML
5 INJECTION, SOLUTION INTRAVENOUS ONCE
Status: COMPLETED | OUTPATIENT
Start: 2023-08-15 | End: 2023-08-15

## 2023-08-14 RX ADMIN — METOCLOPRAMIDE 10 MG: 5 INJECTION, SOLUTION INTRAMUSCULAR; INTRAVENOUS at 23:54

## 2023-08-14 RX ADMIN — DIPHENHYDRAMINE HYDROCHLORIDE 12.5 MG: 50 INJECTION INTRAMUSCULAR; INTRAVENOUS at 23:54

## 2023-08-14 RX ADMIN — AMLODIPINE BESYLATE 5 MG: 5 TABLET ORAL at 23:54

## 2023-08-14 RX ADMIN — NITROGLYCERIN 1 INCH: 20 OINTMENT TOPICAL at 23:55

## 2023-08-14 RX ADMIN — VALSARTAN 40 MG: 80 TABLET ORAL at 18:45

## 2023-08-14 RX ADMIN — METOPROLOL TARTRATE 25 MG: 25 TABLET, FILM COATED ORAL at 22:33

## 2023-08-14 ASSESSMENT — PAIN - FUNCTIONAL ASSESSMENT
PAIN_FUNCTIONAL_ASSESSMENT: NONE - DENIES PAIN
PAIN_FUNCTIONAL_ASSESSMENT: 0-10

## 2023-08-14 ASSESSMENT — PAIN SCALES - GENERAL
PAINLEVEL_OUTOF10: 0
PAINLEVEL_OUTOF10: 2

## 2023-08-14 ASSESSMENT — PAIN DESCRIPTION - LOCATION: LOCATION: HEAD

## 2023-08-14 NOTE — ED NOTES
Report given to [facility], RN face to face. Questions answered, care transferred.       Jimbo Mcginnis RN  08/14/23 [id number]

## 2023-08-14 NOTE — ED NOTES
Pt placed on CMU, pulse ox, and NIBP. Pt given call light and instructed on its use. No new needs at this time. Will cont to monitor.        Wen Hilliard RN  08/14/23 [id number]

## 2023-08-15 VITALS
SYSTOLIC BLOOD PRESSURE: 153 MMHG | OXYGEN SATURATION: 95 % | TEMPERATURE: 97.1 F | HEART RATE: 62 BPM | RESPIRATION RATE: 20 BRPM | DIASTOLIC BLOOD PRESSURE: 76 MMHG

## 2023-08-15 PROCEDURE — 6360000002 HC RX W HCPCS: Performed by: EMERGENCY MEDICINE

## 2023-08-15 RX ADMIN — LABETALOL HYDROCHLORIDE 5 MG: 5 INJECTION, SOLUTION INTRAVENOUS at 00:09

## 2023-08-15 ASSESSMENT — PAIN - FUNCTIONAL ASSESSMENT: PAIN_FUNCTIONAL_ASSESSMENT: NONE - DENIES PAIN

## 2023-08-15 ASSESSMENT — HEART SCORE: ECG: 1

## 2023-08-15 NOTE — DISCHARGE INSTRUCTIONS
We did talk to the hospitalist about admission but at this time they feel that you are safe to go home. Please call your cardiologist tomorrow for repeat evaluation over the next few days to ensure your heart is okay and blood pressure medications are properly prescribed. Return to the emergency department over the next 12 to 24 hours for any return of elevated blood pressure with new onset chest pain, severe lightheadedness or dizziness, strokelike symptoms, or any other concerns. Call your primary doctor for any other questions or concerns and follow-up.

## 2023-08-15 NOTE — ED PROVIDER NOTES
to call his cardiologist for urgent evaluation to be safe and if he develops any return of significant hypertension with severe headache, new strokelike symptoms, persistent indigestion with shortness of breath, or any other concerns over the next 6 to 24 hours, then return to the ED immediately for further assessment, but otherwise call cardiologist as discussed to be safe and make sure that no additional blood pressure management is needed at this point. Since this was an acute change today and normally his blood pressure is well managed with his medications he takes at home, I did not prescribe him any additional blood pressure medications and told him to talk to his primary doctor or cardiologist about this to see if he truly needs any additional medications or not. Again, I did try to admit the patient for observation but ultimately hospitalist declined and felt that he was safe to go home and patient felt comfortable with this recommendation.           Melodie Rodríguez MD  08/17/23 [id number]
attending and it was thought the patient should be admitted for his symptoms of lightheadedness with elevated blood pressure. He was started on labetalol. By the time the hospitalist saw the patient patient was feeling completely back to baseline his blood pressure was 153/76 and he was requesting discharge home. Patient will be discharged with instructions on close outpatient follow-up with his family and cardiology specialist and strict ER return precautions. At this time I have no concern for ACS no concern for PE dissection or stroke and I believe discharge home to be reasonable at this time. Disposition Considerations (include 1 Tests not done, Shared Decision Making, Pt Expectation of Test or Tx.): Pt is in agreement with the current plan and all questions were addressed. I am the Primary Clinician of Record. FINAL IMPRESSION      1. Hypertensive urgency    2. Lightheaded    3. Acute nonintractable headache, unspecified headache type    4. Indigestion          DISPOSITION/PLAN     DISPOSITION        PATIENT REFERRED TO:  No follow-up provider specified.     DISCHARGE MEDICATIONS:  New Prescriptions    No medications on file       DISCONTINUED MEDICATIONS:  Discontinued Medications    No medications on file              (Please note that portions of this note were completed with a voice recognition program.  Efforts were made to edit the dictations but occasionally words are mis-transcribed.)    Thao Sawant (electronically signed)            Thao Sawant  08/15/23 [id number]

## 2023-08-25 RX ORDER — FAMOTIDINE 20 MG/1
20 TABLET, FILM COATED ORAL DAILY
Qty: 30 TABLET | Refills: 2 | Status: SHIPPED | OUTPATIENT
Start: 2023-08-25 | End: 2023-11-23

## 2023-08-25 NOTE — TELEPHONE ENCOUNTER
Refill Request     CONFIRM preferrred pharmacy with the patient. If Mail Order Rx - Pend for 90 day refill. Last Seen: Last Seen Department: 5/16/2023  Last Seen by PCP: 3/2/2023    Last Written: 7/5/23    If no future appointment scheduled, route STAFF MESSAGE with patient name to the [facility] for scheduling. Next Appointment:   Future Appointments   Date Time Provider [address] [place] [address]   10/17/2023  9:00 AM Dimitrios Gottlieb MD [address]       Message sent to  to schedule appt with patient?   YES      Requested Prescriptions     Pending Prescriptions Disp Refills    famotidine (PEPCID) 20 MG tablet [Pharmacy Med Name: FAMOTIDINE 20 MG TABLET] 30 tablet 2     Sig: TAKE 1 TABLET BY MOUTH DAILY

## 2023-10-17 ENCOUNTER — OFFICE VISIT (OUTPATIENT)
Dept: FAMILY MEDICINE CLINIC | Age: 78
End: 2023-10-17

## 2023-10-17 VITALS
SYSTOLIC BLOOD PRESSURE: 170 MMHG | OXYGEN SATURATION: 93 % | HEART RATE: 68 BPM | BODY MASS INDEX: 31 KG/M2 | WEIGHT: 235 LBS | DIASTOLIC BLOOD PRESSURE: 87 MMHG

## 2023-10-17 DIAGNOSIS — Z23 FLU VACCINE NEED: ICD-10-CM

## 2023-10-17 DIAGNOSIS — M25.551 BILATERAL HIP PAIN: ICD-10-CM

## 2023-10-17 DIAGNOSIS — M25.552 BILATERAL HIP PAIN: ICD-10-CM

## 2023-10-17 DIAGNOSIS — I25.10 CORONARY ARTERY DISEASE INVOLVING NATIVE CORONARY ARTERY OF NATIVE HEART WITHOUT ANGINA PECTORIS: ICD-10-CM

## 2023-10-17 DIAGNOSIS — I10 UNCONTROLLED HYPERTENSION: Primary | ICD-10-CM

## 2023-10-17 DIAGNOSIS — E11.42 TYPE 2 DIABETES MELLITUS WITH DIABETIC POLYNEUROPATHY, WITHOUT LONG-TERM CURRENT USE OF INSULIN (HCC): ICD-10-CM

## 2023-10-17 DIAGNOSIS — I10 ESSENTIAL HYPERTENSION: ICD-10-CM

## 2023-10-17 DIAGNOSIS — E78.2 MIXED HYPERLIPIDEMIA: ICD-10-CM

## 2023-10-17 DIAGNOSIS — Z13.220 SCREENING FOR LIPID DISORDERS: ICD-10-CM

## 2023-10-17 RX ORDER — VALSARTAN 40 MG/1
40 TABLET ORAL 2 TIMES DAILY
Qty: 180 TABLET | Refills: 3 | Status: CANCELLED | OUTPATIENT
Start: 2023-10-17

## 2023-10-17 NOTE — PROGRESS NOTES
encouraged to do so. Blood sugar readings by glycosylated hemoglobin or home fingerstick blood sugars are acceptable and no changes in diabetic medication as listed in the medication list are necessary. He will return to see Dr. Monica Kennedy for his hip pain check hip x-rays. There is no clinical evidence of coronary insufficiency or heart failure that requires any change in the treatment regimen and no changes in medications for cardiac conditions as listed in the medication list are necessary. Lipids will be monitored based upon levels requiring treatment and other cardiac risks. Medications for hyperlipidemia and hypertriglyceridemia as listed on the medication list will be changed as necessary to reach control parameters. Flu shot given. Follow-up 4 months. Patient should call the office immediately with new or ongoing signs or symptoms or worsening, or proceed to the emergency room. No changes in past medical history, past surgical history, social history, or family history were noted during the patient encounter unless specifically listed above. All updates of past medical history, past surgical history, social history, or family history were reviewed personally by me during the office visit. All problems listed in the assessment are stable unless noted otherwise. Medication profile reviewed personally by me during the visit. Medication side effects and possible impairments from medications were discussed as applicable. Unless stated otherwise, we will continue current medications as listed in the medication review report contained in the patient's medical record. This document was prepared by a combination of typing and transcription through a voice recognition software.                 Scribe attestation: Mikey Méndez RN, am scribing for and in the presence of Kylah Alvarado MD. Electronically signed by Jameson Harada, RN on 10/17/2023 at 9:20 AM      Provider attestation:     I,

## 2023-10-18 LAB
CHOLEST SERPL-MCNC: 143 MG/DL (ref 0–199)
EST. AVERAGE GLUCOSE BLD GHB EST-MCNC: 171.4 MG/DL
HBA1C MFR BLD: 7.6 %
HDLC SERPL-MCNC: 48 MG/DL (ref 40–60)
LDLC SERPL CALC-MCNC: 78 MG/DL
TRIGL SERPL-MCNC: 87 MG/DL (ref 0–150)
VLDLC SERPL CALC-MCNC: 17 MG/DL

## 2023-10-24 ENCOUNTER — APPOINTMENT (OUTPATIENT)
Dept: GENERAL RADIOLOGY | Age: 78
End: 2023-10-24
Payer: MEDICARE

## 2023-10-24 ENCOUNTER — HOSPITAL ENCOUNTER (EMERGENCY)
Age: 78
Discharge: HOME | End: 2023-10-24
Attending: EMERGENCY MEDICINE
Payer: MEDICARE

## 2023-10-24 VITALS
BODY MASS INDEX: 30.88 KG/M2 | WEIGHT: 233 LBS | HEART RATE: 61 BPM | SYSTOLIC BLOOD PRESSURE: 150 MMHG | OXYGEN SATURATION: 100 % | DIASTOLIC BLOOD PRESSURE: 82 MMHG | TEMPERATURE: 97.9 F | HEIGHT: 73 IN | RESPIRATION RATE: 16 BRPM

## 2023-10-24 DIAGNOSIS — I10 HYPERTENSION, UNSPECIFIED TYPE: ICD-10-CM

## 2023-10-24 DIAGNOSIS — R42 LIGHTHEADEDNESS: Primary | ICD-10-CM

## 2023-10-24 LAB
ALBUMIN SERPL-MCNC: 4.6 G/DL (ref 3.4–5)
ALBUMIN/GLOB SERPL: 1.6 {RATIO} (ref 1.1–2.2)
ALP SERPL-CCNC: 70 U/L (ref 40–129)
ALT SERPL-CCNC: 17 U/L (ref 10–40)
ANION GAP SERPL CALCULATED.3IONS-SCNC: 12 MMOL/L (ref 3–16)
AST SERPL-CCNC: 19 U/L (ref 15–37)
BASOPHILS # BLD: 0 K/UL (ref 0–0.2)
BASOPHILS NFR BLD: 0.6 %
BILIRUB SERPL-MCNC: 0.9 MG/DL (ref 0–1)
BUN SERPL-MCNC: 11 MG/DL (ref 7–20)
CALCIUM SERPL-MCNC: 9.9 MG/DL (ref 8.3–10.6)
CHLORIDE SERPL-SCNC: 102 MMOL/L (ref 99–110)
CO2 SERPL-SCNC: 24 MMOL/L (ref 21–32)
CREAT SERPL-MCNC: 1 MG/DL (ref 0.8–1.3)
DEPRECATED RDW RBC AUTO: 13.3 % (ref 12.4–15.4)
EKG ATRIAL RATE: 67 BPM
EKG DIAGNOSIS: NORMAL
EKG P AXIS: 25 DEGREES
EKG P-R INTERVAL: 164 MS
EKG Q-T INTERVAL: 406 MS
EKG QRS DURATION: 96 MS
EKG QTC CALCULATION (BAZETT): 429 MS
EKG R AXIS: 56 DEGREES
EKG T AXIS: 42 DEGREES
EKG VENTRICULAR RATE: 67 BPM
EOSINOPHIL # BLD: 0.1 K/UL (ref 0–0.6)
EOSINOPHIL NFR BLD: 2.2 %
GFR SERPLBLD CREATININE-BSD FMLA CKD-EPI: >60 ML/MIN/{1.73_M2}
GLUCOSE SERPL-MCNC: 200 MG/DL (ref 70–99)
HCT VFR BLD AUTO: 45.5 % (ref 40.5–52.5)
HGB BLD-MCNC: 15.8 G/DL (ref 13.5–17.5)
LYMPHOCYTES # BLD: 1.6 K/UL (ref 1–5.1)
LYMPHOCYTES NFR BLD: 30.2 %
MCH RBC QN AUTO: 31.4 PG (ref 26–34)
MCHC RBC AUTO-ENTMCNC: 34.7 G/DL (ref 31–36)
MCV RBC AUTO: 90.6 FL (ref 80–100)
MONOCYTES # BLD: 0.5 K/UL (ref 0–1.3)
MONOCYTES NFR BLD: 9.4 %
NEUTROPHILS # BLD: 3 K/UL (ref 1.7–7.7)
NEUTROPHILS NFR BLD: 57.6 %
PLATELET # BLD AUTO: 213 K/UL (ref 135–450)
PMV BLD AUTO: 8.7 FL (ref 5–10.5)
POTASSIUM SERPL-SCNC: 3.9 MMOL/L (ref 3.5–5.1)
PROT SERPL-MCNC: 7.4 G/DL (ref 6.4–8.2)
RBC # BLD AUTO: 5.02 M/UL (ref 4.2–5.9)
SODIUM SERPL-SCNC: 138 MMOL/L (ref 136–145)
TROPONIN, HIGH SENSITIVITY: 19 NG/L (ref 0–22)
TROPONIN, HIGH SENSITIVITY: 20 NG/L (ref 0–22)
WBC # BLD AUTO: 5.1 K/UL (ref 4–11)

## 2023-10-24 PROCEDURE — 93010 ELECTROCARDIOGRAM REPORT: CPT | Performed by: INTERNAL MEDICINE

## 2023-10-24 PROCEDURE — 99285 EMERGENCY DEPT VISIT HI MDM: CPT

## 2023-10-24 PROCEDURE — 71045 X-RAY EXAM CHEST 1 VIEW: CPT

## 2023-10-24 PROCEDURE — 84484 ASSAY OF TROPONIN QUANT: CPT

## 2023-10-24 PROCEDURE — 80053 COMPREHEN METABOLIC PANEL: CPT

## 2023-10-24 PROCEDURE — 36415 COLL VENOUS BLD VENIPUNCTURE: CPT

## 2023-10-24 PROCEDURE — 93005 ELECTROCARDIOGRAM TRACING: CPT | Performed by: EMERGENCY MEDICINE

## 2023-10-24 PROCEDURE — 85025 COMPLETE CBC W/AUTO DIFF WBC: CPT

## 2023-10-24 RX ORDER — VALSARTAN 160 MG/1
160 TABLET ORAL DAILY
COMMUNITY

## 2023-10-24 ASSESSMENT — LIFESTYLE VARIABLES
HOW MANY STANDARD DRINKS CONTAINING ALCOHOL DO YOU HAVE ON A TYPICAL DAY: PATIENT DOES NOT DRINK
HOW OFTEN DO YOU HAVE A DRINK CONTAINING ALCOHOL: NEVER

## 2023-10-24 ASSESSMENT — PAIN - FUNCTIONAL ASSESSMENT: PAIN_FUNCTIONAL_ASSESSMENT: NONE - DENIES PAIN

## 2023-10-24 NOTE — ED PROVIDER NOTES
[address] [facility] 1 ED      CHIEF COMPLAINT  Hypertension (HX Bypass 2022; pt c/o high BP more recently and feeling lightheaded last night intermittently. Taking BP medications as prescribed )       HISTORY OF PRESENT ILLNESS  Timo Damian is a 70 y.o. male  who presents to the ED complaining of concern for elevated blood pressure and some dizziness. Patient states that ever since his CABG around this time last year he has experienced episodes where his blood pressure is elevated and he also has the intermittent dizziness. He describes it more as a lightheaded feeling. He denies any feeling like the room is spinning. No focal numbness tingling or weakness. No vision changes. He denies any associated chest pain. He states that he does have indigestion from time to time. No vomiting. He states that he woke up this morning more because he felt like his blood sugar was bottoming out. He ate a croissant and a Snickers. He does state that about a week ago his valsartan dosing was doubled from 80 mg daily to 160 mg daily. He did take his daily dose today at about 5 AM.    No other complaints, modifying factors or associated symptoms. I have reviewed the following from the nursing documentation.     Past Medical History:   Diagnosis Date    Acute MI ([address])     Angina pectoris, unstable ([address])     ASHD (arteriosclerotic heart disease)     Bladder outlet obstruction     BPH (benign prostatic hyperplasia)     Chest pain     Constipation     Diabetes mellitus ([address])     GERD (gastroesophageal reflux disease)     Hypercholesteremia     Hypercholesterolemia     Hypertension     IBS (irritable bowel syndrome)     Influenza A 01/28/2020    Sinusitis     Type II or unspecified type diabetes mellitus without mention of complication, not stated as uncontrolled      Past Surgical History:   Procedure Laterality Date    CARDIAC CATHETERIZATION  2002    stent    CARDIAC CATHETERIZATION  2011    stent    COLONOSCOPY

## 2023-10-25 RX ORDER — ISOSORBIDE MONONITRATE 30 MG/1
30 TABLET, EXTENDED RELEASE ORAL DAILY
Qty: 30 TABLET | Refills: 3 | Status: SHIPPED | OUTPATIENT
Start: 2023-10-25

## 2023-10-25 NOTE — TELEPHONE ENCOUNTER
Refill Request     CONFIRM preferrred pharmacy with the patient. If Mail Order Rx - Pend for 90 day refill. Last Seen: Last Seen Department: 10/17/2023  Last Seen by PCP: 10/17/2023    Last Written: 1/8/23    If no future appointment scheduled, route STAFF MESSAGE with patient name to the [facility] for scheduling. Next Appointment:   Future Appointments   Date Time Provider [address] [place] [address]   4/17/2024  9:00 AM Caro Cat MD [place] [address]       Message sent to  to schedule appt with patient?   N/A      Requested Prescriptions     Pending Prescriptions Disp Refills    isosorbide mononitrate (IMDUR) 30 MG extended release tablet 30 tablet 3     Sig: Take 1 tablet by mouth daily

## 2023-11-02 ENCOUNTER — HOSPITAL ENCOUNTER (OUTPATIENT)
Dept: GENERAL RADIOLOGY | Age: 78
Discharge: HOME | End: 2023-11-02
Payer: MEDICARE

## 2023-11-02 ENCOUNTER — HOSPITAL ENCOUNTER (OUTPATIENT)
Age: 78
Discharge: HOME | End: 2023-11-02
Payer: MEDICARE

## 2023-11-02 DIAGNOSIS — M25.551 BILATERAL HIP PAIN: ICD-10-CM

## 2023-11-02 DIAGNOSIS — M25.552 BILATERAL HIP PAIN: ICD-10-CM

## 2023-11-02 PROCEDURE — 73521 X-RAY EXAM HIPS BI 2 VIEWS: CPT

## 2023-11-07 DIAGNOSIS — E11.41 CONTROLLED TYPE 2 DIABETES MELLITUS WITH DIABETIC MONONEUROPATHY, WITHOUT LONG-TERM CURRENT USE OF INSULIN (HCC): ICD-10-CM

## 2023-11-07 RX ORDER — GLIMEPIRIDE 4 MG/1
TABLET ORAL
Qty: 180 TABLET | Refills: 3 | Status: SHIPPED | OUTPATIENT
Start: 2023-11-07

## 2023-11-25 RX ORDER — METFORMIN HYDROCHLORIDE 500 MG/1
TABLET, EXTENDED RELEASE ORAL
Qty: 360 TABLET | Refills: 3 | OUTPATIENT
Start: 2023-11-25

## 2023-11-27 NOTE — TELEPHONE ENCOUNTER
Refill Request     CONFIRM preferrred pharmacy with the patient. If Mail Order Rx - Pend for 90 day refill. Last Seen: Last Seen Department: 10/17/2023  Last Seen by PCP: 10/17/2023    Last Written: 8/24/2023    If no future appointment scheduled, route STAFF MESSAGE with patient name to the [facility] for scheduling. Next Appointment:   Future Appointments   Date Time Provider [facility] [address]   4/17/2024  9:00 AM Ochoa Herman MD [place] [address]       Message sent to  to schedule appt with patient?   NO      Requested Prescriptions      No prescriptions requested or ordered in this encounter

## 2023-11-29 DIAGNOSIS — E11.42 TYPE 2 DIABETES MELLITUS WITH DIABETIC POLYNEUROPATHY, WITHOUT LONG-TERM CURRENT USE OF INSULIN (HCC): Primary | ICD-10-CM

## 2023-11-29 RX ORDER — METFORMIN HYDROCHLORIDE 500 MG/1
TABLET, EXTENDED RELEASE ORAL
Qty: 120 TABLET | Refills: 0 | Status: SHIPPED | OUTPATIENT
Start: 2023-11-29

## 2023-11-29 NOTE — TELEPHONE ENCOUNTER
Refill Request     CONFIRM preferrred pharmacy with the patient. If Mail Order Rx - Pend for 90 day refill. Last Seen: Last Seen Department: 10/17/2023  Last Seen by PCP: 10/17/2023    Last Written: 11/14/22    If no future appointment scheduled, route STAFF MESSAGE with patient name to the [facility] for scheduling. Next Appointment:   Future Appointments   Date Time Provider [address] [place] [address]   4/17/2024  9:00 AM Marsha Adair MD [place] [address]       Message sent to  to schedule appt with patient?   N/A      Requested Prescriptions     Pending Prescriptions Disp Refills    metFORMIN (GLUCOPHAGE-XR) 500 MG extended release tablet [Pharmacy Med Name: METFORMIN HCL  MG TABLET] 360 tablet 3     Sig: TAKE TWO TABLETS BY MOUTH TWICE A DAY

## 2023-12-31 DIAGNOSIS — E11.42 TYPE 2 DIABETES MELLITUS WITH DIABETIC POLYNEUROPATHY, WITHOUT LONG-TERM CURRENT USE OF INSULIN (HCC): ICD-10-CM

## 2024-01-02 RX ORDER — METFORMIN HYDROCHLORIDE 500 MG/1
1000 TABLET, EXTENDED RELEASE ORAL 2 TIMES DAILY
Qty: 120 TABLET | Refills: 0 | Status: SHIPPED | OUTPATIENT
Start: 2024-01-02

## 2024-01-05 ENCOUNTER — TELEPHONE (OUTPATIENT)
Dept: FAMILY MEDICINE CLINIC | Age: 79
End: 2024-01-05

## 2024-01-05 NOTE — TELEPHONE ENCOUNTER
RN called patient per PCP request.  RN asked patient to describe his symptoms.  He states that he is having shooting pains from his mouth to the top of his head. This started 2 days ago.   It is better this afternoon.  Sometimes when he moves his head it changes the intensity. Now it feels like it is starting from his throat and nose to the top of his head.     RN asked if he had any :  Sudden numbness or weakness in the face, arm, or leg, especially on one side of the body.   Sudden confusion, trouble speaking, or difficulty understanding speech.   Sudden trouble seeing in one or both eyes.   Sudden trouble walking, dizziness, loss of balance, or lack of coordination.     Patient also states that he was at the [facility] today for a couple of fillings.  RN asked if he mentioned what was going on to [facility].  Patient states that he did not since he was there today for routine dental work.     Patient denied any of these symptoms.   PCP notified.

## 2024-01-05 NOTE — TELEPHONE ENCOUNTER
Spoke with pt after nurse talked to him and he states the pain has subsided recently but was more concerned with having a cardiac history. He states his ears have been popping, the pain he is having is from his ear throat area to the top of his head and his head was tender to the touch, pt states he also has had some cold symptoms lately with drainage and the left side of his throat was irritated, also having some slight headaches. Pt is feeling like this was something possibly sinus related. Pt has not taken anything otc and is wanting to know if he can anything for sinus with his medications. Pt also is scheduled for Monday and if he feels better he will cancel.

## 2024-01-08 ENCOUNTER — OFFICE VISIT (OUTPATIENT)
Dept: FAMILY MEDICINE CLINIC | Age: 79
End: 2024-01-08
Payer: MEDICARE

## 2024-01-08 VITALS
HEIGHT: 73 IN | SYSTOLIC BLOOD PRESSURE: 132 MMHG | HEART RATE: 90 BPM | OXYGEN SATURATION: 97 % | BODY MASS INDEX: 30.74 KG/M2 | DIASTOLIC BLOOD PRESSURE: 76 MMHG

## 2024-01-08 DIAGNOSIS — H65.92 MIDDLE EAR EFFUSION, LEFT: ICD-10-CM

## 2024-01-08 DIAGNOSIS — E11.42 TYPE 2 DIABETES MELLITUS WITH DIABETIC POLYNEUROPATHY, WITHOUT LONG-TERM CURRENT USE OF INSULIN (HCC): ICD-10-CM

## 2024-01-08 DIAGNOSIS — H61.22 IMPACTED CERUMEN OF LEFT EAR: Primary | ICD-10-CM

## 2024-01-08 DIAGNOSIS — I25.119 CORONARY ARTERY DISEASE INVOLVING NATIVE CORONARY ARTERY OF NATIVE HEART WITH ANGINA PECTORIS (HCC): ICD-10-CM

## 2024-01-08 PROCEDURE — 69210 REMOVE IMPACTED EAR WAX UNI: CPT | Performed by: NURSE PRACTITIONER

## 2024-01-08 PROCEDURE — G8484 FLU IMMUNIZE NO ADMIN: HCPCS | Performed by: NURSE PRACTITIONER

## 2024-01-08 PROCEDURE — 99213 OFFICE O/P EST LOW 20 MIN: CPT | Performed by: NURSE PRACTITIONER

## 2024-01-08 PROCEDURE — 3078F DIAST BP <80 MM HG: CPT | Performed by: NURSE PRACTITIONER

## 2024-01-08 PROCEDURE — G8427 DOCREV CUR MEDS BY ELIG CLIN: HCPCS | Performed by: NURSE PRACTITIONER

## 2024-01-08 PROCEDURE — 1123F ACP DISCUSS/DSCN MKR DOCD: CPT | Performed by: NURSE PRACTITIONER

## 2024-01-08 PROCEDURE — G8417 CALC BMI ABV UP PARAM F/U: HCPCS | Performed by: NURSE PRACTITIONER

## 2024-01-08 PROCEDURE — 3075F SYST BP GE 130 - 139MM HG: CPT | Performed by: NURSE PRACTITIONER

## 2024-01-08 PROCEDURE — 1036F TOBACCO NON-USER: CPT | Performed by: NURSE PRACTITIONER

## 2024-01-08 RX ORDER — FLUTICASONE PROPIONATE 50 MCG
1 SPRAY, SUSPENSION (ML) NASAL DAILY
Qty: 16 G | Refills: 0 | Status: SHIPPED | OUTPATIENT
Start: 2024-01-08

## 2024-01-08 ASSESSMENT — ENCOUNTER SYMPTOMS
VOMITING: 0
STRIDOR: 0
RESPIRATORY NEGATIVE: 1
VOICE CHANGE: 0
SINUS PRESSURE: 1
CHOKING: 0
FACIAL SWELLING: 0
COUGH: 0
DIARRHEA: 0
ABDOMINAL PAIN: 0
APNEA: 0
TROUBLE SWALLOWING: 0
WHEEZING: 0
CHEST TIGHTNESS: 0
SHORTNESS OF BREATH: 0
RHINORRHEA: 0
SORE THROAT: 0
SINUS PAIN: 0

## 2024-01-08 ASSESSMENT — PATIENT HEALTH QUESTIONNAIRE - PHQ9
SUM OF ALL RESPONSES TO PHQ QUESTIONS 1-9: 0
SUM OF ALL RESPONSES TO PHQ9 QUESTIONS 1 & 2: 0
2. FEELING DOWN, DEPRESSED OR HOPELESS: 0
SUM OF ALL RESPONSES TO PHQ QUESTIONS 1-9: 0
1. LITTLE INTEREST OR PLEASURE IN DOING THINGS: 0

## 2024-01-08 NOTE — PROGRESS NOTES
Subjective:     Patient presents for evaluation of a plugged ear. Patient noticed the symptoms in the left ear, several days ago.   Patient admits to ear pain.    Patient's medications, allergies, past medical, surgical, social and family histories were reviewed and updated as appropriate.    Review of Systems  Pertinent items are noted in HPI.      Objective:     Vitals:    01/08/24 1147 01/08/24 1150 01/08/24 1229   BP: (!) 174/72 (!) 178/78 132/76   Site: Right Upper Arm Right Upper Arm    Position: Sitting Sitting    Cuff Size: Medium Adult Large Adult    Pulse: 90     SpO2: 97%     Height: 1.854 m (6' 1\")         General: alert, appears stated age, and cooperative   Right Ear: normal appearance   Left Ear:  Cerumen impaction   After removal: Left middle ear effusion         Assessment:      Cerumen Impaction, without otitis externa.      Plan:      Cerumen removed by flushing after use of wax softener, currettage, and lighted currettage utilized .  Care instructions given.  Home treatment: none.  Follow up as needed.

## 2024-01-08 NOTE — PROGRESS NOTES
[facility]  [facility]  [address] [place] [id number]  Dept: [phone]  Dept Fax: [phone]  Loc: [phone]    Newton Hartley is a 78 y.o. male who presents today for his medical conditions/complaints as noted below.  Newton Hartley is c/o of Otalgia (Pt is here for left ear pain. Pt has been having left ear pain that goes into his jaw. )        HPI:     Chief Complaint   Patient presents with    Otalgia     Pt is here for left ear pain. Pt has been having left ear pain that goes into his jaw.        Otalgia   There is pain in the left ear. This is a new problem. The current episode started in the past 7 days (3 days). The problem has been gradually worsening. There has been no fever. The fever has been present for Less than 1 day. Associated symptoms include headaches (Now resolved) and hearing loss (Left ear (muffled)). Pertinent negatives include no abdominal pain, coughing, diarrhea, ear discharge, neck pain, rash, rhinorrhea, sore throat or vomiting. Treatments tried: Q-tips. The treatment provided no relief. There is no history of a chronic ear infection, hearing loss or a tympanostomy tube.     Patient is here for follow up on diabetes.  Taking medication as prescribed.  Denies any hypoglycemia episodes.  Denies any increased urination, hunger, or thirst.  Trying to eat a healthy, diabetic diet.       Newton Hartley is here today to follow up on CAD.  Taking medications as prescribed - Metoprolol 25 mg BID, Isosorbide 30 mg, Diovan 160 mg daily, Amlodipine 10 mg daily. Is trying to adhere to a no salt diet.  Denies any chest pain, leg swelling, orthopnea, dizziness.       Past Medical History:   Diagnosis Date    Acute MI (HCC)     Angina pectoris, unstable (HCC)     ASHD (arteriosclerotic heart disease)     Bladder outlet obstruction     BPH (benign prostatic hyperplasia)     Chest pain     Constipation     Diabetes mellitus (HCC)     GERD

## 2024-01-12 ENCOUNTER — TELEPHONE (OUTPATIENT)
Dept: FAMILY MEDICINE CLINIC | Age: 79
End: 2024-01-12

## 2024-01-12 ENCOUNTER — TELEMEDICINE (OUTPATIENT)
Dept: FAMILY MEDICINE CLINIC | Age: 79
End: 2024-01-12

## 2024-01-12 DIAGNOSIS — J02.9 SORE THROAT: Primary | ICD-10-CM

## 2024-01-12 DIAGNOSIS — H92.09 OTALGIA, UNSPECIFIED LATERALITY: ICD-10-CM

## 2024-01-12 DIAGNOSIS — R05.1 ACUTE COUGH: ICD-10-CM

## 2024-01-12 LAB
INFLUENZA A ANTIBODY: NEGATIVE
INFLUENZA B ANTIBODY: NEGATIVE
Lab: 0
QC PASS/FAIL: 0
S PYO AG THROAT QL: NORMAL
SARS-COV-2 RDRP RESP QL NAA+PROBE: NEGATIVE

## 2024-01-12 RX ORDER — AZITHROMYCIN 250 MG/1
250 TABLET, FILM COATED ORAL SEE ADMIN INSTRUCTIONS
Qty: 6 TABLET | Refills: 0 | Status: SHIPPED | OUTPATIENT
Start: 2024-01-12 | End: 2024-01-17

## 2024-01-12 ASSESSMENT — ENCOUNTER SYMPTOMS
ANAL BLEEDING: 0
NAUSEA: 0
BLOOD IN STOOL: 0
SHORTNESS OF BREATH: 0
SWOLLEN GLANDS: 0
COUGH: 1
RHINORRHEA: 0
ALLERGIC/IMMUNOLOGIC NEGATIVE: 1
GASTROINTESTINAL NEGATIVE: 1
WHEEZING: 0
VOMITING: 0
DIARRHEA: 0
SINUS PAIN: 1
ABDOMINAL PAIN: 0
SORE THROAT: 0
EYES NEGATIVE: 1

## 2024-01-12 NOTE — TELEPHONE ENCOUNTER
Flushing his ears should have nothing to do with a cold.  Please test for covid and flu and strep if has a sore throat and then can do a virtual visit at 440 today.  However if he does not come in to test then will not be able to treat

## 2024-01-12 NOTE — PROGRESS NOTES
Range    SARS-COV-2, RdRp gene Negative Negative    Lot Number 0     QC Pass/Fail 0           Assessment:       1. Sore throat    2. Acute cough    3. Otalgia, unspecified laterality        Results for POC orders placed in visit on 01/12/24   POCT Influenza A/B   Result Value Ref Range    Influenza A Ab Negative     Influenza B Ab Negative    POCT rapid strep A   Result Value Ref Range    Strep A Ag None Detected None Detected            Plan:       Newton was seen today for pharyngitis.    Diagnoses and all orders for this visit:    Sore throat  -     Culture, Throat    Acute cough  -     POCT Influenza A/B  -     POCT rapid strep A  -     POCT COVID-19 Rapid, NAAT    Otalgia, unspecified laterality  -     azithromycin (ZITHROMAX) 250 MG tablet; Take 1 tablet by mouth See Admin Instructions for 5 days 500mg on day 1 followed by 250mg on days 2 - 5        Patient has been instructed call the office immediately with new symptoms, change in symptoms or worseningof symptoms. If this is not feasible, patient is instructed to report to the emergency room. Medication profile reviewed. Medication side effects and possible impairments from medications were discussed as applicable.Allergies were reviewed. Health maintenance was reviewed and updated as appropriate.     Return if symptoms worsen or fail to improve.    (Comment: Please note this report has been produced using a combination of typing and speech recognition software and may contain errors related to that system including errors in grammar, punctuation, and spelling, as well as words and phrases that may be inappropriate. If there are any questions or concerns please feel free to contact the dictating provider for clarification.)        Patient has been instructed call the office immediately with new symptoms, change in symptoms or worseningof symptoms. If this is not feasible, patient is instructed to report to the emergency room. Medication profile reviewed.

## 2024-01-12 NOTE — TELEPHONE ENCOUNTER
Patient calling stating he was here on 1.8.24 to get his ear flushed out and now has a cold.  Patient asking if we will call something in for him.  Has not tried anything over the counter.  Please call patient and advise.

## 2024-01-14 ENCOUNTER — HOSPITAL ENCOUNTER (EMERGENCY)
Age: 79
Discharge: HOME | End: 2024-01-14
Attending: EMERGENCY MEDICINE
Payer: MEDICARE

## 2024-01-14 ENCOUNTER — APPOINTMENT (OUTPATIENT)
Dept: GENERAL RADIOLOGY | Age: 79
End: 2024-01-14
Payer: MEDICARE

## 2024-01-14 VITALS
SYSTOLIC BLOOD PRESSURE: 160 MMHG | DIASTOLIC BLOOD PRESSURE: 91 MMHG | RESPIRATION RATE: 16 BRPM | TEMPERATURE: 98.4 F | OXYGEN SATURATION: 99 % | HEART RATE: 79 BPM

## 2024-01-14 DIAGNOSIS — R13.10 DYSPHAGIA, UNSPECIFIED TYPE: Primary | ICD-10-CM

## 2024-01-14 LAB — BACTERIA THROAT AEROBE CULT: NORMAL

## 2024-01-14 PROCEDURE — 70360 X-RAY EXAM OF NECK: CPT

## 2024-01-14 PROCEDURE — 99283 EMERGENCY DEPT VISIT LOW MDM: CPT

## 2024-01-14 NOTE — ED PROVIDER NOTES
[id number]  [phone]  Go to   As needed, If symptoms worsen    Radha Rocha DO  [address] [facility]  [address]  [facility] [id number] [phone]    Schedule an appointment as soon as possible for a visit         DISCHARGE MEDICATIONS:  Discharge Medication List as of 1/14/2024  4:48 PM             (Please note that portions of this note were completed with a voice recognition program.  Efforts were made to edit the dictations but occasionally words are mis-transcribed.)    Olya Bynum DO (electronically signed)  Attending Emergency Physician       Olya Bynum DO  01/14/24 [id number]

## 2024-01-16 DIAGNOSIS — R84.5 THROAT SWAB CULTURE POSITIVE: Primary | ICD-10-CM

## 2024-01-16 LAB
BACTERIA THROAT AEROBE CULT: ABNORMAL
BACTERIA THROAT AEROBE CULT: ABNORMAL
ORGANISM: ABNORMAL

## 2024-01-16 RX ORDER — SULFAMETHOXAZOLE AND TRIMETHOPRIM 800; 160 MG/1; MG/1
1 TABLET ORAL 2 TIMES DAILY
Qty: 14 TABLET | Refills: 0 | Status: SHIPPED | OUTPATIENT
Start: 2024-01-16 | End: 2024-01-23

## 2024-01-17 NOTE — PROGRESS NOTES
the Last Year: No     DRUG/FOOD ALLERGIES: Prednisone, Codeine, and Lisinopril  CURRENT MEDICATIONS  Prior to Admission medications    Medication Sig Start Date End Date Taking? Authorizing Provider   sulfamethoxazole-trimethoprim (BACTRIM DS;SEPTRA DS) 800-160 MG per tablet Take 1 tablet by mouth 2 times daily for 7 days 1/16/24 1/23/24 Yes Danette Gao APRN - CNP   fluticasone (FLONASE) 50 MCG/ACT nasal spray 1 spray by Each Nostril route daily 1/8/24  Yes Danette Gao APRN - CNP   metFORMIN (GLUCOPHAGE-XR) 500 MG extended release tablet TAKE 2 TABLETS BY MOUTH TWICE A DAY 1/2/24  Yes Kike Milner MD   glimepiride (AMARYL) 4 MG tablet TAKE ONE TABLET BY MOUTH EVERY MORNING AND TAKE ONE TABLET BY MOUTH EVERY NIGHT AT BEDTIME 11/7/23  Yes Kike Milner MD   isosorbide mononitrate (IMDUR) 30 MG extended release tablet Take 1 tablet by mouth daily 10/25/23  Yes Kike Milner MD   metFORMIN (GLUCOPHAGE) 500 MG tablet Take 2 tablets by mouth 2 times daily (with meals)   Yes ProviderJordi MD   valsartan (DIOVAN) 160 MG tablet Take 1 tablet by mouth daily   Yes Jordi Rodríguez MD   amLODIPine (NORVASC) 10 MG tablet Take 1 tablet by mouth daily 2/23/23  Yes Jordi Rodríguez MD   omeprazole (PRILOSEC) 40 MG delayed release capsule Take 1 capsule by mouth daily 9/27/22  Yes ProviderJordi MD   blood glucose test strips (ONETOUCH ULTRA) strip TEST TWO TIMES A DAY 4/11/23  Yes Kike Milner MD   traZODone (DESYREL) 100 MG tablet TAKE ONE TABLET BY MOUTH ONCE NIGHTLY AS NEEDED FOR SLEEP 2/27/23  Yes Kike Milner MD   tamsulosin (FLOMAX) 0.4 MG capsule Take 1 capsule by mouth daily Indications: Benign Enlargement of Prostate 11/28/22  Yes Danette Gao APRN - CNP   metoprolol tartrate (LOPRESSOR) 25 MG tablet Take 2 tablets by mouth 2 times daily  Patient taking differently: Take 1 tablet by mouth 2 times

## 2024-01-18 ENCOUNTER — OFFICE VISIT (OUTPATIENT)
Dept: ENT CLINIC | Age: 79
End: 2024-01-18

## 2024-01-18 VITALS
SYSTOLIC BLOOD PRESSURE: 140 MMHG | HEART RATE: 75 BPM | BODY MASS INDEX: 30.88 KG/M2 | RESPIRATION RATE: 16 BRPM | DIASTOLIC BLOOD PRESSURE: 87 MMHG | HEIGHT: 73 IN | WEIGHT: 233 LBS

## 2024-01-18 DIAGNOSIS — R13.10 DYSPHAGIA, UNSPECIFIED TYPE: ICD-10-CM

## 2024-01-18 DIAGNOSIS — J38.01 VOCAL FOLD PARALYSIS, LEFT: Primary | ICD-10-CM

## 2024-01-18 ASSESSMENT — ENCOUNTER SYMPTOMS
VOICE CHANGE: 1
TROUBLE SWALLOWING: 1
VOMITING: 0
NAUSEA: 0
SHORTNESS OF BREATH: 0
EYE PAIN: 0
COUGH: 0
RHINORRHEA: 0

## 2024-01-23 ENCOUNTER — PROCEDURE VISIT (OUTPATIENT)
Dept: SPEECH THERAPY | Age: 79
End: 2024-01-23
Payer: MEDICARE

## 2024-01-23 DIAGNOSIS — J38.01 VOCAL FOLD PARALYSIS, LEFT: ICD-10-CM

## 2024-01-23 DIAGNOSIS — J38.01 VOCAL CORD PARALYSIS, UNILATERAL COMPLETE: ICD-10-CM

## 2024-01-23 DIAGNOSIS — R13.12 OROPHARYNGEAL DYSPHAGIA: Primary | ICD-10-CM

## 2024-01-23 PROCEDURE — 92526 ORAL FUNCTION THERAPY: CPT | Performed by: SPEECH-LANGUAGE PATHOLOGIST

## 2024-01-23 PROCEDURE — 92612 ENDOSCOPY SWALLOW (FEES) VID: CPT | Performed by: SPEECH-LANGUAGE PATHOLOGIST

## 2024-01-23 NOTE — PROGRESS NOTES
[facility], [facility]  SPEECH THERAPY  Fiberoptic Endoscopic Evaluation of Swallowing  (FEES)/Treatment      Name: Newton Hartley  YOB: 1945  Gender: male  MRN: [id number]  Referring Physician: Dr. Early  Diagnosis: Dysphagia, unspecified; Dysphonia  Onset Date: ~1 yr ago  History of Present Illness/Injury:    Patient Active Problem List    Diagnosis Date Noted    Chest pain 01/05/2023    COVID-19 08/15/2022    Epigastric pain 07/09/2023    Primary insomnia 01/23/2018    Keloid 10/24/2017    Type 2 diabetes mellitus with diabetic polyneuropathy, without long-term current use of insulin ([facility]) 07/25/2017    Benign non-nodular prostatic hyperplasia without lower urinary tract symptoms 11/08/2016    DM (diabetes mellitus), secondary, uncontrolled, w/neurologic complic 03/07/2016    Primary hypertension 03/07/2016    Coronary artery disease involving native coronary artery of native heart with angina pectoris ([facility]) 03/07/2016    Hypercholesterolemia 03/07/2016    Anxiety 10/12/2015    Benign non-nodular prostatic hyperplasia with lower urinary tract symptoms 10/06/2015    Constipation     Bladder outlet obstruction 01/28/2013    S/P CABG x 3 01/28/2013    Mixed hyperlipidemia 01/28/2013    Essential hypertension 01/23/2011    Angina pectoris, unstable ([facility]) 01/23/2011    Diabetes mellitus type II, controlled ([facility]) 01/23/2011     Date of Exam: 1/23/2024    Recent Chest X-ray (10/24/23)-  IMPRESSION:  No significant finding in the chest.    Recent Neck X-ray (1/14/24)-  IMPRESSION:  Unremarkable soft tissue neck    Previous SLP Evaluations- NA    Per ENT Note, Dr. Early, 1/18/24-  \"His dysphagia is likely secondary to the vocal fold paralysis.  We discussed chin tuck to the left and I will have him see Jeri Muñoz for swallowing therapy.\"     Patient Complaints/Reason for Referral:  Newton Hartley was referred for a FEES to assess the efficiency of his/her swallow function, assess for

## 2024-01-26 NOTE — PROGRESS NOTES
[facility]  DIVISION OF OTOLARYNGOLOGY- HEAD & NECK SURGERY  CONSULT    Patient Name: Newton Hartley  Medical Record Number:  [id number]  Primary Care Physician:  Kike Milner MD  Date of Consultation: 1/29/2024    Chief Complaint:   Chief Complaint   Patient presents with    Dysphagia     States also has some hoarseness and trouble swallowing but is better over the last few days.       HISTORY OF PRESENT ILLNESS  Newton is a(n) 78 y.o. male who presents for evaluation of difficulty with swallowing and hoarseness.  He states this was going on for a while but recently worsened significantly in the last week or so.  He was diagnosed with strep and started on Bactrim based off of cultures.  He did have an open heart surgery in October 2022.    Interval history 1/29/2024  Newton has had significant improvement in his voice and swallowing over the last 3 days.    Patient Active Problem List   Diagnosis    Essential hypertension    Angina pectoris, unstable (HCC)    Diabetes mellitus type II, controlled ([facility])    Bladder outlet obstruction    S/P CABG x 3    Mixed hyperlipidemia    Constipation    Benign non-nodular prostatic hyperplasia with lower urinary tract symptoms    Anxiety    DM (diabetes mellitus), secondary, uncontrolled, w/neurologic complic    Primary hypertension    Coronary artery disease involving native coronary artery of native heart with angina pectoris ([facility])    Hypercholesterolemia    Benign non-nodular prostatic hyperplasia without lower urinary tract symptoms    Type 2 diabetes mellitus with diabetic polyneuropathy, without long-term current use of insulin ([facility])    Keloid    Primary insomnia    COVID-19    Chest pain    Epigastric pain     Past Surgical History:   Procedure Laterality Date    CARDIAC CATHETERIZATION  2002    stent    CARDIAC CATHETERIZATION  2011    stent    COLONOSCOPY  10/24/13    CORONARY ANGIOPLASTY WITH STENT PLACEMENT      CORONARY ARTERY BYPASS GRAFT N/A

## 2024-01-29 ENCOUNTER — OFFICE VISIT (OUTPATIENT)
Dept: ENT CLINIC | Age: 79
End: 2024-01-29
Payer: MEDICARE

## 2024-01-29 VITALS
HEIGHT: 72 IN | WEIGHT: 233 LBS | DIASTOLIC BLOOD PRESSURE: 84 MMHG | BODY MASS INDEX: 31.56 KG/M2 | HEART RATE: 73 BPM | SYSTOLIC BLOOD PRESSURE: 142 MMHG

## 2024-01-29 DIAGNOSIS — R13.10 DYSPHAGIA, UNSPECIFIED TYPE: ICD-10-CM

## 2024-01-29 DIAGNOSIS — J38.01 VOCAL FOLD PARALYSIS, LEFT: Primary | ICD-10-CM

## 2024-01-29 PROCEDURE — 99213 OFFICE O/P EST LOW 20 MIN: CPT | Performed by: STUDENT IN AN ORGANIZED HEALTH CARE EDUCATION/TRAINING PROGRAM

## 2024-01-29 PROCEDURE — G8484 FLU IMMUNIZE NO ADMIN: HCPCS | Performed by: STUDENT IN AN ORGANIZED HEALTH CARE EDUCATION/TRAINING PROGRAM

## 2024-01-29 PROCEDURE — 1036F TOBACCO NON-USER: CPT | Performed by: STUDENT IN AN ORGANIZED HEALTH CARE EDUCATION/TRAINING PROGRAM

## 2024-01-29 PROCEDURE — 3077F SYST BP >= 140 MM HG: CPT | Performed by: STUDENT IN AN ORGANIZED HEALTH CARE EDUCATION/TRAINING PROGRAM

## 2024-01-29 PROCEDURE — 1123F ACP DISCUSS/DSCN MKR DOCD: CPT | Performed by: STUDENT IN AN ORGANIZED HEALTH CARE EDUCATION/TRAINING PROGRAM

## 2024-01-29 PROCEDURE — G8417 CALC BMI ABV UP PARAM F/U: HCPCS | Performed by: STUDENT IN AN ORGANIZED HEALTH CARE EDUCATION/TRAINING PROGRAM

## 2024-01-29 PROCEDURE — 3079F DIAST BP 80-89 MM HG: CPT | Performed by: STUDENT IN AN ORGANIZED HEALTH CARE EDUCATION/TRAINING PROGRAM

## 2024-01-29 PROCEDURE — G8427 DOCREV CUR MEDS BY ELIG CLIN: HCPCS | Performed by: STUDENT IN AN ORGANIZED HEALTH CARE EDUCATION/TRAINING PROGRAM

## 2024-01-29 PROCEDURE — 31575 DIAGNOSTIC LARYNGOSCOPY: CPT | Performed by: STUDENT IN AN ORGANIZED HEALTH CARE EDUCATION/TRAINING PROGRAM

## 2024-01-30 ENCOUNTER — HOSPITAL ENCOUNTER (OUTPATIENT)
Dept: SPEECH THERAPY | Age: 79
Setting detail: THERAPIES SERIES
Discharge: HOME | End: 2024-01-30
Payer: MEDICARE

## 2024-01-30 PROCEDURE — 92524 BEHAVRAL QUALIT ANALYS VOICE: CPT

## 2024-01-30 PROCEDURE — 92610 EVALUATE SWALLOWING FUNCTION: CPT

## 2024-01-30 NOTE — PROGRESS NOTES
Clinical Bedside Swallow Assessment     Speech Therapy Certification    Dear  Bg Early DO,    We had the pleasure of evaluating the following patient for speech therapy services at [facility].  A summary of our findings can be found in the initial assessment below.  This includes our plan of care.  If you have any questions or concerns regarding these findings, please do not hesitate to contact me at the office phone number checked above.  Thank you for the referral.       Physician Signature:_______________________________Date:__________________  By signing above (or electronic signature), therapist's plan is approved by physician      Patient: Newton Hartley   : 1945   MRN: [id number]  Referring Physician:  Bg Early DO       Evaluation Date: 2024      Medical Diagnosis Information: R13.12 (ICD-10-CM) - Oropharyngeal dysphagia and J38.01 (ICD-10-CM) - Vocal cord paralysis, unilateral complete                                                  Insurance information:  Medicare, Secondary: [employer]       Precautions/ Contra-indications:   Latex Allergy:  [x]NO      []YES  Preferred Language for Healthcare:   [x]English       []other:    SUBJECTIVE: Pt is pleasant and agreeable to eval.     ONSET: About 3 weeks ago     Recent Chest xray on 10/24/24: \"Impressions: No significant finding in the chest.\"    Current Level of Function: Independent    History of present illness, per MD: \"Newton is a(n) 78 y.o. male who presents for evaluation of difficulty with swallowing and hoarseness.  He states this was going on for a while but recently worsened significantly in the last week or so.  He was diagnosed with strep and started on Bactrim based off of cultures.  He did have an open heart surgery in 2022.     Assessment: 1. Vocal fold paralysis, left  Left vocal fold paralysis actually appears to be improving.  This could be secondary to a viral infection.  His soft

## 2024-02-01 ENCOUNTER — HOSPITAL ENCOUNTER (OUTPATIENT)
Dept: SPEECH THERAPY | Age: 79
Setting detail: THERAPIES SERIES
Discharge: HOME | End: 2024-02-01
Payer: MEDICARE

## 2024-02-01 DIAGNOSIS — E11.42 TYPE 2 DIABETES MELLITUS WITH DIABETIC POLYNEUROPATHY, WITHOUT LONG-TERM CURRENT USE OF INSULIN (HCC): ICD-10-CM

## 2024-02-01 PROCEDURE — 92526 ORAL FUNCTION THERAPY: CPT

## 2024-02-01 PROCEDURE — 92507 TX SP LANG VOICE COMM INDIV: CPT

## 2024-02-01 RX ORDER — METFORMIN HYDROCHLORIDE 500 MG/1
1000 TABLET, EXTENDED RELEASE ORAL 2 TIMES DAILY
Qty: 120 TABLET | Refills: 0 | Status: SHIPPED | OUTPATIENT
Start: 2024-02-01

## 2024-02-01 NOTE — PROGRESS NOTES
[facility] - Outpatient Rehabilitation and Therapy  [address] [place] [address] [place]  [address]  Phone: [phone]  Fax [phone]    Speech-Language Pathology  Daily Treatment Note    Date:  2024    Patient Name:  Newton Hartley    :  1945  MRN: [id number]  Restrictions/Precautions:  None   Diagnosis:   R13.12 (ICD-10-CM) - Oropharyngeal dysphagia and J38.01 (ICD-10-CM) - Vocal cord paralysis, unilateral complete   Treatment Diagnosis:  Oropharyngeal Dysphagia and dysphonia   Insurance/Certification information:  Medicare, Secondary: [employer]    Referring Physician: Bg Early DO   Plan of care signed (Y/N):  []  Yes  [x]  No (24)   Visit# / total visits:   , including eval   Medicare Cap (if applicable): $150   Pain level: The patient does not complain of pain        Progress Note: []  Yes  [x]  No  Next due by: Visit 2/10 or 24     Subjective:    Location: Pt seen in therapy office  Demeanor: Pleasant  and Cooperative  Motivated: [x]  Yes  []  No   Caregivers Present: [x]  Yes, Wife  []  No     Objective:   Dysphagia GOALS:  Patient stated goal: Return to baseline.   [x] Progressing: [] Met: [] Not Met: [] Adjusted  2024 : Ongoing, progressing.      Therapist goals for Patient:   Short Term Goals: To be achieved in: 6 weeks   1. The patient will tolerate recommended diet with no clinical s/s of aspiration 5/5  [x] Progressing: [] Met: [] Not Met: [] Adjusted  2024 : Pt reported their PO intake has increased, however, they are still having difficulty with their pills. SLP recommended pt take their pills cut in puree as able. The pt confirmed understanding and verbalized they will attempt it.     2. The patient/caregiver will demonstrate understanding of compensatory swallow strategies, for improved swallow safety  [x] Progressing: [] Met: [] Not Met: [] Adjusted  2024 : Safety swallow strategies were reviewed with the pt. The pt demonstrated understanding.

## 2024-02-01 NOTE — TELEPHONE ENCOUNTER
Refill Request     CONFIRM preferrred pharmacy with the patient.    If Mail Order Rx - Pend for 90 day refill.      Last Seen: Last Seen Department: 1/12/2024  Last Seen by PCP: 10/17/2023    Last Written: 1/2/2024    If no future appointment scheduled, route STAFF MESSAGE with patient name to the  Pool for scheduling.      Next Appointment:   Future Appointments   Date Time Provider Department Center   2/1/2024 10:30 AM Amanda Walsh, [facility]   2/6/2024 10:30 AM Amanda Walsh, [facility]   [facility]   2/13/2024 10:30 AM Amanda Walsh, [facility]   2/15/2024 10:30 [facility]   2/20/2024 10:30 [facility]   2/22/2024 10:30 AM [facility]   2/27/2024 10:30 AM Amanda Walsh, [facility]   2/29/2024 10:30 AM Amanda Walsh, [facility]   4/17/2024  9:00 AM Kike Milner MD [facility] - [facility]       Message sent to  to schedule appt with patient?  NO      Requested Prescriptions     Pending Prescriptions Disp Refills    metFORMIN (GLUCOPHAGE-XR) 500 MG extended release tablet [Pharmacy Med Name: METFORMIN HCL  MG TABLET] 120 tablet 0     Sig: TAKE 2 TABLETS BY MOUTH TWICE A DAY

## 2024-02-06 ENCOUNTER — HOSPITAL ENCOUNTER (OUTPATIENT)
Dept: SPEECH THERAPY | Age: 79
Setting detail: THERAPIES SERIES
Discharge: HOME | End: 2024-02-06
Payer: MEDICARE

## 2024-02-06 PROCEDURE — 92526 ORAL FUNCTION THERAPY: CPT

## 2024-02-06 PROCEDURE — 92507 TX SP LANG VOICE COMM INDIV: CPT

## 2024-02-06 NOTE — PROGRESS NOTES
Internal Medicine Interval Note  Note Author: Suzi Perez M.D.     Name Justice Ortega       1982   Age/Sex 35 y.o. male   MRN [id number]   Code Status FULL CODE     After 5PM or if no immediate response to page, please call for cross-coverage  Attending/Team: Dr. Jin / Karli See Patient List for primary contact information  Call [phone] to page    1st Call - Day Intern (R1):   Dr. Perez 2nd Call - Day Sr. Resident (R2/R3):   Dr. Cárdenas         Reason for interval visit  (Principal Problem)   Epigastric pain    Interval Problem Daily Status Update  (24 hours)   - admitted with epigastric pain, nausea, vomiting, diarrhea  - minimal history from patient  - epigastric pain on and off for years, worse past 3 days, better this afternoon per patient (pt started on pepcid this am)  - hasn't vomited since admission, no diarrhea since admission  - ambulated to void today after IV fluids  - per RN, responds   - drowsy after 2 mg of Lorazepam given at midnight in ER. Alert, oriented to time, place, person, event  - re-review this afternoon x 2 - continues to remain drowsy and in bed although better than this am  - had lunch        Review of Systems   Constitutional: Negative for chills and fever.        ROS limited due to patient's drowsiness   HENT: Negative for congestion and sinus pain.    Eyes: Negative for discharge and redness.   Respiratory: Negative for cough and shortness of breath.    Cardiovascular: Negative for chest pain and palpitations.   Gastrointestinal: Positive for abdominal pain, nausea and vomiting.   Genitourinary: Negative for dysuria and urgency.   Neurological: Negative for focal weakness and headaches.   Psychiatric/Behavioral: Positive for substance abuse.        Somnolent  Known psychiatric disorder  EtOH abuse  Drug abuse         Consultants/Specialty  None    Disposition  In-patient for monitoring    Quality Measures  Quality-Core Measures   Reviewed items::  EKG  exercises with 90% acc given min cues  [x] Progressing: [] Met: [] Not Met: [] Adjusted  2/6/2024 : Pt completed 50 repetitions of the Effortful Swallow. Pt needed water between each set to assist with dry swallowing. Pt reported they had the most difficulty with this exs as it was hard to dry swallow.     Pt also completed 30 repetitions of the Mendelsohn Maneuver. Pt needed minimal cuing with exs. Pt was given these exs to complete at home.     4. The patient will tolerate instrumental assessment when able   [x] Progressing: [] Met: [] Not Met: [] Adjusted  2/6/2024 : Will target goal when clinically indicating for a repeat. Pt reported they were swallowing better and felt more confident with their swallow. It was discussed to complete a FEES next week during a session.        Long Term Goals: To be achieved in: 8 weeks  The patient will tolerate least restrictive diet with no clinical s/s of aspiration or worsening respiratory/pulmonary status  [x] Progressing: [] Met: [] Not Met: [] Adjusted  2/6/2024 : Ongoing, progressing.        Voice GOALS:  Patient stated goal: Return to [employer].   [x] Progressing: [] Met: [] Not Met: [] Adjusted   2/6/2024 : Ongoing, progressing.     Therapist goals for Patient:   Short Term Goals: To be achieved in: 4 weeks  1. Patient will participate in vocal strengthening exercises for improved vocal quality with minimal cues.  [x] Progressing: [] Met: [] Not Met: [] Adjusted  2/6/2024 : SOVT: Laxvox/bubbles were completed. Pt reported they have not completed this exs at home. Pt was encouraged to complete this exs 2-3 timers per day.     2. Patient will reduce vocal effort and fatigue by decreasing upper body tension as evidenced by cycle to cycle variation pertaining to their acoustic analysis.   [x] Progressing: [] Met: [] Not Met: [] Adjusted  2/6/2024 : Did not directly target.     Long Term Goals: To be achieved in: 6 weeks  1. Patient will improve vocal quality to baseline as  reviewed, Radiology images reviewed, Labs reviewed and Medications reviewed  Palacios catheter::  No Palacios  DVT prophylaxis pharmacological::  Enoxaparin (Lovenox)  Ulcer Prophylaxis::  Yes          Physical Exam       Vitals:    04/26/18 0430 04/26/18 0500 04/26/18 0530 04/26/18 0653   BP:   134/87 139/90   Pulse: 95 100 (!) 113 (!) 107   Resp: 14 18 14 16   Temp:   36.8 °C (98.3 °F) 36.7 °C (98.1 °F)   SpO2: 95% 96% 94% 91%   Weight:       Height:         Body mass index is 22.85 kg/m². Weight: 74.3 kg (163 lb 12.8 oz)  Oxygen Therapy:  Pulse Oximetry: 91 %, O2 (LPM): 0, O2 Delivery: None (Room Air)    Physical Exam   Constitutional: He is oriented to person, place, and time and well-developed, well-nourished, and in no distress. No distress.   HENT:   Head: Normocephalic and atraumatic.   Mouth/Throat: No oropharyngeal exudate.   Eyes: Conjunctivae are normal. Right eye exhibits no discharge. Left eye exhibits no discharge. No scleral icterus.   Neck: No JVD present.   Cardiovascular: Normal rate, regular rhythm and normal heart sounds.  Exam reveals no gallop and no friction rub.    No murmur heard.  Pulmonary/Chest: Effort normal and breath sounds normal. No stridor. No respiratory distress. He has no wheezes. He has no rales.   Abdominal: Soft. Bowel sounds are normal. He exhibits no distension. There is no tenderness. There is no rebound and no guarding.   Musculoskeletal: Normal range of motion. He exhibits no edema or tenderness.   Lymphadenopathy:     He has no cervical adenopathy.   Neurological: He is alert and oriented to person, place, and time.   Drowsy, responds to questions but snores in the middle of conversation/assessment   Skin: Skin is warm. No rash noted. He is not diaphoretic. No erythema.         Lab Data Review:         4/26/2018  3:32 PM    Recent Labs      04/25/18 2027 04/26/18   0305  04/26/18   0806   SODIUM  138   --   136   POTASSIUM  3.4*   --   4.1   CHLORIDE  104   --   106   CO2   25   --   24   BUN  5*   --   6*   CREATININE  0.69   --   0.73   MAGNESIUM   --   1.8  1.9   CALCIUM  9.1   --   8.6       Recent Labs      04/25/18 2027 04/26/18   0806   ALTSGPT  10  11   ASTSGOT  11*  12   ALKPHOSPHAT  80  95   TBILIRUBIN  0.3  0.6   AMYLASE   --   37   LIPASE  10*   --    GLUCOSE  109*  99       Recent Labs      04/25/18 2027 04/26/18   0934   RBC  4.74  4.89   HEMOGLOBIN  14.9  15.5   HEMATOCRIT  42.6  43.0   PLATELETCT  274  272       Recent Labs      04/25/18 2027 04/26/18   0806  04/26/18   0934   WBC  11.1*   --   12.5*   NEUTSPOLYS  62.10   --   72.40*   LYMPHOCYTES  26.10   --   16.80*   MONOCYTES  9.70   --   9.80   EOSINOPHILS  1.30   --   0.20   BASOPHILS  0.40   --   0.30   ASTSGOT  11*  12   --    ALTSGPT  10  11   --    ALKPHOSPHAT  80  95   --    TBILIRUBIN  0.3  0.6   --            Assessment/Plan     * Epigastric pain   Assessment & Plan    - limited info from patient, drowsy, info form chart review  - epigastric pain 3 days, associated with N, V, D --- similar to pain of previous pancreatitis  - pain better this pm per pt  - no nausea, vomiting or diarrhea on floor  - per pt intermittent abdo pain for 2 years  - EtOH abuse, other substance abuse (UDS +ve for Amphetamine and cannabinoids prior presentation)  - UDS this admission negaive  - BAL 0.01  - salicylate, acetaminophen levels wnl  - LFTs, CBC, Lipase wnl  - soft, non-tender abdomen  - CT Abdo/Pelvis - nil acute, no features of chronic pancreatitis evident  Imp : Likely gastritis / dyspepsia  Plan  - Change pepcid to PPI  - sucralfate  - PO diet if passes bedside swallow eval  - DIANA  - encourage pt to ambulate  - compazine and zofran PRN  - stool sample for FOBT, and leucocytes if has BM          Altered mental status, unspecified   Assessment & Plan    - since admission to floor  - / was ok during ERP assessment  - UDS negative  - BAL 0.1  - oriented to time, place, person, event  - drowsy all day --> ? Likely  due to ativan given in ER  Plan  - ammonia level  - CT Head          Homeless   Assessment & Plan    - homeless  - per pt, has family in [place]?, but pt with altered mentation / drowsy  - lives on street  - SW Consult        History of psychiatric disorder   Assessment & Plan    - h/o psychiatric disorder, unknown type  - pt drowsy after ativan  - schizoaffective disorder with depression and prior suicide attempt in the past per EPIC  - UDS negative  - not on any psychiatric meds  - no self-harm attempts on floor  - homeless  Plan  - ctm        Substance abuse   Assessment & Plan    - h/o meth use, marijuana use, ecstasy, heavy EtOH use  - pt drowsy and does not give all info  - endorses to not doing drugs past 1 month  - UDS this admission negative  - BAL wsa 0.01, acetaminophen and salicylate levels wnl this admission  Plan  - SW consult for counseling           Tachycardia   Assessment & Plan    - Tachy overnight and in ER  - -110  - was given ativan 2 mg in ER for probable alcohol withdrawal  - pt denies EtOH past 2-3 days, had small amount 3 days ago, he does not really give good info  - per chart review from prior presentation he was taking 1/2 bottle of vodka/day for 13 years  - not tachycardic on bedside eval this am and pm  - EKG : NSR  - UDS negative this admission  - Tele : nil abnormal  Plan  - ctm  - IV fluids for now  - transfer to medical floor (orders placed)          Alcohol abuse- (present on admission)   Assessment & Plan    - per chart review --> half bottle of vodka past 13 years  - pt denies EtOH intake past 2-3 days, had a small amount 3 days ago, did not quantify when asked to specify  -  consult for ounseling

## 2024-02-08 ENCOUNTER — HOSPITAL ENCOUNTER (OUTPATIENT)
Dept: SPEECH THERAPY | Age: 79
Setting detail: THERAPIES SERIES
Discharge: HOME | End: 2024-02-08
Payer: MEDICARE

## 2024-02-08 PROCEDURE — 92526 ORAL FUNCTION THERAPY: CPT

## 2024-02-08 PROCEDURE — 92507 TX SP LANG VOICE COMM INDIV: CPT

## 2024-02-08 NOTE — PROGRESS NOTES
cues  [x] Progressing: [] Met: [] Not Met: [] Adjusted  2/8/2024 : Pt completed 60 repetitions of the Effortful Swallow. Pt needed water between each set to assist with dry swallowing. Pt reported they use this exs when they swallow pills to ensure the pill is safely swallowed.     Pt also completed 60 repetitions of the Mendelsohn Maneuver. Pt was able to independently complete this exs.     4. The patient will tolerate instrumental assessment when able   [x] Progressing: [] Met: [] Not Met: [] Adjusted  2/8/2024 : It was discussed to complete a FEES next Thursday during a session.        Long Term Goals: To be achieved in: 8 weeks  The patient will tolerate least restrictive diet with no clinical s/s of aspiration or worsening respiratory/pulmonary status  [x] Progressing: [] Met: [] Not Met: [] Adjusted  2/8/2024 : Ongoing, progressing.        Voice GOALS:  Patient stated goal: Return to [employer].   [x] Progressing: [] Met: [] Not Met: [] Adjusted   2/8/2024 : Ongoing, progressing.     Therapist goals for Patient:   Short Term Goals: To be achieved in: 4 weeks  1. Patient will participate in vocal strengthening exercises for improved vocal quality with minimal cues.  [x] Progressing: [] Met: [] Not Met: [] Adjusted  2/8/2024 : SOVT: Laxvox/bubbles were completed. Pt needed little to no cueing. Pt was recommended to complete this exs before their meeting as they present the opening and closing remarks.     2. Patient will reduce vocal effort and fatigue by decreasing upper body tension as evidenced by cycle to cycle variation pertaining to their acoustic analysis.   [x] Progressing: [] Met: [] Not Met: [] Adjusted  2/8/2024 : Did not directly target.     Long Term Goals: To be achieved in: 6 weeks  1. Patient will improve vocal quality to baseline as subjectively rated by SLP, pt, and family.   [x] Progressing: [] Met: [] Not Met: [] Adjusted    2/8/2024 : Ongoing, progressing.     Assessment: Pt reported they have

## 2024-02-13 ENCOUNTER — HOSPITAL ENCOUNTER (OUTPATIENT)
Dept: SPEECH THERAPY | Age: 79
Setting detail: THERAPIES SERIES
Discharge: HOME | End: 2024-02-13
Payer: MEDICARE

## 2024-02-13 PROCEDURE — 92526 ORAL FUNCTION THERAPY: CPT

## 2024-02-13 PROCEDURE — 92507 TX SP LANG VOICE COMM INDIV: CPT

## 2024-02-13 NOTE — PROGRESS NOTES
reported they have seen a significant increase with their swallow. A repeat FEES plans to take place next session. More voice exs were targeted this session. Pt was given diaphragmatic breathing to complete at home. Pt continues to progress towards their goal.     Plan: Continue with current POC Pt was given diaphragmatic breathing along with Mendelsohn Maneuver, SOVT: Laxvox/ bubbles and Effortful swallow to completed at home.       Total Treatment Time / Charges :    Time in Time out Total Time / units   Cognitive Tx         Speech Tx 1045 1115  30 minutes / 1 unit    Dysphagia Tx 1030 1045 15 minutes / 1 unit      Signature:    Amanda Walsh M.A., CF-SLP #[id number]  Speech-Language Pathologist  Desk: [phone]      Note: If patient does not return for scheduled/ recommended follow up visits, this note will serve as a discharge from care along with most recent update on progress.

## 2024-02-15 ENCOUNTER — HOSPITAL ENCOUNTER (OUTPATIENT)
Dept: SPEECH THERAPY | Age: 79
Setting detail: THERAPIES SERIES
Discharge: HOME | End: 2024-02-15
Payer: MEDICARE

## 2024-02-15 PROCEDURE — 92526 ORAL FUNCTION THERAPY: CPT

## 2024-02-15 PROCEDURE — 92612 ENDOSCOPY SWALLOW (FEES) VID: CPT

## 2024-02-15 PROCEDURE — 92507 TX SP LANG VOICE COMM INDIV: CPT

## 2024-02-15 NOTE — PROCEDURES
Speech Language Pathology  [facility]  Swallowing Disorders and Dysphagia  Fiberoptic Endoscopic Evaluation of Swallowing  (FEES)      NAME:Newton Hartley  : 1945 (78 y.o.)   MRN: [id number]  ROOM: Room/bed info not found  ADMISSION DATE: (Not on file)  PATIENT DIAGNOSIS(ES): Paralysis of vocal cords and larynx, unilateral [J38.01]  Dysphagia, oropharyngeal phase [R13.12]  No chief complaint on file.    Patient Active Problem List    Diagnosis Date Noted    Chest pain 2023    COVID-19 08/15/2022    Epigastric pain 2023    Primary insomnia 2018    Keloid 10/24/2017    Type 2 diabetes mellitus with diabetic polyneuropathy, without long-term current use of insulin ([facility]) 2017    Benign non-nodular prostatic hyperplasia without lower urinary tract symptoms 2016    DM (diabetes mellitus), secondary, uncontrolled, w/neurologic complic 2016    Primary hypertension 2016    Coronary artery disease involving native coronary artery of native heart with angina pectoris ([facility]) 2016    Hypercholesterolemia 2016    Anxiety 10/12/2015    Benign non-nodular prostatic hyperplasia with lower urinary tract symptoms 10/06/2015    Constipation     Bladder outlet obstruction 2013    S/P CABG x 3 2013    Mixed hyperlipidemia 2013    Essential hypertension 2011    Angina pectoris, unstable ([facility]) 2011    Diabetes mellitus type II, controlled ([facility]) 2011     Past Medical History:   Diagnosis Date    Acute MI ([facility])     Angina pectoris, unstable ([facility])     ASHD (arteriosclerotic heart disease)     Bladder outlet obstruction     BPH (benign prostatic hyperplasia)     Chest pain     Constipation     Diabetes mellitus ([facility])     GERD (gastroesophageal reflux disease)     Hypercholesteremia     Hypercholesterolemia     Hypertension     IBS (irritable bowel syndrome)     Influenza A 2020    Sinusitis     Type II or unspecified type

## 2024-02-15 NOTE — PROGRESS NOTES
speaking and diaphragmatic breathing to help if their voice begins to get tired. Pt continues to progress towards their goals.     Plan: Continue with current POC Pt was given diaphragmatic breathing along with SOVT: Laxvox/ bubbles to complete at home.       Total Treatment Time / Charges :    Time in Time out Total Time / units   Cognitive Tx         Speech Tx 1055 1115 20  minutes / 1 unit    Dysphagia Tx        Signature:    Amanda Walsh M.A., CF-SLP #[id number]  Speech-Language Pathologist  Desk: [phone]      Note: If patient does not return for scheduled/ recommended follow up visits, this note will serve as a discharge from care along with most recent update on progress.

## 2024-02-20 ENCOUNTER — APPOINTMENT (OUTPATIENT)
Dept: SPEECH THERAPY | Age: 79
End: 2024-02-20
Payer: MEDICARE

## 2024-02-22 ENCOUNTER — HOSPITAL ENCOUNTER (OUTPATIENT)
Dept: SPEECH THERAPY | Age: 79
Setting detail: THERAPIES SERIES
Discharge: HOME | End: 2024-02-22
Payer: MEDICARE

## 2024-02-22 PROCEDURE — 92526 ORAL FUNCTION THERAPY: CPT

## 2024-02-22 PROCEDURE — 92507 TX SP LANG VOICE COMM INDIV: CPT

## 2024-02-22 NOTE — PROGRESS NOTES
[facility]     Outpatient Speech Therapy   Phone: [phone] Fax: [phone]    Speech Therapy Discharge Note      The following patient has been evaluated for therapy services. Please review the attached summary of the patient's plan of care, and verify that you agree with plan for additional therapy services at this time.      Thank you for the referral of this patient. Please sign the attached certification form.      Physician signature_______________________ Date________________      [facility]  [phone]        Date: 2024        Patient Name:  Newton Hartley    :  1945  MRN: [id number]  Restrictions/Precautions:  None   Diagnosis:   R13.12 (ICD-10-CM) - Oropharyngeal dysphagia and J38.01 (ICD-10-CM) - Vocal cord paralysis, unilateral complete   Treatment Diagnosis:  Oropharyngeal Dysphagia and dysphonia   Insurance/Certification information:  Medicare, Secondary: [employer]    Referring Physician: Bg Early DO   Plan of care signed (Y/N):  [x]  Yes  (2/15/24)          []  No  (Sent 24, 24, 2/15/24)   Visit# / total visits:   , including eval   Medicare Cap (if applicable): $450   Pain level: The patient does not complain of pain      Time Period for Report:  24 to 24   Cancels/No-shows to date:  0    Plan of Care/Treatment to date:  [] Speech-Language Evaluation/Treatment    [x] Dysphagia Evaluation/Treatment        [] Dysphagia Treatment via Neuromuscular Electrical Stimulation (NMES)   [] Modified Barium Swallowing Study  [x] Fiberoptic Endoscopic Evaluation of Swallowing (FEES)    [] Cognitive-Linguistic Skills Development  [x] Voice evaluation and Treatment      [] Evaluation, modification, and Training of Voice Prosthetic     [] Evaluation for Speech-Generating Augmentative and Alternative Communication Device   [] Therapeutic Services for the use of Speech-Generating Device.   [] Other:     Significant 
speaking for an extended amount of time. No further speech therapy is warranted. Please see discharge note.     Plan: Discharge expected at this time due to all goals met     Total Treatment Time / Charges :    Time in Time out Total Time / units   Cognitive Tx         Speech Tx 1015 1040 25 minutes / 1 unit    Dysphagia Tx 1040 1105 25 minutes / 1 unit      Signature:    Amanda Walsh M.A., CF-SLP #[id number]  Speech-Language Pathologist  Desk: [phone]      Note: If patient does not return for scheduled/ recommended follow up visits, this note will serve as a discharge from care along with most recent update on progress.

## 2024-02-23 DIAGNOSIS — E11.41 CONTROLLED TYPE 2 DIABETES MELLITUS WITH DIABETIC MONONEUROPATHY, WITHOUT LONG-TERM CURRENT USE OF INSULIN (HCC): ICD-10-CM

## 2024-02-23 DIAGNOSIS — E11.42 TYPE 2 DIABETES MELLITUS WITH DIABETIC POLYNEUROPATHY, WITHOUT LONG-TERM CURRENT USE OF INSULIN (HCC): ICD-10-CM

## 2024-02-23 RX ORDER — BLOOD SUGAR DIAGNOSTIC
STRIP MISCELLANEOUS
Qty: 300 STRIP | Refills: 5 | Status: SHIPPED | OUTPATIENT
Start: 2024-02-23

## 2024-02-23 NOTE — TELEPHONE ENCOUNTER
Refill Request     CONFIRM preferrred pharmacy with the patient.    If Mail Order Rx - Pend for 90 day refill.      Last Seen: Last Seen Department: 1/12/2024  Last Seen by PCP: 10/17/2023    Last Written: 4/11/23    If no future appointment scheduled, route STAFF MESSAGE with patient name to the  Pool for scheduling.      Next Appointment:   Future Appointments   Date Time Provider Department Center   2/29/2024 10:30 AM Amanda Walsh, [facility]   4/17/2024  9:00 AM K[facility]       Message sent to  to schedule appt with patient?  N/A      Requested Prescriptions     Pending Prescriptions Disp Refills    blood glucose test strips (ONETOUCH ULTRA) strip 300 strip 5     Sig: TEST TWO TIMES A DAY

## 2024-02-27 ENCOUNTER — APPOINTMENT (OUTPATIENT)
Dept: SPEECH THERAPY | Age: 79
End: 2024-02-27
Payer: MEDICARE

## 2024-02-29 ENCOUNTER — HOSPITAL ENCOUNTER (OUTPATIENT)
Dept: SPEECH THERAPY | Age: 79
Setting detail: THERAPIES SERIES
End: 2024-02-29
Payer: MEDICARE

## 2024-02-29 DIAGNOSIS — E11.42 TYPE 2 DIABETES MELLITUS WITH DIABETIC POLYNEUROPATHY, WITHOUT LONG-TERM CURRENT USE OF INSULIN (HCC): ICD-10-CM

## 2024-03-01 RX ORDER — METFORMIN HYDROCHLORIDE 500 MG/1
1000 TABLET, EXTENDED RELEASE ORAL 2 TIMES DAILY
Qty: 120 TABLET | Refills: 0 | Status: SHIPPED | OUTPATIENT
Start: 2024-03-01

## 2024-03-01 NOTE — TELEPHONE ENCOUNTER
Refill Request     CONFIRM preferrred pharmacy with the patient.    If Mail Order Rx - Pend for 90 day refill.      Last Seen: Last Seen Department: 1/12/2024  Last Seen by PCP: 10/17/2023    Last Written: 2/1/2024    If no future appointment scheduled, route STAFF MESSAGE with patient name to the  Pool for scheduling.      Next Appointment:   Future Appointments   Date Time Provider Department Center   4/17/2024  9:00 AM [facility]       Message sent to  to schedule appt with patient?  NO      Requested Prescriptions     Pending Prescriptions Disp Refills    metFORMIN (GLUCOPHAGE-XR) 500 MG extended release tablet [Pharmacy Med Name: METFORMIN HCL  MG TABLET] 120 tablet 0     Sig: TAKE 2 TABLETS BY MOUTH TWICE A DAY

## 2024-03-12 ENCOUNTER — TELEPHONE (OUTPATIENT)
Dept: FAMILY MEDICINE CLINIC | Age: 79
End: 2024-03-12

## 2024-03-12 NOTE — TELEPHONE ENCOUNTER
[facility] sent letter notifying of recall for fluticasone Propionate Nasal Spray 50 mcg.  NDC 37323--9677-11. Lot Number/Expiration date [id number] exp 09/2026.

## 2024-03-26 ENCOUNTER — TELEMEDICINE (OUTPATIENT)
Dept: FAMILY MEDICINE CLINIC | Age: 79
End: 2024-03-26
Payer: MEDICARE

## 2024-03-26 DIAGNOSIS — F43.9 SITUATIONAL STRESS: ICD-10-CM

## 2024-03-26 DIAGNOSIS — Z00.00 MEDICARE ANNUAL WELLNESS VISIT, SUBSEQUENT: Primary | ICD-10-CM

## 2024-03-26 PROCEDURE — G0439 PPPS, SUBSEQ VISIT: HCPCS | Performed by: FAMILY MEDICINE

## 2024-03-26 PROCEDURE — G8484 FLU IMMUNIZE NO ADMIN: HCPCS | Performed by: FAMILY MEDICINE

## 2024-03-26 PROCEDURE — 1123F ACP DISCUSS/DSCN MKR DOCD: CPT | Performed by: FAMILY MEDICINE

## 2024-03-26 RX ORDER — ESCITALOPRAM OXALATE 10 MG/1
10 TABLET ORAL DAILY
Qty: 30 TABLET | Refills: 3 | Status: SHIPPED | OUTPATIENT
Start: 2024-03-26

## 2024-03-26 RX ORDER — BUSPIRONE HYDROCHLORIDE 10 MG/1
10 TABLET ORAL 3 TIMES DAILY PRN
Qty: 30 TABLET | Refills: 2 | Status: SHIPPED | OUTPATIENT
Start: 2024-03-26

## 2024-03-26 SDOH — ECONOMIC STABILITY: INCOME INSECURITY: HOW HARD IS IT FOR YOU TO PAY FOR THE VERY BASICS LIKE FOOD, HOUSING, MEDICAL CARE, AND HEATING?: NOT HARD AT ALL

## 2024-03-26 SDOH — ECONOMIC STABILITY: FOOD INSECURITY: WITHIN THE PAST 12 MONTHS, YOU WORRIED THAT YOUR FOOD WOULD RUN OUT BEFORE YOU GOT MONEY TO BUY MORE.: NEVER TRUE

## 2024-03-26 SDOH — ECONOMIC STABILITY: FOOD INSECURITY: WITHIN THE PAST 12 MONTHS, THE FOOD YOU BOUGHT JUST DIDN'T LAST AND YOU DIDN'T HAVE MONEY TO GET MORE.: NEVER TRUE

## 2024-03-26 ASSESSMENT — PATIENT HEALTH QUESTIONNAIRE - PHQ9
1. LITTLE INTEREST OR PLEASURE IN DOING THINGS: NOT AT ALL
SUM OF ALL RESPONSES TO PHQ9 QUESTIONS 1 & 2: 1
4. FEELING TIRED OR HAVING LITTLE ENERGY: SEVERAL DAYS
SUM OF ALL RESPONSES TO PHQ QUESTIONS 1-9: 3
9. THOUGHTS THAT YOU WOULD BE BETTER OFF DEAD, OR OF HURTING YOURSELF: NOT AT ALL
5. POOR APPETITE OR OVEREATING: NOT AT ALL
SUM OF ALL RESPONSES TO PHQ QUESTIONS 1-9: 3
6. FEELING BAD ABOUT YOURSELF - OR THAT YOU ARE A FAILURE OR HAVE LET YOURSELF OR YOUR FAMILY DOWN: NOT AT ALL
10. IF YOU CHECKED OFF ANY PROBLEMS, HOW DIFFICULT HAVE THESE PROBLEMS MADE IT FOR YOU TO DO YOUR WORK, TAKE CARE OF THINGS AT HOME, OR GET ALONG WITH OTHER PEOPLE: NOT DIFFICULT AT ALL
8. MOVING OR SPEAKING SO SLOWLY THAT OTHER PEOPLE COULD HAVE NOTICED. OR THE OPPOSITE, BEING SO FIGETY OR RESTLESS THAT YOU HAVE BEEN MOVING AROUND A LOT MORE THAN USUAL: NOT AT ALL
SUM OF ALL RESPONSES TO PHQ QUESTIONS 1-9: 3
2. FEELING DOWN, DEPRESSED OR HOPELESS: SEVERAL DAYS
7. TROUBLE CONCENTRATING ON THINGS, SUCH AS READING THE NEWSPAPER OR WATCHING TELEVISION: NOT AT ALL
3. TROUBLE FALLING OR STAYING ASLEEP: SEVERAL DAYS
SUM OF ALL RESPONSES TO PHQ QUESTIONS 1-9: 3

## 2024-03-26 NOTE — PROGRESS NOTES
Medicare Annual Wellness Visit    Newton Hartley is here for Medicare AWV    Assessment & Plan   Medicare annual wellness visit, subsequent  Situational stress  The following orders have not been finalized:  -     busPIRone (BUSPAR) 10 MG tablet  -     escitalopram (LEXAPRO) 10 MG tablet    Recommendations for Preventive Services Due: see orders and patient instructions/AVS.  Recommended screening schedule for the next 5-10 years is provided to the patient in written form: see Patient Instructions/AVS.     No follow-ups on file.     Subjective   The following acute and/or chronic problems were also addressed today:  Wife has health issues and he is stressed.  He is willing to try Lexapro and BuSpar.    Patient's complete Health Risk Assessment and screening values have been reviewed and are found in Flowsheets. The following problems were reviewed today and where indicated follow up appointments were made and/or referrals ordered.    Positive Risk Factor Screenings with Interventions:               General HRA Questions:  Select all that apply: (!) Stress, Anger    Stress Interventions:  See above    Anger Interventions:  See above      Activity, Diet, and Weight:  On average, how many days per week do you engage in moderate to strenuous exercise (like a brisk walk)?: 2 days  On average, how many minutes do you engage in exercise at this level?: 30 min    Do you eat balanced/healthy meals regularly?: Yes    There is no height or weight on file to calculate BMI. (!) Abnormal    Obesity Interventions:  Patient declines any further evaluation or treatment              Vision Screen:  Do you have difficulty driving, watching TV, or doing any of your daily activities because of your eyesight?: No  Have you had an eye exam within the past year?: (!) No  No results found.    Interventions:    Discuss at follow-up office visit                    Objective      Patient-Reported Vitals  No data recorded

## 2024-03-26 NOTE — PATIENT INSTRUCTIONS
medicines. These can increase your chances of quitting for good. Quitting is one of the most important things you can do to protect your heart. It is never too late to quit. Try to avoid secondhand smoke too.     Stay at a weight that's healthy for you. Talk to your doctor if you need help losing weight.     Try to get 7 to 9 hours of sleep each night.     Limit alcohol to 2 drinks a day for men and 1 drink a day for women. Too much alcohol can cause health problems.     Manage other health problems such as diabetes, high blood pressure, and high cholesterol. If you think you may have a problem with alcohol or drug use, talk to your doctor.   Medicines    Take your medicines exactly as prescribed. Call your doctor if you think you are having a problem with your medicine.     If your doctor recommends aspirin, take the amount directed each day. Make sure you take aspirin and not another kind of pain reliever, such as acetaminophen (Tylenol).   When should you call for help?   Call 911 if you have symptoms of a heart attack. These may include:    Chest pain or pressure, or a strange feeling in the chest.     Sweating.     Shortness of breath.     Pain, pressure, or a strange feeling in the back, neck, jaw, or upper belly or in one or both shoulders or arms.     Lightheadedness or sudden weakness.     A fast or irregular heartbeat.   After you call 911, the  may tell you to chew 1 adult-strength or 2 to 4 low-dose aspirin. Wait for an ambulance. Do not try to drive yourself.  Watch closely for changes in your health, and be sure to contact your doctor if you have any problems.  Where can you learn more?  Go to https://www.[employer].net/patientEd and enter F075 to learn more about \"A Healthy Heart: Care Instructions.\"  Current as of: June 24, 2023               Content Version: 14.0  © 1031-8021 [employer]wise[employer].   Care instructions adapted under license by [employer]. If you have questions about a

## 2024-03-31 DIAGNOSIS — E11.42 TYPE 2 DIABETES MELLITUS WITH DIABETIC POLYNEUROPATHY, WITHOUT LONG-TERM CURRENT USE OF INSULIN (HCC): ICD-10-CM

## 2024-04-01 RX ORDER — METFORMIN HYDROCHLORIDE 500 MG/1
1000 TABLET, EXTENDED RELEASE ORAL 2 TIMES DAILY
Qty: 120 TABLET | Refills: 5 | Status: SHIPPED | OUTPATIENT
Start: 2024-04-01

## 2024-04-01 NOTE — TELEPHONE ENCOUNTER
Refill Request     CONFIRM preferrred pharmacy with the patient.    If Mail Order Rx - Pend for 90 day refill.      Last Seen: Last Seen Department: 3/26/2024  Last Seen by PCP: 3/26/2024    Last Written: 3/1/24    If no future appointment scheduled, route STAFF MESSAGE with patient name to the  Pool for scheduling.      Next Appointment:   Future Appointments   Date Time Provider Department Center   4/17/2024  9:00 AM Kike Milner MD [facility] - [facility]       Message sent to  to schedule appt with patient?  N/A      Requested Prescriptions     Pending Prescriptions Disp Refills    metFORMIN (GLUCOPHAGE-XR) 500 MG extended release tablet [Pharmacy Med Name: METFORMIN HCL  MG TABLET] 120 tablet 0     Sig: TAKE 2 TABLETS BY MOUTH TWICE A DAY

## 2024-05-08 ENCOUNTER — OFFICE VISIT (OUTPATIENT)
Dept: FAMILY MEDICINE CLINIC | Age: 79
End: 2024-05-08

## 2024-05-08 VITALS
BODY MASS INDEX: 28.99 KG/M2 | HEIGHT: 72 IN | HEART RATE: 83 BPM | SYSTOLIC BLOOD PRESSURE: 123 MMHG | DIASTOLIC BLOOD PRESSURE: 70 MMHG | WEIGHT: 214 LBS | OXYGEN SATURATION: 97 %

## 2024-05-08 DIAGNOSIS — Z13.21 ENCOUNTER FOR VITAMIN DEFICIENCY SCREENING: ICD-10-CM

## 2024-05-08 DIAGNOSIS — F43.0 STRESS REACTION: Primary | ICD-10-CM

## 2024-05-08 DIAGNOSIS — E11.42 TYPE 2 DIABETES MELLITUS WITH DIABETIC POLYNEUROPATHY, WITHOUT LONG-TERM CURRENT USE OF INSULIN (HCC): ICD-10-CM

## 2024-05-08 DIAGNOSIS — R63.4 WEIGHT LOSS, UNINTENTIONAL: ICD-10-CM

## 2024-05-08 DIAGNOSIS — M89.9 BONE DISORDER: ICD-10-CM

## 2024-05-08 LAB
BASOPHILS # BLD: 0 K/UL (ref 0–0.2)
BASOPHILS NFR BLD: 0.6 %
DEPRECATED RDW RBC AUTO: 13 % (ref 12.4–15.4)
EOSINOPHIL # BLD: 0.1 K/UL (ref 0–0.6)
EOSINOPHIL NFR BLD: 1.3 %
HCT VFR BLD AUTO: 42.9 % (ref 40.5–52.5)
HGB BLD-MCNC: 15.3 G/DL (ref 13.5–17.5)
LYMPHOCYTES # BLD: 1.8 K/UL (ref 1–5.1)
LYMPHOCYTES NFR BLD: 29.1 %
MCH RBC QN AUTO: 32.9 PG (ref 26–34)
MCHC RBC AUTO-ENTMCNC: 35.6 G/DL (ref 31–36)
MCV RBC AUTO: 92.4 FL (ref 80–100)
MONOCYTES # BLD: 0.6 K/UL (ref 0–1.3)
MONOCYTES NFR BLD: 10.2 %
NEUTROPHILS # BLD: 3.6 K/UL (ref 1.7–7.7)
NEUTROPHILS NFR BLD: 58.8 %
PLATELET # BLD AUTO: 238 K/UL (ref 135–450)
PMV BLD AUTO: 8.5 FL (ref 5–10.5)
RBC # BLD AUTO: 4.64 M/UL (ref 4.2–5.9)
WBC # BLD AUTO: 6.1 K/UL (ref 4–11)

## 2024-05-08 ASSESSMENT — ENCOUNTER SYMPTOMS
TROUBLE SWALLOWING: 0
VOMITING: 0
EYE ITCHING: 0
RESPIRATORY NEGATIVE: 1
APNEA: 0
EYE PAIN: 0
WHEEZING: 0
DIARRHEA: 0
SHORTNESS OF BREATH: 0
COUGH: 0
ALLERGIC/IMMUNOLOGIC NEGATIVE: 1
EYE REDNESS: 0
ABDOMINAL PAIN: 0
CHEST TIGHTNESS: 0
RHINORRHEA: 0
PHOTOPHOBIA: 0
SINUS PRESSURE: 0
VOICE CHANGE: 0
CHOKING: 0
SORE THROAT: 0
BACK PAIN: 0
COLOR CHANGE: 0
BLOOD IN STOOL: 0
STRIDOR: 0
CONSTIPATION: 0
GASTROINTESTINAL NEGATIVE: 1
NAUSEA: 0
EYE DISCHARGE: 0

## 2024-05-08 NOTE — PROGRESS NOTES
Did inform Newton that concern for weight loss being from stress and anxiety and he verbalizes in agreement.     Diagnoses and all orders for this visit:    Stress reaction    Weight loss, unintentional  -     CBC with Auto Differential  -     Comprehensive Metabolic Panel  -     Magnesium  -     TSH with Reflex    Type 2 diabetes mellitus with diabetic polyneuropathy, without long-term current use of insulin (HCC)  -     Vitamin B12 & Folate  -     Hemoglobin A1C    Encounter for vitamin deficiency screening  -     Vitamin B12 & Folate  -     Vitamin D 25 Hydroxy    Bone disorder  -     Vitamin D 25 Hydroxy      Prior to Visit Medications    Medication Sig Taking? Authorizing Provider   metFORMIN (GLUCOPHAGE-XR) 500 MG extended release tablet TAKE 2 TABLETS BY MOUTH TWICE A DAY  Kike Milner MD   escitalopram (LEXAPRO) 10 MG tablet Take 1 tablet by mouth daily  Kike Milner MD   busPIRone (BUSPAR) 10 MG tablet Take 1 tablet by mouth 3 times daily as needed (Anxiety)  Kike Milner MD   blood glucose test strips (ONETOUCH ULTRA) strip TEST TWO TIMES A DAY  Kike Milner MD   fluticasone (FLONASE) 50 MCG/ACT nasal spray 1 spray by Each Nostril route daily  Danette Gao APRN - CNP   glimepiride (AMARYL) 4 MG tablet TAKE ONE TABLET BY MOUTH EVERY MORNING AND TAKE ONE TABLET BY MOUTH EVERY NIGHT AT BEDTIME  Kike Milner MD   isosorbide mononitrate (IMDUR) 30 MG extended release tablet Take 1 tablet by mouth daily  Kike Milner MD   metFORMIN (GLUCOPHAGE) 500 MG tablet Take 2 tablets by mouth 2 times daily (with meals)  Jordi Rodríguez MD   valsartan (DIOVAN) 160 MG tablet Take 1 tablet by mouth daily  Jordi Rodríguez MD   famotidine (PEPCID) 20 MG tablet TAKE 1 TABLET BY MOUTH DAILY  Monika Masters, APRN - CNP   amLODIPine (NORVASC) 10 MG tablet Take 1 tablet by mouth daily  Jordi Rodríguez MD

## 2024-05-09 LAB
25(OH)D3 SERPL-MCNC: 18.1 NG/ML
ALBUMIN SERPL-MCNC: 4.4 G/DL (ref 3.4–5)
ALBUMIN/GLOB SERPL: 1.8 {RATIO} (ref 1.1–2.2)
ALP SERPL-CCNC: 71 U/L (ref 40–129)
ALT SERPL-CCNC: 16 U/L (ref 10–40)
ANION GAP SERPL CALCULATED.3IONS-SCNC: 18 MMOL/L (ref 3–16)
AST SERPL-CCNC: 18 U/L (ref 15–37)
BILIRUB SERPL-MCNC: 0.8 MG/DL (ref 0–1)
BUN SERPL-MCNC: 16 MG/DL (ref 7–20)
CALCIUM SERPL-MCNC: 9.7 MG/DL (ref 8.3–10.6)
CHLORIDE SERPL-SCNC: 99 MMOL/L (ref 99–110)
CO2 SERPL-SCNC: 21 MMOL/L (ref 21–32)
CREAT SERPL-MCNC: 1.2 MG/DL (ref 0.8–1.3)
EST. AVERAGE GLUCOSE BLD GHB EST-MCNC: 154.2 MG/DL
FOLATE SERPL-MCNC: 11.8 NG/ML (ref 4.78–24.2)
GFR SERPLBLD CREATININE-BSD FMLA CKD-EPI: 62 ML/MIN/{1.73_M2}
GLUCOSE SERPL-MCNC: 190 MG/DL (ref 70–99)
HBA1C MFR BLD: 7 %
MAGNESIUM SERPL-MCNC: 1.9 MG/DL (ref 1.8–2.4)
POTASSIUM SERPL-SCNC: 4 MMOL/L (ref 3.5–5.1)
PROT SERPL-MCNC: 6.9 G/DL (ref 6.4–8.2)
SODIUM SERPL-SCNC: 138 MMOL/L (ref 136–145)
TSH SERPL DL<=0.005 MIU/L-ACNC: 0.85 UIU/ML (ref 0.27–4.2)
VIT B12 SERPL-MCNC: 502 PG/ML (ref 211–911)

## 2024-05-09 NOTE — PATIENT INSTRUCTIONS
Not feeling your best?  Where to go for the right care at the right time.    Dear Newton Hartley   I wanted to provide you with some information that might help you seek care for your condition when your primary care provider or specialist is unavailable. If you have a need outside of normal business hours, you should first contact your primary care office or specialist caring for your condition. They may have on-call providers that could assist with your care. During office hours, you may request a virtual or same day appointment.   But what if your primary care provider is not in the office that day and you can't wait until the  next day for care? In that situation, your next option is to visit an urgent care facility.          [facility] now has urgent care sites open to support our community.   [facility] is a great alternative when you need immediate medical care that is not a serious threat to your health or your doctor's office is closed or unable to get you in for an appointment. The urgent care centers offer fast access to [facility] doctors for minor illnesses and injuries for patients of all ages. There are other medical services available including lab testing, X-rays, EKGs, and IV fluids.  Locations are open daily from 8 a.m. - 8 p.m.     [facility]  [address], [place], [place] [address]  [phone]    [address], [place], [place] [address]  [phone]    Local Urgent Care     [facility] 8:30 am - 7:70 pm   [address] [place] [address]  [phone]    [facility]     8 am - 8 pm   [address] [place] [address]  [phone]    [facility] 7 am - 7:30 pm  [address] [place] [address]  [phone]     [facility] [place] 7 am - 7:30 pm  [address] [place] [address]  [phone]    [place]

## 2024-05-12 DIAGNOSIS — E55.9 VITAMIN D DEFICIENCY: Primary | ICD-10-CM

## 2024-07-30 ENCOUNTER — OFFICE VISIT (OUTPATIENT)
Dept: FAMILY MEDICINE CLINIC | Age: 79
End: 2024-07-30

## 2024-07-30 VITALS
HEART RATE: 85 BPM | BODY MASS INDEX: 29.02 KG/M2 | DIASTOLIC BLOOD PRESSURE: 80 MMHG | OXYGEN SATURATION: 94 % | SYSTOLIC BLOOD PRESSURE: 139 MMHG | WEIGHT: 214 LBS

## 2024-07-30 DIAGNOSIS — F51.01 PRIMARY INSOMNIA: ICD-10-CM

## 2024-07-30 DIAGNOSIS — E78.2 MIXED HYPERLIPIDEMIA: ICD-10-CM

## 2024-07-30 DIAGNOSIS — I10 ESSENTIAL HYPERTENSION: ICD-10-CM

## 2024-07-30 DIAGNOSIS — I25.119 CORONARY ARTERY DISEASE INVOLVING NATIVE CORONARY ARTERY OF NATIVE HEART WITH ANGINA PECTORIS (HCC): ICD-10-CM

## 2024-07-30 DIAGNOSIS — E11.41 DIABETIC MONONEUROPATHY ASSOCIATED WITH TYPE 2 DIABETES MELLITUS (HCC): ICD-10-CM

## 2024-07-30 DIAGNOSIS — E11.41 CONTROLLED TYPE 2 DIABETES MELLITUS WITH DIABETIC MONONEUROPATHY, WITHOUT LONG-TERM CURRENT USE OF INSULIN (HCC): Primary | ICD-10-CM

## 2024-07-30 RX ORDER — GABAPENTIN 100 MG/1
CAPSULE ORAL
Qty: 60 CAPSULE | Refills: 0 | Status: SHIPPED | OUTPATIENT
Start: 2024-07-30 | End: 2024-08-30

## 2024-07-30 RX ORDER — GLIMEPIRIDE 4 MG/1
TABLET ORAL
Qty: 90 TABLET | Refills: 3 | Status: SHIPPED | OUTPATIENT
Start: 2024-07-30

## 2024-07-30 NOTE — PATIENT INSTRUCTIONS
Not feeling your best?  Where to go for the right care at the right time.    Dear Newton Hartley   I wanted to provide you with some information that might help you seek care for your condition when your primary care provider or specialist is unavailable. If you have a need outside of normal business hours, you should first contact your primary care office or specialist caring for your condition. They may have on-call providers that could assist with your care. During office hours, you may request a virtual or same day appointment.   But what if your primary care provider is not in the office that day and you can't wait until the  next day for care? In that situation, your next option is to visit an urgent care facility.          [facility] now has urgent care sites open to support our community.   [facility] is a great alternative when you need immediate medical care that is not a serious threat to your health or your doctor's office is closed or unable to get you in for an appointment. The urgent care centers offer fast access to [facility] doctors for minor illnesses and injuries for patients of all ages. There are other medical services available including lab testing, X-rays, EKGs, and IV fluids.  Locations are open daily from 8 a.m. - 8 p.m.     [facility]  [address], [place], [place] [address]  [phone]    [address], [place], [place] [address]  [phone]    Local Urgent Care     [facility] 8:30 am - 7:70 pm   [address] [place] [address]  [phone]    [facility]     8 am - 8 pm   [address] [place] [address]  [phone]    [facility] 7 am - 7:30 pm  [address] [place] [address]  [phone]     [facility] [place] 7 am - 7:30 pm  [address] [place] [address]  [phone]    [place]

## 2024-07-30 NOTE — PROGRESS NOTES
advanced directive  [] Patient has a documented healthcare surrogate  [x] Patient does NOT have a documented healthcare surrogate  [] Discussed the importance of establishing and updating an advanced directive.  Patient has questions at this time and those were answered.  [x] Discussed the importance of establishing and updating an advanced directive.  Patient does NOT have questions at this time.    Discussed with: [x] Patient            [] Family             [] Other caregiver    Patient's medications, allergies, past medical, surgical, social and family histories were reviewed and updated asappropriate on 7/30/2024 at 1:52 PM.    ROS:  Review of Systems    All other systems reviewed and are negative except as noted above on 7/30/2024 at 1:52 PM. Additional review of systems may be scanned into the media section ofthis medical record.  Any responses requiring further intervention were pursued.    Past Medical History:   Diagnosis Date    Acute MI (HCC)     Angina pectoris, unstable (HCC)     ASHD (arteriosclerotic heart disease)     Bladder outlet obstruction     BPH (benign prostatic hyperplasia)     Chest pain     Constipation     Diabetes mellitus (HCC)     GERD (gastroesophageal reflux disease)     Hypercholesteremia     Hypercholesterolemia     Hypertension     IBS (irritable bowel syndrome)     Influenza A 01/28/2020    Sinusitis     Type II or unspecified type diabetes mellitus without mention of complication, not stated as uncontrolled      Family History   Problem Relation Age of Onset    Diabetes Mother     High Blood Pressure Mother     Arthritis Mother      Social History     Socioeconomic History    Marital status:      Spouse name: Not on file    Number of children: Not on file    Years of education: Not on file    Highest education level: Not on file   Occupational History    Occupation:    Tobacco Use    Smoking status: Former     Current packs/day: 0.00     Average

## 2024-09-03 ENCOUNTER — TELEMEDICINE (OUTPATIENT)
Dept: FAMILY MEDICINE CLINIC | Age: 79
End: 2024-09-03
Payer: MEDICARE

## 2024-09-03 DIAGNOSIS — E11.41 DIABETIC MONONEUROPATHY ASSOCIATED WITH TYPE 2 DIABETES MELLITUS (HCC): Primary | ICD-10-CM

## 2024-09-03 PROCEDURE — 99441 PR PHYS/QHP TELEPHONE EVALUATION 5-10 MIN: CPT | Performed by: FAMILY MEDICINE

## 2024-09-03 NOTE — PROGRESS NOTES
Documentation:  I communicated with the patient and/or health care decision maker about see below.   Details of this discussion including any medical advice provided: See below    Total Time: minutes: 5-10 minutes    Newton Hartley was evaluated through a synchronous (real-time) audio encounter. Patient identification was verified at the start of the visit. He (or guardian if applicable) is aware that this is a billable service, which includes applicable co-pays. This visit was conducted with the patient's (and/or legal guardian's) verbal consent. He has not had a related appointment within my department in the past 7 days or scheduled within the next 24 hours.   The patient was located at Home: [address] [place] [address].  The provider was located at Facility (Appt Dept): [address] [place] [address].    Note: not billable if this call serves to triage the patient into an appointment for the relevant concern  Yes, I confirm.   Newton Hartley is a 79 y.o. male evaluated via telephone on 9/3/2024 for Follow-up (Pt feels the medication is doing well with medications. Pt does notice a difference in his legs with less burning and tingling. )  .        RODOLFO JACKSON MD       Newton Hartley is a 79 y.o. male evaluated via telephone on 9/3/2024.      Patient states the gabapentin has helped his legs and he is found exercising also helps his legs.  Finds he is drowsy the next morning if he takes it with trazodone.  Has not been taking gabapentin every night.     Internal Administration   First Dose COVID-19, MODERNA BLUE border, Primary or Immunocompromised, (age 12y+), IM, 100 mcg/0.5mL  02/16/2021   Second Dose COVID-19, MODERNA BLUE border, Primary or Immunocompromised, (age 12y+), IM, 100 mcg/0.5mL   03/27/2021       Last COVID Lab SARS-CoV-2 (no units)   Date Value   09/11/2021 Not Detected     SARS-CoV-2 RNA, RT PCR (no units)   Date Value   07/09/2023 NOT DETECTED

## 2024-09-12 ENCOUNTER — HOSPITAL ENCOUNTER (EMERGENCY)
Age: 79
Discharge: HOME | End: 2024-09-12
Attending: EMERGENCY MEDICINE
Payer: MEDICARE

## 2024-09-12 VITALS
HEIGHT: 73 IN | WEIGHT: 212.8 LBS | TEMPERATURE: 98 F | RESPIRATION RATE: 21 BRPM | DIASTOLIC BLOOD PRESSURE: 83 MMHG | HEART RATE: 78 BPM | BODY MASS INDEX: 28.2 KG/M2 | SYSTOLIC BLOOD PRESSURE: 157 MMHG | OXYGEN SATURATION: 96 %

## 2024-09-12 DIAGNOSIS — I10 ASYMPTOMATIC HYPERTENSION: Primary | ICD-10-CM

## 2024-09-12 PROCEDURE — 99282 EMERGENCY DEPT VISIT SF MDM: CPT

## 2024-09-12 ASSESSMENT — ENCOUNTER SYMPTOMS
COUGH: 0
CONSTIPATION: 0
EYE REDNESS: 0
ABDOMINAL PAIN: 0
NAUSEA: 0
DIARRHEA: 0
SHORTNESS OF BREATH: 0
EYE PAIN: 0
BACK PAIN: 0
VOMITING: 0
RHINORRHEA: 0

## 2024-09-12 ASSESSMENT — PAIN - FUNCTIONAL ASSESSMENT
PAIN_FUNCTIONAL_ASSESSMENT: NONE - DENIES PAIN
PAIN_FUNCTIONAL_ASSESSMENT: 0-10

## 2024-09-12 ASSESSMENT — PAIN SCALES - GENERAL: PAINLEVEL_OUTOF10: 0

## 2024-09-13 ENCOUNTER — CARE COORDINATION (OUTPATIENT)
Dept: CARE COORDINATION | Age: 79
End: 2024-09-13

## 2024-09-16 ENCOUNTER — TELEPHONE (OUTPATIENT)
Dept: FAMILY MEDICINE CLINIC | Age: 79
End: 2024-09-16

## 2024-09-16 DIAGNOSIS — F51.01 PRIMARY INSOMNIA: Primary | ICD-10-CM

## 2024-09-16 RX ORDER — TRAZODONE HYDROCHLORIDE 100 MG/1
100 TABLET ORAL NIGHTLY
Qty: 30 TABLET | Refills: 1 | Status: SHIPPED | OUTPATIENT
Start: 2024-09-16

## 2024-09-17 ENCOUNTER — CARE COORDINATION (OUTPATIENT)
Dept: CARE COORDINATION | Age: 79
End: 2024-09-17

## 2024-09-18 ENCOUNTER — CARE COORDINATION (OUTPATIENT)
Dept: CARE COORDINATION | Age: 79
End: 2024-09-18

## 2024-09-18 RX ORDER — AMLODIPINE BESYLATE VALSARTAN HYDROCHLOROTHIAZIDE 10; 25; 320 MG/1; MG/1; MG/1
1 TABLET, FILM COATED ORAL DAILY
COMMUNITY

## 2024-09-18 SDOH — HEALTH STABILITY: PHYSICAL HEALTH: ON AVERAGE, HOW MANY DAYS PER WEEK DO YOU ENGAGE IN MODERATE TO STRENUOUS EXERCISE (LIKE A BRISK WALK)?: 0 DAYS

## 2024-09-18 SDOH — HEALTH STABILITY: MENTAL HEALTH
STRESS IS WHEN SOMEONE FEELS TENSE, NERVOUS, ANXIOUS, OR CAN'T SLEEP AT NIGHT BECAUSE THEIR MIND IS TROUBLED. HOW STRESSED ARE YOU?: TO SOME EXTENT

## 2024-09-18 SDOH — ECONOMIC STABILITY: TRANSPORTATION INSECURITY
IN THE PAST 12 MONTHS, HAS THE LACK OF TRANSPORTATION KEPT YOU FROM MEDICAL APPOINTMENTS OR FROM GETTING MEDICATIONS?: NO

## 2024-09-18 SDOH — ECONOMIC STABILITY: TRANSPORTATION INSECURITY
IN THE PAST 12 MONTHS, HAS LACK OF TRANSPORTATION KEPT YOU FROM MEETINGS, WORK, OR FROM GETTING THINGS NEEDED FOR DAILY LIVING?: NO

## 2024-09-18 SDOH — HEALTH STABILITY: PHYSICAL HEALTH: ON AVERAGE, HOW MANY MINUTES DO YOU ENGAGE IN EXERCISE AT THIS LEVEL?: 0 MIN

## 2024-09-24 ENCOUNTER — CARE COORDINATION (OUTPATIENT)
Dept: CARE COORDINATION | Age: 79
End: 2024-09-24

## 2024-09-24 DIAGNOSIS — I10 ESSENTIAL HYPERTENSION: Primary | ICD-10-CM

## 2024-09-24 RX ORDER — VALSARTAN AND HYDROCHLOROTHIAZIDE 320; 25 MG/1; MG/1
1 TABLET, FILM COATED ORAL DAILY
COMMUNITY

## 2024-09-25 ENCOUNTER — CARE COORDINATION (OUTPATIENT)
Dept: PRIMARY CARE CLINIC | Age: 79
End: 2024-09-25

## 2024-09-27 ENCOUNTER — CARE COORDINATION (OUTPATIENT)
Dept: CARE COORDINATION | Age: 79
End: 2024-09-27
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
## 2024-10-10 ENCOUNTER — CARE COORDINATION (OUTPATIENT)
Dept: CARE COORDINATION | Age: 79
End: 2024-10-10

## 2024-10-11 ENCOUNTER — CARE COORDINATION (OUTPATIENT)
Dept: CARE COORDINATION | Age: 79
End: 2024-10-11

## 2024-10-15 RX ORDER — FAMOTIDINE 20 MG/1
20 TABLET, FILM COATED ORAL DAILY
Qty: 30 TABLET | Refills: 2 | Status: SHIPPED | OUTPATIENT
Start: 2024-10-15 | End: 2025-01-13

## 2024-10-15 NOTE — TELEPHONE ENCOUNTER
Refill Request     CONFIRM preferrred pharmacy with the patient.    If Mail Order Rx - Pend for 90 day refill.      Last Seen: Last Seen Department: 9/3/2024  Last Seen by PCP: 1/12/2024    Last Written: 8/25/23    If no future appointment scheduled, route STAFF MESSAGE with patient name to the  Pool for scheduling.      Next Appointment:   Future Appointments   Date Time Provider Department Center   11/4/2024  2:00 PM [facility]       Message sent to  to schedule appt with patient?  N/A      Requested Prescriptions     Pending Prescriptions Disp Refills    famotidine (PEPCID) 20 MG tablet [Pharmacy Med Name: FAMOTIDINE 20 MG TABLET] 30 tablet 2     Sig: TAKE 1 TABLET BY MOUTH DAILY

## 2024-10-18 VITALS — DIASTOLIC BLOOD PRESSURE: 57 MMHG | SYSTOLIC BLOOD PRESSURE: 116 MMHG | HEART RATE: 80 BPM

## 2024-10-21 ENCOUNTER — CARE COORDINATION (OUTPATIENT)
Dept: CARE COORDINATION | Age: 79
End: 2024-10-21

## 2024-10-21 NOTE — CARE COORDINATION
Ambulatory Care Coordination Note     10/21/2024 11:00 AM     Patient outreach attempt by this ACM today to perform care management follow up . ACM was unable to reach the patient by telephone today;  spouse, Otilia asked me to call back later.      ACM: Helen Antonio RN     Care Summary Note:     PCP/Specialist follow up:   Future Appointments         Provider Specialty Dept Phone    11/4/2024 2:00 PM Kike Milner MD Family Medicine [phone]            Follow Up:   Plan for next ACM outreach in approximately 1-2 days  to complete:  - CC Protocol assessments- HTN and DM   - disease specific assessments  - medication review- check again   - RPM-active  -ADs and SDOH still needed

## 2024-10-23 DIAGNOSIS — E11.42 TYPE 2 DIABETES MELLITUS WITH DIABETIC POLYNEUROPATHY, WITHOUT LONG-TERM CURRENT USE OF INSULIN (HCC): ICD-10-CM

## 2024-10-23 RX ORDER — METFORMIN HYDROCHLORIDE 500 MG/1
1000 TABLET, EXTENDED RELEASE ORAL 2 TIMES DAILY
Qty: 360 TABLET | Refills: 3 | Status: SHIPPED | OUTPATIENT
Start: 2024-10-23

## 2024-12-02 ENCOUNTER — CARE COORDINATION (OUTPATIENT)
Dept: CARE COORDINATION | Age: 79
End: 2024-12-02

## 2024-12-02 RX ORDER — METOPROLOL SUCCINATE 50 MG/1
50 TABLET, EXTENDED RELEASE ORAL DAILY
COMMUNITY

## 2024-12-02 SDOH — HEALTH STABILITY: MENTAL HEALTH: HOW MANY STANDARD DRINKS CONTAINING ALCOHOL DO YOU HAVE ON A TYPICAL DAY?: PATIENT DOES NOT DRINK

## 2024-12-02 SDOH — SOCIAL STABILITY: SOCIAL NETWORK: HOW OFTEN DO YOU ATTENT MEETINGS OF THE CLUB OR ORGANIZATION YOU BELONG TO?: NEVER

## 2024-12-02 SDOH — ECONOMIC STABILITY: FOOD INSECURITY: WITHIN THE PAST 12 MONTHS, YOU WORRIED THAT YOUR FOOD WOULD RUN OUT BEFORE YOU GOT MONEY TO BUY MORE.: NEVER TRUE

## 2024-12-02 SDOH — ECONOMIC STABILITY: INCOME INSECURITY: HOW HARD IS IT FOR YOU TO PAY FOR THE VERY BASICS LIKE FOOD, HOUSING, MEDICAL CARE, AND HEATING?: NOT HARD AT ALL

## 2024-12-02 SDOH — ECONOMIC STABILITY: INCOME INSECURITY: IN THE LAST 12 MONTHS, WAS THERE A TIME WHEN YOU WERE NOT ABLE TO PAY THE MORTGAGE OR RENT ON TIME?: NO

## 2024-12-02 SDOH — SOCIAL STABILITY: SOCIAL NETWORK: HOW OFTEN DO YOU GET TOGETHER WITH FRIENDS OR RELATIVES?: MORE THAN THREE TIMES A WEEK

## 2024-12-02 SDOH — HEALTH STABILITY: MENTAL HEALTH: HOW OFTEN DO YOU HAVE A DRINK CONTAINING ALCOHOL?: NEVER

## 2024-12-02 SDOH — SOCIAL STABILITY: SOCIAL NETWORK: ARE YOU MARRIED, WIDOWED, DIVORCED, SEPARATED, NEVER MARRIED, OR LIVING WITH A PARTNER?: MARRIED

## 2024-12-02 SDOH — ECONOMIC STABILITY: FOOD INSECURITY: WITHIN THE PAST 12 MONTHS, THE FOOD YOU BOUGHT JUST DIDN'T LAST AND YOU DIDN'T HAVE MONEY TO GET MORE.: NEVER TRUE

## 2024-12-02 SDOH — HEALTH STABILITY: MENTAL HEALTH
STRESS IS WHEN SOMEONE FEELS TENSE, NERVOUS, ANXIOUS, OR CAN'T SLEEP AT NIGHT BECAUSE THEIR MIND IS TROUBLED. HOW STRESSED ARE YOU?: ONLY A LITTLE

## 2024-12-02 SDOH — SOCIAL STABILITY: SOCIAL NETWORK
DO YOU BELONG TO ANY CLUBS OR ORGANIZATIONS SUCH AS CHURCH GROUPS UNIONS, FRATERNAL OR ATHLETIC GROUPS, OR SCHOOL GROUPS?: NO

## 2024-12-02 SDOH — SOCIAL STABILITY: SOCIAL NETWORK
IN A TYPICAL WEEK, HOW MANY TIMES DO YOU TALK ON THE PHONE WITH FAMILY, FRIENDS, OR NEIGHBORS?: MORE THAN THREE TIMES A WEEK

## 2024-12-02 SDOH — SOCIAL STABILITY: SOCIAL NETWORK: HOW OFTEN DO YOU ATTEND CHURCH OR RELIGIOUS SERVICES?: NEVER

## 2024-12-02 NOTE — CARE COORDINATION
5/10  Anticipated Goal Completion Date:12/31/24     Patient has been checking bp with rpm. Values have improved. 12/2/24 trb                  PCP/Specialist follow up:       Follow Up:   Plan for next ACM outreach in approximately 2 weeks to complete:  - disease specific assessments-HTN, DM   - advance care planning- still needed   - RPM-plan to complete next call if stable   - assess for d/c soon (improving status)   Patient  is agreeable to this plan.

## 2024-12-09 RX ORDER — ISOSORBIDE MONONITRATE 30 MG/1
30 TABLET, EXTENDED RELEASE ORAL DAILY
Qty: 30 TABLET | Refills: 3 | Status: SHIPPED | OUTPATIENT
Start: 2024-12-09

## 2024-12-27 DIAGNOSIS — E78.00 PURE HYPERCHOLESTEROLEMIA: ICD-10-CM

## 2024-12-27 RX ORDER — ROSUVASTATIN CALCIUM 40 MG/1
40 TABLET, COATED ORAL EVERY EVENING
Qty: 30 TABLET | Refills: 1 | Status: SHIPPED | OUTPATIENT
Start: 2024-12-27

## 2025-01-02 ENCOUNTER — CARE COORDINATION (OUTPATIENT)
Dept: CARE COORDINATION | Age: 80
End: 2025-01-02

## 2025-01-02 NOTE — CARE COORDINATION
Blood Glucose       Barriers: lack of education  Plan for overcoming my barriers: education and support   Confidence: 5/10  Anticipated Goal Completion Date:12/31/24     Completing rpm. Bp stable.  Last reading 125/60  1/2/25 trb                  PCP/Specialist follow up:   Future Appointments         Provider Specialty Dept Phone    1/31/2025 10:50 AM Brittany Cardona MD Family Medicine [phone]            Follow Up:   Plan for next ACM outreach in approximately 2 weeks to complete:  - advance care planning- still needs assessed    - plan on completing CC program next call .   Patient  is agreeable to this plan.

## 2025-01-03 ENCOUNTER — CARE COORDINATION (OUTPATIENT)
Dept: PRIMARY CARE CLINIC | Age: 80
End: 2025-01-03

## 2025-01-03 DIAGNOSIS — I10 ESSENTIAL HYPERTENSION: Primary | ICD-10-CM

## 2025-01-03 NOTE — CARE COORDINATION
RPM Kit Return    Patient Newton Hartley  01/03/25     Care Coordination  placed call to patient to arrange RPM kit  through [employer]. Left HIPAA Compliant Message     provided return and how to pack equipment in original packing via the patients voicemail if available and provided call back number should patient have questions.    Patient made aware [employer] will  equipment in 2-4 days.

## 2025-01-03 NOTE — PROGRESS NOTES
Remote Patient Order Discontinued    Received request from Helen Antonio RN   to discontinue order for remote patient monitoring of HTN and order completed.

## 2025-01-14 ENCOUNTER — CARE COORDINATION (OUTPATIENT)
Dept: CARE COORDINATION | Age: 80
End: 2025-01-14

## 2025-01-14 NOTE — CARE COORDINATION
Ambulatory Care Coordination Note     2025 1:32 PM     Patient Current Location:  [place]     ACM contacted the patient by telephone. Verified name and  with patient as identifiers.     Patient graduated from the High Risk Care Management program on 2025.  Patient verbalizes confidence in the ability to self-manage at this time.. He continues to check his bp and bs at home. Bp 120/140/80 depending on if meds have been taken yet. No distress noted. BS are good. Understand goals for BP and BS. Follow up PCP planned with new provider at office.  Care management goals have been completed. No further Ambulatory Care Manager follow up scheduled.     Lab Results   Component Value Date    LABA1C 7.0 2024    LABA1C 7.6 10/17/2023    LABA1C 7.8 2023     Lab Results   Component Value Date    .2 2024    .4 10/17/2023    .2 2023      Reminder sent to RPM pool to scheduled equipment pickup still needed   ACM: Helen Antonio RN       Method of communication with provider: chart routing.    Utilization: Patient has not had any utilization since our last call.     Advance Care Planning:   Reviewed and current      PCP/Specialist follow up:   Future Appointments         Provider Specialty Dept Phone    2025 10:50 AM Brittany Cardona MD Family Medicine [phone]            Follow Up:   No further Ambulatory Care Management follow-up scheduled at this time.  Patient  has Ambulatory Care Manager's contact information for any further questions, concerns or needs.

## 2025-01-14 NOTE — ACP (ADVANCE CARE PLANNING)
Advance Care Planning Note      Date: 1/14/2025    Patient Current Location:  [place]    ACP Specialist:  Helen Antonio RN    Advance Care Planning   Healthcare Decision Maker:    Primary Decision Maker: Otilia Hartley - Spouse - [phone]    Click here to complete Healthcare Decision Makers including selection of the Healthcare Decision Maker Relationship (ie \"Primary\").  Today we documented Decision Maker(s) consistent with Legal Next of Kin hierarchy. Patient encouraged to bring in ACP documents for chart

## 2025-01-30 NOTE — ASSESSMENT & PLAN NOTE
Chronic, stable, continue current medication/management.    Orders:    Comprehensive Metabolic Panel

## 2025-01-30 NOTE — ASSESSMENT & PLAN NOTE
Chronic, stable.  Continue current management and will recheck lipid panel    Orders:    Lipid Panel

## 2025-01-30 NOTE — PROGRESS NOTES
[facility] Medicine  2025    Newton Hartley (:  1945) is a 79 y.o. male, here for evaluation of the following medical concerns:    Chief Complaint   Patient presents with    New Patient     Transfer from Dr. Milner     Diabetes    Hypertension          HPI    Past medical history of CAD status post CABG x 3, hypertension, type 2 diabetes with associated neuropathy, BPH, anxiety, hyperlipidemia    Hyperlipidemia: Rosuvastatin 40 mg  Diabetes: Metformin 1000 mg twice daily, glimepiride 4 mg - takes glimepiride once every two weeks (Takes it once in a while due to low blood sugars of 60, hypoglycemic episode symptoms include GI upset, feels shaky or nervous). Blood glucose levels at home: 135 randomly throughout the day. He enjoys pizza, pastas.   Hypertension: Valsartan-hydrochlorothiazide 320-25 mg daily, metoprolol succinate 50 mg daily, amlodipine 10 mg daily. HR when taking metoprolol can go down to 40 bpm at times.  Otherwise blood pressure similar to blood pressure obtained in office  BPH: tamsulosin 0.4 mg daily  Neuropathy: Gabapentin 100 mg, advised to take 1 to 2 capsules by mouth nightly - takes as needed  Insomnia: Trazodone 100 mg nightly (taking half tablet only)  GERD: Famotidine 20 mg daily    No longer taking vitamin D.  Patient follows with cardiology and plans to follow-up with his GI specialist.  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 Nurse comes in to check wife.     ROS  Review of Systems   Constitutional:  Negative for activity change and appetite change.   Respiratory:  Negative for shortness of breath.    Cardiovascular:  Negative for chest pain and leg swelling.   Gastrointestinal:  Positive for constipation (1-2 times per month). Negative for abdominal pain.   Genitourinary:  Negative for difficulty urinating.

## 2025-01-30 NOTE — ASSESSMENT & PLAN NOTE
Chronic, stable.  Will recheck A1c in albumin to creatinine ratio.  May consider discontinuing glimepiride if patient's A1c remains well-controlled due to explain hypoglycemic episodes he is reporting    Orders:    Hemoglobin A1C    Albumin/Creatinine Ratio, Urine

## 2025-01-31 ENCOUNTER — OFFICE VISIT (OUTPATIENT)
Dept: FAMILY MEDICINE CLINIC | Age: 80
End: 2025-01-31

## 2025-01-31 VITALS
HEART RATE: 94 BPM | DIASTOLIC BLOOD PRESSURE: 76 MMHG | BODY MASS INDEX: 27.84 KG/M2 | OXYGEN SATURATION: 95 % | RESPIRATION RATE: 17 BRPM | WEIGHT: 211 LBS | SYSTOLIC BLOOD PRESSURE: 126 MMHG

## 2025-01-31 DIAGNOSIS — E55.9 VITAMIN D DEFICIENCY: ICD-10-CM

## 2025-01-31 DIAGNOSIS — E11.41 DIABETIC MONONEUROPATHY ASSOCIATED WITH TYPE 2 DIABETES MELLITUS (HCC): ICD-10-CM

## 2025-01-31 DIAGNOSIS — E11.42 TYPE 2 DIABETES MELLITUS WITH DIABETIC POLYNEUROPATHY, WITHOUT LONG-TERM CURRENT USE OF INSULIN (HCC): ICD-10-CM

## 2025-01-31 DIAGNOSIS — E78.2 MIXED HYPERLIPIDEMIA: ICD-10-CM

## 2025-01-31 DIAGNOSIS — I10 PRIMARY HYPERTENSION: Primary | ICD-10-CM

## 2025-01-31 ASSESSMENT — PATIENT HEALTH QUESTIONNAIRE - PHQ9
SUM OF ALL RESPONSES TO PHQ QUESTIONS 1-9: 1
SUM OF ALL RESPONSES TO PHQ9 QUESTIONS 1 & 2: 1
1. LITTLE INTEREST OR PLEASURE IN DOING THINGS: NOT AT ALL
SUM OF ALL RESPONSES TO PHQ QUESTIONS 1-9: 1
SUM OF ALL RESPONSES TO PHQ QUESTIONS 1-9: 1
2. FEELING DOWN, DEPRESSED OR HOPELESS: SEVERAL DAYS
SUM OF ALL RESPONSES TO PHQ QUESTIONS 1-9: 1

## 2025-01-31 ASSESSMENT — ENCOUNTER SYMPTOMS
CONSTIPATION: 1
SHORTNESS OF BREATH: 0
ABDOMINAL PAIN: 0

## 2025-02-01 LAB
25(OH)D3 SERPL-MCNC: 25.1 NG/ML
ALBUMIN SERPL-MCNC: 4.5 G/DL (ref 3.4–5)
ALBUMIN/GLOB SERPL: 1.9 {RATIO} (ref 1.1–2.2)
ALP SERPL-CCNC: 69 U/L (ref 40–129)
ALT SERPL-CCNC: 20 U/L (ref 10–40)
ANION GAP SERPL CALCULATED.3IONS-SCNC: 14 MMOL/L (ref 3–16)
AST SERPL-CCNC: 24 U/L (ref 15–37)
BILIRUB SERPL-MCNC: 1.3 MG/DL (ref 0–1)
BUN SERPL-MCNC: 19 MG/DL (ref 7–20)
CALCIUM SERPL-MCNC: 10.2 MG/DL (ref 8.3–10.6)
CHLORIDE SERPL-SCNC: 99 MMOL/L (ref 99–110)
CHOLEST SERPL-MCNC: 145 MG/DL (ref 0–199)
CO2 SERPL-SCNC: 24 MMOL/L (ref 21–32)
CREAT SERPL-MCNC: 1.2 MG/DL (ref 0.8–1.3)
EST. AVERAGE GLUCOSE BLD GHB EST-MCNC: 174.3 MG/DL
GFR SERPLBLD CREATININE-BSD FMLA CKD-EPI: 62 ML/MIN/{1.73_M2}
GLUCOSE SERPL-MCNC: 192 MG/DL (ref 70–99)
HBA1C MFR BLD: 7.7 %
HDLC SERPL-MCNC: 51 MG/DL (ref 40–60)
LDLC SERPL CALC-MCNC: 77 MG/DL
POTASSIUM SERPL-SCNC: 4 MMOL/L (ref 3.5–5.1)
PROT SERPL-MCNC: 6.9 G/DL (ref 6.4–8.2)
SODIUM SERPL-SCNC: 137 MMOL/L (ref 136–145)
TRIGL SERPL-MCNC: 85 MG/DL (ref 0–150)
VLDLC SERPL CALC-MCNC: 17 MG/DL

## 2025-02-03 ENCOUNTER — TELEPHONE (OUTPATIENT)
Dept: FAMILY MEDICINE CLINIC | Age: 80
End: 2025-02-03

## 2025-02-03 DIAGNOSIS — E11.42 TYPE 2 DIABETES MELLITUS WITH DIABETIC POLYNEUROPATHY, WITHOUT LONG-TERM CURRENT USE OF INSULIN (HCC): Primary | ICD-10-CM

## 2025-02-03 DIAGNOSIS — R79.89 LFT ELEVATION: ICD-10-CM

## 2025-02-03 LAB
CREAT UR-MCNC: 120 MG/DL (ref 39–259)
MICROALBUMIN UR DL<=1MG/L-MCNC: 4.42 MG/DL
MICROALBUMIN/CREAT UR: 36.8 MG/G (ref 0–30)

## 2025-02-03 NOTE — TELEPHONE ENCOUNTER
[facility] called and stated that on 1/31 they received orders for a urine microalbumin but never received the specimen.

## 2025-02-20 DIAGNOSIS — F51.01 PRIMARY INSOMNIA: ICD-10-CM

## 2025-02-20 RX ORDER — TRAZODONE HYDROCHLORIDE 100 MG/1
100 TABLET ORAL NIGHTLY
Qty: 30 TABLET | Refills: 1 | Status: SHIPPED | OUTPATIENT
Start: 2025-02-20

## 2025-02-20 NOTE — TELEPHONE ENCOUNTER
Refill Request     CONFIRM preferrred pharmacy with the patient.    If Mail Order Rx - Pend for 90 day refill.      Last Seen: Last Seen Department: 1/31/2025  Last Seen by PCP: 1/31/2025    Last Written: 9/16/24    If no future appointment scheduled, route STAFF MESSAGE with patient name to the  Pool for scheduling.      Next Appointment:   Future Appointments   Date Time Provider Department Center   5/5/2025  9:00 AM SCHEDULE, [facility] DEP   7/28/2025  3:00 PM Brittany Cardona MD [facility] DEP       Message sent to  to schedule appt with patient?  N/A      Requested Prescriptions     Pending Prescriptions Disp Refills    traZODone (DESYREL) 100 MG tablet 30 tablet 1     Sig: Take 1 tablet by mouth nightly

## 2025-02-23 DIAGNOSIS — E78.00 PURE HYPERCHOLESTEROLEMIA: ICD-10-CM

## 2025-02-24 RX ORDER — ROSUVASTATIN CALCIUM 40 MG/1
40 TABLET, COATED ORAL NIGHTLY
Qty: 30 TABLET | Refills: 1 | Status: SHIPPED | OUTPATIENT
Start: 2025-02-24

## 2025-03-24 ENCOUNTER — OFFICE VISIT (OUTPATIENT)
Dept: FAMILY MEDICINE CLINIC | Age: 80
End: 2025-03-24
Payer: MEDICARE

## 2025-03-24 ENCOUNTER — TELEPHONE (OUTPATIENT)
Dept: FAMILY MEDICINE CLINIC | Age: 80
End: 2025-03-24

## 2025-03-24 ENCOUNTER — RESULTS FOLLOW-UP (OUTPATIENT)
Dept: FAMILY MEDICINE CLINIC | Age: 80
End: 2025-03-24

## 2025-03-24 VITALS
DIASTOLIC BLOOD PRESSURE: 74 MMHG | HEART RATE: 92 BPM | WEIGHT: 210 LBS | HEIGHT: 73 IN | OXYGEN SATURATION: 99 % | BODY MASS INDEX: 27.83 KG/M2 | SYSTOLIC BLOOD PRESSURE: 150 MMHG

## 2025-03-24 DIAGNOSIS — K21.9 GASTROESOPHAGEAL REFLUX DISEASE WITHOUT ESOPHAGITIS: ICD-10-CM

## 2025-03-24 DIAGNOSIS — E11.42 TYPE 2 DIABETES MELLITUS WITH DIABETIC POLYNEUROPATHY, WITHOUT LONG-TERM CURRENT USE OF INSULIN (HCC): ICD-10-CM

## 2025-03-24 DIAGNOSIS — I10 ESSENTIAL HYPERTENSION: Primary | ICD-10-CM

## 2025-03-24 PROCEDURE — 99214 OFFICE O/P EST MOD 30 MIN: CPT | Performed by: NURSE PRACTITIONER

## 2025-03-24 PROCEDURE — 1160F RVW MEDS BY RX/DR IN RCRD: CPT | Performed by: NURSE PRACTITIONER

## 2025-03-24 PROCEDURE — 1159F MED LIST DOCD IN RCRD: CPT | Performed by: NURSE PRACTITIONER

## 2025-03-24 PROCEDURE — 3077F SYST BP >= 140 MM HG: CPT | Performed by: NURSE PRACTITIONER

## 2025-03-24 PROCEDURE — G8417 CALC BMI ABV UP PARAM F/U: HCPCS | Performed by: NURSE PRACTITIONER

## 2025-03-24 PROCEDURE — G8427 DOCREV CUR MEDS BY ELIG CLIN: HCPCS | Performed by: NURSE PRACTITIONER

## 2025-03-24 PROCEDURE — 3078F DIAST BP <80 MM HG: CPT | Performed by: NURSE PRACTITIONER

## 2025-03-24 PROCEDURE — 3051F HG A1C>EQUAL 7.0%<8.0%: CPT | Performed by: NURSE PRACTITIONER

## 2025-03-24 PROCEDURE — 1036F TOBACCO NON-USER: CPT | Performed by: NURSE PRACTITIONER

## 2025-03-24 PROCEDURE — 93000 ELECTROCARDIOGRAM COMPLETE: CPT | Performed by: NURSE PRACTITIONER

## 2025-03-24 PROCEDURE — 1123F ACP DISCUSS/DSCN MKR DOCD: CPT | Performed by: NURSE PRACTITIONER

## 2025-03-24 RX ORDER — PANTOPRAZOLE SODIUM 40 MG/1
40 TABLET, DELAYED RELEASE ORAL
Qty: 30 TABLET | Refills: 5 | Status: SHIPPED | OUTPATIENT
Start: 2025-03-24

## 2025-03-24 ASSESSMENT — ENCOUNTER SYMPTOMS
WHEEZING: 0
ANAL BLEEDING: 0
COUGH: 0
CHEST TIGHTNESS: 0
RECTAL PAIN: 0
ABDOMINAL DISTENTION: 1
VOMITING: 1
SHORTNESS OF BREATH: 1
SORE THROAT: 0
COLOR CHANGE: 0
ABDOMINAL PAIN: 1
BLOOD IN STOOL: 0
CHOKING: 0
PHOTOPHOBIA: 0
CONSTIPATION: 1
BACK PAIN: 0
DIARRHEA: 1
TROUBLE SWALLOWING: 0
SINUS PRESSURE: 0
EYE DISCHARGE: 0
EYE REDNESS: 0
ALLERGIC/IMMUNOLOGIC NEGATIVE: 1
EYE PAIN: 0
APNEA: 0
EYE ITCHING: 0
STRIDOR: 0
NAUSEA: 1
RHINORRHEA: 0

## 2025-03-24 NOTE — PATIENT INSTRUCTIONS
Metoprolol 25 mg daily     Not feeling your best?  Where to go for the right care at the right time.    Dear Newton Hartley   I wanted to provide you with some information that might help you seek care for your condition when your primary care provider or specialist is unavailable. If you have a need outside of normal business hours, you should first contact your primary care office or specialist caring for your condition. They may have on-call providers that could assist with your care. During office hours, you may request a virtual or same day appointment.   But what if your primary care provider is not in the office that day and you can't wait until the  next day for care? In that situation, your next option is to visit an urgent care facility.          [facility] now has urgent care sites open to support our community.   [facility] is a great alternative when you need immediate medical care that is not a serious threat to your health or your doctor's office is closed or unable to get you in for an appointment. The urgent care centers offer fast access to [facility] doctors for minor illnesses and injuries for patients of all ages. There are other medical services available including lab testing, X-rays, EKGs, and IV fluids.  Locations are open daily from 8 a.m. - 8 p.m.     [facility]  [address], [place], [place] [address]  [phone]    [address], [place], [place] [address]  [phone]    Local Urgent Care     [facility] 8:30 am - 7:70 pm   [address] [place] [address]  [phone]    [facility]     8 am - 8 pm   [address], [place] [address]  [phone]    [facility] 7 am - 7:30 pm  [address] [place] [address]  [phone]     [facility] [place] 7 am - 7:30 pm  [address]

## 2025-03-24 NOTE — TELEPHONE ENCOUNTER
RN called Dr Danrell Crowe's office [phone], spoke to Sabrina.    They will call patient and schedule in office appointment asap.  EKG from today routed and faxed to cardiologist office.    RN called and spoke to patient.  He did speak to cardio office and he has an appointment on 3/27/25 @ 2:30 pm in [place] with Dr. Alba Arnett.

## 2025-03-24 NOTE — PROGRESS NOTES
Newton Hartley (:  1945) is a 79 y.o. male, here for evaluation of the following chief complaint(s):  Other (Pt has been having headaches and high bp. Pt has also had some indigestion and gas )         Assessment & Plan  1. Hypertension.  His blood pressure today is 150/74. He is currently taking amlodipine 10 mg daily, metoprolol XL 50 mg daily (though inconsistently), valsartan-hydrochlorothiazide 320/25 mg daily, and Imdur 30 mg daily. He admits to being more stressed as he is caring for his wife. He is advised to take the metoprolol dose to help with the heart racing feeling and to take it consistently to help manage his blood pressure.    2. Headaches.  He reports experiencing headaches that seem to be related to stress. An EKG will be performed today to rule out any cardiac causes.    3. Indigestion and gas.  He reports symptoms of indigestion, gas, belching, heartburn, and occasional nausea. He will be started on pantoprazole once daily for 8 weeks to lower stomach acid and alleviate these symptoms. He is to continue to see GI as recommended.    4. Diabetes mellitus.  He is currently taking metformin 2 tablets once a day but reports it upsets his stomach. He states he cannot continue taking this Rx. He will stop taking metformin and start Jardiance if it is covered by his insurance. If Jardiance is too expensive or not covered, he will inform us to consider alternative medications. BMP in 4 weeks.  He is scheduled with PCP in 4 weeks.     5. Frequent PVCs.  He had a negative stress test in 2024. Today's EKG showed frequent PVCs. He is advised to follow up urgently with cardiology. Sabi SULLIVAN will schedule this appointment for him. He should go to the ER if symptoms worsen. He is to continue with his Metoprolol and take his Metoprolol daily as recommended and not take this only as needed.     Results  Diagnostic Testing   - [id number], Sinus rhythm and frequent PVCs  1. Essential

## 2025-04-05 ENCOUNTER — HOSPITAL ENCOUNTER (EMERGENCY)
Age: 80
Discharge: HOME | End: 2025-04-05
Attending: EMERGENCY MEDICINE
Payer: MEDICARE

## 2025-04-05 ENCOUNTER — APPOINTMENT (OUTPATIENT)
Dept: GENERAL RADIOLOGY | Age: 80
End: 2025-04-05
Payer: MEDICARE

## 2025-04-05 VITALS
TEMPERATURE: 97.9 F | HEIGHT: 73 IN | HEART RATE: 68 BPM | SYSTOLIC BLOOD PRESSURE: 148 MMHG | RESPIRATION RATE: 16 BRPM | BODY MASS INDEX: 27.83 KG/M2 | OXYGEN SATURATION: 100 % | WEIGHT: 210 LBS | DIASTOLIC BLOOD PRESSURE: 87 MMHG

## 2025-04-05 DIAGNOSIS — K21.9 GASTROESOPHAGEAL REFLUX DISEASE WITHOUT ESOPHAGITIS: Primary | ICD-10-CM

## 2025-04-05 DIAGNOSIS — R07.89 ATYPICAL CHEST PAIN: ICD-10-CM

## 2025-04-05 DIAGNOSIS — I49.3 PVC (PREMATURE VENTRICULAR CONTRACTION): ICD-10-CM

## 2025-04-05 LAB
ALBUMIN SERPL-MCNC: 3.8 G/DL (ref 3.4–5)
ALBUMIN/GLOB SERPL: 1.3 {RATIO} (ref 1.1–2.2)
ALP SERPL-CCNC: 52 U/L (ref 40–129)
ALT SERPL-CCNC: 14 U/L (ref 10–40)
ANION GAP SERPL CALCULATED.3IONS-SCNC: 14 MMOL/L (ref 3–16)
AST SERPL-CCNC: 19 U/L (ref 15–37)
BASOPHILS # BLD: 0 K/UL (ref 0–0.2)
BASOPHILS NFR BLD: 0.6 %
BILIRUB SERPL-MCNC: 0.9 MG/DL (ref 0–1)
BUN SERPL-MCNC: 20 MG/DL (ref 7–20)
CALCIUM SERPL-MCNC: 10 MG/DL (ref 8.3–10.6)
CHLORIDE SERPL-SCNC: 99 MMOL/L (ref 99–110)
CO2 SERPL-SCNC: 22 MMOL/L (ref 21–32)
CREAT SERPL-MCNC: 1 MG/DL (ref 0.8–1.3)
DEPRECATED RDW RBC AUTO: 13 % (ref 12.4–15.4)
EKG ATRIAL RATE: 77 BPM
EKG DIAGNOSIS: NORMAL
EKG P AXIS: -1 DEGREES
EKG P-R INTERVAL: 144 MS
EKG Q-T INTERVAL: 402 MS
EKG QRS DURATION: 102 MS
EKG QTC CALCULATION (BAZETT): 454 MS
EKG R AXIS: 76 DEGREES
EKG T AXIS: 33 DEGREES
EKG VENTRICULAR RATE: 77 BPM
EOSINOPHIL # BLD: 0.1 K/UL (ref 0–0.6)
EOSINOPHIL NFR BLD: 2.3 %
GFR SERPLBLD CREATININE-BSD FMLA CKD-EPI: 77 ML/MIN/{1.73_M2}
GLUCOSE SERPL-MCNC: 202 MG/DL (ref 70–99)
HCT VFR BLD AUTO: 41.5 % (ref 40.5–52.5)
HGB BLD-MCNC: 14.4 G/DL (ref 13.5–17.5)
LIPASE SERPL-CCNC: 14 U/L (ref 13–60)
LYMPHOCYTES # BLD: 1.4 K/UL (ref 1–5.1)
LYMPHOCYTES NFR BLD: 27.7 %
MCH RBC QN AUTO: 32.4 PG (ref 26–34)
MCHC RBC AUTO-ENTMCNC: 34.7 G/DL (ref 31–36)
MCV RBC AUTO: 93.3 FL (ref 80–100)
MONOCYTES # BLD: 0.5 K/UL (ref 0–1.3)
MONOCYTES NFR BLD: 9.6 %
NEUTROPHILS # BLD: 3 K/UL (ref 1.7–7.7)
NEUTROPHILS NFR BLD: 59.8 %
PLATELET # BLD AUTO: 206 K/UL (ref 135–450)
PMV BLD AUTO: 8.4 FL (ref 5–10.5)
POTASSIUM SERPL-SCNC: 3.9 MMOL/L (ref 3.5–5.1)
PROT SERPL-MCNC: 6.7 G/DL (ref 6.4–8.2)
RBC # BLD AUTO: 4.45 M/UL (ref 4.2–5.9)
SODIUM SERPL-SCNC: 135 MMOL/L (ref 136–145)
TROPONIN, HIGH SENSITIVITY: 23 NG/L (ref 0–22)
TROPONIN, HIGH SENSITIVITY: 24 NG/L (ref 0–22)
WBC # BLD AUTO: 5 K/UL (ref 4–11)

## 2025-04-05 PROCEDURE — 93005 ELECTROCARDIOGRAM TRACING: CPT | Performed by: EMERGENCY MEDICINE

## 2025-04-05 PROCEDURE — 84484 ASSAY OF TROPONIN QUANT: CPT

## 2025-04-05 PROCEDURE — 6370000000 HC RX 637 (ALT 250 FOR IP): Performed by: EMERGENCY MEDICINE

## 2025-04-05 PROCEDURE — 36415 COLL VENOUS BLD VENIPUNCTURE: CPT

## 2025-04-05 PROCEDURE — 2580000003 HC RX 258: Performed by: EMERGENCY MEDICINE

## 2025-04-05 PROCEDURE — 71045 X-RAY EXAM CHEST 1 VIEW: CPT

## 2025-04-05 PROCEDURE — 93010 ELECTROCARDIOGRAM REPORT: CPT | Performed by: INTERNAL MEDICINE

## 2025-04-05 PROCEDURE — 99285 EMERGENCY DEPT VISIT HI MDM: CPT

## 2025-04-05 PROCEDURE — 85025 COMPLETE CBC W/AUTO DIFF WBC: CPT

## 2025-04-05 PROCEDURE — 96360 HYDRATION IV INFUSION INIT: CPT

## 2025-04-05 PROCEDURE — 83690 ASSAY OF LIPASE: CPT

## 2025-04-05 PROCEDURE — 80053 COMPREHEN METABOLIC PANEL: CPT

## 2025-04-05 RX ORDER — 0.9 % SODIUM CHLORIDE 0.9 %
500 INTRAVENOUS SOLUTION INTRAVENOUS ONCE
Status: COMPLETED | OUTPATIENT
Start: 2025-04-05 | End: 2025-04-05

## 2025-04-05 RX ADMIN — SODIUM CHLORIDE 500 ML: 0.9 INJECTION, SOLUTION INTRAVENOUS at 10:07

## 2025-04-05 RX ADMIN — LIDOCAINE HYDROCHLORIDE: 20 SOLUTION ORAL at 08:18

## 2025-04-05 ASSESSMENT — HEART SCORE: ECG: NORMAL

## 2025-04-05 NOTE — ED PROVIDER NOTES
[facility] EMERGENCY DEPARTMENT  EMERGENCY DEPARTMENT ENCOUNTER      Pt Name: Newton Hartley  MRN: [id number]  Birthdate 1945  Date of evaluation: 4/5/2025  Provider: Corwin Martinez MD  Time Note started  8:07 AM EDT  4/5/25           NURSING NOTES REVIEWED     Pt evaluated in a timely manner      CURRENT MEDICATIONS       Discharge Medication List as of 4/5/2025 10:50 AM        CONTINUE these medications which have NOT CHANGED    Details   pantoprazole (PROTONIX) 40 MG tablet Take 1 tablet by mouth every morning (before breakfast), Disp-30 tablet, R-5Normal      empagliflozin (JARDIANCE) 10 MG tablet Take 1 tablet by mouth daily, Disp-30 tablet, R-1Normal      rosuvastatin (CRESTOR) 40 MG tablet TAKE ONE TABLET BY MOUTH EVERY EVENING, Disp-30 tablet, R-1Normal      traZODone (DESYREL) 100 MG tablet Take 1 tablet by mouth nightly, Disp-30 tablet, R-1Normal      isosorbide mononitrate (IMDUR) 30 MG extended release tablet TAKE 1 TABLET BY MOUTH DAILY, Disp-30 tablet, R-3Normal      metoprolol succinate (TOPROL XL) 50 MG extended release tablet Take 1 tablet by mouth dailyHistorical Med      famotidine (PEPCID) 20 MG tablet TAKE 1 TABLET BY MOUTH DAILY, Disp-30 tablet, R-2Normal      valsartan-hydroCHLOROthiazide (DIOVAN-HCT) 320-25 MG per tablet Take 1 tablet by mouth dailyHistorical Med      glimepiride (AMARYL) 4 MG tablet TAKE ONE TABLET BY MOUTH EVERY MORNING., Disp-90 tablet, R-3Normal      gabapentin (NEURONTIN) 100 MG capsule Take 1-2 capsule by mouth nightly., Disp-60 capsule, R-0Normal      vitamin D (CHOLECALCIFEROL) [address] UNIT CAPS Take 1 capsule by mouth once a week, Disp-12 capsule, R-1Normal      busPIRone (BUSPAR) 10 MG tablet Take 1 tablet by mouth 3 times daily as needed (Anxiety), Disp-30 tablet, R-2Normal      blood glucose test strips (ONETOUCH ULTRA) strip TEST TWO TIMES A DAY, Disp-300 strip, R-5Normal      amLODIPine (NORVASC) 10 MG tablet Take 1 tablet by mouth dailyHistorical  follow-up (48/72 hours) with strict return precautions     All questions answered and addressed    Code Status Discussion: Full code                CRITICAL CARE TIME   Total Critical Care time was minutes, excluding separately reportable procedures.  There was a high probability of clinically significant/life threatening deterioration in the patient's condition which required my urgent intervention.             PROCEDURES:    Procedures       ED BEDSIDE ULTRASOUND:   Performed by ED Physician - none      FINAL IMPRESSION     1. Gastroesophageal reflux disease without esophagitis    2. PVC (premature ventricular contraction)    3. Atypical chest pain          DISPOSITION/PLAN   DISPOSITION Decision To Discharge 04/05/2025 10:48:39 AM      PATIENT REFERRED TO:  Brittany Cardona MD  [address] [facility] [address]  [phone]    In 3 days        DISCHARGE MEDICATIONS:  Discharge Medication List as of 4/5/2025 10:50 AM        Controlled Substances Monitoring:     RX Monitoring Attestation Periodic Controlled Substance Monitoring   1/23/2018   2:58 PM The Prescription Monitoring Report for this patient was reviewed today. No signs of potential drug abuse or diversion identified.       (Please note that portions of this note were completed with a voice recognition program.  Efforts were made to edit the dictations but occasionally words are mis-transcribed.)    Corwin Martinez MD (electronically signed)  Attending Emergency Physician            Corwin Martinez MD  04/06/25 0804

## 2025-04-05 NOTE — ED NOTES
I have completed the patients EKG and handed it to . The patient is on the bedside cardiac monitor with alarms on and audible.

## 2025-04-05 NOTE — ED NOTES
Orthostatic Vitals:      4/5/2025    10:07 AM   Orthostatic Vitals   Orthostatic B/P and Pulse? Yes   Blood Pressure Lying 139/81   Pulse Lying 72 PER MINUTE   Blood Pressure Sitting 145/81   Pulse Sitting 73 PER MINUTE   Blood Pressure Standing 162/91   Pulse Standing 81 PER MINUTE

## 2025-04-05 NOTE — DISCHARGE INSTRUCTIONS
Please continue with medications as presently advised and prescribed    Please also follow-up with your healthcare providers as discussed  Thank you for trusting your care with us today.

## 2025-04-07 ENCOUNTER — LAB (OUTPATIENT)
Dept: FAMILY MEDICINE CLINIC | Age: 80
End: 2025-04-07

## 2025-04-07 ENCOUNTER — TELEPHONE (OUTPATIENT)
Dept: FAMILY MEDICINE CLINIC | Age: 80
End: 2025-04-07

## 2025-04-07 VITALS — HEART RATE: 79 BPM | SYSTOLIC BLOOD PRESSURE: 161 MMHG | OXYGEN SATURATION: 96 % | DIASTOLIC BLOOD PRESSURE: 74 MMHG

## 2025-04-07 NOTE — TELEPHONE ENCOUNTER
Pt came in for BP check. He does monitor at home with readings staying around 150s/70s. I office today it was     150/74  80  95%    161/74  79  96%

## 2025-04-08 ENCOUNTER — CARE COORDINATION (OUTPATIENT)
Dept: CARE COORDINATION | Age: 80
End: 2025-04-08

## 2025-04-08 NOTE — CARE COORDINATION
care.  I will notify my provider of any symptoms that indicate a worsening of my condition.    Barriers: lack of motivation, financial, lack of support, and lack of education  Plan for overcoming my barriers: education and support needed   Confidence: 4/10  Anticipated Goal Completion Date: 6/25         Self Monitoring   No change     Self-Monitored Blood Glucose - I will check my blood sugar Fasting blood sugar  Blood Pressure - I will take my blood pressure as directed - Daily and report values that remain higher than 140/90    Patient Reported Blood Pressure       9/23/2024     6:30 PM 9/22/2024     8:30 AM 9/22/2024     2:30 AM 9/21/2024     6:30 PM 9/21/2024    12:30 PM   Patient Reported Blood Pressure   Home Blood Pressure 166/84 179/100 172/100 161/98 154/91     Patient Reported Blood Glucose        No data to display                Barriers: lack of motivation, overwhelmed by complexity of regimen, and lack of education  Plan for overcoming my barriers: education and support   Confidence: 5/10  Anticipated Goal Completion Date: 6/25                 PCP/Specialist follow up:   Future Appointments         Provider Specialty Dept Phone    4/21/2025 11:50 AM Brittany Cardona MD Family Medicine [phone]    5/5/2025 9:00 AM SCHEDULE, [facility]  Family Medicine [phone]    7/28/2025 3:00 PM Brittany Cardona MD Family Medicine [phone]            Follow Up:   Plan for next AC outreach in approximately 1 week to complete:  - CC Protocol assessments - week 2 needed   - disease specific assessments- DM and HTN   - SDOH assessments-still needed   - goal progression  - education   - follow up appointment with Dr Crowe cardiology needed   - BP and HR check , reassess symptoms  -was med list updated  -do we need SW referral for cost of medication   Patient  is agreeable to this plan.

## 2025-04-15 ENCOUNTER — CARE COORDINATION (OUTPATIENT)
Dept: CARE COORDINATION | Age: 80
End: 2025-04-15

## 2025-04-15 DIAGNOSIS — E11.41 CONTROLLED TYPE 2 DIABETES MELLITUS WITH DIABETIC MONONEUROPATHY, WITHOUT LONG-TERM CURRENT USE OF INSULIN (HCC): ICD-10-CM

## 2025-04-15 DIAGNOSIS — E11.42 TYPE 2 DIABETES MELLITUS WITH DIABETIC POLYNEUROPATHY, WITHOUT LONG-TERM CURRENT USE OF INSULIN (HCC): ICD-10-CM

## 2025-04-15 RX ORDER — BLOOD SUGAR DIAGNOSTIC
STRIP MISCELLANEOUS
Qty: 300 STRIP | Refills: 5 | Status: SHIPPED | OUTPATIENT
Start: 2025-04-15

## 2025-04-15 NOTE — CARE COORDINATION
Ambulatory Care Coordination Note     4/15/2025 2:14 PM     Patient outreach attempt by this ACM today to offer care management services. [facility] was unable to reach the patient by telephone today;   left voice message requesting a return phone call to this ACM.     ACM: Helen Antonio RN     Care Summary Note: none     PCP/Specialist follow up:   Future Appointments         Provider Specialty Dept Phone    4/21/2025 11:50 AM Brittany Cardona MD Family Medicine [phone]    5/5/2025 9:00 AM SCHEDULE, [facility]  Family Medicine [phone]    7/28/2025 3:00 PM Brittany Cardona MD Family Medicine [phone]            Follow Up:   Plan for next AC outreach in approximately 1 week to complete:  - CC Protocol assessments - week 2 needed   - disease specific assessments- DM and HTN   - SDOH assessments-still needed   - goal progression  - education   - follow up appointment with Dr Crowe cardiology needed   - BP and HR check , reassess symptoms  -was med list updated  -do we need SW referral for cost of medication .

## 2025-04-15 NOTE — TELEPHONE ENCOUNTER
Refill Request     CONFIRM preferrred pharmacy with the patient.    If Mail Order Rx - Pend for 90 day refill.      Last Seen: Last Seen Department: 3/24/2025  Last Seen by PCP: 1/31/2025    Last Written: 2.23.2025    If no future appointment scheduled, route STAFF MESSAGE with patient name to the  Pool for scheduling.      Next Appointment:   Future Appointments   Date Time Provider Department Center   4/21/2025 11:50 AM [facility]   5/5/2025  9:00 AM SCHEDULE, [facility] DEP   7/28/2025  3:00 PM Brittany Cardona MD [facility] DEP       Message sent to  to schedule appt with patient?  NO      Requested Prescriptions     Pending Prescriptions Disp Refills    blood glucose test strips (ONETOUCH ULTRA) strip 300 strip 5     Sig: TEST TWO TIMES A DAY

## 2025-04-17 NOTE — TELEPHONE ENCOUNTER
Refill Request     CONFIRM preferrred pharmacy with the patient.    If Mail Order Rx - Pend for 90 day refill.      Last Seen: Last Seen Department: 3/24/2025  Last Seen by PCP: 1/31/2025    Last Written: 10/23/24    If no future appointment scheduled, route STAFF MESSAGE with patient name to the  Pool for scheduling.      Next Appointment:   Future Appointments   Date Time Provider Department Center   4/21/2025 11:50 AM Brittany Cardona MD [facility] DEP   5/5/2025  9:00 AM SCHEDULE, [facility] ECC DEP   7/28/2025  3:00 PM Brittany Cardona MD [facility] DEP       Message sent to  to schedule appt with patient?  N/A      Requested Prescriptions     Pending Prescriptions Disp Refills    metFORMIN (GLUCOPHAGE) 500 MG tablet 180 tablet 0     Sig: Take 2 tablets by mouth 2 times daily (with meals)

## 2025-04-18 ENCOUNTER — CARE COORDINATION (OUTPATIENT)
Dept: CASE MANAGEMENT | Age: 80
End: 2025-04-18

## 2025-04-18 NOTE — CARE COORDINATION
Ambulatory Care Coordination Note     2025 2:39 PM     Patient Current Location:  [address]: [address] [address]     ACM contacted the patient by telephone. Verified name and  with patient as identifiers.         ACM: Mirna Vaughan RN     Challenges to be reviewed by the provider   Additional needs identified to be addressed with provider No  none               Method of communication with provider: none.    Utilization: Patient has not had any utilization since our last call.     Care Summary Note:   Pt initially questioned call. Able to engage pt. He is doing ok, still has PVCs, will have cardiac cath at [facility] 2025, Dr. Crowe. Pt doing well otherwise, states PVCs definitely knock him physically at times. No other needs at this time. Will f/u after cath, pt agreeable. 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    Offered patient enrollment in the Remote Patient Monitoring (RPM) program for in-home monitoring: Deferred at this time because has cardiac cath next week; will discuss at next outreach.     Assessments Completed:   No changes since last call    Medications Reviewed:   Patient denies any changes with medications and reports taking all medications as prescribed. and Completed during a previous call     Advance Care Planning:   Not reviewed during this call     Care Planning:   Not completed during this call    PCP/Specialist follow up:   Future Appointments         Provider Specialty Dept Phone    2025 11:50 AM Brittany Cardona MD Family Medicine [phone]    2025 9:00 AM SCHEDULE, [facility] Family Medicine [phone]    2025 3:00 PM Brittany Cardona MD Family Medicine [phone]            Follow Up:   Plan for next AC outreach in approximately 1 week to complete:  - education   - review cardiac cath post op .   Patient  is agreeable to this plan.

## 2025-04-21 ENCOUNTER — OFFICE VISIT (OUTPATIENT)
Dept: FAMILY MEDICINE CLINIC | Age: 80
End: 2025-04-21
Payer: MEDICARE

## 2025-04-21 ENCOUNTER — TELEPHONE (OUTPATIENT)
Dept: FAMILY MEDICINE CLINIC | Age: 80
End: 2025-04-21

## 2025-04-21 VITALS
SYSTOLIC BLOOD PRESSURE: 136 MMHG | RESPIRATION RATE: 17 BRPM | DIASTOLIC BLOOD PRESSURE: 69 MMHG | WEIGHT: 207 LBS | BODY MASS INDEX: 27.31 KG/M2 | HEART RATE: 74 BPM | OXYGEN SATURATION: 94 %

## 2025-04-21 DIAGNOSIS — I10 ESSENTIAL HYPERTENSION: ICD-10-CM

## 2025-04-21 DIAGNOSIS — F43.9 SITUATIONAL STRESS: ICD-10-CM

## 2025-04-21 DIAGNOSIS — E11.42 TYPE 2 DIABETES MELLITUS WITH DIABETIC POLYNEUROPATHY, WITHOUT LONG-TERM CURRENT USE OF INSULIN (HCC): Primary | ICD-10-CM

## 2025-04-21 PROCEDURE — 3051F HG A1C>EQUAL 7.0%<8.0%: CPT

## 2025-04-21 PROCEDURE — 99214 OFFICE O/P EST MOD 30 MIN: CPT

## 2025-04-21 PROCEDURE — G8417 CALC BMI ABV UP PARAM F/U: HCPCS

## 2025-04-21 PROCEDURE — 1036F TOBACCO NON-USER: CPT

## 2025-04-21 PROCEDURE — 3078F DIAST BP <80 MM HG: CPT

## 2025-04-21 PROCEDURE — 3075F SYST BP GE 130 - 139MM HG: CPT

## 2025-04-21 PROCEDURE — G8427 DOCREV CUR MEDS BY ELIG CLIN: HCPCS

## 2025-04-21 PROCEDURE — 1159F MED LIST DOCD IN RCRD: CPT

## 2025-04-21 PROCEDURE — 1123F ACP DISCUSS/DSCN MKR DOCD: CPT

## 2025-04-21 RX ORDER — CARVEDILOL 12.5 MG/1
12.5 TABLET ORAL 2 TIMES DAILY WITH MEALS
COMMUNITY
Start: 2025-04-15

## 2025-04-21 RX ORDER — METFORMIN HYDROCHLORIDE 500 MG/1
1000 TABLET, EXTENDED RELEASE ORAL 2 TIMES DAILY
Qty: 120 TABLET | Refills: 2 | Status: SHIPPED | OUTPATIENT
Start: 2025-04-21

## 2025-04-21 NOTE — TELEPHONE ENCOUNTER
Xavier at [facility] pharmacy called to get clarification of RX Metformin. He said that he needs to know if it is now Extended Release or still Immediate Release? If it has changed, please resubmit new RX for Extended Release. Pt was seen today in office. His phone # [phone]. Thank you!

## 2025-04-21 NOTE — PROGRESS NOTES
2 to 3 times daily, with readings typically ranging from 100 to 150. His fasting blood glucose level this morning was 139. He experiences symptoms when his blood glucose level drops to around 90. He has been experiencing decreased appetite and weight loss, which he attributes to skipping meals. He reports no fluid retention. He has been managing his diabetes since 1994 without the need for insulin. He has discontinued glimepiride due to episodes of hypoglycemia.    He experiences constipation, which he believes is medication-induced, and has a long-standing history of gastrointestinal issues dating back to his 30s, initially attributed to stress.    He has a colonoscopy scheduled for 05/09/2025. He has a dental appointment tomorrow for an extraction.    MEDICATIONS  Current: Coreg, amlodipine, metformin, trazodone, buspirone (as needed)  Discontinued: glimepiride           Objective   Blood pressure 136/69, pulse 74, resp. rate 17, weight 93.9 kg (207 lb), SpO2 94%.  Physical Exam      Vital Signs  Blood pressure in office is 136/69.     Physical Exam  Constitutional:       General: He is not in acute distress.     Appearance: Normal appearance. He is not ill-appearing, toxic-appearing or diaphoretic.   Cardiovascular:      Rate and Rhythm: Normal rate and regular rhythm.      Pulses: Normal pulses.   Pulmonary:      Effort: Pulmonary effort is normal. No respiratory distress.      Breath sounds: Normal breath sounds. No wheezing or rales.   Musculoskeletal:      Cervical back: Neck supple.      Right lower leg: No edema.      Left lower leg: No edema.   Skin:     General: Skin is warm.   Neurological:      Mental Status: He is alert. Mental status is at baseline.   Psychiatric:         Mood and Affect: Mood normal.         Behavior: Behavior normal.         Chief Complaint   Patient presents with    Hypertension     He is a 79 y.o. male who presents for evalution.     Reviewed PmHx, RxHx, FmHx, SocHx, AllgHx and

## 2025-04-21 NOTE — TELEPHONE ENCOUNTER
Called and spoke to pt and he clarify with the bottle and writer called pharmacy and spoke to getachew and that is what has been being filled    Please see note from 11/19/24

## 2025-05-02 RX ORDER — ISOSORBIDE MONONITRATE 30 MG/1
30 TABLET, EXTENDED RELEASE ORAL DAILY
Qty: 90 TABLET | Refills: 0 | Status: SHIPPED | OUTPATIENT
Start: 2025-05-02

## 2025-05-02 NOTE — TELEPHONE ENCOUNTER
Refill Request     CONFIRM preferrred pharmacy with the patient.    If Mail Order Rx - Pend for 90 day refill.      Last Seen: Last Seen Department: 4/21/2025  Last Seen by PCP: 4/21/2025    Last Written: 12/9/24    If no future appointment scheduled, route STAFF MESSAGE with patient name to the  Pool for scheduling.      Next Appointment:   Future Appointments   Date Time Provider Department Center   5/5/2025  9:00 AM SCHEDULE, [facility] [place] [facility] ECC DEP   7/28/2025  3:00 PM Brittany Cardona MD [facility] DEP       Message sent to  to schedule appt with patient?  N/A      Requested Prescriptions     Pending Prescriptions Disp Refills    isosorbide mononitrate (IMDUR) 30 MG extended release tablet 90 tablet 0     Sig: Take 1 tablet by mouth daily

## 2025-05-05 ENCOUNTER — CLINICAL SUPPORT (OUTPATIENT)
Dept: FAMILY MEDICINE CLINIC | Age: 80
End: 2025-05-05
Payer: MEDICARE

## 2025-05-05 DIAGNOSIS — R79.89 LFT ELEVATION: ICD-10-CM

## 2025-05-05 DIAGNOSIS — E11.42 TYPE 2 DIABETES MELLITUS WITH DIABETIC POLYNEUROPATHY, WITHOUT LONG-TERM CURRENT USE OF INSULIN (HCC): ICD-10-CM

## 2025-05-05 LAB
ALBUMIN SERPL-MCNC: 4.2 G/DL (ref 3.4–5)
ALBUMIN/GLOB SERPL: 2.1 {RATIO} (ref 1.1–2.2)
ALP SERPL-CCNC: 61 U/L (ref 40–129)
ALT SERPL-CCNC: 16 U/L (ref 10–40)
ANION GAP SERPL CALCULATED.3IONS-SCNC: 11 MMOL/L (ref 3–16)
AST SERPL-CCNC: 18 U/L (ref 15–37)
BILIRUB SERPL-MCNC: 1 MG/DL (ref 0–1)
BUN SERPL-MCNC: 15 MG/DL (ref 7–20)
CALCIUM SERPL-MCNC: 9.8 MG/DL (ref 8.3–10.6)
CHLORIDE SERPL-SCNC: 101 MMOL/L (ref 99–110)
CO2 SERPL-SCNC: 24 MMOL/L (ref 21–32)
CREAT SERPL-MCNC: 1.3 MG/DL (ref 0.8–1.3)
GFR SERPLBLD CREATININE-BSD FMLA CKD-EPI: 56 ML/MIN/{1.73_M2}
GLUCOSE SERPL-MCNC: 187 MG/DL (ref 70–99)
POTASSIUM SERPL-SCNC: 3.8 MMOL/L (ref 3.5–5.1)
PROT SERPL-MCNC: 6.2 G/DL (ref 6.4–8.2)
SODIUM SERPL-SCNC: 136 MMOL/L (ref 136–145)

## 2025-05-05 PROCEDURE — 36415 COLL VENOUS BLD VENIPUNCTURE: CPT

## 2025-05-05 RX ORDER — ISOSORBIDE MONONITRATE 30 MG/1
30 TABLET, EXTENDED RELEASE ORAL DAILY
Qty: 90 TABLET | Refills: 0 | Status: SHIPPED | OUTPATIENT
Start: 2025-05-05

## 2025-05-05 NOTE — TELEPHONE ENCOUNTER
Refill Request     CONFIRM preferrred pharmacy with the patient.    If Mail Order Rx - Pend for 90 day refill.      Last Seen: Last Seen Department: 4/21/2025  Last Seen by PCP: 4/21/2025    Last Written:     If no future appointment scheduled, route STAFF MESSAGE with patient name to the  Pool for scheduling.      Next Appointment:   Future Appointments   Date Time Provider Department Center   5/5/2025  9:00 AM SCHEDULE, [facility] ECC DEP   7/28/2025  3:00 PM Brittany Cardona MD [facility] DEP       Message sent to  to schedule appt with patient?  NO      Requested Prescriptions      No prescriptions requested or ordered in this encounter

## 2025-05-06 ENCOUNTER — CARE COORDINATION (OUTPATIENT)
Dept: CARE COORDINATION | Age: 80
End: 2025-05-06

## 2025-05-06 ENCOUNTER — TELEPHONE (OUTPATIENT)
Dept: FAMILY MEDICINE CLINIC | Age: 80
End: 2025-05-06

## 2025-05-06 ENCOUNTER — RESULTS FOLLOW-UP (OUTPATIENT)
Dept: FAMILY MEDICINE CLINIC | Age: 80
End: 2025-05-06

## 2025-05-06 DIAGNOSIS — E11.41 CONTROLLED TYPE 2 DIABETES MELLITUS WITH DIABETIC MONONEUROPATHY, WITHOUT LONG-TERM CURRENT USE OF INSULIN (HCC): ICD-10-CM

## 2025-05-06 DIAGNOSIS — E11.42 TYPE 2 DIABETES MELLITUS WITH DIABETIC POLYNEUROPATHY, WITHOUT LONG-TERM CURRENT USE OF INSULIN (HCC): ICD-10-CM

## 2025-05-06 LAB
EST. AVERAGE GLUCOSE BLD GHB EST-MCNC: 185.8 MG/DL
HBA1C MFR BLD: 8.1 %

## 2025-05-06 RX ORDER — BLOOD SUGAR DIAGNOSTIC
STRIP MISCELLANEOUS
Qty: 300 STRIP | Refills: 5 | Status: SHIPPED | OUTPATIENT
Start: 2025-05-06 | End: 2025-05-07 | Stop reason: SDUPTHER

## 2025-05-06 SDOH — SOCIAL STABILITY: SOCIAL NETWORK
DO YOU BELONG TO ANY CLUBS OR ORGANIZATIONS SUCH AS CHURCH GROUPS, UNIONS, FRATERNAL OR ATHLETIC GROUPS, OR SCHOOL GROUPS?: NO

## 2025-05-06 SDOH — HEALTH STABILITY: MENTAL HEALTH: HOW MANY DRINKS CONTAINING ALCOHOL DO YOU HAVE ON A TYPICAL DAY WHEN YOU ARE DRINKING?: PATIENT DOES NOT DRINK

## 2025-05-06 SDOH — HEALTH STABILITY: MENTAL HEALTH: HOW OFTEN DO YOU HAVE A DRINK CONTAINING ALCOHOL?: NEVER

## 2025-05-06 SDOH — SOCIAL STABILITY: SOCIAL NETWORK: HOW OFTEN DO YOU GET TOGETHER WITH FRIENDS OR RELATIVES?: MORE THAN THREE TIMES A WEEK

## 2025-05-06 SDOH — HEALTH STABILITY: MENTAL HEALTH
DO YOU FEEL STRESS - TENSE, RESTLESS, NERVOUS, OR ANXIOUS, OR UNABLE TO SLEEP AT NIGHT BECAUSE YOUR MIND IS TROUBLED ALL THE TIME - THESE DAYS?: TO SOME EXTENT

## 2025-05-06 SDOH — ECONOMIC STABILITY: FOOD INSECURITY: WITHIN THE PAST 12 MONTHS, YOU WORRIED THAT YOUR FOOD WOULD RUN OUT BEFORE YOU GOT THE MONEY TO BUY MORE.: NEVER TRUE

## 2025-05-06 SDOH — SOCIAL STABILITY: SOCIAL NETWORK: HOW OFTEN DO YOU ATTEND MEETINGS OF THE CLUBS OR ORGANIZATIONS YOU BELONG TO?: NEVER

## 2025-05-06 SDOH — ECONOMIC STABILITY: FOOD INSECURITY: WITHIN THE PAST 12 MONTHS, THE FOOD YOU BOUGHT JUST DIDN'T LAST AND YOU DIDN'T HAVE MONEY TO GET MORE.: NEVER TRUE

## 2025-05-06 SDOH — HEALTH STABILITY: PHYSICAL HEALTH: ON AVERAGE, HOW MANY MINUTES DO YOU ENGAGE IN EXERCISE AT THIS LEVEL?: 0 MIN

## 2025-05-06 SDOH — HEALTH STABILITY: PHYSICAL HEALTH: ON AVERAGE, HOW MANY DAYS PER WEEK DO YOU ENGAGE IN MODERATE TO STRENUOUS EXERCISE (LIKE A BRISK WALK)?: 0 DAYS

## 2025-05-06 SDOH — ECONOMIC STABILITY: HOUSING INSECURITY: IN THE LAST 12 MONTHS, WAS THERE A TIME WHEN YOU WERE NOT ABLE TO PAY THE MORTGAGE OR RENT ON TIME?: NO

## 2025-05-06 SDOH — SOCIAL STABILITY: SOCIAL INSECURITY: ARE YOU MARRIED, WIDOWED, DIVORCED, SEPARATED, NEVER MARRIED, OR LIVING WITH A PARTNER?: MARRIED

## 2025-05-06 SDOH — ECONOMIC STABILITY: FOOD INSECURITY: HOW HARD IS IT FOR YOU TO PAY FOR THE VERY BASICS LIKE FOOD, HOUSING, MEDICAL CARE, AND HEATING?: NOT HARD AT ALL

## 2025-05-06 SDOH — SOCIAL STABILITY: SOCIAL NETWORK: HOW OFTEN DO YOU ATTEND CHURCH OR RELIGIOUS SERVICES?: NEVER

## 2025-05-06 SDOH — ECONOMIC STABILITY: TRANSPORTATION INSECURITY: IN THE PAST 12 MONTHS, HAS LACK OF TRANSPORTATION KEPT YOU FROM MEDICAL APPOINTMENTS OR FROM GETTING MEDICATIONS?: NO

## 2025-05-06 ASSESSMENT — ACTIVITIES OF DAILY LIVING (ADL): LACK_OF_TRANSPORTATION: NO

## 2025-05-06 NOTE — ACP (ADVANCE CARE PLANNING)
Advance Care Planning Note      Date: 5/6/2025    Patient Current Location:  Home: [address]  [place] [address]    ACP Specialist:  Helen Antonio RN    Advance Care Planning   Healthcare Decision Maker:    Primary Decision Maker: Otilia Hartley - Spouse - [phone]    Click here to complete Healthcare Decision Makers including selection of the Healthcare Decision Maker Relationship (ie \"Primary\").  Today we documented Decision Maker(s) consistent with Legal Next of Kin hierarchy. Patient requested referral for additional information advance directives. Referral placed for follow up

## 2025-05-06 NOTE — CARE COORDINATION
Hyperglycemia, taught by Helen Antonio RN at 5/6/2025 11:59 AM.  Learner: Patient  Readiness: Acceptance  Method: Explanation, Handout  Response: Needs Reinforcement  Comment: diabetic diet provided. reminder on cutting carb intake. A1C reviewed and education provided. discussed need to continue BS check and work to reduce A1C    Prevention of Hypoglycemia, taught by Helen Antonio RN at 5/6/2025 11:59 AM.  Learner: Patient  Readiness: Acceptance  Method: Explanation, Handout  Response: Needs Reinforcement  Comment: diabetic diet provided. reminder on cutting carb intake. A1C reviewed and education provided. discussed need to continue BS check and work to reduce A1C    Prevention of Hyperglycemia, taught by Helen Antonio RN at 5/6/2025 11:59 AM.  Learner: Patient  Readiness: Acceptance  Method: Explanation, Handout  Response: Needs Reinforcement  Comment: diabetic diet provided. reminder on cutting carb intake. A1C reviewed and education provided. discussed need to continue BS check and work to reduce A1C    Introduction to diabetes, taught by Helen Antonio RN at 5/6/2025 11:59 AM.  Learner: Patient  Readiness: Acceptance  Method: Explanation, Handout  Response: Needs Reinforcement  Comment: diabetic diet provided. reminder on cutting carb intake. A1C reviewed and education provided. discussed need to continue BS check and work to reduce A1C    HgbA1c, taught by Helen Antonio RN at 5/6/2025 11:59 AM.  Learner: Patient  Readiness: Acceptance  Method: Explanation, Handout  Response: Needs Reinforcement  Comment: diabetic diet provided. reminder on cutting carb intake. A1C reviewed and education provided. discussed need to continue BS check and work to reduce A1C    Education Comments  No comments found.     ,    Goals Addressed                   This Visit's Progress       Care Coordination     Conditions and Symptoms        I will schedule office visits, as directed by my provider.  I will keep my

## 2025-05-06 NOTE — TELEPHONE ENCOUNTER
Pt called to get an RX refill for One Touch Test Strips sent to [facility] pharmacy. His # [phone]. Thank you!

## 2025-05-08 ENCOUNTER — CARE COORDINATION (OUTPATIENT)
Dept: CARE COORDINATION | Age: 80
End: 2025-05-08

## 2025-05-08 NOTE — CARE COORDINATION
Advance Care Planning Note      Date: 5/8/2025    Patient Current Location:  [place]    ACP Specialist:  JAVIER Castillo    Date Referral Received:5/8/25    Initial Outreach:     Outreach call to patient in follow-up to ACP Specialist referral from: Ambulatory Care Manager Marisela  requesting assistance with new advance directive completion.   Patient agreeable to receive AD packet via mail. SW verified home address and will send mail request to [employer] on this date.     Next Step:    Mailed Advance Directive  Outreach again in 2 weeks         Thank you for this referral.    Sydney RUGGIERO, JAVIER     [phone]

## 2025-05-09 RX ORDER — BLOOD SUGAR DIAGNOSTIC
STRIP MISCELLANEOUS
Qty: 300 STRIP | Refills: 5 | Status: SHIPPED | OUTPATIENT
Start: 2025-05-09

## 2025-05-21 ENCOUNTER — CARE COORDINATION (OUTPATIENT)
Dept: CARE COORDINATION | Age: 80
End: 2025-05-21

## 2025-05-21 ENCOUNTER — CARE COORDINATION (OUTPATIENT)
Dept: CASE MANAGEMENT | Age: 80
End: 2025-05-21

## 2025-05-21 NOTE — CARE COORDINATION
Ambulatory Care Coordination Note     5/21/2025 11:01 AM     Patient outreach attempt by this ACM today to perform care management follow up . ACM was unable to reach the patient by telephone today;   left voice message requesting a return phone call to this ACM.     ACM: Sienna Kumar LPN     Care Summary Note:     PCP/Specialist follow up:   Future Appointments         Provider Specialty Dept Phone    7/28/2025 3:00 PM Brittany Cardona MD Family Medicine [phone]            Follow Up:   Plan for next ACM outreach in approximately 1 week to complete:  - disease specific assessments  - advance care planning  - goal progression  - education   Assess for palpitations, fatigue and BP, HR, BS readings .

## 2025-05-21 NOTE — CARE COORDINATION
Advance Care Planning Note      Date: 5/21/2025    Patient Current Location:  [place]    ACP Specialist:  JAVIER Castillo    Date Referral Received:5/8/25    Second Outreach:     Outreach call to patient in follow-up to ACP Specialist. Patient confirmed receipt of mailed AD packet. Patient reports no questions at this time; he will outreach to this worker if any arise.     Next Step:    Closing referral due to no assistance needed at this time.  Routing closure to referral source.       Sydney Cerna MSW, JAVIER     [phone]

## 2025-05-30 ENCOUNTER — CARE COORDINATION (OUTPATIENT)
Dept: CARE COORDINATION | Age: 80
End: 2025-05-30

## 2025-05-30 NOTE — CARE COORDINATION
Ambulatory Care Coordination Note     2025 5:00 PM     Patient Current Location:  Home: [address] [place] [id number]     ACM contacted the patient by telephone. Verified name and  with patient as identifiers.         ACM: Helen Antonio RN     Challenges to be reviewed by the provider   Additional needs identified to be addressed with provider Yes  medications- cardiology stopped amlodipine and metroprolol. Coreg is 25 mg bid. Patient is not taking jardiance due to cost of med for him. Med list flagged with changes.                Method of communication with provider: chart routing.    Utilization: Patient has not had any utilization since our last call.     Care Summary Note: ACM completed f/u call. Newton is doing doing ok but reports fatigue. He has had not further issues with  palpitations and or chest pressue. He has tolerated Coreg well tolerated. He is worried that BS hs been 150-160 range but is eating breakfast and dinner later. Meals schedule has changed slightly.     Expresss concern with cost of jardiance. Did not  refill  and is not taking due to cost.  ACM will place referral for additional financial supports to  related to jardiance.      HTN more controlled but does depend on stress level. Wife is chronically ill and he is caregiver.     Offered patient enrollment in the Remote Patient Monitoring (RPM) program for in-home monitoring: Yes, but did not enroll at this time: already monitoring with home equipment.     Assessments Completed:   Diabetes Assessment    Medic Alert ID: No  Meal Planning: Avoidance of concentrated sweets, Plate Method   How often do you test your blood sugar?: Daily   Do you have barriers with adherence to non-pharmacologic self-management interventions? (Nutrition/Exercise/Self-Monitoring): No   Have you ever had to go to the ED for symptoms of low blood sugar?: No       Increase or Decrease trend in Blood Sugars   Do you have hyperglycemia symptoms?:

## 2025-06-02 ENCOUNTER — TELEPHONE (OUTPATIENT)
Dept: FAMILY MEDICINE CLINIC | Age: 80
End: 2025-06-02

## 2025-06-03 ENCOUNTER — CARE COORDINATION (OUTPATIENT)
Dept: CARE COORDINATION | Age: 80
End: 2025-06-03

## 2025-06-03 NOTE — CARE COORDINATION
Call to pt to initiate SW referral for PAP and CG concerns. Patient reported that his insurance no longer covers Jardiance. GABI will send [employer] aleksander.     SW discussed CG concerns. He reported that [facility] and [facility] to [facility]. Explained to him that if his wife does not qualify for services to inquire about home care services for himself to receive assistance with housekeeping. Due to patient's income and spouses' insurance, she will not qualify for [facility] services. Discussed the caregiver support program.     GABI will follow up next week.

## 2025-06-04 ENCOUNTER — CARE COORDINATION (OUTPATIENT)
Dept: CARE COORDINATION | Age: 80
End: 2025-06-04

## 2025-06-04 ENCOUNTER — OFFICE VISIT (OUTPATIENT)
Dept: FAMILY MEDICINE CLINIC | Age: 80
End: 2025-06-04

## 2025-06-04 VITALS
DIASTOLIC BLOOD PRESSURE: 65 MMHG | RESPIRATION RATE: 18 BRPM | BODY MASS INDEX: 26.65 KG/M2 | OXYGEN SATURATION: 95 % | WEIGHT: 202 LBS | HEART RATE: 74 BPM | SYSTOLIC BLOOD PRESSURE: 145 MMHG

## 2025-06-04 DIAGNOSIS — Z63.6 CAREGIVER BURDEN: ICD-10-CM

## 2025-06-04 DIAGNOSIS — R63.4 WEIGHT LOSS, UNINTENTIONAL: Primary | ICD-10-CM

## 2025-06-04 DIAGNOSIS — E11.42 TYPE 2 DIABETES MELLITUS WITH DIABETIC POLYNEUROPATHY, WITHOUT LONG-TERM CURRENT USE OF INSULIN (HCC): ICD-10-CM

## 2025-06-04 DIAGNOSIS — R53.83 OTHER FATIGUE: ICD-10-CM

## 2025-06-04 SDOH — SOCIAL STABILITY - SOCIAL INSECURITY: DEPENDENT RELATIVE NEEDING CARE AT HOME: Z63.6

## 2025-06-04 NOTE — PROGRESS NOTES
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    Hypercholesterolemia E78.00    Benign non-nodular prostatic hyperplasia without lower urinary tract symptoms N40.0    Type 2 diabetes mellitus with diabetic polyneuropathy, without long-term current use of insulin (HCC) E11.42    Keloid L91.0    Primary insomnia F51.01    COVID-19 U07.1    Chest pain R07.9    Epigastric pain R10.13    Diabetic mononeuropathy associated with type 2 diabetes mellitus (HCC) E11.41        Review of Systems - negative except as listed above in the HPI    Time was used for level of billing: no     The patient (or guardian, if applicable) and other individuals in attendance with the patient were advised that Artificial Intelligence will be utilized during this visit to record and process the conversation to generate a clinical note. The patient (or guardian, if applicable) and other individuals in attendance at the appointment consented to the use of AI, including the recording.           I have discussed the diagnosis with the patient and the intended plan as seen in the above orders.  The patient understands and agrees with the plan. The patient has received an after-visit summary and questions were answered concerning future plans.     Medication Side Effects and Warnings   were discussed with patient    Patient Labs were reviewed and or requested    Patient Past Records were reviewed and or requested    Please note that this dictation was completed with [employer], the computer voice recognition software and [employer] intellience software.  Quite often unanticipated grammatical, syntax, homophones, and other interpretive errors are inadvertently transcribed by the computer software.  Please disregard these errors.  Please excuse any errors that have escaped final proofreading.  Thank you.     Patient should call the office immediately with new or ongoing signs or symptoms or worsening, or proceedto the emergency room.  No changes in past

## 2025-06-05 LAB
ALBUMIN SERPL-MCNC: 4.2 G/DL (ref 3.4–5)
ALBUMIN/GLOB SERPL: 2 {RATIO} (ref 1.1–2.2)
ALP SERPL-CCNC: 60 U/L (ref 40–129)
ALT SERPL-CCNC: 15 U/L (ref 10–40)
ANION GAP SERPL CALCULATED.3IONS-SCNC: 12 MMOL/L (ref 3–16)
AST SERPL-CCNC: 18 U/L (ref 15–37)
BASOPHILS # BLD: 0.1 K/UL (ref 0–0.2)
BASOPHILS NFR BLD: 1.6 %
BILIRUB SERPL-MCNC: 1.2 MG/DL (ref 0–1)
BUN SERPL-MCNC: 20 MG/DL (ref 7–20)
CALCIUM SERPL-MCNC: 9.8 MG/DL (ref 8.3–10.6)
CHLORIDE SERPL-SCNC: 100 MMOL/L (ref 99–110)
CO2 SERPL-SCNC: 24 MMOL/L (ref 21–32)
CREAT SERPL-MCNC: 1.4 MG/DL (ref 0.8–1.3)
DEPRECATED RDW RBC AUTO: 13.3 % (ref 12.4–15.4)
EOSINOPHIL # BLD: 0.1 K/UL (ref 0–0.6)
EOSINOPHIL NFR BLD: 1.8 %
GFR SERPLBLD CREATININE-BSD FMLA CKD-EPI: 51 ML/MIN/{1.73_M2}
GLUCOSE SERPL-MCNC: 297 MG/DL (ref 70–99)
HCT VFR BLD AUTO: 41.2 % (ref 40.5–52.5)
HGB BLD-MCNC: 14 G/DL (ref 13.5–17.5)
LYMPHOCYTES # BLD: 1.4 K/UL (ref 1–5.1)
LYMPHOCYTES NFR BLD: 26.2 %
MCH RBC QN AUTO: 31.9 PG (ref 26–34)
MCHC RBC AUTO-ENTMCNC: 34 G/DL (ref 31–36)
MCV RBC AUTO: 93.7 FL (ref 80–100)
MONOCYTES # BLD: 0.4 K/UL (ref 0–1.3)
MONOCYTES NFR BLD: 7.1 %
NEUTROPHILS # BLD: 3.3 K/UL (ref 1.7–7.7)
NEUTROPHILS NFR BLD: 63.3 %
PLATELET # BLD AUTO: 208 K/UL (ref 135–450)
PMV BLD AUTO: 9.3 FL (ref 5–10.5)
POTASSIUM SERPL-SCNC: 3.6 MMOL/L (ref 3.5–5.1)
PROT SERPL-MCNC: 6.3 G/DL (ref 6.4–8.2)
RBC # BLD AUTO: 4.4 M/UL (ref 4.2–5.9)
SODIUM SERPL-SCNC: 136 MMOL/L (ref 136–145)
TSH SERPL DL<=0.005 MIU/L-ACNC: 0.61 UIU/ML (ref 0.27–4.2)
WBC # BLD AUTO: 5.3 K/UL (ref 4–11)

## 2025-06-06 ENCOUNTER — RESULTS FOLLOW-UP (OUTPATIENT)
Dept: FAMILY MEDICINE CLINIC | Age: 80
End: 2025-06-06

## 2025-06-06 ENCOUNTER — CARE COORDINATION (OUTPATIENT)
Dept: CARE COORDINATION | Age: 80
End: 2025-06-06

## 2025-06-06 DIAGNOSIS — R79.89 ABNORMAL BLOOD CREATININE LEVEL: Primary | ICD-10-CM

## 2025-06-06 NOTE — CARE COORDINATION
Ambulatory Care Coordination Note     2025 4:00 PM     Patient Current Location:  Home: [address] [place] [id number]     ACM contacted the patient by telephone. Verified name and  with patient as identifiers.         ACM: Helen Antonio RN     Challenges to be reviewed by the provider   Additional needs identified to be addressed with provider No  None                Method of communication with provider: none.    Utilization: Patient has not had any utilization since our last call.     Care Summary Note: follow up call completed with Newton. He has seen PCP and cardiology this week. His cardiology increased isosorbide to 60 mg daily. Reviewed  med list. He has not started this change. Encourage BP check. He is working with  to try an find assistance to afford jardiance. PAP looks to have been sent and I also encouraged him reaching out to [facility] for follow up on me support. He has been there this week and received paperwork. He is looking for homemaking support for himself. His wife is ill and he is the caregiver and support is challenging.     Offered patient enrollment in the Remote Patient Monitoring (RPM) program for in-home monitoring: Patient is not eligible for RPM program because: can check BP at home. Checks his BS  .     Assessments Completed:   Diabetes Assessment    Medic Alert ID: No  Meal Planning: Avoidance of concentrated sweets, Plate Method   How often do you test your blood sugar?: Daily   Do you have barriers with adherence to non-pharmacologic self-management interventions? (Nutrition/Exercise/Self-Monitoring): No   Have you ever had to go to the ED for symptoms of low blood sugar?: No       No patient-reported symptoms         ,   Hypertension - Encounter Level          ,   General Assessment    Do you have any symptoms that are causing concern?: No          Medications Reviewed:   Completed med review        Advance Care Planning:   Not reviewed during this call

## 2025-06-18 NOTE — CARE COORDINATION
Contacted pt for welcome call, all of his vitals have been recently normal. Pt reports his BP was running high as he was under some stress as his wife was ill but he states she is doing better. He also notes his cardiologist made some changes to his metoprolol and he has seen improvements with his BP readings   Remote Patient Monitoring Welcome Note  Date/Time:  10/11/2024 12:26 PM  Patient Current Location: [place]  Verified patients name and  as identifiers.       Completed and confirmed the following:    [x] Patient received all RPM equipment (tablet, scale, blood pressure device and cuff, and pulse oximeter)  Cuff Size: regular (9.05\"-15.74\")    Weight Scale:  regular (<330lbs)                    [x] Instructed patient keep box for use when returning equipment                                                          [x] Reviewed Patient Welcome Letter with patient    [x]  Reviewed Consent Form  Copy of consent form in chart.                 [x] Reviewed expectations for patient and care team  Monitoring hours M-F 9-4pm  It is important to take your vitals every day, even on the weekends,to keep your care team aware of how you are doing every day of the week.  Completing monitoring by 12pm on  so that alerts can be responded to in the same day  Patient weighs self at same time every day (or after urinating and waking up)  Take blood pressure 1-2 hrs after medications   RPM team may have different phone area code (including [place], [place], [place] or [place])                              [x] Instructed patient to keep scale on flat surface                                                         [x] Instructed patient to keep tablet plugged in at all times                         [x] Instructed how to contact IT support  ([phone])  [x] Provided Remote Patient Monitoring care  information     Emergency Contact Verified: Otilia Hartley- spouse [phone]               All questions answered at this  Improving status post placed a drain.  Patient will follow-up with general surgery appropriately.

## 2025-06-19 ENCOUNTER — CARE COORDINATION (OUTPATIENT)
Dept: CARE COORDINATION | Age: 80
End: 2025-06-19

## 2025-06-19 NOTE — CARE COORDINATION
Patient Current Location: Home: [address]  [facility] [address]     Phone call to Pt to follow up regarding RX assistance application for jardiance. Pt reported he received it and also has to complete [facility] for [facility]. Pt reported he met with hospice for his wife and decided she was not ready for hospice at this time. SW discussed palliative care as an option as well. Pt reported there is already a nurse who visits the home and she has been very helpful with referrals to services and assisting with obtaining DME.     Pt reported at times he has to miss his own appointments due to his wife's appointments. Pt reports he is aware his wife's health is deteriorating and is stress with coming to this realization and with caregiving.     Discussed passport services as an option. Pt declined stating he owns his own home and does not want medicaid to relinquish assets.     Attempted phone call to [facility], but they were closed. SW will make next outreach attempt on 6/20 to determine if they offer respite services.     GABI plan of care: SW will contact senior services to determine if they offer respite services on 6/20

## 2025-06-20 ENCOUNTER — CARE COORDINATION (OUTPATIENT)
Dept: CARE COORDINATION | Age: 80
End: 2025-06-20

## 2025-06-20 NOTE — CARE COORDINATION
Phone call to [facility] [phone] to discuss resources. SW was directed to [facility] caregiver support program at [phone] - Tayla Dean    Phone call to Tayla with [facility] Caregiver Support. No answer, vm message left requesting return call, contact number provided.     Phone call to Pt to provide     SW plan of care: SW will make next outreach for follow up on 6/30.

## 2025-06-26 ENCOUNTER — LAB (OUTPATIENT)
Dept: FAMILY MEDICINE CLINIC | Age: 80
End: 2025-06-26

## 2025-06-26 DIAGNOSIS — R79.89 ABNORMAL BLOOD CREATININE LEVEL: ICD-10-CM

## 2025-06-26 LAB
ANION GAP SERPL CALCULATED.3IONS-SCNC: 13 MMOL/L (ref 3–16)
BUN SERPL-MCNC: 22 MG/DL (ref 7–20)
CALCIUM SERPL-MCNC: 9.7 MG/DL (ref 8.3–10.6)
CHLORIDE SERPL-SCNC: 101 MMOL/L (ref 99–110)
CO2 SERPL-SCNC: 24 MMOL/L (ref 21–32)
CREAT SERPL-MCNC: 1.4 MG/DL (ref 0.8–1.3)
GFR SERPLBLD CREATININE-BSD FMLA CKD-EPI: 51 ML/MIN/{1.73_M2}
GLUCOSE SERPL-MCNC: 172 MG/DL (ref 70–99)
POTASSIUM SERPL-SCNC: 4 MMOL/L (ref 3.5–5.1)
SODIUM SERPL-SCNC: 138 MMOL/L (ref 136–145)

## 2025-06-29 ENCOUNTER — RESULTS FOLLOW-UP (OUTPATIENT)
Dept: FAMILY MEDICINE CLINIC | Age: 80
End: 2025-06-29

## 2025-06-29 DIAGNOSIS — F51.01 PRIMARY INSOMNIA: ICD-10-CM

## 2025-06-30 ENCOUNTER — CARE COORDINATION (OUTPATIENT)
Dept: CARE COORDINATION | Age: 80
End: 2025-06-30

## 2025-06-30 RX ORDER — TRAZODONE HYDROCHLORIDE 100 MG/1
TABLET ORAL
Qty: 30 TABLET | Refills: 1 | Status: SHIPPED | OUTPATIENT
Start: 2025-06-30

## 2025-06-30 NOTE — TELEPHONE ENCOUNTER
Refill Request     CONFIRM preferrred pharmacy with the patient.    If Mail Order Rx - Pend for 90 day refill.      Last Seen: Last Seen Department: 6/4/2025  Last Seen by PCP: 6/4/2025    Last Written: 4/27/25    If no future appointment scheduled, route STAFF MESSAGE with patient name to the  Pool for scheduling.      Next Appointment:   Future Appointments   Date Time Provider Department Center   9/16/2025  3:40 PM [facility]       Message sent to  to schedule appt with patient?  NO      Requested Prescriptions     Pending Prescriptions Disp Refills    traZODone (DESYREL) 100 MG tablet [Pharmacy Med Name: traZODone 100 MG TABLET] 30 tablet 1     Sig: TAKE 1 TABLET BY MOUTH ONCE NIGHTLY

## 2025-06-30 NOTE — CARE COORDINATION
Patient Current Location: Home: [address]  [facility] [address]     Phone call to Pt to follow up regarding HHA services and support through     Wife is on hospice - [place] from hospital [place] [facility], no longer needs referral to [place] [facility].     Pt reports he is doing a lot better medically, decrease in stress and depression following wife moving in with her daughters. Pt is able to visit often and feels this is best case scenario for all involved.   Pt declined any further needs / concerns at this time.     SW plan of care: SW will resolve from SW services.

## 2025-07-02 ENCOUNTER — CARE COORDINATION (OUTPATIENT)
Dept: CARE COORDINATION | Age: 80
End: 2025-07-02

## 2025-07-02 NOTE — CARE COORDINATION
Ambulatory Care Coordination Note     7/2/2025 4:20 PM     LPN CC outreach attempt by this ACM today to perform care management follow up . ACM was unable to reach the patient by telephone today;   left voice message requesting a return phone call to this ACM.     ACM: Daya Main LPN       PCP/Specialist follow up:   Future Appointments         Provider Specialty Dept Phone    9/16/2025 3:40 PM Brittany Cardona MD Family Medicine [phone]            Follow Up:   Plan for next AC outreach in approximately 1 week to complete:  - disease specific assessments  - reassess BP and response to med change .   -SW active PAP support?   -assess wt again and BS. (Lower appetite and elevated BS on labs)

## 2025-07-10 ENCOUNTER — CARE COORDINATION (OUTPATIENT)
Dept: CARE COORDINATION | Age: 80
End: 2025-07-10

## 2025-07-10 NOTE — CARE COORDINATION
Ambulatory Care Coordination Note     7/10/2025 12:16 PM     Patient outreach attempt by this ACM today to perform care management follow up . ACM was unable to reach the patient by telephone today;   left voice message requesting a return phone call to this ACM.   2nd missed call     ACM: Helen Antonio RN     Care Summary Note: none     PCP/Specialist follow up:   Future Appointments         Provider Specialty Dept Phone    9/16/2025 3:40 PM Brittany Cardona MD Family Medicine [phone]            Follow Up:   Plan for next AC outreach in approximately 1 week to complete:  - caregiver strain.( Wife with dtr and in hospice)?  -HTN check .   -assess for med affordability next call  - plan for d/c 3 mos on service.

## 2025-07-17 ENCOUNTER — CARE COORDINATION (OUTPATIENT)
Dept: CARE COORDINATION | Age: 80
End: 2025-07-17

## 2025-07-17 RX ORDER — GLIMEPIRIDE 2 MG/1
2 TABLET ORAL
COMMUNITY

## 2025-07-17 NOTE — CARE COORDINATION
Helen Antonio, RN at 7/17/2025  2:23 PM.  Learner: Patient  Readiness: Acceptance  Method: Explanation, Teachback  Response: Verbalizes Understanding  Comment: working to minimize stress at home. He realized the effects of caregiver strain    Treatment of Hypoglycemia, taught by Helen Antonio RN at 7/17/2025  2:23 PM.  Learner: Patient  Readiness: Acceptance  Method: Explanation, Teachback, Handout  Response: Verbalizes Understanding  Comment: understands diabetes and s/s of hypo/hyperglycemia. Aware of current A1 C values and goals    Signs and Symptoms of Hypoglycemia, taught by Helen Antonio RN at 7/17/2025  2:23 PM.  Learner: Patient  Readiness: Acceptance  Method: Explanation, Teachback, Handout  Response: Verbalizes Understanding  Comment: understands diabetes and s/s of hypo/hyperglycemia. Aware of current A1 C values and goals    Signs and Symptoms of Hyperglycemia, taught by Helen Antonio RN at 7/17/2025  2:23 PM.  Learner: Patient  Readiness: Acceptance  Method: Explanation, Teachback, Handout  Response: Verbalizes Understanding  Comment: understands diabetes and s/s of hypo/hyperglycemia. Aware of current A1 C values and goals    Prevention of Hypoglycemia, taught by Helen Antonio RN at 7/17/2025  2:23 PM.  Learner: Patient  Readiness: Acceptance  Method: Explanation, Teachback, Handout  Response: Verbalizes Understanding  Comment: understands diabetes and s/s of hypo/hyperglycemia. Aware of current A1 C values and goals    Prevention of Hyperglycemia, taught by Helen Antonio RN at 7/17/2025  2:23 PM.  Learner: Patient  Readiness: Acceptance  Method: Explanation, Teachback, Handout  Response: Verbalizes Understanding  Comment: understands diabetes and s/s of hypo/hyperglycemia. Aware of current A1 C values and goals    Introduction to diabetes, taught by Helen Antonio RN at 7/17/2025  2:23 PM.  Learner: Patient  Readiness: Acceptance  Method: Explanation, Teachback, Handout  Response:

## 2025-07-18 ENCOUNTER — CARE COORDINATION (OUTPATIENT)
Dept: CARE COORDINATION | Age: 80
End: 2025-07-18

## 2025-07-21 RX ORDER — METFORMIN HYDROCHLORIDE 500 MG/1
TABLET, EXTENDED RELEASE ORAL
Qty: 120 TABLET | Refills: 2 | Status: SHIPPED | OUTPATIENT
Start: 2025-07-21

## 2025-07-21 NOTE — TELEPHONE ENCOUNTER
Refill Request     CONFIRM preferred pharmacy with the patient.    If Mail Order Rx - Pend for 90 day refill.      Last Seen: Last Seen Department: 6/4/2025  Last Seen by PCP: 6/4/2025    Last Written: 4/21/25 #120 - 2 refills     If no future appointment scheduled:  Review the last OV with PCP and review information for follow-up visit,  Route STAFF MESSAGE with patient name to the  Pool for scheduling with the following information:            -  Timing of next visit           -  Visit type ie Physical, OV, etc           -  Diagnoses/Reason ie. COPD, HTN - Do not use MEDICATION, Follow-up or CHECK UP - Give reason for visit      Next Appointment:   Future Appointments   Date Time Provider Department Center   9/16/2025  3:40 PM [facility]       Message sent to  to schedule appt with patient?  NO      Requested Prescriptions     Pending Prescriptions Disp Refills    metFORMIN (GLUCOPHAGE-XR) 500 MG extended release tablet [Pharmacy Med Name: METFORMIN HCL  MG TABLET] 120 tablet 2     Sig: TAKE 2 TABLETS BY MOUTH EVERY MORNING AND TAKE 2 TABLETS BY MOUTH EVERY EVENING

## 2025-07-22 ENCOUNTER — TELEPHONE (OUTPATIENT)
Dept: FAMILY MEDICINE CLINIC | Age: 80
End: 2025-07-22

## 2025-07-22 DIAGNOSIS — E11.42 TYPE 2 DIABETES MELLITUS WITH DIABETIC POLYNEUROPATHY, WITHOUT LONG-TERM CURRENT USE OF INSULIN (HCC): Primary | ICD-10-CM

## 2025-08-06 ENCOUNTER — CARE COORDINATION (OUTPATIENT)
Dept: CARE COORDINATION | Age: 80
End: 2025-08-06

## 2025-08-06 ENCOUNTER — CLINICAL SUPPORT (OUTPATIENT)
Dept: FAMILY MEDICINE CLINIC | Age: 80
End: 2025-08-06
Payer: MEDICARE

## 2025-08-06 DIAGNOSIS — E11.42 TYPE 2 DIABETES MELLITUS WITH DIABETIC POLYNEUROPATHY, WITHOUT LONG-TERM CURRENT USE OF INSULIN (HCC): ICD-10-CM

## 2025-08-06 LAB
EST. AVERAGE GLUCOSE BLD GHB EST-MCNC: 174.3 MG/DL
HBA1C MFR BLD: 7.7 %

## 2025-08-06 PROCEDURE — 36415 COLL VENOUS BLD VENIPUNCTURE: CPT

## 2025-08-15 ENCOUNTER — CARE COORDINATION (OUTPATIENT)
Dept: CARE COORDINATION | Age: 80
End: 2025-08-15

## 2025-08-21 ENCOUNTER — CARE COORDINATION (OUTPATIENT)
Dept: CARE COORDINATION | Age: 80
End: 2025-08-21

## 2025-09-02 ENCOUNTER — CARE COORDINATION (OUTPATIENT)
Dept: CARE COORDINATION | Age: 80
End: 2025-09-02

## (undated) DEVICE — 20 ML SYRINGE LUER-LOCK TIP: Brand: MONOJECT

## (undated) DEVICE — LINE TBL CSC-14 FOR CARDPLG DEL SYS

## (undated) DEVICE — BLADE RETRCT 3 SH FNGR ASST

## (undated) DEVICE — SUTURE MCRYL + SZ 4-0 L18IN ABSRB UD L19MM PS-2 3/8 CIR MCP496G

## (undated) DEVICE — SPONGE LAP W18XL18IN WHT COT 4 PLY FLD STRUNG RADPQ DISP ST

## (undated) DEVICE — Device

## (undated) DEVICE — BLOOD TRANSFUSION FILTER: Brand: HAEMONETICS

## (undated) DEVICE — TIP APPL 20GA 4IN GEL PLT 2 CANN

## (undated) DEVICE — LEAD PACEMKR MYOCARDIAL UNIPOLAR TEMP

## (undated) DEVICE — SOLUTION IRRIG 2000ML 0.9% SOD CHL USP UROMATIC PLAS CONT

## (undated) DEVICE — SUTURE ETHBND EXCEL SZ 2-0 L36IN NONABSORBABLE GRN L26MM SH X523H

## (undated) DEVICE — SUTURE PROL SZ 7 0 L30IN NONABSORBABLE BLU BV175 8 L93MM 3 8 EP8755H

## (undated) DEVICE — TIP APPL 20 GAX5 CM 2 CANN MALL

## (undated) DEVICE — SURGIFOAM SPNG SZ 100

## (undated) DEVICE — DRAIN,WOUND,ROUND,24FR,5/16",FULL-FLUTED: Brand: MEDLINE

## (undated) DEVICE — RETRACTOR SURG INSRT SUT HLD OCTOBASE

## (undated) DEVICE — CANNULA PERF L15IN DIA29FR VEN 3 STG THN WALL DSGN W  VENT

## (undated) DEVICE — CATHETER THORACENTHESIS 9 FRX20 IN EYES TOP

## (undated) DEVICE — BLADE ES ELASTOMERIC COAT INSUL DURABLE BEND UPTO 90DEG

## (undated) DEVICE — SUTURE VCRL SZ 0 L27IN ABSRB UD L36MM CT-1 1/2 CIR J260H

## (undated) DEVICE — CONNECTOR PERF W3/8XH0.25XL0.25IN BASE UNEQUAL Y SHP W/O

## (undated) DEVICE — GLOVE ORANGE PI 7 1/2   MSG9075

## (undated) DEVICE — PLATELET CONCENTRATION PACK PROC 14-20 ML SMARTPREP 2

## (undated) DEVICE — ADHESIVE SKIN CLSR 0.7ML TOP DERMBND ADV

## (undated) DEVICE — SUTURE NONABSORBABLE MONOFILAMENT 4-0 RB-1 36 IN BLU PROLENE 8557H

## (undated) DEVICE — APPLICATOR LNG TP 12.5IN

## (undated) DEVICE — KIT APPL 11:1 PROC W/ FIBRIJET MED CUP APPL TIP TY

## (undated) DEVICE — TIP APPL GEL PLT ENDO 5MMX32CM

## (undated) DEVICE — BLADE OPHTH 180DEG CUT SURF BLU STR SHRP DBL BVL GRINDLESS

## (undated) DEVICE — CANNULA ART 21FR L14IN VENT 3/8IN CONN SFT FLO ANG TIP W/

## (undated) DEVICE — GAUZE,SPONGE,4"X4",16PLY,XRAY,STRL,LF: Brand: MEDLINE

## (undated) DEVICE — RESERVOIR AUTOTRANSFUSION 225/120 CC GS FILTERED XTRA

## (undated) DEVICE — SUTURE VCRL + SZ 0 L27IN ABSRB UD CT-1 L36MM 1/2 CIR TAPR VCP260H

## (undated) DEVICE — SUTURE PROL 4-0 L36IN NONABSORBABLE BLU V-7 L26MM 1/2 CIR 8975H

## (undated) DEVICE — SUTURE PROL SZ 6-0 L24IN NONABSORBABLE BLU L13MM C-1 3/8 8726H

## (undated) DEVICE — SUTURE ETHBND EXCEL SZ 0 L18IN NONABSORBABLE GRN L36MM CT-1 CX21D

## (undated) DEVICE — HEMOCONCENTRATOR PERF MINNTECH HEMOCOR

## (undated) DEVICE — APPLICATOR SURG SEAL FIBRIJET

## (undated) DEVICE — TIP APPL TOP 2 SPRY

## (undated) DEVICE — EVERGRIP INSERT SET 86MM: Brand: FOGARTY EVERGRIP

## (undated) DEVICE — VESSEL HARVESTING KIT 7 MM ENDOSCP FOR PWR SUPL

## (undated) DEVICE — SYSTEM BLD DEL

## (undated) DEVICE — Z INACTIVE USE 2735373 APPLICATOR FBR LAIN COT WOOD TIP ECONOMICAL

## (undated) DEVICE — PERFUSION PACK O2

## (undated) DEVICE — SUTURE NONABSORBABLE MONOFILAMENT 7-0 BV-1 1X24 IN PROLENE 8702H

## (undated) DEVICE — NEEDLE ECHOGENIC 22GA L3125IN INSUL W 30DEG BVL EXTN SET

## (undated) DEVICE — [HIGH FLOW INSUFFLATOR,  DO NOT USE IF PACKAGE IS DAMAGED,  KEEP DRY,  KEEP AWAY FROM SUNLIGHT,  PROTECT FROM HEAT AND RADIOACTIVE SOURCES.]: Brand: PNEUMOSURE

## (undated) DEVICE — COVER LT HNDL PLAS RIG 2 PER PK

## (undated) DEVICE — BAG TRNSF 1000ML TWO CPLR TERUFLEX

## (undated) DEVICE — SUTURE VCRL SZ 3-0 L27IN ABSRB UD L36MM CT-1 1/2 CIR J258H

## (undated) DEVICE — COVER US PRB W12XL244CM SURGICAL INTRAOPERATIVE PLAS TAPR L

## (undated) DEVICE — APPLICATOR MEDICATED 10.5 CC SOLUTION HI LT ORNG CHLORAPREP

## (undated) DEVICE — KIT BLWR MISTER 5P 15L W/ TBNG SET IRRIG MIST TO IMPROVE

## (undated) DEVICE — CLIP INT L ORNG TI TRNSVRS GRV CHEVRON SHP W/ PRECIS TIP TO

## (undated) DEVICE — CANNULA PERF 7FR L5.5IN AORT ROOT RADPQ STD TIP W/ VENT LN

## (undated) DEVICE — Z CONVERTED USE 2271043 CONTAINER SPEC COLL 4OZ SCR ON LID PEEL PCH

## (undated) DEVICE — PACK PROCEDURE SURG OPN HRT A BASIC

## (undated) DEVICE — STERNUM BLADE, OFFSET (31.7 X 0.64 X 6.3MM)

## (undated) DEVICE — CHLORAPREP 26ML ORANGE

## (undated) DEVICE — ASPIRATION/ANTICOAGULATION SET: Brand: HAEMONETICS CELL SAVER SYSTEM

## (undated) DEVICE — KIT,ANTI FOG,W/SPONGE & FLUID,SOFT PACK: Brand: MEDLINE

## (undated) DEVICE — PUNCH AORT DIA4MM LNG HNDL

## (undated) DEVICE — MEDI-TRACE CADENCE ADULT, DEFIBRILLATION ELECTRODE -RTS  (10 PR/PK) - PHYSIO-CONTROL: Brand: MEDI-TRACE CADENCE

## (undated) DEVICE — SUTURE VCRL SZ 3-0 L27IN ABSRB UD L26MM SH 1/2 CIR J416H